# Patient Record
Sex: FEMALE | Race: WHITE | NOT HISPANIC OR LATINO | Employment: OTHER | URBAN - METROPOLITAN AREA
[De-identification: names, ages, dates, MRNs, and addresses within clinical notes are randomized per-mention and may not be internally consistent; named-entity substitution may affect disease eponyms.]

---

## 2022-03-28 ENCOUNTER — TELEPHONE (OUTPATIENT)
Dept: TRANSPLANT | Facility: CLINIC | Age: 57
End: 2022-03-28
Payer: COMMERCIAL

## 2022-03-28 NOTE — TELEPHONE ENCOUNTER
"----- Message from Kel Nayak sent at 3/28/2022 10:03 AM CDT -----    Have the patient's demographic sheet which includes: patient's name, mailing address, phone number and insurance information. We will check to see if patient medical records are in Care Everywhere: [facility]    Will fax "POST MD CARE AGREEMENT" letter to:    Local Hepatologist/Gastroenterologist: Sudarshan Mann MD   Phone: [phone]   Fax: [phone]        .    ----- Message -----  From: Kel Nayak  Sent: 3/28/2022  10:02 AM CDT  To: Kel Nayak      Returned call to pt and she told me that [facility] was told that Marjan in finance at [facility] faxed the pt recs to us for her to be seen in our clinic. Told her that we did not have any recs on her, but that we will be able to see her recs in Care Everywhere. She told me that she Is currently listed at [facility] with a MELD score of 19, but they will be moving to [place] and wanted to transfer he care here. Explained the referral process to her and her caregiver. Pt demos updated in Epic. Will send pt chart for review.    ==========================================================================    Speak with office  Received: Today  Vane Kimball sent to [facility] Pre-Liver Transplant Non-Clinical  Caller: Mickey Harris (Today,  8:51 AM)  Mickey Harris is calling to speak with Evelyn in regards to next step in transplant process. Records were faxed and pt would like confirmation.     [phone]        "

## 2022-04-04 ENCOUNTER — TELEPHONE (OUTPATIENT)
Dept: TRANSPLANT | Facility: CLINIC | Age: 57
End: 2022-04-04

## 2022-04-04 NOTE — TELEPHONE ENCOUNTER
Referral received from SELF    Patient with DORAN. MELD 19  ICD-10:  k74.60  Referred for liver transplant for  EVALUATION.    Referral completed and forwarded to [facility].    currently listed at [facility]    Insurance: [employer]  PRIMARY:   ID:  Contact #     SECONDARY:   ID:  Contact #

## 2022-04-07 ENCOUNTER — TELEPHONE (OUTPATIENT)
Dept: TRANSPLANT | Facility: CLINIC | Age: 57
End: 2022-04-07
Payer: COMMERCIAL

## 2022-04-07 NOTE — TELEPHONE ENCOUNTER
----- Message from Jethro Sterling sent at 4/7/2022  3:49 PM CDT -----  Regarding: miss call  Pt returning miss call    Call

## 2022-04-08 ENCOUNTER — TELEPHONE (OUTPATIENT)
Dept: TRANSPLANT | Facility: CLINIC | Age: 57
End: 2022-04-08
Payer: COMMERCIAL

## 2022-04-08 NOTE — TELEPHONE ENCOUNTER
----- Message from Kel Nayak sent at 4/8/2022 12:44 PM CDT -----  Regarding: FW: miss call    Returned call to pt to [facility] virtual appt w/Dr. Young, but the pt told me that they have left [place] and will be in [place] until SEPT. Virtual appt [facility]'ed with Dr. Regalado for 5/2  .  ----- Message -----  From: Jethro Sterling  Sent: 4/8/2022   9:23 AM CDT  To: [facility] Liver Referral Pool  Subject: miss call                                        Pt returning miss call    Call

## 2022-04-18 ENCOUNTER — TELEPHONE (OUTPATIENT)
Dept: TRANSPLANT | Facility: CLINIC | Age: 57
End: 2022-04-18

## 2022-04-18 NOTE — TELEPHONE ENCOUNTER
Spoke to pt. She is temporarily in [place] before the move to [place].  Pt is still listed in [place].

## 2022-04-29 RX ORDER — CHOLECALCIFEROL (VITAMIN D3) 125 MCG
125 CAPSULE ORAL DAILY
COMMUNITY
End: 2022-10-18

## 2022-04-29 RX ORDER — EVENING PRIMROSE OIL 500 MG
1 CAPSULE ORAL DAILY
Status: ON HOLD | COMMUNITY
End: 2023-01-31 | Stop reason: HOSPADM

## 2022-04-29 RX ORDER — ONDANSETRON 4 MG/1
4 TABLET, FILM COATED ORAL DAILY PRN
Status: ON HOLD | COMMUNITY
Start: 2021-09-30 | End: 2023-01-31 | Stop reason: HOSPADM

## 2022-04-29 RX ORDER — IBUPROFEN 100 MG/5ML
SUSPENSION, ORAL (FINAL DOSE FORM) ORAL
COMMUNITY
End: 2022-10-18

## 2022-04-29 RX ORDER — ALBUTEROL SULFATE 90 UG/1
AEROSOL, METERED RESPIRATORY (INHALATION)
Status: ON HOLD | COMMUNITY
Start: 2021-12-17 | End: 2023-01-31 | Stop reason: HOSPADM

## 2022-04-29 RX ORDER — FLUTICASONE PROPIONATE 220 UG/1
1 AEROSOL, METERED RESPIRATORY (INHALATION) DAILY PRN
Status: ON HOLD | COMMUNITY
Start: 2021-10-20 | End: 2023-01-31 | Stop reason: HOSPADM

## 2022-04-29 RX ORDER — LACTULOSE 10 G/15ML
SOLUTION ORAL; RECTAL
Status: ON HOLD | COMMUNITY
Start: 2022-04-26 | End: 2023-01-31 | Stop reason: HOSPADM

## 2022-04-29 RX ORDER — SPIRONOLACTONE 50 MG/1
100 TABLET, FILM COATED ORAL DAILY
Status: ON HOLD | COMMUNITY
Start: 2022-04-26 | End: 2023-01-31 | Stop reason: HOSPADM

## 2022-04-29 RX ORDER — BENZONATATE 100 MG/1
100 CAPSULE ORAL
COMMUNITY
Start: 2021-10-29 | End: 2022-06-30 | Stop reason: ALTCHOICE

## 2022-04-29 RX ORDER — AA/PROT/LYSINE/METHIO/VIT C/B6 50-12.5 MG
1 TABLET ORAL DAILY
Status: ON HOLD | COMMUNITY
Start: 2022-04-13 | End: 2023-01-31 | Stop reason: HOSPADM

## 2022-04-29 RX ORDER — FUROSEMIDE 20 MG/1
40 TABLET ORAL DAILY
Status: ON HOLD | COMMUNITY
Start: 2022-04-26 | End: 2023-02-01 | Stop reason: SDUPTHER

## 2022-04-29 RX ORDER — NICOTINE POLACRILEX 2 MG
1 GUM BUCCAL DAILY
Status: ON HOLD | COMMUNITY
End: 2023-01-31 | Stop reason: HOSPADM

## 2022-05-02 ENCOUNTER — OFFICE VISIT (OUTPATIENT)
Dept: TRANSPLANT | Facility: CLINIC | Age: 57
End: 2022-05-02
Payer: COMMERCIAL

## 2022-05-02 ENCOUNTER — PATIENT MESSAGE (OUTPATIENT)
Dept: TRANSPLANT | Facility: CLINIC | Age: 57
End: 2022-05-02
Payer: COMMERCIAL

## 2022-05-02 DIAGNOSIS — K76.82 HEPATIC ENCEPHALOPATHY: ICD-10-CM

## 2022-05-02 DIAGNOSIS — I85.10 SECONDARY ESOPHAGEAL VARICES WITHOUT BLEEDING: ICD-10-CM

## 2022-05-02 DIAGNOSIS — K75.81 LIVER CIRRHOSIS SECONDARY TO NASH: Primary | ICD-10-CM

## 2022-05-02 DIAGNOSIS — R18.8 OTHER ASCITES: ICD-10-CM

## 2022-05-02 DIAGNOSIS — K74.60 LIVER CIRRHOSIS SECONDARY TO NASH: Primary | ICD-10-CM

## 2022-05-02 DIAGNOSIS — R79.89 ELEVATED LFTS: ICD-10-CM

## 2022-05-02 PROCEDURE — 99204 OFFICE O/P NEW MOD 45 MIN: CPT | Mod: 95,TXP,, | Performed by: STUDENT IN AN ORGANIZED HEALTH CARE EDUCATION/TRAINING PROGRAM

## 2022-05-02 PROCEDURE — 99204 PR OFFICE/OUTPT VISIT, NEW, LEVL IV, 45-59 MIN: ICD-10-PCS | Mod: 95,TXP,, | Performed by: STUDENT IN AN ORGANIZED HEALTH CARE EDUCATION/TRAINING PROGRAM

## 2022-05-02 NOTE — PROGRESS NOTES
Transplant Hepatology   Transplant Evaluation Consult Note    The patient location is: home ([place])  The chief complaint leading to consultation is: DORAN cirrhosis    Visit type: audiovisual    Face to Face time with patient: 25  45 minutes of total time spent on the encounter, which includes face to face time and non-face to face time preparing to see the patient (eg, review of tests), Obtaining and/or reviewing separately obtained history, Documenting clinical information in the electronic or other health record, Independently interpreting results (not separately reported) and communicating results to the patient/family/caregiver, or Care coordination (not separately reported).     Each patient to whom he or she provides medical services by telemedicine is:  (1) informed of the relationship between the physician and patient and the respective role of any other health care provider with respect to management of the patient; and (2) notified that he or she may decline to receive medical services by telemedicine and may withdraw from such care at any time.      Referring provider: Aaareferral Self  PCP: No primary care provider on file.    Chief complaint:  DORAN cirrhosis, transplant evaluation    HPI: Mickey Harris is a 57 y.o. female who was referred to Transplant Hepatology Clinic for DORAN related cirrhosis. She is accompanied by her .    She reports being diagnosed with cirrhosis approximately 4 years ago.  She was found to have leukopenia and thrombocytopenia.  Ultrasound revealed hepatic steatosis and splenomegaly.  Subsequent MRI was suggestive of cirrhosis, and FibroScan showed F4 fibrosis, cirrhosis.  Serologic evaluation was negative, and her cirrhosis was attributed to DORAN.    She did well until approximately 1 year ago when she developed lower extremity edema and abdominal distension for which she was started on diuretics.  Subsequent imaging confirmed moderate volume ascites.  She has remained on  diuretics and has not required paracentesis.  Her cirrhosis has also been complicated by hepatic encephalopathy which is well controlled with lactulose.  She has had intermittent scleral icterus.  No recent GI bleeding.    She does not drink alcohol.  She was a previous social drinker but denies history of heavy alcohol use.  Previous workup was negative for chronic viral hepatitis.  She says that her mother  at age 24 from cirrhosis related to congenital portal vein issues.  Her son has elevated bilirubin.  She has no other known family history of liver disease.      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     Mild tricuspid regurgitation     Unspecified cirrhosis of liver        Past Surgical History:   Procedure Laterality Date    cholecystectomy      COLONOSCOPY      6 polyps removed    COLONOSCOPY  2021    ESOPHAGOGASTRODUODENOSCOPY      2 coulums of grade 1 varices    ESOPHAGOGASTRODUODENOSCOPY  2019    trace varices    ESOPHAGOGASTRODUODENOSCOPY  2021    gynecologic surgery       removal of scar tissues and adhesions    knee surgery      OOPHORECTOMY Right     OPEN hysterectomy      removed uterus and bilateral fallopian tubes and LEFT ovary stayed in place    ROTATOR CUFF REPAIR Right     TONSILLECTOMY  1972       Family History   Problem Relation Age of Onset    Hypertension Father     Hyperlipidemia Father     Heart disease Father     Depression Father     Arrhythmia Brother     Heart disease Brother        Social History     Tobacco Use    Smoking status: Former Smoker     Packs/day: 1.00     Types: Cigarettes     Quit date:      Years since quittin.3    Smokeless tobacco: Never Used   Substance Use Topics    Alcohol use: Not Currently     Comment:  2020 few sips of beer. before that last alcohol was 2019 no DUI hx       Current Outpatient Medications   Medication Sig Dispense Refill    albuterol (PROVENTIL/VENTOLIN HFA) 90 mcg/actuation inhaler SMARTSI Puff(s) Via Inhaler Every 4 Hours PRN      ascorbic acid, vitamin C, (VITAMIN C) 1000 MG tablet Take by mouth.      benzonatate (TESSALON) 100 MG capsule Take 100 mg by mouth.      biotin 1 mg Cap Take 1 mg by mouth.      cholecalciferol, vitamin D3, 125 mcg (5,000 unit) capsule Take 125 mcg by mouth.      cyanocobalamin, vitamin B-12, 50 mcg tablet Take 1 tablet by mouth once daily.      fluticasone propionate (FLOVENT HFA) 220 mcg/actuation inhaler Inhale 1 puff into the lungs.      furosemide (LASIX) 20 MG tablet Take 40 mg by mouth once daily.      lactulose (CHRONULAC) 10 gram/15 mL solution SMARTSIG:15 Milliliter(s) By Mouth 3 Times Daily      MG-PLUS-PROTEIN 133 mg tablet Take 1 tablet by mouth 3 (three) times daily with meals.      ondansetron (ZOFRAN) 4 MG tablet Take 4 mg by mouth.      spironolactone (ALDACTONE) 50 MG tablet Take 100 mg by mouth once daily.      vitamin E, dl,tocopheryl acet, (VITAMIN E, DL, ACETATE,) 45 mg (100 unit) Cap Take 1 tablet by mouth.       No current facility-administered medications for this visit.       Review of patient's allergies indicates:   Allergen Reactions    Levofloxacin Hives and Shortness Of Breath    Sulfa (sulfonamide antibiotics) Rash       Review of Systems   Constitutional: Negative for fever and weight loss.   Gastrointestinal: Negative for abdominal pain, blood in stool, constipation, diarrhea, heartburn, melena, nausea and vomiting.       There were no vitals filed for this visit.    Physical Exam  Constitutional:       General: She is not in acute distress.     Appearance: She is not ill-appearing.   HENT:      Head: Normocephalic and atraumatic.   Eyes:      Extraocular Movements: Extraocular movements intact.   Pulmonary:       Effort: No respiratory distress.   Neurological:      Mental Status: She is alert.         LABS: I personally reviewed pertinent laboratory findings.    No results found for: ALT, AST, GGT, ALKPHOS, BILITOT    No results found for: WBC, HGB, HCT, MCV, PLT    No results found for: NA, K, CL, CO2, BUN, CREATININE, CALCIUM, ANIONGAP, ESTGFRAFRICA, EGFRNONAA    No results found for: INR, PROTIME    Imaging:  I personally reviewed recent imaging studies available on the chart and from outside medical records.      Assessment:  57 y.o. female presenting with DORAN related cirrhosis. She is decompensated with ascites, HE, jaundice.    1. Liver cirrhosis secondary to DORAN    2. Secondary esophageal varices without bleeding    3. Other ascites    4. Hepatic encephalopathy    5. Elevated LFTs        Computed MELD-Na score unavailable. Necessary lab results were not found in the last year.  Computed MELD score unavailable. Necessary lab results were not found in the last year.      Transplant Candidacy: Patient is a 57 y.o. female with DORAN related cirrhosis with MELD-Na 17 on outside labs here for evaluation for possible OLT. Based on available information, she is a potential liver transplant candidate. She will undergo liver transplant evaluation after financial approval is obtained. She will be presented to Liver Transplant Selection Committee after all tests and evaluations are complete.    Recommendations:  1. Ascites/Edema: 2 gm Na diet. Continue Lasix 40 mg daily and Aldactone 100 mg daily    2. Encephalopathy: Continue lactulose. Titrate to maintain 3-4 bowel movements per day.    3. Variceal screening: EGD in 07/2021 showed trace varices. Repeat EGD in 07/2022.    4. HCC screening: US in 11/2021 without HCC. AFP 4. Repeat abdominal US and AFP every 6 months.    5. Immunizations: Recommend HAV and HBV vaccinations if not immune      UNOS Patient Status  Functional Status: 70% - Cares for self: unable to carry on normal  activity or active work  Physical Capacity: No Limitations    Diabetes: No  Any previous malignancy: No  Neoadjuvant Therapy: no  Has patient ever had a dx of HCC: no  Previous Abdominal Surgery: yes  Spontaneous Bacterial Peritonitis: no  History of Portal Vein Thrombosis: no  Transjugular Intrahepatic Portosystemic Shunt: no    Return to clinic within 3 months.    Kg Regalado MD  Staff Physician  Hepatology and Liver Transplant  [facility]  [facility]

## 2022-05-10 ENCOUNTER — TELEPHONE (OUTPATIENT)
Dept: TRANSPLANT | Facility: CLINIC | Age: 57
End: 2022-05-10
Payer: COMMERCIAL

## 2022-05-10 NOTE — TELEPHONE ENCOUNTER
"One Lorazepam prescription given to you today  Two Oxycodone prescriptions given to you today  STAY OFF OF Amlodipine  START Zetia  Get calcium in your diet rather than supplement  Enjoy [place]  See me in the spring/early summer for \"physical\"    " Called pt to discuss [facility] liver transplant evaluation.  Informed patient  that evaluation will take approx  2 to 3 days to complete, depending on rather additional consults.  Informed  her that all testing will be performed outpatient.  Patient notified that some testing may be completed outside.  Patient informed of which tests that can be scheduled locally.  Patient voiced understanding of the need to have her caregiver present for the . All questions/ concerns regarding liver transplant evaluation answered/ addressed.    Patient is currently listed for transplant at [facility] and hopes to get listed at [facility] too. Patient picked [facility] eval dates of June 30th and July 1st.     Not allergic to iodine. EGD and Colonoscopy done at [facility]. OBGYN and mammogram also at [facility].     [facility] pre-liver coordinator is Kay Nayak [phone].    Patient lives in [place] and in [place].

## 2022-05-19 ENCOUNTER — PATIENT MESSAGE (OUTPATIENT)
Dept: TRANSPLANT | Facility: CLINIC | Age: 57
End: 2022-05-19
Payer: COMMERCIAL

## 2022-05-19 ENCOUNTER — TELEPHONE (OUTPATIENT)
Dept: TRANSPLANT | Facility: CLINIC | Age: 57
End: 2022-05-19
Payer: COMMERCIAL

## 2022-05-19 NOTE — TELEPHONE ENCOUNTER
----- Message from Rodger Chen sent at 5/19/2022  2:35 PM CDT -----  Regarding: speak to nurse  Contact: Mickey Garduno is calling to speak to nurse.    Contact: [phone]

## 2022-05-19 NOTE — TELEPHONE ENCOUNTER
Returned call to pt. She stated that her [facility] MD wants to request the following two tests to be done during her liver transplant evaluation.    MRI abdomen w & w/o contrast  US abdomen    Explained that these are common tests ordered during our liver transplant evals, but that I would make sure that her coordinator, Santhosh, placed them in her eval order set. Understanding expressed. No other questions at this time.

## 2022-05-20 ENCOUNTER — TELEPHONE (OUTPATIENT)
Dept: TRANSPLANT | Facility: CLINIC | Age: 57
End: 2022-05-20
Payer: COMMERCIAL

## 2022-05-20 NOTE — TELEPHONE ENCOUNTER
Called patient to let her know that her evaluation will include an MRI w &wo contrast and a complete abd US with doppler per our transplant protocol for evaluation.   Patient sending message to [facility] to let them know the testing that will be ordered.

## 2022-06-21 ENCOUNTER — TELEPHONE (OUTPATIENT)
Dept: TRANSPLANT | Facility: CLINIC | Age: 57
End: 2022-06-21
Payer: COMMERCIAL

## 2022-06-21 ENCOUNTER — PATIENT MESSAGE (OUTPATIENT)
Dept: TRANSPLANT | Facility: CLINIC | Age: 57
End: 2022-06-21
Payer: COMMERCIAL

## 2022-06-21 DIAGNOSIS — Z76.82 ORGAN TRANSPLANT CANDIDATE: Primary | ICD-10-CM

## 2022-06-21 DIAGNOSIS — R05.9 COUGH: ICD-10-CM

## 2022-06-21 NOTE — TELEPHONE ENCOUNTER
Evaluation orders in Epic.  Evaluation worksheet given to .   Financially cleared for evaluation.   Will schedule updated Mammogram during eval.  Patient had hysterectomy and does not need pap smear.  Patient sent last pap smear from last July in [place].

## 2022-06-23 DIAGNOSIS — Z76.82 ORGAN TRANSPLANT CANDIDATE: Primary | ICD-10-CM

## 2022-06-29 ENCOUNTER — TELEPHONE (OUTPATIENT)
Dept: TRANSPLANT | Facility: CLINIC | Age: 57
End: 2022-06-29
Payer: COMMERCIAL

## 2022-06-29 NOTE — TELEPHONE ENCOUNTER
Patient called to confirm that they will be attending the scheduled appointments for Liver Transplant [facility] Evaluation scheduled to start 6/30  at 0730.  Patient confirms that caregivers will be present for the scheduled appointments.  Patient appointments reviewed along with location and special instructions.  Patient questions answered at this time and number provided to call the office if there is any problem.

## 2022-06-30 ENCOUNTER — HOSPITAL ENCOUNTER (OUTPATIENT)
Dept: RADIOLOGY | Facility: HOSPITAL | Age: 57
Discharge: HOME | End: 2022-06-30
Attending: STUDENT IN AN ORGANIZED HEALTH CARE EDUCATION/TRAINING PROGRAM
Payer: COMMERCIAL

## 2022-06-30 ENCOUNTER — EVALUATION (OUTPATIENT)
Dept: TRANSPLANT | Facility: CLINIC | Age: 57
End: 2022-06-30
Payer: COMMERCIAL

## 2022-06-30 ENCOUNTER — CLINICAL SUPPORT (OUTPATIENT)
Dept: TRANSPLANT | Facility: CLINIC | Age: 57
End: 2022-06-30
Payer: COMMERCIAL

## 2022-06-30 VITALS
OXYGEN SATURATION: 98 % | RESPIRATION RATE: 18 BRPM | WEIGHT: 188.25 LBS | DIASTOLIC BLOOD PRESSURE: 64 MMHG | TEMPERATURE: 97 F | HEIGHT: 64 IN | OXYGEN SATURATION: 98 % | RESPIRATION RATE: 18 BRPM | OXYGEN SATURATION: 98 % | SYSTOLIC BLOOD PRESSURE: 128 MMHG | HEIGHT: 64 IN | BODY MASS INDEX: 32.14 KG/M2 | SYSTOLIC BLOOD PRESSURE: 128 MMHG | HEART RATE: 83 BPM | BODY MASS INDEX: 32.14 KG/M2 | RESPIRATION RATE: 18 BRPM | DIASTOLIC BLOOD PRESSURE: 64 MMHG | WEIGHT: 188.25 LBS | BODY MASS INDEX: 32.14 KG/M2 | HEIGHT: 64 IN | SYSTOLIC BLOOD PRESSURE: 128 MMHG | HEART RATE: 83 BPM | TEMPERATURE: 97 F | DIASTOLIC BLOOD PRESSURE: 64 MMHG | HEART RATE: 83 BPM | WEIGHT: 188.25 LBS | TEMPERATURE: 97 F

## 2022-06-30 DIAGNOSIS — Z76.82 ORGAN TRANSPLANT CANDIDATE: ICD-10-CM

## 2022-06-30 DIAGNOSIS — Z12.31 BREAST CANCER SCREENING BY MAMMOGRAM: ICD-10-CM

## 2022-06-30 DIAGNOSIS — R05.9 COUGH: ICD-10-CM

## 2022-06-30 DIAGNOSIS — Z01.818 PRE-TRANSPLANT EVALUATION FOR LIVER TRANSPLANT: Primary | ICD-10-CM

## 2022-06-30 LAB
AMPHET+METHAMPHET UR QL: NEGATIVE
BACTERIA #/AREA URNS AUTO: NORMAL /HPF
BARBITURATES UR QL SCN>200 NG/ML: NEGATIVE
BENZODIAZ UR QL SCN>200 NG/ML: NEGATIVE
BILIRUB UR QL STRIP: NEGATIVE
BZE UR QL SCN: NEGATIVE
CANNABINOIDS UR QL SCN: NEGATIVE
CLARITY UR REFRACT.AUTO: ABNORMAL
COLOR UR AUTO: YELLOW
CREAT UR-MCNC: 141 MG/DL (ref 15–325)
ETHANOL UR-MCNC: <10 MG/DL
GLUCOSE UR QL STRIP: NEGATIVE
HGB UR QL STRIP: NEGATIVE
KETONES UR QL STRIP: NEGATIVE
LEUKOCYTE ESTERASE UR QL STRIP: NEGATIVE
METHADONE UR QL SCN>300 NG/ML: NEGATIVE
MICROSCOPIC COMMENT: NORMAL
NITRITE UR QL STRIP: NEGATIVE
OPIATES UR QL SCN: NEGATIVE
PCP UR QL SCN>25 NG/ML: NEGATIVE
PH UR STRIP: 6 [PH] (ref 5–8)
PROT UR QL STRIP: NEGATIVE
RBC #/AREA URNS AUTO: 1 /HPF (ref 0–4)
SP GR UR STRIP: 1.02 (ref 1–1.03)
SQUAMOUS #/AREA URNS AUTO: 1 /HPF
TOXICOLOGY INFORMATION: NORMAL
URN SPEC COLLECT METH UR: ABNORMAL
WBC #/AREA URNS AUTO: 2 /HPF (ref 0–5)

## 2022-06-30 PROCEDURE — 77063 MAMMO DIGITAL SCREENING BILAT WITH TOMO: ICD-10-PCS | Mod: 26,TXP,, | Performed by: RADIOLOGY

## 2022-06-30 PROCEDURE — 81001 URINALYSIS AUTO W/SCOPE: CPT | Mod: TXP | Performed by: STUDENT IN AN ORGANIZED HEALTH CARE EDUCATION/TRAINING PROGRAM

## 2022-06-30 PROCEDURE — 93975 VASCULAR STUDY: CPT | Mod: 26,GC,TXP, | Performed by: INTERNAL MEDICINE

## 2022-06-30 PROCEDURE — 77063 BREAST TOMOSYNTHESIS BI: CPT | Mod: TC,TXP

## 2022-06-30 PROCEDURE — 71250 CT CHEST WITHOUT CONTRAST: ICD-10-PCS | Mod: 26,TXP,, | Performed by: RADIOLOGY

## 2022-06-30 PROCEDURE — 71250 CT THORAX DX C-: CPT | Mod: 26,TXP,, | Performed by: RADIOLOGY

## 2022-06-30 PROCEDURE — 99999 PR PBB SHADOW E&M-EST. PATIENT-LVL III: ICD-10-PCS | Mod: PBBFAC,TXP,,

## 2022-06-30 PROCEDURE — 76700 US EXAM ABDOM COMPLETE: CPT | Mod: 26,59,GC,TXP | Performed by: INTERNAL MEDICINE

## 2022-06-30 PROCEDURE — 77063 BREAST TOMOSYNTHESIS BI: CPT | Mod: 26,TXP,, | Performed by: RADIOLOGY

## 2022-06-30 PROCEDURE — 93975 US ABDOMEN COMP WITH DOPPLER_PRE LIVER TRANSPLANT: ICD-10-PCS | Mod: 26,GC,TXP, | Performed by: INTERNAL MEDICINE

## 2022-06-30 PROCEDURE — 76700 US EXAM ABDOM COMPLETE: CPT | Mod: TC,TXP,59

## 2022-06-30 PROCEDURE — 77067 SCR MAMMO BI INCL CAD: CPT | Mod: 26,TXP,, | Performed by: RADIOLOGY

## 2022-06-30 PROCEDURE — 99215 OFFICE O/P EST HI 40 MIN: CPT | Mod: S$GLB,TXP,, | Performed by: TRANSPLANT SURGERY

## 2022-06-30 PROCEDURE — 97802 MEDICAL NUTRITION INDIV IN: CPT | Mod: S$GLB,TXP,, | Performed by: DIETITIAN, REGISTERED

## 2022-06-30 PROCEDURE — 80307 DRUG TEST PRSMV CHEM ANLYZR: CPT | Mod: TXP | Performed by: STUDENT IN AN ORGANIZED HEALTH CARE EDUCATION/TRAINING PROGRAM

## 2022-06-30 PROCEDURE — 99999 PR PBB SHADOW E&M-EST. PATIENT-LVL III: CPT | Mod: PBBFAC,TXP,,

## 2022-06-30 PROCEDURE — 99215 PR OFFICE/OUTPT VISIT, EST, LEVL V, 40-54 MIN: ICD-10-PCS | Mod: S$GLB,TXP,, | Performed by: TRANSPLANT SURGERY

## 2022-06-30 PROCEDURE — 76700 US ABDOMEN COMP WITH DOPPLER_PRE LIVER TRANSPLANT: ICD-10-PCS | Mod: 26,59,GC,TXP | Performed by: INTERNAL MEDICINE

## 2022-06-30 PROCEDURE — 97802 PR MED NUTR THER, 1ST, INDIV, EA 15 MIN: ICD-10-PCS | Mod: S$GLB,TXP,, | Performed by: DIETITIAN, REGISTERED

## 2022-06-30 PROCEDURE — 93975 VASCULAR STUDY: CPT | Mod: TC,TXP

## 2022-06-30 PROCEDURE — 77067 MAMMO DIGITAL SCREENING BILAT WITH TOMO: ICD-10-PCS | Mod: 26,TXP,, | Performed by: RADIOLOGY

## 2022-06-30 PROCEDURE — 71250 CT THORAX DX C-: CPT | Mod: TC,TXP

## 2022-06-30 NOTE — PROGRESS NOTES
Transplant Surgery Liver Transplant Recipient Evaluation    Referring Physician: Rosario Self  Corresponding Physician:     Subjective:     Reason for Visit: evaluate liver transplant candidacy    History of Present Illness: Mickey Harris is a 57 y.o. year old female who is being evaluated for liver transplantation.    Transplant History: N/A    Native Liver Disease (UNOS terminology): Cirrhosis: Fatty Liver (DORAN) (based on medical records from referral).    Manifestations of liver disease: ascites (diuretic-dependent), edema, encephalopathy, esophageal or gastric varices, fatigue, muscle wasting and thrombocytopenia  MELD-Na score: 15 at 6/30/2022  8:20 AM  MELD score: 15 at 6/30/2022  8:20 AM  Calculated from:  Serum Creatinine: 0.6 mg/dL (Using min of 1 mg/dL) at 6/30/2022  8:20 AM  Serum Sodium: 137 mmol/L at 6/30/2022  8:20 AM  Total Bilirubin: 3.5 mg/dL at 6/30/2022  8:20 AM  INR(ratio): 1.4 at 6/30/2022  8:20 AM  Age: 57 years    External provider notes reviewed: Yes    PMH: reviewed  PSH: reviewed    Review of Systems  Objective:     Physical Exam:  Constitutional:   Vitals reviewed: yes   Well-nourished and well-groomed: yes  Eyes:   Sclerae icteric: no   Extraocular movements intact: yes  GI:    Bowel sounds normal: yes   Tenderness: no    If yes, quadrant/location: not applicable   Palpable masses: no    If yes, quadrant/location: not applicable   Hepatosplenomegaly: no   Ascites: no   Hernia: no    If yes, type/location: not applicable   Surgical scars: yes    If yes, type/location: laparoscopic port sites  not applicable  Resp:   Effort normal: yes   Breath sounds normal: yes    CV:   Regular rate and rhythm: yes   Heart sounds normal: yes   Femoral pulses normal: yes   Extremities edematous: no  Skin:   Rashes or lesions present: no    If yes, describe:not applicable   Jaundice:: no    Musculoskeletal:   Gait normal: yes   Strength normal: yes  Psych:   Oriented to person, place, and time:  yes   Affect and mood normal: yes    Additional comments: obese abodmen    Diagnostics:  The following labs have been reviewed: CBC  CMP  The following radiology images have been independently reviewed and interpreted: scans pending         Transplant Surgery - Candidacy   Assessment/Plan:   I see no surgical contraindication to liver transplantation. Based on available information, Mickey Harris is a suitable liver transplant candidate. Final determination of transplant candidacy will be made once evaluation is complete and reviewed by the Liver Transplant Selection Committee.    Patient advised that it is recommended that all transplant candidates, and their close contacts and household members receive Covid vaccination.    Additional testing to be completed according to Liver : Written Order Guideline for Adult Liver Transplant Evaluation (LI-02)    Interpretation of tests and discussion of patient management involves all members of the multidisciplinary transplant team.    Tj Zazueta Jr, MD          Counseling: We provided Mickey Harris with a group education session today.  We discussed liver transplantation at length with her, including risks, potential complications, and alternatives in the management of her liver disease.  The discussion included complications related to anesthesia, bleeding, infection, primary nonfunction, and vascular thrombosis.  I discussed the typical postoperative course, length of hospitalization, the need for long-term immunosuppression, and the need for long-term routine follow-up.  I discussed living-donor and -donor transplantation and the relative advantages and disadvantages of each.  I also discussed average waiting times for both living donation and  donation.  I discussed national and center-specific survival rates.  I also mentioned the potential benefit of multicenter listing to candidates listed with centers within more than one organ procurement  organization.  All questions were answered.    Coronavirus disease (COVID-19) caused by severe acute respiratory virus coronavirus 2 (SARS-C0V 2) is associated with increased mortality in solid organ transplant recipients (SOT) compared to non-transplant patients. Vaccine responses to vaccination are depressed against SARS-CoV2 compared to normal individuals but improve with third vaccination doses. Vaccination prior to SOT provides both the best opportunity for transplant candidates to develop protective immunity and to reduce the risk of serious COVID19 infections post transplantation. Organ transplant candidates at [facility] will be required to receive SARS-CoV-2 vaccination prior to being listed with a an active status, whenever possible. Exceptions will be made for disability related reasons or for sincerely held Restorationism beliefs.     PHS: I discussed the use of organs from donors with PHS risk criteria, including the testing protocols utilized, as well as data from the literature regarding the likelihood of transmission of hepatitis or HIV.  The patient is willing to consider such grafts.  DCD: I discussed the use of organs recovered by donation after cardiac death (DCD), including slightly decreased graft survival and greater risk of arterial and biliary complications. The potential advantage to the recipient is possibly receiving a transplant sooner by accepting such an organ. The patient is willing to consider such grafts.  HBcAb: I discussed the use of organs from donors with HBcAb in conjunction with long term use of HBV antiviral drugs, such as lamivudine. The small but measurable risk of hepatitis B seroconversion was discussed as well as the potentially life long need to continue antiviral drugs. The patient is willing to consider such grafts.  HCV Non-viremic recipient: I discussed the use of HCV-positive organs in naive recipients, including the risk of viral  transmission to the patients or others, potential insurance barriers for antiviral medication coverage, risk for fibrosing cholestatic hepatitis, death or graft loss. The potential advantage to the recipient is the possibility of receiving a transplant sooner with decreased mortality risk by accepting such an organ. The patient is willing to consider such grafts.  LDLT: I discussed the nature of living donor liver transplant, including donor risks and more frequent recipient complications. The patient is willing to consider such grafts.

## 2022-06-30 NOTE — PROGRESS NOTES
"Transplant Recipient Adult Psychosocial Assessment    Mickey Harris  [address] [facility] [address]   Patient and spouse live between [facility] at this time. If listed, patient plans to permanently move out of [place] at the end of the summer and relocate to [place] prior to transplant.   Telephone Information:   Mobile [phone]   Home  [phone] (home)  Work  There is no work phone number on file.  E-mail  [email or web address]    Sex: female  YOB: 1965  Age: 57 y.o.    Encounter Date: 6/30/2022  [place] Citizen: yes  Primary Language: English   Needed: no    Emergency Contact:  Name: Kg Harris  Relationship: spouse  Address: same as patient  Phone Numbers:  [phone] (mobile)    Family/Social Support:   Number of dependents/: Patient has no dependents in need of . Patient has 3 pets that she is currently trying to secure pet care for.  Marital history:   Other family dynamics: Patient's biological mother passed away when patient was 5 years old. Patient's biological father and step-mother are a part of patient's life and live in [place]. Patient has been  to her spouse, Kg, for 39 years. Patient and spouse have 1 daughter, Simon Briones" (age 38), and 1 son, Jaison (age 37). Patient also has 2 brothers and in laws. Patient reports good relationship with family members.    Household Composition:  Name: Mickey Harris  Age: 57  Relationship: patient  Does person drive? sometimes     Name: Kg Harris  Age: 59  Relationship: spouse  Does person drive? yes     Pets: 3 dogs    *Patient and spouse are currently living on properties between [place], [place]. Patient also reports they have an RV they may utilize to stay closer to [facility] and will look at RV campgrounds. Patient plans to return to [place] after local transplant stay for a few more months if she may have to come to local appointments. Patient and spouse " plan to permanently move to their property in [place] in the future.     Do you and your caregivers have access to reliable transportation? yes  PRIMARY CAREGIVER: Kg Harris, spouse, will be primary caregiver, phone number [phone]. Patient's spouse was present during this assessment and reports commitment to serve as patient's primary caregiver. Patient's spouse is retired, drives and has transportation, and is in adequate health.       provided in-depth information to patient and caregiver regarding pre- and post-transplant caregiver role.   strongly encourages patient and caregiver to have concrete plan regarding post-transplant care giving, including back-up caregiver(s) to ensure care giving needs are met as needed.    Patient and Caregiver states understanding all aspects of caregiver role/commitment and is able/willing/committed to being caregiver to the fullest extent necessary.    Patient and Caregiver verbalizes understanding of the education provided today and caregiver responsibilities.         remains available. Patient and Caregiver agree to contact  in a timely manner if concerns arise.      Able to take time off work without financial concerns: yes.     Additional Significant Others who will Assist with Transplant:  Name: Kobe Harris (Confirmed via phone)  Age: 38  City: [place] State: [place]  Relationship: daughter  Does person drive? yes   Phone: [phone]    Name: Santhosh Gandara  Age: 75  City: [place] State: [place]  Relationship: step-mother  Does person drive? yes   Phone: [phone]     Living Will: Patient reports having personal living will  Healthcare Power of : Patient reports having personal healthcare power of . Patient reports her spouse, Kg Harris, is her POA.   Advance Directives on file: <<no information> per medical record.  Verbally reviewed LW/HCPA information. Patient reports she has personal living  will and Healthcare POA documents she will file with medical records  provided patient with copy of LW/HCPA documents and provided education on completion of forms.    Living Donors: Patient reports her daughter, Kobe, is interested in being a living donor if possible.  provided brief overview and advised patient to speak to coordinator regarding living donor process.    Highest Education Level: Post-College Graduate Degree  Reading Ability: college  Reports difficulty with: vision (wears prescription glasses). Patient also reports weekly memory issues due to ammonia levels. Patient reports good adherence with Lactulose as needed  Learns Best By: Combination      Status: no  VA Benefits: no     Working for Income: No  If no, reason not working: Demands of Treatment  Patient is currently unemployed. Patient was released from her company in 2022 and last worked in 2021 as an  .     Spouse/Significant Other Employment: Retired    Disabled: Patient is currently on long term disability from last employer. Patient has had assistance from [employer] and a  to apply for Social Security Disability and is awaiting decision.     Monthly Income: ~$10,000/month combined household  Able to afford all costs now and if transplanted, including medications: yes  Patient and Caregiver verbalizes understanding of personal responsibilities related to transplant costs and the importance of having a financial plan to ensure that patients transplant costs are fully covered.       provided fundraising information/education. Patient and Caregiververbalizes understanding.   remains available.    Insurance:   Payer/Plan Subscr  Sex Relation Sub. Ins. ID Effective Group Num   1. JAKI - AETNA* MARYAN HOOD 1965 Female Self [id number] 1/1/15 [id number]                                    [id number]   2. JAKI - [employer]* MARYAN HOOD  1965 Female Self [id number] 1/1/15                                    [address]     Primary Insurance (for UNOS reporting): Private Insurance  Secondary Insurance (for UNOS reporting): Private Insurance  Patient and Caregiver verbalizes clear understanding that patient may experience difficulty obtaining and/or be denied insurance coverage post-surgery. This includes and is not limited to disability insurance, life insurance, health insurance, burial insurance, long term care insurance, and other insurances.      Patient and Caregiver also reports understanding that future health concerns related to or unrelated to transplantation may not be covered by patient's insurance.  Resources and information provided and reviewed.     Patient and Caregiver provides verbal permission to release any necessary information to outside resources for patient care and discharge planning.  Resources and information provided are reviewed.      Dialysis Adherence: Patient denies any dialysis  Infusion Service: patient utilizing? no  Home Health: patient utilizing? no  DME: no  Pulmonary/Cardiac Rehab: no   ADLS:  Patient reports she is independent with daily activities and drives sometimes.     Adherence:  Patient reports high level of adherence with medical and medication regimens.  Adherence education and counseling provided.     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 before that last alcohol was 2019 no DUI hx     Social History     Substance and Sexual Activity   Drug Use Not on file     Social History     Substance and Sexual Activity   Sexual Activity Not on file       Per Today's Psychosocial:  Tobacco: none, patient denies any use.   Alcohol: none, patient denies any use. Patient's last alcohol use was over 2 years ago and only social drinking (1-2 drinks per month) prior to that time.   Illicit Drugs/Non-prescribed Medications: none, patient denies any use.    Patient and Caregiver states clear understanding of the potential impact of substance use as it relates to transplant candidacy and is aware of possible random substance screening. Substance abstinence/cessation counseling, education and resources provided and reviewed.     Arrests/DWI/Treatment/Rehab: patient denies    Psychiatric History:    Mental Health: Patient reports some recent anxiety related to current health concerns  Psychiatrist/Counselor: Patient has seen a psychologist in [place] and has the name of a counselor she plans to contact if needed.   Medications: Patient prefers not to take any medication for anxiety at this time   Suicide/Homicide Issues: Patient denies any SI or HI  Safety at home: Patient reports living in a safe environment at home and denies any concerns    Knowledge: Patient and Caregiver states having clear understanding and realistic expectations regarding the potential risks and potential benefits of organ transplantation and organ donation and agrees to discuss with health care team members and support system members, as well as to utilize available resources and express questions and/or concerns in order to further facilitate the pt informed decision-making.  Resources and information provided and reviewed.    Patient and Caregiver is aware of [facility]'s affiliation and/or partnership with agencies in home health care, LTAC, SNF, [facility], and other hospitals and  clinics.    Understanding: Patient and Caregiver reports having a clear understanding of the many lifetime commitments involved with being a transplant recipient, including costs, compliance, medications, lab work, procedures, appointments, concrete and financial planning, preparedness, timely and appropriate communication of concerns, abstinence (ETOH, tobacco, illicit non-prescribed drugs), adherence to all health care team recommendations, support system and caregiver involvement, appropriate and timely resource utilization and follow-through, mental health counseling as needed/recommended, and patient and caregiver responsibilities.  Social Service Handbook, resources and detailed educational information provided and reviewed.  Educational information provided.    Patient and Caregiver also reports current and expected compliance with health care regime and states having a clear understanding of the importance of compliance.      Patient and Caregiver reports a clear understanding that risks and benefits may be involved with organ transplantation and with organ donation.       Patient and Caregiver also reports clear understanding that psychosocial risk factors may affect patient, and include but are not limited to feelings of depression, generalized anxiety, anxiety regarding dependence on others, post traumatic stress disorder, feelings of guilt and other emotional and/or mental concerns, and/or exacerbation of existing mental health concerns.  Detailed resources provided and discussed.      Patient and Caregiver agrees to access appropriate resources in a timely manner as needed and/or as recommended, and to communicate concerns appropriately.  Patient and Caregiver also reports a clear understanding of treatment options available.     Patient and Caregiver received education in a group setting.   reviewed education, provided additional information, and answered questions.    Feelings or  Concerns: Patient denies any concerns regarding transplant.    Coping: Identify Patient & Caregiver Strategies to [place]:   1. Currently & Pre-transplant - Patient enjoys camping, cooking, boating, fishing and walking her dogs   2. At the time of surgery - Patient plans to have support from family   3. During post-Transplant & Recovery Period - Patient plans to read and have support from family    Goals: Patient has goals to enjoy her spouse's longterm with him in a home they are building in [place] and also visit their vacation home in [facility] .    Interview Behavior: Patient and Caregiver presents as alert and oriented x 4, pleasant, calm, communicative and asking and answering questions appropriately.          Transplant Social Work - Candidacy  Assessment/Plan:     Psychosocial Suitability: Patient presents as a low risk candidate for transplant at this time. Based on psychosocial risk factors, patient presents as low risk, due to patient having a strong support system and confirmed caregiver plan. Patient reports adequate finances and insurance. Patient denies any tobacco, alcohol or illicit substance abuse. Patient reports some anxiety related to current health issues and plans to see a counselor if needed.     Final determination of transplant candidacy will be made once evaluation is complete and reviewed by the Liver Transplant Selection Committee.    Recommendations/Additional Comments:   -Fundraising  -Local Lodging (LR vs RV/apartment)  -Ensure pet care  -File living will/healthcare POA with medical records  -Patient to call transplant SW with any concerns or obstacles  -Plan was created in collaboration with patient and patient/caregiver verbalize agreement with plan as discussed.      RAJENDRA YOO LCSW

## 2022-06-30 NOTE — PROGRESS NOTES
Mickey was seen  seen in clinic for Fast Pass evaluation.  Handbook on pre-liver transplant information (see outline below) was given to the patient.  Patient's , accompanied her to scheduled appointments.  Patient viewed pre-liver transplant education slides via desktop in transplant clinic.  Informed consent signed and written information given on selection criteria.    LIVER TRANSPLANT WORK-UP EDUCATION   I. UNDERSTANDING THE TRANSPLANT PROCESS     A. Transplant team      B. MELD score      C. Balancing urgency and outcome     D. Liver Transplant Options         1.  Donor         2. Living Donor--rationale, benefits     E. Transplant Work-up         1. Medical         2. Psychological and Social--lifetime commitment, life changes, personal plan/ goal         3. Financial--fundraising     F.  Completed work-up and Next Steps    G. Wait Time         1.  Can be listed at more than one center         2.  Can transfer wait time     H. The Call       I. Possible donor options         1. DCD         2. Hep B Core and Hep C Positive         3. Increased Risk     J.  Liver Transplant Surgery         1. Length         2. Transfusions, cell saver         3. Surgical risks         4. What to expect after sugery--Central lines, drains, Morrow catheter, incision, endotracheal              tube, NG tube, length of stay in ICU/ TSU  II.  HOW TO BEST CARE FOR YOURSELF (Take Five To Thrive)  III. UNDERSTANDING LIFE AFTER TRANSPLANT  A. Medicines after transplant      1. Immunosuppression--infection and rejection  B. Labs   IV. ADULT LIVER SURVIVAL RATES

## 2022-06-30 NOTE — PROGRESS NOTES
PHARM.D. PRE-TRANSPLANT NOTE:    This patient's medication therapy was evaluated as part of her pre-transplant evaluation.      The following general pharmacologic concerns were noted: Vitamin E may increase bleeding risk     The following concerns for post-operative pain management were noted: none     The following pharmacologic concerns related to HCV therapy were noted: none     This patient's medication profile was reviewed for considerations for DAA Hepatitis C therapy:    [X]  No current inducers of CYP 3A4 or PGP  [X]  No amiodarone on this patient's EMR profile in the last 24 months  [X]  No past or current atrial fibrillation on this patient's EMR profile       Current Outpatient Medications   Medication Sig Dispense Refill    albuterol (PROVENTIL/VENTOLIN HFA) 90 mcg/actuation inhaler SMARTSI Puff(s) Via Inhaler Every 4 Hours PRN      ascorbic acid, vitamin C, (VITAMIN C) 1000 MG tablet Take by mouth.      biotin 1 mg Cap Take 1 mg by mouth once daily.      cholecalciferol, vitamin D3, 125 mcg (5,000 unit) capsule Take 125 mcg by mouth once daily.      cyanocobalamin, vitamin B-12, 50 mcg tablet Take 1 tablet by mouth once daily.      fluticasone propionate (FLOVENT HFA) 220 mcg/actuation inhaler Inhale 1 puff into the lungs daily as needed.      furosemide (LASIX) 20 MG tablet Take 40 mg by mouth once daily.      lactulose (CHRONULAC) 10 gram/15 mL solution SMARTSIG:15 Milliliter(s) By Mouth 3 Times Daily      MG-PLUS-PROTEIN 133 mg tablet Take 1 tablet by mouth once daily.      ondansetron (ZOFRAN) 4 MG tablet Take 4 mg by mouth daily as needed.      spironolactone (ALDACTONE) 50 MG tablet Take 100 mg by mouth once daily.      vitamin E, dl,tocopheryl acet, (VITAMIN E, DL, ACETATE,) 45 mg (100 unit) Cap Take 1 tablet by mouth once daily.       No current facility-administered medications for this visit.           I am available for consultation and can be contacted, as needed by the other  members of the Transplant team.

## 2022-06-30 NOTE — PROGRESS NOTES
Subjective:      Patient ID: Mickey Harris is a 57 y.o. female.    Chief Complaint:Liver Transplant      History of Present Illness    Ms. Mickey Harris is a 57 year old woman with DORAN cirrhosis who is undergoing liver transplant evaluation.    She is from [place], home in [place], also home in [facility]     Patient denies recent fevers, chills, infective illnesses.   Denies hx of paracentesis or SBP    No history of diabetes.  No current or long term steroid use.   Denies splenectomy    Denies history of chronic, recurring infections of sinuses, throat, intestines, skin, reproductive organs, teeth or gums.   Reports history of chronic UTI - none in the last 6 months?  Previously 4-7 per year.      History of chickenpox as an adult.    No shingles.   Denies history of syphilis, gonorrhea, other STDs/viral hepatitis/ or other infective illnesses.     Denies known exposure to TB  Lived in [place]   No foreign travel -  [place] only.  Vacation, 3 weeks.  No associated illnesses  [place] - one week only  In 2014  No current plans for foreign travel    Animal exposures: 3 dogs fully vaccinated.    Possibly wants chickens  - counseled regarding this    Occupational:  Human Resources   Hobbies: camping, hiking Counseled re mosquito/tick precautions   Denies eating raw meat, fish, or shellfish  Denies IVDU or inhaled marijuana/cannabis     Immunization history:  Usual childhood vaccines  Flu - up to date  Tdap -5/6/2014  Hepatitis A - denies.  Positive Antibody from 2019, but current lab negative IgG  Hepatitis B - 3 doses in 2019. Surface antibody negative  Pneumonia - PPSV23 2018  Shingles - two doses 2019  COVID - 4th booster March 31, 2022       Immunization History   Administered Date(s) Administered    COVID-19, MRNA, LN-S, PF (MODERNA FULL 0.5 ML DOSE) 03/10/2021, 04/07/2021, 09/15/2021, 3/31/22    Hepatitis B, Adult 04/18/2019, 07/09/2019, 12/13/2019    Influenza 10/01/2019,  10/01/2020, 09/15/2021    Pneumococcal Polysaccharide - 23 Valent 09/19/2018    Tdap 05/06/2014    Zoster Recombinant 09/20/2019, 12/13/2019       Serologies     Latest Reference Range & Units 06/30/22 08:20   Hepatitis A Antibody IgG  Negative   Hep B Core Total Ab  Negative   Hep B S Ab  Negative   Hepatitis B Surface Ag Negative  Negative   Hepatitis C Ab Negative  Negative   HIV 1/2 Ag/Ab Negative  Negative   CMV IgG Interpretation Non-Reactive  Reactive !   Varicella IgG 0.00 - 0.90 ISR 3.20 (H)   Varicella Interpretation Negative  Positive !     Remaining serologies pending.  Data incomplete    Micro:   Note    Imaging:  CT chest - There are few small calcified micro granulomas.  Some of these are small noncalcified micro nodules.  These are all in the 2 mm range.        Review of Systems   Constitutional: Negative for chills, decreased appetite, fever, malaise/fatigue, night sweats, weight gain and weight loss.   HENT: Negative for congestion, ear pain, hearing loss, hoarse voice, sore throat and tinnitus.    Eyes: Negative for blurred vision, redness and visual disturbance.   Cardiovascular: Positive for leg swelling. Negative for chest pain and palpitations.   Respiratory: Negative for cough, hemoptysis, shortness of breath, sputum production and wheezing.    Endocrine: Negative for cold intolerance and heat intolerance.   Hematologic/Lymphatic: Negative for adenopathy. Does not bruise/bleed easily.   Skin: Negative for dry skin, itching, rash and suspicious lesions.   Musculoskeletal: Positive for joint pain. Negative for back pain, myalgias and neck pain.   Gastrointestinal: Negative for abdominal pain, constipation, diarrhea, heartburn, nausea and vomiting.   Genitourinary: Negative for dysuria, flank pain, frequency, hematuria, hesitancy and urgency.   Neurological: Positive for dizziness (almost daily lightheadedness ). Negative for numbness, paresthesias and weakness.   Psychiatric/Behavioral:  Negative for depression and memory loss. The patient does not have insomnia and is not nervous/anxious.    Allergic/Immunologic: Negative for environmental allergies, HIV exposure, hives and persistent infections.     Objective:   Physical Exam  Constitutional:       General: She is not in acute distress.     Appearance: Normal appearance. She is well-developed. She is not ill-appearing, toxic-appearing or diaphoretic.   HENT:      Head: Normocephalic and atraumatic.      Mouth/Throat:      Mouth: Mucous membranes are moist. No oral lesions.      Dentition: Normal dentition. No dental caries.      Pharynx: Uvula midline. No oropharyngeal exudate or posterior oropharyngeal erythema.   Eyes:      General: No scleral icterus.        Right eye: No discharge.         Left eye: No discharge.      Conjunctiva/sclera: Conjunctivae normal.   Cardiovascular:      Rate and Rhythm: Normal rate and regular rhythm.      Heart sounds: No murmur heard.    No friction rub. No gallop.   Pulmonary:      Effort: Pulmonary effort is normal. No respiratory distress.      Breath sounds: Normal breath sounds. No wheezing or rales.   Chest:   Breasts:      Right: No supraclavicular adenopathy.      Left: No supraclavicular adenopathy.       Abdominal:      General: Bowel sounds are normal. There is no distension.      Palpations: Abdomen is soft.      Tenderness: There is no abdominal tenderness. There is no guarding.   Musculoskeletal:      Cervical back: Normal range of motion and neck supple.      Right lower leg: Edema (mild ) present.      Left lower leg: Edema (mild) present.   Lymphadenopathy:      Head:      Right side of head: No submental, submandibular, tonsillar, preauricular, posterior auricular or occipital adenopathy.      Left side of head: No submental, submandibular, tonsillar, preauricular, posterior auricular or occipital adenopathy.      Cervical: No cervical adenopathy.      Upper Body:      Right upper body: No  supraclavicular or epitrochlear adenopathy.      Left upper body: No supraclavicular or epitrochlear adenopathy.      Lower Body: No right inguinal adenopathy. No left inguinal adenopathy.   Skin:     General: Skin is warm and dry.      Findings: No rash.   Neurological:      Mental Status: She is alert and oriented to person, place, and time.   Psychiatric:         Mood and Affect: Mood normal.         Behavior: Behavior normal.         Thought Content: Thought content normal.         Judgment: Judgment normal.       Assessment:       1. Pre-transplant evaluation for chronic liver disease    2. Organ transplant candidate    3. Need for hepatitis B vaccination    4. Need for pneumococcal vaccine    5. Need for hepatitis A immunization    6. History of recurrent UTI (urinary tract infection)          Plan:        1.  Risks of infection - No unusual risks of infection or significant barriers to transplantation were identified from the infectious disease standpoint given the available information at this time subject to the following:     -   Quantiferon Gold is pending.  If positive, please consult ID. If  Indeterminate, please draw T spot.  If T spot positive, please consult ID.     -   RPR, strongyloides  pending.  If positive, please consult ID   -   Pulmonary micronodules noted on CT - have ordered coccidioides IgG (also hx of residence in coccidioides endemic area), histoplasma antigen (serum and urine), blastomyces antigen urine, cryptococcal antigen.     -   Recommend Urology evaluation for history of recurrent UTI      2. Counseling: I discussed with the patient and her  the risk for increased susceptibility to infections following transplantation including increased risk for infection right after transplant and if rejection should occur.  The patient has been counseled on the importance of vaccinations including but not limited to a yearly flu vaccine.    3.  Specific guidance has been provided to the  patient regarding the patients occupation, hobbies and activities to avoid future infectious complications including but not limited to avoiding undercooked meats and seafood, proper hygiene, and contact with animals. Counseled regarding insect precautions with camping, fishing precautions.  Counseled regarding risks with birds/chickens.     4.  Recommended Immunizations      COVID booster - 5th dose.  4 months after 4th dose  Hep A (0,6 months)  Heplisav-B (0,1 month)   Prevnar 20      The patient was encouraged to contact us about any problems that may develop after immunization and possible side effects were reviewed.   Patient given my contact information.       Final determination of transplant candidacy will be made once evaluation is complete and reviewed by the Liver Transplant Selection Committee.        60 minutes of total time was spent on this encounter, which includes face to face time and non-face to face time preparing to see the patient (eg, review of tests), Obtaining and/or reviewing separately obtained history, documenting clinical information in the electronic or other health record, independently interpreting results (not separately reported) and communicating results to the patient/family/caregiver, or care coordination (not separately reported).

## 2022-06-30 NOTE — PROGRESS NOTES
Patient seen in clinic with caregiver.  Consents reviewed and signatures obtained.   Patient given supplies needed to obtain urine specimen.    Patient's name and date of birth verified.   Instructions reviewed with the patient on the way the specimen should be obtained.   Patient stated that  they understood the information provided for the collection and  placement of the specimen. Specimen collected in clinic. Patient given supplies and printed instructions for the collection of the 24 hour urine specimen.  Patient reports understanding the instructions provided to obtain the specimen and the storage of the specimen while at home.  Patient given instructed to bring the container to 2nd floor lab when the collection is completed.  Patient contact information verified.  Patient questions answered and concerns addressed.

## 2022-06-30 NOTE — PROGRESS NOTES
TRANSPLANT NUTRITIONAL ASSESSMENT    Referring Provider: Kg Regalado MD    Reason for Visit: Pre-liver transplant work-up    Age: 57 y.o.  Sex: female    There is no problem list on file for this patient.    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     Mild tricuspid regurgitation     Unspecified cirrhosis of liver      Lab Results   Component Value Date     (H) 2022    K 4.0 2022    ALBUMIN 3.3 (L) 2022    HGBA1C 4.5 2022    CALCIUM 8.7 2022     Other Pertinent Labs: none    Current Outpatient Medications   Medication Sig    albuterol (PROVENTIL/VENTOLIN HFA) 90 mcg/actuation inhaler SMARTSI Puff(s) Via Inhaler Every 4 Hours PRN    ascorbic acid, vitamin C, (VITAMIN C) 1000 MG tablet Take by mouth.    biotin 1 mg Cap Take 1 mg by mouth once daily.    cholecalciferol, vitamin D3, 125 mcg (5,000 unit) capsule Take 125 mcg by mouth once daily.    cyanocobalamin, vitamin B-12, 50 mcg tablet Take 1 tablet by mouth once daily.    fluticasone propionate (FLOVENT HFA) 220 mcg/actuation inhaler Inhale 1 puff into the lungs daily as needed.    furosemide (LASIX) 20 MG tablet Take 40 mg by mouth once daily.    lactulose (CHRONULAC) 10 gram/15 mL solution SMARTSIG:15 Milliliter(s) By Mouth 3 Times Daily    MG-PLUS-PROTEIN 133 mg tablet Take 1 tablet by mouth once daily.    ondansetron (ZOFRAN) 4 MG tablet Take 4 mg by mouth daily as needed.    spironolactone (ALDACTONE) 50 MG tablet Take 100 mg by mouth once daily.    vitamin E, dl,tocopheryl acet, (VITAMIN E, DL, ACETATE,) 45 mg (100 unit) Cap Take 1 tablet by mouth once daily.     No current facility-administered medications for this visit.     Allergies: Levofloxacin and Sulfa (sulfonamide antibiotics)    Ht Readings from Last 1  "Encounters:   06/30/22 5' 4.45" (1.637 m)     Wt Readings from Last 1 Encounters:   06/30/22 85.4 kg (188 lb 4.4 oz)      BMI: Body mass index is 31.87 kg/m².    Usual Weight: 175 lb  Weight Change/Time: lately eating out of the house more often, attributes weight gain to travel/inconsistent routine   Current Diet: regular  Appetite/Current Intake: fair   Exercise/Physical Activity: functional in ADL's when not too fatigued; can swim, use other exercise equipment or walk when able to  Frailty Score: 4.05     Nutritional/Herbal Supplements: Vit B, Vit E, Vit D  Potential Food/Medication Interactions: reviewed  Chewing/Swallowing Problems: none  Symptoms: nausea  Assessment of Lab Values: Glu 114, Alb 3.3  Support System: spouse present, voices support, pt enjoys cooking    Estimated Kcal Need: 6663-4814 kcal (20-25 kcal/kg)  Estimated Protein Need:  gm (1-1.5 gm/kg)    Nutritional History:   Pt has nausea about 1-2 hr after she takes her medication at night, occasional vomiting. She other davis has a good appetite. Pt is not using salt in cooking at home. She states she is trying to do more 'plant based' meals lately. She does not use boxed mixes or pre made ingredients. Pt reported the following diet recall:  B: 2 eggs, rarely de luna/sausage, fresh fruit, toast maybe, egg white frittata w/ vegetables, makes her own sauces  Snack: almonds/other nuts roasting in the oven with craisins, sandwich (freshly sliced low sodium turkey), low carb yogurt w/ frozen berries, maybe protein powder  D: lean beef (less often), chicken, baking shells for tacos herself, lean pork, grilled vegetables- usually raw vegetables  Snack: low sodium & cheese    Nutritional Diagnoses  Problem: food- and nutrition-related knowledge deficit  Etiology: r/t no recent edu on pre liver transplant nutrition recommendations  Symptoms: aeb diet     Educational Need? yes  Barriers: none identified  Discussed with: patient and spouse  Interventions: " Patient taught nutrition information regarding Pre-liver transplant work-up.    Pre liver transplant nutrition packet provided and discussed. Pt advised on the recommendations to follow a low sodium diet while taking in adequate protein.  This packet includes label low sodium reading tips, seasoning suggestions, protein intake goal amount (gm) for the individual patient, as well as oral supplement suggestions.   Exercise and physical activity are encouraged.    Goals/Recommendations: diet adherence, small frequent meals and snacks, choose healthy options when dining out and aerobic exercises as medically able  Actions Taken: instruct/provide written information  Strategies Used: problem solving, goal setting, motivational interviewing  Patient and/or family comprehend instructions: yes , adherence expected  Outcome: Verbalizes understanding  Monitoring: Contact information provided, will f/u in clinic and communicate with the care team as needed.     Counseling Time: 15 minutes

## 2022-07-01 ENCOUNTER — HOSPITAL ENCOUNTER (OUTPATIENT)
Dept: PULMONOLOGY | Facility: CLINIC | Age: 57
Discharge: HOME | End: 2022-07-01
Payer: COMMERCIAL

## 2022-07-01 ENCOUNTER — HOSPITAL ENCOUNTER (OUTPATIENT)
Dept: RADIOLOGY | Facility: HOSPITAL | Age: 57
Discharge: HOME | End: 2022-07-01
Attending: STUDENT IN AN ORGANIZED HEALTH CARE EDUCATION/TRAINING PROGRAM
Payer: COMMERCIAL

## 2022-07-01 ENCOUNTER — OFFICE VISIT (OUTPATIENT)
Dept: TRANSPLANT | Facility: CLINIC | Age: 57
End: 2022-07-01
Payer: COMMERCIAL

## 2022-07-01 ENCOUNTER — HOSPITAL ENCOUNTER (OUTPATIENT)
Dept: CARDIOLOGY | Facility: HOSPITAL | Age: 57
Discharge: HOME | End: 2022-07-01
Attending: STUDENT IN AN ORGANIZED HEALTH CARE EDUCATION/TRAINING PROGRAM
Payer: COMMERCIAL

## 2022-07-01 ENCOUNTER — OFFICE VISIT (OUTPATIENT)
Dept: INFECTIOUS DISEASES | Facility: CLINIC | Age: 57
End: 2022-07-01
Payer: COMMERCIAL

## 2022-07-01 ENCOUNTER — CLINICAL SUPPORT (OUTPATIENT)
Dept: INFECTIOUS DISEASES | Facility: CLINIC | Age: 57
End: 2022-07-01
Payer: COMMERCIAL

## 2022-07-01 VITALS
HEART RATE: 72 BPM | RESPIRATION RATE: 16 BRPM | HEIGHT: 64 IN | DIASTOLIC BLOOD PRESSURE: 57 MMHG | TEMPERATURE: 97 F | BODY MASS INDEX: 32.29 KG/M2 | SYSTOLIC BLOOD PRESSURE: 114 MMHG | WEIGHT: 189.13 LBS | OXYGEN SATURATION: 98 %

## 2022-07-01 VITALS
BODY MASS INDEX: 30.67 KG/M2 | HEART RATE: 65 BPM | SYSTOLIC BLOOD PRESSURE: 110 MMHG | TEMPERATURE: 98 F | DIASTOLIC BLOOD PRESSURE: 62 MMHG | WEIGHT: 190.06 LBS

## 2022-07-01 VITALS
WEIGHT: 189 LBS | SYSTOLIC BLOOD PRESSURE: 110 MMHG | HEART RATE: 65 BPM | BODY MASS INDEX: 30.37 KG/M2 | RESPIRATION RATE: 18 BRPM | HEIGHT: 66 IN | DIASTOLIC BLOOD PRESSURE: 62 MMHG

## 2022-07-01 DIAGNOSIS — Z76.82 ORGAN TRANSPLANT CANDIDATE: ICD-10-CM

## 2022-07-01 DIAGNOSIS — Z87.440 HISTORY OF RECURRENT UTI (URINARY TRACT INFECTION): ICD-10-CM

## 2022-07-01 DIAGNOSIS — R05.9 COUGH: ICD-10-CM

## 2022-07-01 DIAGNOSIS — R91.8 PULMONARY NODULES: ICD-10-CM

## 2022-07-01 DIAGNOSIS — Z23 NEED FOR HEPATITIS A IMMUNIZATION: ICD-10-CM

## 2022-07-01 DIAGNOSIS — K76.82 HEPATIC ENCEPHALOPATHY: ICD-10-CM

## 2022-07-01 DIAGNOSIS — Z23 NEED FOR HEPATITIS B VACCINATION: ICD-10-CM

## 2022-07-01 DIAGNOSIS — I85.10 SECONDARY ESOPHAGEAL VARICES WITHOUT BLEEDING: ICD-10-CM

## 2022-07-01 DIAGNOSIS — R18.8 OTHER ASCITES: ICD-10-CM

## 2022-07-01 DIAGNOSIS — K75.81 LIVER CIRRHOSIS SECONDARY TO NASH: Primary | ICD-10-CM

## 2022-07-01 DIAGNOSIS — Z01.818 PRE-TRANSPLANT EVALUATION FOR CHRONIC LIVER DISEASE: Primary | ICD-10-CM

## 2022-07-01 DIAGNOSIS — Z23 NEED FOR PNEUMOCOCCAL VACCINE: ICD-10-CM

## 2022-07-01 DIAGNOSIS — K74.60 LIVER CIRRHOSIS SECONDARY TO NASH: Primary | ICD-10-CM

## 2022-07-01 LAB
ALLENS TEST: ABNORMAL
ALLENS TEST: ABNORMAL
ASCENDING AORTA: 3.69 CM
AV INDEX (PROSTH): 0.85
AV MEAN GRADIENT: 4 MMHG
AV PEAK GRADIENT: 8 MMHG
AV VALVE AREA: 3.02 CM2
AV VELOCITY RATIO: 0.79
BSA FOR ECHO PROCEDURE: 2 M2
CV ECHO LV RWT: 0.35 CM
CV STRESS BASE HR: 69 BPM
DELSYS: ABNORMAL
DELSYS: ABNORMAL
DIASTOLIC BLOOD PRESSURE: 62 MMHG
DOP CALC AO PEAK VEL: 1.45 M/S
DOP CALC AO VTI: 29.47 CM
DOP CALC LVOT AREA: 3.6 CM2
DOP CALC LVOT DIAMETER: 2.13 CM
DOP CALC LVOT PEAK VEL: 1.14 M/S
DOP CALC LVOT STROKE VOLUME: 89.14 CM3
DOP CALCLVOT PEAK VEL VTI: 25.03 CM
E WAVE DECELERATION TIME: 235.25 MSEC
E/A RATIO: 1.18
E/E' RATIO: 9.74 M/S
ECHO LV POSTERIOR WALL: 0.83 CM (ref 0.6–1.1)
EJECTION FRACTION: 65 %
FRACTIONAL SHORTENING: 37 % (ref 28–44)
HCO3 UR-SCNC: 23.6 MMOL/L (ref 24–28)
HCO3 UR-SCNC: 24.2 MMOL/L (ref 24–28)
INTERVENTRICULAR SEPTUM: 0.81 CM (ref 0.6–1.1)
IVRT: 77.07 MSEC
LA MAJOR: 5.25 CM
LA MINOR: 4.89 CM
LA WIDTH: 3.92 CM
LEFT ATRIUM SIZE: 4.21 CM
LEFT ATRIUM VOLUME INDEX MOD: 28.6 ML/M2
LEFT ATRIUM VOLUME INDEX: 36.4 ML/M2
LEFT ATRIUM VOLUME MOD: 55.77 CM3
LEFT ATRIUM VOLUME: 71.03 CM3
LEFT INTERNAL DIMENSION IN SYSTOLE: 2.97 CM (ref 2.1–4)
LEFT VENTRICLE DIASTOLIC VOLUME INDEX: 53.14 ML/M2
LEFT VENTRICLE DIASTOLIC VOLUME: 103.62 ML
LEFT VENTRICLE MASS INDEX: 65 G/M2
LEFT VENTRICLE SYSTOLIC VOLUME INDEX: 17.5 ML/M2
LEFT VENTRICLE SYSTOLIC VOLUME: 34.15 ML
LEFT VENTRICULAR INTERNAL DIMENSION IN DIASTOLE: 4.72 CM (ref 3.5–6)
LEFT VENTRICULAR MASS: 127.15 G
LV LATERAL E/E' RATIO: 8.62 M/S
LV SEPTAL E/E' RATIO: 11.2 M/S
MV A" WAVE DURATION": 10.56 MSEC
MV PEAK A VEL: 0.95 M/S
MV PEAK E VEL: 1.12 M/S
MV STENOSIS PRESSURE HALF TIME: 68.22 MS
MV VALVE AREA P 1/2 METHOD: 3.22 CM2
OHS CV CPX 1 MINUTE RECOVERY HEART RATE: 142 BPM
OHS CV CPX 85 PERCENT MAX PREDICTED HEART RATE MALE: 132
OHS CV CPX MAX PREDICTED HEART RATE: 155
OHS CV CPX PATIENT IS FEMALE: 1
OHS CV CPX PATIENT IS MALE: 0
OHS CV CPX PEAK DIASTOLIC BLOOD PRESSURE: 54 MMHG
OHS CV CPX PEAK HEAR RATE: 142 BPM
OHS CV CPX PEAK RATE PRESSURE PRODUCT: NORMAL
OHS CV CPX PEAK SYSTOLIC BLOOD PRESSURE: 155 MMHG
OHS CV CPX PERCENT MAX PREDICTED HEART RATE ACHIEVED: 92
OHS CV CPX RATE PRESSURE PRODUCT PRESENTING: 7590
PCO2 BLDA: 36.2 MMHG (ref 35–45)
PCO2 BLDA: 38.4 MMHG (ref 35–45)
PH SMN: 7.4 [PH] (ref 7.35–7.45)
PH SMN: 7.43 [PH] (ref 7.35–7.45)
PISA MRMAX VEL: 0.05 M/S
PISA TR MAX VEL: 2.73 M/S
PO2 BLDA: 107 MMHG (ref 80–100)
PO2 BLDA: 305 MMHG (ref 80–100)
POC BE: -1 MMOL/L
POC BE: 0 MMOL/L
POC SATURATED O2: 100 % (ref 95–100)
POC SATURATED O2: 98 % (ref 95–100)
POC TCO2: 25 MMOL/L (ref 23–27)
POC TCO2: 25 MMOL/L (ref 23–27)
PULM VEIN S/D RATIO: 1.21
PV PEAK D VEL: 0.57 M/S
PV PEAK S VEL: 0.69 M/S
RA MAJOR: 5.88 CM
RA WIDTH: 3.47 CM
RIGHT VENTRICULAR END-DIASTOLIC DIMENSION: 4.14 CM
RV TISSUE DOPPLER FREE WALL SYSTOLIC VELOCITY 1 (APICAL 4 CHAMBER VIEW): 12.12 CM/S
SAMPLE: ABNORMAL
SAMPLE: ABNORMAL
SINUS: 3.15 CM
SITE: ABNORMAL
SITE: ABNORMAL
STJ: 2.82 CM
SYSTOLIC BLOOD PRESSURE: 110 MMHG
TDI LATERAL: 0.13 M/S
TDI SEPTAL: 0.1 M/S
TDI: 0.12 M/S
TR MAX PG: 30 MMHG
TRICUSPID ANNULAR PLANE SYSTOLIC EXCURSION: 2.69 CM

## 2022-07-01 PROCEDURE — 71046 XR CHEST PA AND LATERAL: ICD-10-PCS | Mod: 26,TXP,, | Performed by: RADIOLOGY

## 2022-07-01 PROCEDURE — 93351 STRESS ECHO (CUPID ONLY): ICD-10-PCS | Mod: 26,TXP,, | Performed by: INTERNAL MEDICINE

## 2022-07-01 PROCEDURE — A9585 GADOBUTROL INJECTION: HCPCS | Mod: TXP | Performed by: STUDENT IN AN ORGANIZED HEALTH CARE EDUCATION/TRAINING PROGRAM

## 2022-07-01 PROCEDURE — 94010 BREATHING CAPACITY TEST: ICD-10-PCS | Mod: S$GLB,TXP,, | Performed by: INTERNAL MEDICINE

## 2022-07-01 PROCEDURE — 90471 PR IMMUNIZ ADMIN,1 SINGLE/COMB VAC/TOXOID: ICD-10-PCS | Mod: S$GLB,TXP,, | Performed by: INTERNAL MEDICINE

## 2022-07-01 PROCEDURE — 94729 DIFFUSING CAPACITY: CPT | Mod: S$GLB,TXP,, | Performed by: INTERNAL MEDICINE

## 2022-07-01 PROCEDURE — 74183 MRI ABD W/O CNTR FLWD CNTR: CPT | Mod: 26,TXP,, | Performed by: RADIOLOGY

## 2022-07-01 PROCEDURE — 90677 PCV20 VACCINE IM: CPT | Mod: S$GLB,TXP,, | Performed by: INTERNAL MEDICINE

## 2022-07-01 PROCEDURE — 99999 PR PBB SHADOW E&M-EST. PATIENT-LVL IV: ICD-10-PCS | Mod: PBBFAC,TXP,, | Performed by: STUDENT IN AN ORGANIZED HEALTH CARE EDUCATION/TRAINING PROGRAM

## 2022-07-01 PROCEDURE — 90739 HEPATITIS B (RECOMBINANT) ADJUVANTED, 2 DOSE: ICD-10-PCS | Mod: S$GLB,TXP,, | Performed by: INTERNAL MEDICINE

## 2022-07-01 PROCEDURE — 36600 WITHDRAWAL OF ARTERIAL BLOOD: CPT | Mod: S$GLB,TXP,, | Performed by: INTERNAL MEDICINE

## 2022-07-01 PROCEDURE — 90739 HEPB VACC 2/4 DOSE ADULT IM: CPT | Mod: S$GLB,TXP,, | Performed by: INTERNAL MEDICINE

## 2022-07-01 PROCEDURE — 99999 PR PBB SHADOW E&M-EST. PATIENT-LVL IV: CPT | Mod: PBBFAC,TXP,, | Performed by: STUDENT IN AN ORGANIZED HEALTH CARE EDUCATION/TRAINING PROGRAM

## 2022-07-01 PROCEDURE — 99205 OFFICE O/P NEW HI 60 MIN: CPT | Mod: S$GLB,TXP,, | Performed by: NURSE PRACTITIONER

## 2022-07-01 PROCEDURE — 90472 PR IMMUNIZ,ADMIN,EACH ADDL: ICD-10-PCS | Mod: S$GLB,TXP,, | Performed by: INTERNAL MEDICINE

## 2022-07-01 PROCEDURE — 94010 BREATHING CAPACITY TEST: CPT | Mod: S$GLB,TXP,, | Performed by: INTERNAL MEDICINE

## 2022-07-01 PROCEDURE — 90471 IMMUNIZATION ADMIN: CPT | Mod: S$GLB,TXP,, | Performed by: INTERNAL MEDICINE

## 2022-07-01 PROCEDURE — 74183 MRI ABD W/O CNTR FLWD CNTR: CPT | Mod: TC,TXP

## 2022-07-01 PROCEDURE — 99999 PR PBB SHADOW E&M-EST. PATIENT-LVL V: ICD-10-PCS | Mod: PBBFAC,TXP,, | Performed by: NURSE PRACTITIONER

## 2022-07-01 PROCEDURE — 93325 DOPPLER ECHO COLOR FLOW MAPG: CPT | Mod: TXP

## 2022-07-01 PROCEDURE — 94681 PR EXHALED AIR ANALYSIS: O2, CO2: ICD-10-PCS | Mod: S$GLB,TXP,, | Performed by: INTERNAL MEDICINE

## 2022-07-01 PROCEDURE — 94681 O2 UPTK CO2 OUTP % O2 XTRC: CPT | Mod: S$GLB,TXP,, | Performed by: INTERNAL MEDICINE

## 2022-07-01 PROCEDURE — 99999 PR PBB SHADOW E&M-EST. PATIENT-LVL I: ICD-10-PCS | Mod: PBBFAC,TXP,,

## 2022-07-01 PROCEDURE — 63600150 PHARM REV CODE 636: Mod: TXP | Performed by: STUDENT IN AN ORGANIZED HEALTH CARE EDUCATION/TRAINING PROGRAM

## 2022-07-01 PROCEDURE — 99215 OFFICE O/P EST HI 40 MIN: CPT | Mod: S$GLB,TXP,, | Performed by: STUDENT IN AN ORGANIZED HEALTH CARE EDUCATION/TRAINING PROGRAM

## 2022-07-01 PROCEDURE — 90632 HEPATITIS A VACCINE ADULT IM: ICD-10-PCS | Mod: S$GLB,TXP,, | Performed by: INTERNAL MEDICINE

## 2022-07-01 PROCEDURE — 71046 X-RAY EXAM CHEST 2 VIEWS: CPT | Mod: 26,TXP,, | Performed by: RADIOLOGY

## 2022-07-01 PROCEDURE — 93325 DOPPLER ECHO COLOR FLOW MAPG: CPT | Mod: 26,TXP,, | Performed by: INTERNAL MEDICINE

## 2022-07-01 PROCEDURE — 93325 STRESS ECHO (CUPID ONLY): ICD-10-PCS | Mod: 26,TXP,, | Performed by: INTERNAL MEDICINE

## 2022-07-01 PROCEDURE — 36600 PR WITHDRAWAL OF ARTERIAL BLOOD: ICD-10-PCS | Mod: S$GLB,TXP,, | Performed by: INTERNAL MEDICINE

## 2022-07-01 PROCEDURE — 93351 STRESS TTE COMPLETE: CPT | Mod: 26,TXP,, | Performed by: INTERNAL MEDICINE

## 2022-07-01 PROCEDURE — 99215 PR OFFICE/OUTPT VISIT, EST, LEVL V, 40-54 MIN: ICD-10-PCS | Mod: S$GLB,TXP,, | Performed by: STUDENT IN AN ORGANIZED HEALTH CARE EDUCATION/TRAINING PROGRAM

## 2022-07-01 PROCEDURE — 99999 PR PBB SHADOW E&M-EST. PATIENT-LVL V: CPT | Mod: PBBFAC,TXP,, | Performed by: NURSE PRACTITIONER

## 2022-07-01 PROCEDURE — 93320 STRESS ECHO (CUPID ONLY): ICD-10-PCS | Mod: 26,TXP,, | Performed by: INTERNAL MEDICINE

## 2022-07-01 PROCEDURE — 93320 DOPPLER ECHO COMPLETE: CPT | Mod: 26,TXP,, | Performed by: INTERNAL MEDICINE

## 2022-07-01 PROCEDURE — 63600175 PHARM REV CODE 636 W HCPCS: Mod: TXP | Performed by: STUDENT IN AN ORGANIZED HEALTH CARE EDUCATION/TRAINING PROGRAM

## 2022-07-01 PROCEDURE — 99205 PR OFFICE/OUTPT VISIT, NEW, LEVL V, 60-74 MIN: ICD-10-PCS | Mod: S$GLB,TXP,, | Performed by: NURSE PRACTITIONER

## 2022-07-01 PROCEDURE — 94729 PR C02/MEMBANE DIFFUSE CAPACITY: ICD-10-PCS | Mod: S$GLB,TXP,, | Performed by: INTERNAL MEDICINE

## 2022-07-01 PROCEDURE — 82803 PR  BLOOD GASES: PH, PO2 & PCO2: ICD-10-PCS | Mod: S$GLB,TXP,, | Performed by: INTERNAL MEDICINE

## 2022-07-01 PROCEDURE — 94727 GAS DIL/WSHOT DETER LNG VOL: CPT | Mod: S$GLB,TXP,, | Performed by: INTERNAL MEDICINE

## 2022-07-01 PROCEDURE — 99999 PR PBB SHADOW E&M-EST. PATIENT-LVL I: CPT | Mod: PBBFAC,TXP,,

## 2022-07-01 PROCEDURE — 90632 HEPA VACCINE ADULT IM: CPT | Mod: S$GLB,TXP,, | Performed by: INTERNAL MEDICINE

## 2022-07-01 PROCEDURE — 90472 IMMUNIZATION ADMIN EACH ADD: CPT | Mod: S$GLB,TXP,, | Performed by: INTERNAL MEDICINE

## 2022-07-01 PROCEDURE — 25500020 PHARM REV CODE 255: Mod: TXP | Performed by: STUDENT IN AN ORGANIZED HEALTH CARE EDUCATION/TRAINING PROGRAM

## 2022-07-01 PROCEDURE — 74183 MRI ABDOMEN W WO CONTRAST: ICD-10-PCS | Mod: 26,TXP,, | Performed by: RADIOLOGY

## 2022-07-01 PROCEDURE — 94727 PR PULM FUNCTION TEST BY GAS: ICD-10-PCS | Mod: S$GLB,TXP,, | Performed by: INTERNAL MEDICINE

## 2022-07-01 PROCEDURE — 82803 BLOOD GASES ANY COMBINATION: CPT | Mod: S$GLB,TXP,, | Performed by: INTERNAL MEDICINE

## 2022-07-01 PROCEDURE — 90677 PNEUMOCOCCAL CONJUGATE VACCINE 20-VALENT: ICD-10-PCS | Mod: S$GLB,TXP,, | Performed by: INTERNAL MEDICINE

## 2022-07-01 PROCEDURE — 71046 X-RAY EXAM CHEST 2 VIEWS: CPT | Mod: TC,FY,TXP

## 2022-07-01 RX ORDER — ATROPINE SULFATE 0.1 MG/ML
0.75 INJECTION INTRAVENOUS
Status: COMPLETED | OUTPATIENT
Start: 2022-07-01 | End: 2022-07-01

## 2022-07-01 RX ORDER — GADOBUTROL 604.72 MG/ML
10 INJECTION INTRAVENOUS
Status: COMPLETED | OUTPATIENT
Start: 2022-07-01 | End: 2022-07-01

## 2022-07-01 RX ORDER — DOBUTAMINE HYDROCHLORIDE 100 MG/100ML
30 INJECTION INTRAVENOUS
Status: COMPLETED | OUTPATIENT
Start: 2022-07-01 | End: 2022-07-01

## 2022-07-01 RX ADMIN — ATROPINE SULFATE 0.75 MG: 0.1 INJECTION PARENTERAL at 11:07

## 2022-07-01 RX ADMIN — DOBUTAMINE IN DEXTROSE 30 MCG/KG/MIN: 100 INJECTION, SOLUTION INTRAVENOUS at 11:07

## 2022-07-01 RX ADMIN — GADOBUTROL 10 ML: 604.72 INJECTION INTRAVENOUS at 04:07

## 2022-07-01 NOTE — PROGRESS NOTES
Transplant Hepatology   Transplant Evaluation Follow Up Note    Referring provider: No ref. provider found  PCP: Primary Doctor No    Chief complaint:  DORAN cirrhosis, transplant evaluation    HPI: Mickey Harris is a 57 y.o. female who was referred to Transplant Hepatology Clinic for DORAN related cirrhosis. She is accompanied by her .    22:  She reports generalized fatigue but is otherwise without complaints today.  No recent hospitalizations or ED visits.  Compliant with diuretics without recent abdominal distention.  Also compliant with lactulose without recent encephalopathy.  Continued scleral icterus.  No recent GI bleeding.    22 Initial Visit: She reports being diagnosed with cirrhosis approximately 4 years ago.  She was found to have leukopenia and thrombocytopenia.  Ultrasound revealed hepatic steatosis and splenomegaly.  Subsequent MRI was suggestive of cirrhosis, and FibroScan showed F4 fibrosis, cirrhosis.  Serologic evaluation was negative, and her cirrhosis was attributed to DORAN.    She did well until approximately 1 year ago when she developed lower extremity edema and abdominal distension for which she was started on diuretics.  Subsequent imaging confirmed moderate volume ascites.  She has remained on diuretics and has not required paracentesis.  Her cirrhosis has also been complicated by hepatic encephalopathy which is well controlled with lactulose.  She has had intermittent scleral icterus.  No recent GI bleeding.    She does not drink alcohol.  She was a previous social drinker but denies history of heavy alcohol use.  Previous workup was negative for chronic viral hepatitis.  She says that her mother  at age 24 from cirrhosis related to congenital portal vein issues.  Her son has elevated bilirubin.  She has no other known family history of liver disease.      Past Medical History:   Diagnosis Date    Anxiety disorder, unspecified     Asthma     Chronic cough      Hepatic encephalopathy     Hyperlipidemia     Infarction of spleen 2021    Liver cirrhosis secondary to DORAN     Mild tricuspid regurgitation     Unspecified cirrhosis of liver        Past Surgical History:   Procedure Laterality Date    cholecystectomy  2003    COLONOSCOPY      6 polyps removed    COLONOSCOPY  2021    ESOPHAGOGASTRODUODENOSCOPY      2 coulums of grade 1 varices    ESOPHAGOGASTRODUODENOSCOPY  2019    trace varices    ESOPHAGOGASTRODUODENOSCOPY  2021    gynecologic surgery       removal of scar tissues and adhesions    HYSTERECTOMY  1989    knee surgery  1992    OOPHORECTOMY Right 1982    OPEN hysterectomy      removed uterus and bilateral fallopian tubes and LEFT ovary stayed in place    ROTATOR CUFF REPAIR Right     TONSILLECTOMY  1972       Family History   Problem Relation Age of Onset    Hypertension Father     Hyperlipidemia Father     Heart disease Father     Depression Father     Arrhythmia Brother     Heart disease Brother        Social History     Tobacco Use    Smoking status: Former Smoker     Packs/day: 1.00     Types: Cigarettes     Quit date:      Years since quittin.5    Smokeless tobacco: Never Used   Substance Use Topics    Alcohol use: Not Currently     Comment: 2020 few sips of beer. before that last alcohol was 2019 no DUI hx       Current Outpatient Medications   Medication Sig Dispense Refill    albuterol (PROVENTIL/VENTOLIN HFA) 90 mcg/actuation inhaler SMARTSI Puff(s) Via Inhaler Every 4 Hours PRN      ascorbic acid, vitamin C, (VITAMIN C) 1000 MG tablet Take by mouth.      biotin 1 mg Cap Take 1 mg by mouth once daily.      cholecalciferol, vitamin D3, 125 mcg (5,000 unit) capsule Take 125 mcg by mouth once daily.      cyanocobalamin, vitamin B-12, 50 mcg tablet Take 1 tablet by mouth once daily.      fluticasone propionate (FLOVENT HFA) 220 mcg/actuation inhaler Inhale 1 puff into the lungs  "daily as needed.      furosemide (LASIX) 20 MG tablet Take 40 mg by mouth once daily.      lactulose (CHRONULAC) 10 gram/15 mL solution SMARTSIG:15 Milliliter(s) By Mouth 3 Times Daily      MG-PLUS-PROTEIN 133 mg tablet Take 1 tablet by mouth once daily.      ondansetron (ZOFRAN) 4 MG tablet Take 4 mg by mouth daily as needed.      spironolactone (ALDACTONE) 50 MG tablet Take 100 mg by mouth once daily.      vitamin E, dl,tocopheryl acet, (VITAMIN E, DL, ACETATE,) 45 mg (100 unit) Cap Take 1 tablet by mouth once daily.       No current facility-administered medications for this visit.       Review of patient's allergies indicates:   Allergen Reactions    Levofloxacin Hives and Shortness Of Breath    Sulfa (sulfonamide antibiotics) Rash       Review of Systems   Constitutional: Positive for malaise/fatigue. Negative for fever and weight loss.   Gastrointestinal: Positive for abdominal pain. Negative for blood in stool, constipation, diarrhea, heartburn, melena, nausea and vomiting.       Vitals:    07/01/22 0810   BP: (!) 114/57   Pulse: 72   Resp: 16   Temp: 97.3 °F (36.3 °C)   TempSrc: Tympanic   SpO2: 98%   Weight: 85.8 kg (189 lb 2.5 oz)   Height: 5' 4" (1.626 m)       Physical Exam  Vitals reviewed.   Constitutional:       General: She is not in acute distress.  Eyes:      General: Scleral icterus present.   Cardiovascular:      Rate and Rhythm: Normal rate and regular rhythm.   Pulmonary:      Effort: Pulmonary effort is normal. No respiratory distress.   Abdominal:      General: Bowel sounds are normal. There is no distension.      Palpations: Abdomen is soft.      Tenderness: There is no abdominal tenderness. There is no guarding or rebound.   Musculoskeletal:      Right lower leg: No edema.      Left lower leg: No edema.   Skin:     Coloration: Skin is not jaundiced.         LABS: I personally reviewed pertinent laboratory findings.    Lab Results   Component Value Date    ALT 31 06/30/2022    AST 32 " 06/30/2022    GGT 38 06/30/2022    ALKPHOS 96 06/30/2022    BILITOT 3.5 (H) 06/30/2022       Lab Results   Component Value Date    WBC 2.33 (L) 06/30/2022    HGB 14.5 06/30/2022    HCT 40.8 06/30/2022    MCV 93 06/30/2022    PLT 47 (L) 06/30/2022       Lab Results   Component Value Date     06/30/2022    K 4.0 06/30/2022     06/30/2022    CO2 24 06/30/2022    BUN 14 06/30/2022    CREATININE 0.6 06/30/2022    CALCIUM 8.7 06/30/2022    ANIONGAP 6 (L) 06/30/2022    ESTGFRAFRICA >60.0 06/30/2022    EGFRNONAA >60.0 06/30/2022       Lab Results   Component Value Date    INR 1.4 (H) 06/30/2022       Imaging:  I personally reviewed recent imaging studies available on the chart and from outside medical records.      Assessment:  57 y.o. female with DORAN related cirrhosis. MELD 15. She is decompensated with ascites, HE, jaundice.    1. Liver cirrhosis secondary to DORAN    2. Secondary esophageal varices without bleeding    3. Other ascites    4. Hepatic encephalopathy        MELD-Na score: 15 at 6/30/2022  8:20 AM  MELD score: 15 at 6/30/2022  8:20 AM  Calculated from:  Serum Creatinine: 0.6 mg/dL (Using min of 1 mg/dL) at 6/30/2022  8:20 AM  Serum Sodium: 137 mmol/L at 6/30/2022  8:20 AM  Total Bilirubin: 3.5 mg/dL at 6/30/2022  8:20 AM  INR(ratio): 1.4 at 6/30/2022  8:20 AM  Age: 57 years      Transplant Candidacy:  She is undergoing liver transplant evaluation will be presented to Liver Transplant Selection Committee after all tests and evaluations are complete.    Recommendations:  1. Ascites/Edema: 2 gm Na diet. Continue Lasix 40 mg daily and Aldactone 100 mg daily    2. Encephalopathy: Continue lactulose. Titrate to maintain 3-4 bowel movements per day.    3. Variceal screening: EGD in 07/2021 showed trace varices. Repeat EGD in 07/2022.    4. HCC screening: US in 06/2022 without HCC. AFP 2.8. Repeat abdominal US and AFP every 6 months.    5. Immunizations: Recommend HAV and HBV vaccinations if not  immune    UNOS Patient Status  Functional Status: 70% - Cares for self: unable to carry on normal activity or active work  Physical Capacity: No Limitations    Diabetes: No  Any previous malignancy: No  Neoadjuvant Therapy: no  Has patient ever had a dx of HCC: no  Previous Abdominal Surgery: yes  Spontaneous Bacterial Peritonitis: no  History of Portal Vein Thrombosis: no  Transjugular Intrahepatic Portosystemic Shunt: no    MELD labs in 6 weeks. Return to clinic within 3 months.    I spent a total of 40 minutes on the day of the visit. This includes face to face time and non-face to face time preparing to see the patient (eg, review of tests), obtaining and/or reviewing separately obtained history, documenting clinical information in the electronic or other health record, independently interpreting results, and communicating results to the patient/family/caregiver, or care coordination.    Kg Regalado MD  Staff Physician  Hepatology and Liver Transplant  [facility]  [facility]

## 2022-07-01 NOTE — PROGRESS NOTES
Patient received Hep A #1 and Prevnar 20 vaccines in the left deltoid, and  Hep B #1 in the right deltoid. Pt tolerated well. Pt asked to wait in the clinic 15 minutes after injection in the event of an allergic reaction. Pt verbalized understanding. Pt left in NAD.

## 2022-07-01 NOTE — LETTER
July 5, 2022                     [facility]  [address]  [facility] [address]  Phone: [phone]   Patient: Mickey Harris   MR Number: [id number]   YOB: 1965   Date of Visit: 7/1/2022       Dear      Thank you for referring Mickey Harris to me for evaluation. Attached you will find relevant portions of my assessment and plan of care.    If you have questions, please do not hesitate to call me. I look forward to following Mickey Harris along with you.    Sincerely,    Kg Regalado MD    Enclosure    If you would like to receive this communication electronically, please contact [email or web address] or [phone] to request [employer] access.    [employer] is a tool which provides read-only access to select patient information with whom you have a relationship. Its easy to use and provides real time access to review your patients record including encounter summaries, notes, results, and demographic information.    If you feel you have received this communication in error or would no longer like to receive these types of communications, please e-mail [email or web address]

## 2022-07-01 NOTE — Clinical Note
Hi there!  Attached is ID pre-txplnt note.  Updated vaccines.  Also recommending consider Urology evaluation for frequent UTIs.  Added on some additional serologies.  Message me with any questions. Thanks.

## 2022-07-01 NOTE — NURSING NOTE
Patient verified by 2 identifiers and allergies reviewed.  22g IV placed to Lt FA.  DSE explained to patient, consent obtained & testing completed.  Pt tolerated testing well. IV left in place & flushed for MRI- Imaging Center notified.  Post study discharge instructions reviewed with patient, patient verbalized understanding.  Patient discharged to home in stable condition.

## 2022-07-01 NOTE — PATIENT INSTRUCTIONS
Vaccines recommended today:    Prevnar 20 (one dose good for life)  Heplisav-B (hepatitis B.  Two doses at least one month apart.    Hepatitis A (two doses 6 months apart).      Recommend COVID booster (5th COVID dose) - may take 4 months after your 4th dose ( after 7/31/22)   Ensure you get flu vaccine every year when available.     I am going to recommend consideration of Urology evaluation given history of recurrent UTI to your transplant team and they will determine if they want to refer you to someone here    Call me for any concerns

## 2022-07-01 NOTE — Clinical Note
Needs EGD in July for variceal surveillance either here or locally. MELD labs in 6 weeks. Return in 3 months with labs.

## 2022-07-05 ENCOUNTER — DOCUMENTATION ONLY (OUTPATIENT)
Dept: TRANSPLANT | Facility: CLINIC | Age: 57
End: 2022-07-05
Payer: COMMERCIAL

## 2022-07-06 ENCOUNTER — TELEPHONE (OUTPATIENT)
Dept: TRANSPLANT | Facility: CLINIC | Age: 57
End: 2022-07-06
Payer: COMMERCIAL

## 2022-07-06 ENCOUNTER — COMMITTEE REVIEW (OUTPATIENT)
Dept: TRANSPLANT | Facility: CLINIC | Age: 57
End: 2022-07-06
Payer: COMMERCIAL

## 2022-07-06 NOTE — TELEPHONE ENCOUNTER
Patient notified that she has been accepted into [facility] program pending financial approval from insurance company.   Patient notified that she needs an updated EGD.Patient states she will schedule.  Patient notified that she needs to discuss low vit D with PCP.   Patient's daughter will reach out to [facility] regarding LDLT program after patient is listed. Discussed small pulmonary nodule. Will need repeat CT chest in 1 year. Pt verbalized understanding.     Also Discussed patient's appt with ID. Per ID,  recommending consider Urology evaluation for frequent UTIs. Patient states she has not had a UTI in several months but if she starts having them again will make urology appt.

## 2022-07-06 NOTE — COMMITTEE REVIEW
Mickey Harris's case presented to selection committee.  Patient has been accepted for liver transplant due to Cirrhosis: Fatty Liver (DORAN) and complications of end stage liver disease including ascites, coagulopathy, encephalopathy, esophageal varices, hyperbilirubinemia, hypoalbuminemia, jaundice, portal hypertension, splenomegaly, thrombocytopenia and splenic collateral vessels, Leukopenia, leg edema, F4 fibrosis, fatigue, scleral icterus and prominent gastro esophageal varices  with a MELD score of 15.  Patient has no absolute contraindications for liver transplant.  Patient will be listed pending financial approval.    Patient will accept HBcAb positive livers.  Patient will accept HCVAB positive livers.  Patient will accept DCD livers.  Patient will accept HCV PALOMA positive livers  Patient will accept HBV PALOMA positive livers  I was present at the committee meeting and attest to the decision of the committee.    Raj Gauthier  07/06/2022

## 2022-07-07 ENCOUNTER — PATIENT MESSAGE (OUTPATIENT)
Dept: TRANSPLANT | Facility: CLINIC | Age: 57
End: 2022-07-07
Payer: COMMERCIAL

## 2022-07-07 ENCOUNTER — TELEPHONE (OUTPATIENT)
Dept: RADIOLOGY | Facility: HOSPITAL | Age: 57
End: 2022-07-07
Payer: COMMERCIAL

## 2022-07-07 NOTE — TELEPHONE ENCOUNTER
----- Message from Danni Garzon sent at 7/7/2022  3:50 PM CDT -----  Regarding: FW: MAMMO RESULTS  Contact: pt    ----- Message -----  From: Karlee Headley  Sent: 7/7/2022   3:44 PM CDT  To: , #  Subject: MAMMO RESULTS                                    Pt calling to ask staff to sign off on MAMMO report. Stats that she has been accepted for the transplant list but doctors are waiting to see the report..      Confirmed contact info below:  Contact Name: Mickey Harris  Phone Number: [phone]

## 2022-07-15 NOTE — PROGRESS NOTES
Pre-liver transplant education provided via slide show in exam room of transplant clinic.  E-consents obtained by Lizabeth Vera LPN and can be found under the Media tab.  FRANCESCO and HIV consent submitted to JENNYFER Bejarano MA for scanning.

## 2022-07-18 ENCOUNTER — PATIENT MESSAGE (OUTPATIENT)
Dept: TRANSPLANT | Facility: CLINIC | Age: 57
End: 2022-07-18
Payer: COMMERCIAL

## 2022-07-18 ENCOUNTER — TELEPHONE (OUTPATIENT)
Dept: TRANSPLANT | Facility: CLINIC | Age: 57
End: 2022-07-18
Payer: COMMERCIAL

## 2022-07-18 NOTE — TELEPHONE ENCOUNTER
Kg Regalado MD  [facility] Pre-Liver Transplant Clinical    Vitamin A and zinc low. Recommend she discuss supplement with her PCP.     Patient notified. She will discuss with PCP.    _________  Patient also notified that she is financially cleared for transplant however she needs updated MELD listing labs. Patient states she can go get labs this Wednesday.

## 2022-07-20 ENCOUNTER — TELEPHONE (OUTPATIENT)
Dept: TRANSPLANT | Facility: CLINIC | Age: 57
End: 2022-07-20
Payer: COMMERCIAL

## 2022-07-20 NOTE — TELEPHONE ENCOUNTER
Patient states that she went to get her MELD labs done at [facility] this morning. She will let me know when the lab results are in so that she can be listed in [facility].

## 2022-07-21 ENCOUNTER — TELEPHONE (OUTPATIENT)
Dept: TRANSPLANT | Facility: CLINIC | Age: 57
End: 2022-07-21
Payer: COMMERCIAL

## 2022-07-21 ENCOUNTER — DOCUMENTATION ONLY (OUTPATIENT)
Dept: TRANSPLANT | Facility: CLINIC | Age: 57
End: 2022-07-21
Payer: COMMERCIAL

## 2022-07-21 LAB
EXT ALBUMIN: 3.5
EXT ALKALINE PHOSPHATASE: 100
EXT ALT: 30
EXT AST: 31
EXT BILIRUBIN DIRECT: 0.7 MG/DL
EXT BILIRUBIN TOTAL: 3.2
EXT BUN: 11
EXT CALCIUM: 8.7
EXT CHLORIDE: 106
EXT CO2: 26
EXT CREATININE: 0.56 MG/DL
EXT GFR MDRD NON AF AMER: 106
EXT GLUCOSE: 108
EXT INR: 1.4
EXT POTASSIUM: 4.2
EXT PROTEIN TOTAL: 6
EXT PT: 14
EXT SODIUM: 138 MMOL/L

## 2022-07-21 NOTE — TELEPHONE ENCOUNTER
"  LIVER WAIT LISTING NOTE    **NOTE:   IF ANY EXTERNAL LABS ARE USED FOR LISTING THE VALUES AND DATES MUST BE ENTERED IN EPIC TO GENERATE THIS NOTE**    Date of Financial clearance to list: 22    N/[facility]:        Organ: Liver    Last Name: Kimberly  First Name: Mickey     : 1965      Gender:     female         MRN#: [id number]                                   State of Permanent Residence:  [address] [place] [id number]    Ethnicity: Not  or /a   Race:      White    CLINICAL INFORMATION       ABO  ABO Blood Group:   A NEG     ABO Confirmation: (THESE DATES MUST BE PRIOR TO THE LIST DATE AND SUPPORTED BY SEPARATE LAB REPORTS)    Internal Results    Lab Results   Component Value Date    GROUPTRH A NEG 2022    GROUPTRH A NEG 2022     No results found for: ABO    External Results    ABO Date 1:  ABO Result 1:  ABO Date 2:  ABO Result 2:     Are either of these ABO results based on External Labs? no  (If Yes, STOP and go to source document in Media Tab for verification).    VITALS  Height:    Ht Readings from Last 1 Encounters:   22 5' 6" (1.676 m)     Weight:    Wt Readings from Last 1 Encounters:   22 85.7 kg (189 lb)       LIVER ORGAN INFORMATION  Candidate Medical Urgency Status: ACTIVE    Number of Previous Transplants: 0    MELD/PELD Data Collection:  Had dialysis twice, or 24 hours of CVVHD, within 1 week prior to the serum creatinine test: N/A  Encephalopathy: 1 - 2 Date: 22  Ascites: slight Date: 22          MELD Score: 15          Lab Results   Component Value Date    EXTINR 1.4 (H) 2022    EXTCREATININ 0.56 2022    EXTSODIUM 138 2022    EXTBILITOTAL 3.2 (H) 2022    EXTALBUMIN 3.5 (L) 2022       Additional Organs: none  Kidney: No    If Kidney is "Yes" above, check Diagnosis and enter the medical eligibility below for a simultaneous liver/kidney:    Diagnosis: Chronic kidney disease (CKD) with " measured or calculated GFR less than or equal to 60 ml/min for greater than 90 consecutive days. At least one of the following must qualify for CKD:  Date Begun Dialysis CrCl (ml/min)  Must be < or = 30 eGFR (ml/min) Must be < or = 30    Yes/no:                    Diagnosis: Sustained acute kidney injury (must be confirmed at least once every 7 days). Please select at least one of the following criteria:  Date of test or treatment Dialysis received CrCl (ml/min) Must be < or = 30 eGFR (ml/min) Must be < or = 25 Number of days since previous test or treatment (must be less than or equal to 7 days)    Yes/No:                  Diagnosis: Metabolic disease, Check all diagnosis that apply:     Hyperoxaluria    Atypical hemolytic uremic syndrome (HUS) from mutations in factor H or factor I    Familial non-neuropathic systemic amyloidosis    Methylmalonic aciduria     Transplant nephrologist confirming candidate's most recent diagnosis for SLK: N/A               ## Please submit a separate Kidney Listing note for combined Liver/Kidney patients. ##    Will Recipient Accept?   Accept HBcAB Positive Organ:  yes  Accept HBV PALOMA Positive Organ:  yes  Accept HCV Antibody Positive Organ: yes   Accept HCV PALOMA Positive Organ:  yes                        Local: No                           Import: No  Accept DCD Organ:    yes  Minimum acceptable donor age:  5 years  Maximum acceptable donor age:  99 years  Minimum acceptable donor weight:  40 lbs    Maximum acceptable donor weight:  440 lb  Maximum miles the organ or  Recovery team will travel:   5000 miles    TCR Information    Citizenship: [place] Citizen   Country of permanent residence:   Year of entry to the [place]:   Highest education level: Post-College Graduate Degree    Patient on Life Support: No  Functional Status: 70% - cares for self: unable to carry on normal activity or active work  Working for income: No  If no, reason not working: Demands of Treatment  Previous Pancreas  Islet Infusion - No  Source of payment: Private Insurance  Diabetes: No  Any previous malignancy: No  Neoadjuvant Therapy: No  Has candidate ever had a diagnosis of HCC: No    Liver Medical Factors  Previous abdominal surgery: Yes  Spontaneous Bacterial Peritonitis: No  History of Portal Vein Thrombosis: No  Transjugular Intrahepatic Portosystemic Shunt: No    Blood type verified by Santhosh Rose RN and Jose G Canas RN  Patient is financially cleared to list

## 2022-07-22 NOTE — TELEPHONE ENCOUNTER
Patient has been listed in [facility] for liver transplant.  Called patient to let her know.  No answer. LVM  Will call back tomorrow.

## 2022-07-28 ENCOUNTER — EPISODE CHANGES (OUTPATIENT)
Dept: TRANSPLANT | Facility: CLINIC | Age: 57
End: 2022-07-28

## 2022-08-03 ENCOUNTER — CONFERENCE (OUTPATIENT)
Dept: TRANSPLANT | Facility: CLINIC | Age: 57
End: 2022-08-03
Payer: COMMERCIAL

## 2022-08-03 ENCOUNTER — PATIENT MESSAGE (OUTPATIENT)
Dept: TRANSPLANT | Facility: CLINIC | Age: 57
End: 2022-08-03
Payer: COMMERCIAL

## 2022-08-03 DIAGNOSIS — Z76.82 ORGAN TRANSPLANT CANDIDATE: Primary | ICD-10-CM

## 2022-08-04 ENCOUNTER — TELEPHONE (OUTPATIENT)
Dept: TRANSPLANT | Facility: CLINIC | Age: 57
End: 2022-08-04
Payer: COMMERCIAL

## 2022-08-04 ENCOUNTER — PATIENT MESSAGE (OUTPATIENT)
Dept: ENDOSCOPY | Facility: HOSPITAL | Age: 57
End: 2022-08-04
Payer: COMMERCIAL

## 2022-08-04 DIAGNOSIS — K74.60 CIRRHOSIS OF LIVER WITHOUT ASCITES, UNSPECIFIED HEPATIC CIRRHOSIS TYPE: Primary | ICD-10-CM

## 2022-08-04 NOTE — TELEPHONE ENCOUNTER
Patient's case discussed in the liver meeting.  Discussed recipients candidacy for LDLT.  Imaging reviewed.  Plan/ Recommendations:  No identified barriers to proceeding with live liver transplant    Informed patient of the above information.  Patient verbalized understanding.    Patient also wanted transplant team to know that she is scheduled EGD at [facility] soon.

## 2022-08-04 NOTE — TELEPHONE ENCOUNTER
Patient's case discussed in the liver meeting today.  Patients daughter would like to be a living liver donor.  Discussed recipients candidacy for LDLT.  Imaging reviewed.  Plan/ Recommendations:  No identified barriers to proceeding with live liver transplant.  Patient deemed suitable to proceed w/ evaluation with patients daughter as a potential live donor.

## 2022-08-05 NOTE — TELEPHONE ENCOUNTER
[facility] RISK CRITERIA BEHAVIOR DONOR ORGAN OFFER NOTE    Notified by Palmer Roberts, , of liver offer with donor information.  Donor and recipient information read back and verified.  Spoke with Mickey Harris and identified no acute medical issues during telephone assessment.  Patient stated her daughter will be evaluated for LDLT and she will wait

## 2022-08-17 ENCOUNTER — TELEPHONE (OUTPATIENT)
Dept: ENDOSCOPY | Facility: HOSPITAL | Age: 57
End: 2022-08-17
Payer: COMMERCIAL

## 2022-08-17 NOTE — TELEPHONE ENCOUNTER
----- Message from Beba Li MA sent at 8/17/2022 11:52 AM CDT -----  Contact: Mickey Garduno is currently scheduled to have a EGD done on 9/20. Patient is requesting that the procedure be moved to October. Please contact patient, to assist with rescheduling.    Confirmed Contact below:  Contact Name:Mickey Harris  Phone Number: [phone]

## 2022-08-18 ENCOUNTER — PATIENT MESSAGE (OUTPATIENT)
Dept: ENDOSCOPY | Facility: HOSPITAL | Age: 57
End: 2022-08-18
Payer: COMMERCIAL

## 2022-08-19 ENCOUNTER — PATIENT MESSAGE (OUTPATIENT)
Dept: ENDOSCOPY | Facility: HOSPITAL | Age: 57
End: 2022-08-19
Payer: COMMERCIAL

## 2022-08-19 ENCOUNTER — PATIENT MESSAGE (OUTPATIENT)
Dept: TRANSPLANT | Facility: CLINIC | Age: 57
End: 2022-08-19
Payer: COMMERCIAL

## 2022-08-19 ENCOUNTER — TELEPHONE (OUTPATIENT)
Dept: TRANSPLANT | Facility: CLINIC | Age: 57
End: 2022-08-19
Payer: COMMERCIAL

## 2022-08-19 NOTE — TELEPHONE ENCOUNTER
Hi Dr Regalado,  I discussed our conversation with my  and we both agree you had some very good points.  We just made arrangements to be in [place] on the 17th of September and going forward, instead of the end of Oct. In addition, I changed my EGD test back to September 20th versus October 19th.   I also spoke with my daughter and she wants to continue the testing to be a donor but does understand that I should respond to backups in case a good liver becomes available.  Therefore I will consider backup offers and will be available after September 17th for any and all calls.  Please let me know if I need to address anything else. Mickey Harris      Message forwarded to Dr. Regalado

## 2022-08-26 ENCOUNTER — PATIENT MESSAGE (OUTPATIENT)
Dept: INFECTIOUS DISEASES | Facility: CLINIC | Age: 57
End: 2022-08-26
Payer: COMMERCIAL

## 2022-08-26 DIAGNOSIS — Z76.82 ORGAN TRANSPLANT CANDIDATE: Primary | ICD-10-CM

## 2022-09-09 ENCOUNTER — TELEPHONE (OUTPATIENT)
Dept: TRANSPLANT | Facility: CLINIC | Age: 57
End: 2022-09-09
Payer: COMMERCIAL

## 2022-09-09 NOTE — TELEPHONE ENCOUNTER
BACK-UP ORGAN OFFER NOTE    Notified by Dave Horn, , of backup liver offer with donor information.  Donor and recipient information read back and verified.  Spoke with Mickey Harris in telephone assessment.  She reports that she remains in [place] at this time.  In the process of packing and plans to relocate to vacation home in [place] 9/17/2022.  She states that she will be available then for any offers.   notified of above.  On call comments updated w/ above info.

## 2022-09-20 ENCOUNTER — ANESTHESIA EVENT (OUTPATIENT)
Dept: ENDOSCOPY | Facility: HOSPITAL | Age: 57
End: 2022-09-20
Payer: COMMERCIAL

## 2022-09-20 ENCOUNTER — ANESTHESIA (OUTPATIENT)
Dept: ENDOSCOPY | Facility: HOSPITAL | Age: 57
End: 2022-09-20
Payer: COMMERCIAL

## 2022-09-20 ENCOUNTER — HOSPITAL ENCOUNTER (OUTPATIENT)
Facility: HOSPITAL | Age: 57
Discharge: HOME | End: 2022-09-20
Attending: STUDENT IN AN ORGANIZED HEALTH CARE EDUCATION/TRAINING PROGRAM | Admitting: STUDENT IN AN ORGANIZED HEALTH CARE EDUCATION/TRAINING PROGRAM
Payer: COMMERCIAL

## 2022-09-20 VITALS
OXYGEN SATURATION: 100 % | TEMPERATURE: 98 F | HEIGHT: 65 IN | HEART RATE: 71 BPM | DIASTOLIC BLOOD PRESSURE: 62 MMHG | BODY MASS INDEX: 31.65 KG/M2 | SYSTOLIC BLOOD PRESSURE: 106 MMHG | WEIGHT: 190 LBS | RESPIRATION RATE: 18 BRPM

## 2022-09-20 DIAGNOSIS — Z76.82 ORGAN TRANSPLANT CANDIDATE: Primary | ICD-10-CM

## 2022-09-20 PROCEDURE — E9220 PRA ENDO ANESTHESIA: HCPCS | Mod: TXP,,, | Performed by: NURSE ANESTHETIST, CERTIFIED REGISTERED

## 2022-09-20 PROCEDURE — 25000003 PHARM REV CODE 250: Mod: TXP | Performed by: STUDENT IN AN ORGANIZED HEALTH CARE EDUCATION/TRAINING PROGRAM

## 2022-09-20 PROCEDURE — 43235 PR EGD, FLEX, DIAGNOSTIC: ICD-10-PCS | Mod: TXP,,, | Performed by: STUDENT IN AN ORGANIZED HEALTH CARE EDUCATION/TRAINING PROGRAM

## 2022-09-20 PROCEDURE — 37000008 HC ANESTHESIA 1ST 15 MINUTES: Mod: TXP | Performed by: STUDENT IN AN ORGANIZED HEALTH CARE EDUCATION/TRAINING PROGRAM

## 2022-09-20 PROCEDURE — 63600175 PHARM REV CODE 636 W HCPCS: Mod: TXP | Performed by: NURSE ANESTHETIST, CERTIFIED REGISTERED

## 2022-09-20 PROCEDURE — 37000009 HC ANESTHESIA EA ADD 15 MINS: Mod: TXP | Performed by: STUDENT IN AN ORGANIZED HEALTH CARE EDUCATION/TRAINING PROGRAM

## 2022-09-20 PROCEDURE — 25000003 PHARM REV CODE 250: Mod: TXP | Performed by: NURSE ANESTHETIST, CERTIFIED REGISTERED

## 2022-09-20 PROCEDURE — E9220 PRA ENDO ANESTHESIA: ICD-10-PCS | Mod: TXP,,, | Performed by: NURSE ANESTHETIST, CERTIFIED REGISTERED

## 2022-09-20 PROCEDURE — 43235 EGD DIAGNOSTIC BRUSH WASH: CPT | Mod: TXP,,, | Performed by: STUDENT IN AN ORGANIZED HEALTH CARE EDUCATION/TRAINING PROGRAM

## 2022-09-20 PROCEDURE — 43235 EGD DIAGNOSTIC BRUSH WASH: CPT | Mod: TXP | Performed by: STUDENT IN AN ORGANIZED HEALTH CARE EDUCATION/TRAINING PROGRAM

## 2022-09-20 RX ORDER — LIDOCAINE HYDROCHLORIDE 20 MG/ML
INJECTION INTRAVENOUS
Status: DISCONTINUED | OUTPATIENT
Start: 2022-09-20 | End: 2022-09-20

## 2022-09-20 RX ORDER — PROPOFOL 10 MG/ML
VIAL (ML) INTRAVENOUS
Status: DISCONTINUED | OUTPATIENT
Start: 2022-09-20 | End: 2022-09-20

## 2022-09-20 RX ORDER — PROPOFOL 10 MG/ML
VIAL (ML) INTRAVENOUS CONTINUOUS PRN
Status: DISCONTINUED | OUTPATIENT
Start: 2022-09-20 | End: 2022-09-20

## 2022-09-20 RX ORDER — SODIUM CHLORIDE 9 MG/ML
INJECTION, SOLUTION INTRAVENOUS CONTINUOUS
Status: DISCONTINUED | OUTPATIENT
Start: 2022-09-20 | End: 2022-09-20 | Stop reason: HOSPADM

## 2022-09-20 RX ADMIN — Medication 150 MCG/KG/MIN: at 11:09

## 2022-09-20 RX ADMIN — LIDOCAINE HYDROCHLORIDE 20 MG: 20 INJECTION INTRAVENOUS at 11:09

## 2022-09-20 RX ADMIN — SODIUM CHLORIDE: 0.9 INJECTION, SOLUTION INTRAVENOUS at 10:09

## 2022-09-20 RX ADMIN — PROPOFOL 80 MG: 10 INJECTION, EMULSION INTRAVENOUS at 11:09

## 2022-09-20 RX ADMIN — SODIUM CHLORIDE: 0.9 INJECTION, SOLUTION INTRAVENOUS at 11:09

## 2022-09-20 NOTE — ANESTHESIA POSTPROCEDURE EVALUATION
Anesthesia Post Evaluation    Patient: Mickey Harris    Procedure(s) Performed: Procedure(s) (LRB):  EGD (ESOPHAGOGASTRODUODENOSCOPY) (N/A)    Final Anesthesia Type: general      Patient location during evaluation: GI PACU  Patient participation: Yes- Able to Participate  Level of consciousness: awake and alert  Post-procedure vital signs: reviewed and stable  Pain management: adequate  Airway patency: patent    PONV status at discharge: No PONV  Anesthetic complications: no      Cardiovascular status: blood pressure returned to baseline  Respiratory status: unassisted  Hydration status: euvolemic  Follow-up not needed.          Vitals Value Taken Time   /59 09/20/22 1147   Temp  09/20/22 1158   Pulse 69 09/20/22 1147   Resp 18 09/20/22 1147   SpO2 99 % 09/20/22 1147         No case tracking events are documented in the log.      Pain/Misael Score: Misael Score: 9 (9/20/2022 11:38 AM)

## 2022-09-20 NOTE — H&P
Short Stay Endoscopy History and Physical    PCP - Primary Doctor No  Referring Physician - Santhosh Rose RN  No address on file    Procedure - EGD  ASA - per anesthesia  Mallampati - per anesthesia  History of Anesthesia problems - no  Family history Anesthesia problems -  no   Plan of anesthesia - General    HPI  57 y.o. 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    Surgical History:  has a past surgical history that includes Esophagogastroduodenoscopy; Colonoscopy; Tonsillectomy (1972); cholecystectomy (2003); knee surgery (1992); Oophorectomy (Right, 1982); Esophagogastroduodenoscopy (11/01/2019); Esophagogastroduodenoscopy (07/13/2021); Colonoscopy (07/07/2021); Rotator cuff repair (Right, 2001); OPEN hysterectomy (1989); gynecologic surgery ; and Hysterectomy (1989).    Family History: family history includes Arrhythmia in her brother; Depression in her father; Heart disease in her brother and father; Hyperlipidemia in her father; Hypertension in her father..    Social History:  reports that she quit smoking about 34 years ago. Her smoking use included cigarettes. She smoked an average of 1 pack per day. She has never used smokeless tobacco. She reports that she does not currently use alcohol.    Review of patient's allergies indicates:   Allergen Reactions    Levofloxacin Hives and Shortness Of Breath    Sulfa (sulfonamide antibiotics) Rash       Medications:   Medications Prior to Admission   Medication Sig Dispense Refill Last Dose    albuterol (PROVENTIL/VENTOLIN HFA) 90  mcg/actuation inhaler SMARTSI Puff(s) Via Inhaler Every 4 Hours PRN       ascorbic acid, vitamin C, (VITAMIN C) 1000 MG tablet Take by mouth.       biotin 1 mg Cap Take 1 mg by mouth once daily.       cholecalciferol, vitamin D3, 125 mcg (5,000 unit) capsule Take 125 mcg by mouth once daily.       cyanocobalamin, vitamin B-12, 50 mcg tablet Take 1 tablet by mouth once daily.       fluticasone propionate (FLOVENT HFA) 220 mcg/actuation inhaler Inhale 1 puff into the lungs daily as needed.       furosemide (LASIX) 20 MG tablet Take 40 mg by mouth once daily.       lactulose (CHRONULAC) 10 gram/15 mL solution SMARTSIG:15 Milliliter(s) By Mouth 3 Times Daily       MG-PLUS-PROTEIN 133 mg tablet Take 1 tablet by mouth once daily.       ondansetron (ZOFRAN) 4 MG tablet Take 4 mg by mouth daily as needed.       spironolactone (ALDACTONE) 50 MG tablet Take 100 mg by mouth once daily.       vitamin E, dl,tocopheryl acet, (VITAMIN E, DL, ACETATE,) 45 mg (100 unit) Cap Take 1 tablet by mouth once daily.          Physical Exam:    Vital Signs:   Vitals:    22 1031   BP: 133/77   Pulse: 77   Resp: 16   Temp: 97.9 °F (36.6 °C)       General Appearance: Well appearing in no acute distress  Abdomen: Soft, non tender, non distended with normal bowel sounds, no masses      Labs:  Lab Results   Component Value Date    WBC 2.33 (L) 2022    HGB 14.5 2022    HCT 40.8 2022    PLT 47 (L) 2022    ALT 31 2022    AST 32 2022     2022    K 4.0 2022     2022    CREATININE 0.6 2022    BUN 14 2022    CO2 24 2022    TSH 1.363 2022    INR 1.4 (H) 2022    HGBA1C 4.5 2022       I have explained the risks and benefits of this endoscopic procedure to the patient including but not limited to bleeding, inflammation, infection, perforation, and death.      Bruce Dominguez MD

## 2022-09-20 NOTE — TRANSFER OF CARE
"Anesthesia Transfer of Care Note    Patient: Mickey Harris    Procedure(s) Performed: Procedure(s) (LRB):  EGD (ESOPHAGOGASTRODUODENOSCOPY) (N/A)    Patient location: PACU    Anesthesia Type: general    Transport from OR: Transported from OR on room air with adequate spontaneous ventilation    Post pain: adequate analgesia    Post assessment: no apparent anesthetic complications and tolerated procedure well    Post vital signs: stable    Level of consciousness: sedated and responds to stimulation    Nausea/Vomiting: no nausea/vomiting    Complications: none    Transfer of care protocol was followed      Last vitals:   Visit Vitals  BP (!) 121/59 (BP Location: Left arm, Patient Position: Lying)   Pulse 89   Temp 36.7 °C (98.1 °F) (Temporal)   Resp 16   Ht 5' 5" (1.651 m)   Wt 86.2 kg (190 lb)   SpO2 97%   Breastfeeding No   BMI 31.62 kg/m²     "

## 2022-09-20 NOTE — PROVATION PATIENT INSTRUCTIONS
Discharge Summary/Instructions after an Endoscopic Procedure  Patient Name: Mickey Harris  Patient MRN: [id number]  Patient YOB: 1965 Tuesday, September 20, 2022  Bruce Dominguez MD  Dear patient,  As a result of recent federal legislation (The Federal Cures Act), you may   receive lab or pathology results from your procedure in your [facility]   account before your physician is able to contact you. Your physician or   their representative will relay the results to you with their   recommendations at their soonest availability.  Thank you,  RESTRICTIONS:  During your procedure today, you received medications for sedation.  These   medications may affect your judgment, balance and coordination.  Therefore,   for 24 hours, you have the following restrictions:   - DO NOT drive a car, operate machinery, make legal/financial decisions,   sign important papers or drink alcohol.    ACTIVITY:  Today: no heavy lifting, straining or running due to procedural   sedation/anesthesia.  The following day: return to full activity including work.  DIET:  Eat and drink normally unless instructed otherwise.     TREATMENT FOR COMMON SIDE EFFECTS:  - Mild abdominal pain, nausea, belching, bloating or excessive gas:  rest,   eat lightly and use a heating pad.  - Sore Throat: treat with throat lozenges and/or gargle with warm salt   water.  - Because air was used during the procedure, expelling large amounts of air   from your rectum or belching is normal.  - If a bowel prep was taken, you may not have a bowel movement for 1-3 days.    This is normal.  SYMPTOMS TO WATCH FOR AND REPORT TO YOUR PHYSICIAN:  1. Abdominal pain or bloating, other than gas cramps.  2. Chest pain.  3. Back pain.  4. Signs of infection such as: chills or fever occurring within 24 hours   after the procedure.  5. Rectal bleeding, which would show as bright red, maroon, or black stools.   (A tablespoon of blood from the rectum is not serious,  especially if   hemorrhoids are present.)  6. Vomiting.  7. Weakness or dizziness.  GO DIRECTLY TO THE NEAREST EMERGENCY ROOM IF YOU HAVE ANY OF THE FOLLOWING:      Difficulty breathing              Chills and/or fever over 101 F   Persistent vomiting and/or vomiting blood   Severe abdominal pain   Severe chest pain   Black, tarry stools   Bleeding- more than one tablespoon   Any other symptom or condition that you feel may need urgent attention  Your doctor recommends these additional instructions:  If any biopsies were taken, your doctors clinic will contact you in 1 to 2   weeks with any results.  - Discharge patient to home.   - The findings and recommendations were discussed with the patient.   - Patient has a contact number available for emergencies.  The signs and   symptoms of potential delayed complications were discussed with the   patient.  Return to normal activities tomorrow.  Written discharge   instructions were provided to the patient.   - Return to liver clinic as previously scheduled.  For questions, problems or results please call your physician - Bruce Dominguez MD at Work:  [phone].  [facility], EMERGENCY ROOM PHONE NUMBER: [phone]  IF A COMPLICATION OR EMERGENCY SITUATION ARISES AND YOU ARE UNABLE TO REACH   YOUR PHYSICIAN - GO DIRECTLY TO THE EMERGENCY ROOM.  Bruce Dominguez MD  9/20/2022 11:34:56 AM  This report has been verified and signed electronically.  Dear patient,  As a result of recent federal legislation (The Federal Cures Act), you may   receive lab or pathology results from your procedure in your [facility]   account before your physician is able to contact you. Your physician or   their representative will relay the results to you with their   recommendations at their soonest availability.  Thank you,  PROVATION

## 2022-09-20 NOTE — PLAN OF CARE
Discharge instructions reviewed. Patient verbalized understanding and is without any questions or concerns at this time.

## 2022-09-20 NOTE — ANESTHESIA PREPROCEDURE EVALUATION
2022  Mickey Harris is a 57 y.o., female.  Pre-operative evaluation for Procedure(s) (LRB):  EGD (ESOPHAGOGASTRODUODENOSCOPY) (N/A)    Mickey Harris is a 57 y.o. female     Patient Active Problem List   Diagnosis    History of recurrent UTI (urinary tract infection)    Cough    Organ transplant candidate       Review of patient's allergies indicates:   Allergen Reactions    Levofloxacin Hives and Shortness Of Breath    Sulfa (sulfonamide antibiotics) Rash       No current facility-administered medications on file prior to encounter.     Current Outpatient Medications on File Prior to Encounter   Medication Sig Dispense Refill    albuterol (PROVENTIL/VENTOLIN HFA) 90 mcg/actuation inhaler SMARTSI Puff(s) Via Inhaler Every 4 Hours PRN      ascorbic acid, vitamin C, (VITAMIN C) 1000 MG tablet Take by mouth.      biotin 1 mg Cap Take 1 mg by mouth once daily.      cholecalciferol, vitamin D3, 125 mcg (5,000 unit) capsule Take 125 mcg by mouth once daily.      cyanocobalamin, vitamin B-12, 50 mcg tablet Take 1 tablet by mouth once daily.      fluticasone propionate (FLOVENT HFA) 220 mcg/actuation inhaler Inhale 1 puff into the lungs daily as needed.      furosemide (LASIX) 20 MG tablet Take 40 mg by mouth once daily.      lactulose (CHRONULAC) 10 gram/15 mL solution SMARTSIG:15 Milliliter(s) By Mouth 3 Times Daily      MG-PLUS-PROTEIN 133 mg tablet Take 1 tablet by mouth once daily.      ondansetron (ZOFRAN) 4 MG tablet Take 4 mg by mouth daily as needed.      spironolactone (ALDACTONE) 50 MG tablet Take 100 mg by mouth once daily.      vitamin E, dl,tocopheryl acet, (VITAMIN E, DL, ACETATE,) 45 mg (100 unit) Cap Take 1 tablet by mouth once daily.         Past Surgical History:   Procedure Laterality Date    cholecystectomy      COLONOSCOPY      6 polyps removed    COLONOSCOPY   2021    ESOPHAGOGASTRODUODENOSCOPY      2 coulums of grade 1 varices    ESOPHAGOGASTRODUODENOSCOPY  2019    trace varices    ESOPHAGOGASTRODUODENOSCOPY  2021    gynecologic surgery       removal of scar tissues and adhesions    HYSTERECTOMY  1989    knee surgery  1992    OOPHORECTOMY Right 1982    OPEN hysterectomy      removed uterus and bilateral fallopian tubes and LEFT ovary stayed in place    ROTATOR CUFF REPAIR Right     TONSILLECTOMY  1972       Social History     Socioeconomic History    Marital status:     Number of children: 2   Tobacco Use    Smoking status: Former     Packs/day: 1.00     Types: Cigarettes     Quit date:      Years since quittin.7    Smokeless tobacco: Never   Substance and Sexual Activity    Alcohol use: Not Currently     Comment: 2020 few sips of beer. before that last alcohol was 2019 no DUI hx         CBC:   Recent Labs     22  0956   WBC 2.47*   RBC 4.50   HGB 14.9   HCT 40.0   PLT 54*   MCV 89   MCH 33.1*   MCHC 37.3*       CMP: No results for input(s): NA, K, CL, CO2, BUN, CREATININE, GLU, MG, PHOS, CALCIUM, ALBUMIN, PROT, ALKPHOS, ALT, AST, BILITOT in the last 72 hours.    INR  Recent Labs     22  0956   INR 1.4*           Diagnostic Studies:      EKD Echo:  No results found for this or any previous visit.        Pre-op Assessment    I have reviewed the Patient Summary Reports.    I have reviewed the NPO Status.      Review of Systems  Anesthesia Hx:  No problems with previous Anesthesia  History of prior surgery of interest to airway management or planning: Previous anesthesia: General Denies Family Hx of Anesthesia complications.   Denies Personal Hx of Anesthesia complications.   Pulmonary:   Asthma    Hepatic/GI:   Liver Disease,        Physical Exam  General: Well nourished, Cooperative, Alert and Oriented    Airway:  Mallampati: III   Mouth Opening: Normal  TM Distance: Normal  Tongue:  Normal  Neck ROM: Normal ROM    Chest/Lungs:  Clear to auscultation, Normal Respiratory Rate    Heart:  Rate: Normal        Anesthesia Plan  Type of Anesthesia, risks & benefits discussed:    Anesthesia Type: Gen Natural Airway  Intra-op Monitoring Plan: Standard ASA Monitors  Post Op Pain Control Plan: multimodal analgesia  Induction:  IV  Informed Consent: Informed consent signed with the Patient and all parties understand the risks and agree with anesthesia plan.  All questions answered.   ASA Score: 3    Ready For Surgery From Anesthesia Perspective.     .

## 2022-09-20 NOTE — PLAN OF CARE
Plan of care reviewed with patient. Patient verbalized understanding. Questions and concerns addressed.

## 2022-09-22 ENCOUNTER — TELEPHONE (OUTPATIENT)
Dept: TRANSPLANT | Facility: CLINIC | Age: 57
End: 2022-09-22
Payer: COMMERCIAL

## 2022-09-25 ENCOUNTER — TELEPHONE (OUTPATIENT)
Dept: TRANSPLANT | Facility: CLINIC | Age: 57
End: 2022-09-25
Payer: COMMERCIAL

## 2022-09-25 NOTE — TELEPHONE ENCOUNTER
PATIENT NAME: Mickey Harris  [facility] #: [id number]    Lab Results   Component Value Date    CREATININE 0.7 09/20/2022     09/20/2022    BILITOT 3.6 (H) 09/20/2022    ALBUMIN 3.3 (L) 09/20/2022    INR 1.4 (H) 09/20/2022     MELD 16  Encephalopathy: 1 - 2  Ascites: slight  Dialysis: no     Recertification requestor: Santhosh Rose

## 2022-10-02 ENCOUNTER — TELEPHONE (OUTPATIENT)
Dept: TRANSPLANT | Facility: CLINIC | Age: 57
End: 2022-10-02
Payer: COMMERCIAL

## 2022-10-02 DIAGNOSIS — Z76.82 ORGAN TRANSPLANT CANDIDATE: Primary | ICD-10-CM

## 2022-10-03 ENCOUNTER — TELEPHONE (OUTPATIENT)
Dept: TRANSPLANT | Facility: CLINIC | Age: 57
End: 2022-10-03

## 2022-10-03 ENCOUNTER — LAB VISIT (OUTPATIENT)
Dept: TRANSPLANT | Facility: CLINIC | Age: 57
End: 2022-10-03
Payer: COMMERCIAL

## 2022-10-03 DIAGNOSIS — Z76.82 ORGAN TRANSPLANT CANDIDATE: ICD-10-CM

## 2022-10-03 LAB — SARS-COV-2 RDRP RESP QL NAA+PROBE: NEGATIVE

## 2022-10-03 PROCEDURE — U0002 COVID-19 LAB TEST NON-CDC: HCPCS | Mod: TXP | Performed by: TRANSPLANT SURGERY

## 2022-10-03 NOTE — PROGRESS NOTES
0852 Covid-19 test performed as ordered with specimen to lab department. Patient tolerated well.

## 2022-10-03 NOTE — TELEPHONE ENCOUNTER
"[facility] RISK CRITERIA BEHAVIOR DONOR ORGAN OFFER NOTE    Notified by Dave Horn, , of liver offer with donor information.  Donor and recipient information read back and verified.  Spoke with Mickey Harris and identified no acute medical issues during telephone assessment.  Patient instructed NPO @ 4:00 AM.    Patient verbalized understanding that he/she has been called as backup recipient.    The donor's reported social history includes PHS risk criteria    The risk of getting HIV or other hepatitis viruses from this donor is very small compared to the risk of dying while waiting for another liver. The risk of clinical illness from any transmitted infection is also small due to the availability of highly effective oral drugs for all three of these viruses. While the patient has the right to decline any organ for any reason, available scientific data do not support turning down an organ based on Hepatitis C or behavioral risk factors as the risks associated with waiting longer for the transplant are many times greater. Informed patient that Dr. Zazueta thinks that this is a good liver for the patient.     Patient was asked if they have had a positive COVID-19 test or if they have any signs or symptoms. Informed patient that they will be tested for COVID-19 upon arrival to the hospital, unless have a previous positive result. If tested and result is positive, the transplant will not be able to occur, they will be inactivated on the wait list for 28 days per protocol and required to quarantine.     Patient was also informed that if she turned down the offer, "A transplant doctor will call you after we hang up". Patient was also informed that if she turned down this donor, she would not be punished or removed from the transplant list, that she would remain on the list at her current status/MELD score. However, by waiting on the list longer rather than receiving this transplant, she will face a " greater risk of death than any risk from the slight possibility of transmitted infection.    Patient was also informed that she would have the opportunity to see and talk to the surgeon when she got to the hospital and before she went down to the operating room.    Patient understands risk and agrees to proceed with transplant.

## 2022-10-03 NOTE — TELEPHONE ENCOUNTER
ON CALL NOTE  @12noon  Received phone call from DIPTI Horn, , that the liver transplant case has been cancelled.  Patient notified.  Informed patient that she may resume her diet/ medication, and resume normal activities since he is no longer on stand by.  She verbalized understanding and agrees to do so.

## 2022-10-05 ENCOUNTER — IMMUNIZATION (OUTPATIENT)
Dept: INTERNAL MEDICINE | Facility: CLINIC | Age: 57
End: 2022-10-05
Payer: COMMERCIAL

## 2022-10-05 ENCOUNTER — IMMUNIZATION (OUTPATIENT)
Dept: PHARMACY | Facility: CLINIC | Age: 57
End: 2022-10-05
Payer: COMMERCIAL

## 2022-10-05 DIAGNOSIS — Z23 NEED FOR VACCINATION: Primary | ICD-10-CM

## 2022-10-05 PROCEDURE — 90686 FLU VACCINE (QUAD) GREATER THAN OR EQUAL TO 3YO PRESERVATIVE FREE IM: ICD-10-PCS | Mod: S$GLB,,, | Performed by: INTERNAL MEDICINE

## 2022-10-05 PROCEDURE — 90471 FLU VACCINE (QUAD) GREATER THAN OR EQUAL TO 3YO PRESERVATIVE FREE IM: ICD-10-PCS | Mod: S$GLB,,, | Performed by: INTERNAL MEDICINE

## 2022-10-05 PROCEDURE — 90471 IMMUNIZATION ADMIN: CPT | Mod: S$GLB,,, | Performed by: INTERNAL MEDICINE

## 2022-10-05 PROCEDURE — 90686 IIV4 VACC NO PRSV 0.5 ML IM: CPT | Mod: S$GLB,,, | Performed by: INTERNAL MEDICINE

## 2022-10-14 ENCOUNTER — TELEPHONE (OUTPATIENT)
Dept: TRANSPLANT | Facility: CLINIC | Age: 57
End: 2022-10-14
Payer: COMMERCIAL

## 2022-10-14 NOTE — TELEPHONE ENCOUNTER
Returned phone call to patient.  No answer.   Called both numbers on file. No answer.  [facility] with callback number.

## 2022-10-14 NOTE — TELEPHONE ENCOUNTER
Gilberto DAMICO [facility] Pre-Liver Transplant Clinical  Caller: Mickey (Today, 11:38 AM)  Pt is returning call back to her coordinator Santhosh.     [phone]  _________________    Called to inform me that her daughter is a match for LDLT.   Discussed her upcoming LDLT surgery. Patient states that she will send me additional questions that she may have through the patient portal.

## 2022-10-18 ENCOUNTER — OFFICE VISIT (OUTPATIENT)
Dept: TRANSPLANT | Facility: CLINIC | Age: 57
End: 2022-10-18
Payer: COMMERCIAL

## 2022-10-18 ENCOUNTER — CLINICAL SUPPORT (OUTPATIENT)
Dept: INFECTIOUS DISEASES | Facility: CLINIC | Age: 57
End: 2022-10-18
Payer: COMMERCIAL

## 2022-10-18 ENCOUNTER — LAB VISIT (OUTPATIENT)
Dept: LAB | Facility: HOSPITAL | Age: 57
End: 2022-10-18
Attending: STUDENT IN AN ORGANIZED HEALTH CARE EDUCATION/TRAINING PROGRAM
Payer: COMMERCIAL

## 2022-10-18 ENCOUNTER — CONFERENCE (OUTPATIENT)
Dept: TRANSPLANT | Facility: CLINIC | Age: 57
End: 2022-10-18
Payer: COMMERCIAL

## 2022-10-18 VITALS
DIASTOLIC BLOOD PRESSURE: 63 MMHG | SYSTOLIC BLOOD PRESSURE: 143 MMHG | RESPIRATION RATE: 16 BRPM | WEIGHT: 190.69 LBS | HEART RATE: 79 BPM | TEMPERATURE: 98 F | HEIGHT: 65 IN | BODY MASS INDEX: 31.77 KG/M2 | OXYGEN SATURATION: 99 %

## 2022-10-18 DIAGNOSIS — K76.82 HEPATIC ENCEPHALOPATHY: ICD-10-CM

## 2022-10-18 DIAGNOSIS — I85.10 SECONDARY ESOPHAGEAL VARICES WITHOUT BLEEDING: ICD-10-CM

## 2022-10-18 DIAGNOSIS — Z23 NEED FOR HEPATITIS B VACCINATION: ICD-10-CM

## 2022-10-18 DIAGNOSIS — Z76.82 ORGAN TRANSPLANT CANDIDATE: ICD-10-CM

## 2022-10-18 DIAGNOSIS — K74.60 LIVER CIRRHOSIS SECONDARY TO NASH: Primary | ICD-10-CM

## 2022-10-18 DIAGNOSIS — R18.8 OTHER ASCITES: ICD-10-CM

## 2022-10-18 DIAGNOSIS — K75.81 LIVER CIRRHOSIS SECONDARY TO NASH: Primary | ICD-10-CM

## 2022-10-18 LAB
ALBUMIN SERPL BCP-MCNC: 3.4 G/DL (ref 3.5–5.2)
ALP SERPL-CCNC: 82 U/L (ref 55–135)
ALT SERPL W/O P-5'-P-CCNC: 40 U/L (ref 10–44)
ANION GAP SERPL CALC-SCNC: 6 MMOL/L (ref 8–16)
AST SERPL-CCNC: 40 U/L (ref 10–40)
BASOPHILS # BLD AUTO: 0.01 K/UL (ref 0–0.2)
BASOPHILS NFR BLD: 0.4 % (ref 0–1.9)
BILIRUB SERPL-MCNC: 3.8 MG/DL (ref 0.1–1)
BUN SERPL-MCNC: 13 MG/DL (ref 6–20)
CALCIUM SERPL-MCNC: 8.7 MG/DL (ref 8.7–10.5)
CHLORIDE SERPL-SCNC: 107 MMOL/L (ref 95–110)
CO2 SERPL-SCNC: 23 MMOL/L (ref 23–29)
CREAT SERPL-MCNC: 0.7 MG/DL (ref 0.5–1.4)
DIFFERENTIAL METHOD: ABNORMAL
EOSINOPHIL # BLD AUTO: 0.1 K/UL (ref 0–0.5)
EOSINOPHIL NFR BLD: 2.2 % (ref 0–8)
ERYTHROCYTE [DISTWIDTH] IN BLOOD BY AUTOMATED COUNT: 13.9 % (ref 11.5–14.5)
EST. GFR  (NO RACE VARIABLE): >60 ML/MIN/1.73 M^2
GLUCOSE SERPL-MCNC: 103 MG/DL (ref 70–110)
HCT VFR BLD AUTO: 40.9 % (ref 37–48.5)
HGB BLD-MCNC: 15 G/DL (ref 12–16)
IMM GRANULOCYTES # BLD AUTO: 0 K/UL (ref 0–0.04)
IMM GRANULOCYTES NFR BLD AUTO: 0 % (ref 0–0.5)
INR PPP: 1.4 (ref 0.8–1.2)
LYMPHOCYTES # BLD AUTO: 0.6 K/UL (ref 1–4.8)
LYMPHOCYTES NFR BLD: 22.7 % (ref 18–48)
MCH RBC QN AUTO: 33.3 PG (ref 27–31)
MCHC RBC AUTO-ENTMCNC: 36.7 G/DL (ref 32–36)
MCV RBC AUTO: 91 FL (ref 82–98)
MONOCYTES # BLD AUTO: 0.3 K/UL (ref 0.3–1)
MONOCYTES NFR BLD: 9 % (ref 4–15)
NEUTROPHILS # BLD AUTO: 1.8 K/UL (ref 1.8–7.7)
NEUTROPHILS NFR BLD: 65.7 % (ref 38–73)
NRBC BLD-RTO: 0 /100 WBC
PLATELET # BLD AUTO: 47 K/UL (ref 150–450)
PMV BLD AUTO: 11.7 FL (ref 9.2–12.9)
POTASSIUM SERPL-SCNC: 4.4 MMOL/L (ref 3.5–5.1)
PROT SERPL-MCNC: 6.4 G/DL (ref 6–8.4)
PROTHROMBIN TIME: 14.7 SEC (ref 9–12.5)
RBC # BLD AUTO: 4.5 M/UL (ref 4–5.4)
SODIUM SERPL-SCNC: 136 MMOL/L (ref 136–145)
WBC # BLD AUTO: 2.77 K/UL (ref 3.9–12.7)

## 2022-10-18 PROCEDURE — 99215 OFFICE O/P EST HI 40 MIN: CPT | Mod: S$GLB,TXP,, | Performed by: STUDENT IN AN ORGANIZED HEALTH CARE EDUCATION/TRAINING PROGRAM

## 2022-10-18 PROCEDURE — 99999 PR PBB SHADOW E&M-EST. PATIENT-LVL IV: ICD-10-PCS | Mod: PBBFAC,TXP,, | Performed by: STUDENT IN AN ORGANIZED HEALTH CARE EDUCATION/TRAINING PROGRAM

## 2022-10-18 PROCEDURE — 36415 COLL VENOUS BLD VENIPUNCTURE: CPT | Mod: TXP | Performed by: STUDENT IN AN ORGANIZED HEALTH CARE EDUCATION/TRAINING PROGRAM

## 2022-10-18 PROCEDURE — 85610 PROTHROMBIN TIME: CPT | Mod: TXP | Performed by: STUDENT IN AN ORGANIZED HEALTH CARE EDUCATION/TRAINING PROGRAM

## 2022-10-18 PROCEDURE — 90739 HEPB VACC 2/4 DOSE ADULT IM: CPT | Mod: S$GLB,TXP,, | Performed by: INTERNAL MEDICINE

## 2022-10-18 PROCEDURE — 85025 COMPLETE CBC W/AUTO DIFF WBC: CPT | Mod: TXP | Performed by: STUDENT IN AN ORGANIZED HEALTH CARE EDUCATION/TRAINING PROGRAM

## 2022-10-18 PROCEDURE — 99999 PR PBB SHADOW E&M-EST. PATIENT-LVL I: ICD-10-PCS | Mod: PBBFAC,TXP,,

## 2022-10-18 PROCEDURE — 90739 HEPATITIS B (RECOMBINANT) ADJUVANTED, 2 DOSE: ICD-10-PCS | Mod: S$GLB,TXP,, | Performed by: INTERNAL MEDICINE

## 2022-10-18 PROCEDURE — 99999 PR PBB SHADOW E&M-EST. PATIENT-LVL IV: CPT | Mod: PBBFAC,TXP,, | Performed by: STUDENT IN AN ORGANIZED HEALTH CARE EDUCATION/TRAINING PROGRAM

## 2022-10-18 PROCEDURE — 99999 PR PBB SHADOW E&M-EST. PATIENT-LVL I: CPT | Mod: PBBFAC,TXP,,

## 2022-10-18 PROCEDURE — 90471 PR IMMUNIZ ADMIN,1 SINGLE/COMB VAC/TOXOID: ICD-10-PCS | Mod: S$GLB,TXP,, | Performed by: INTERNAL MEDICINE

## 2022-10-18 PROCEDURE — 99215 PR OFFICE/OUTPT VISIT, EST, LEVL V, 40-54 MIN: ICD-10-PCS | Mod: S$GLB,TXP,, | Performed by: STUDENT IN AN ORGANIZED HEALTH CARE EDUCATION/TRAINING PROGRAM

## 2022-10-18 PROCEDURE — 80053 COMPREHEN METABOLIC PANEL: CPT | Mod: TXP | Performed by: STUDENT IN AN ORGANIZED HEALTH CARE EDUCATION/TRAINING PROGRAM

## 2022-10-18 PROCEDURE — 80321 ALCOHOLS BIOMARKERS 1OR 2: CPT | Mod: TXP | Performed by: STUDENT IN AN ORGANIZED HEALTH CARE EDUCATION/TRAINING PROGRAM

## 2022-10-18 PROCEDURE — 90471 IMMUNIZATION ADMIN: CPT | Mod: S$GLB,TXP,, | Performed by: INTERNAL MEDICINE

## 2022-10-18 RX ORDER — ZINC SULFATE 50(220)MG
220 CAPSULE ORAL
COMMUNITY
Start: 2022-09-06 | End: 2022-10-18

## 2022-10-18 RX ORDER — ERGOCALCIFEROL 1.25 MG/1
50000 CAPSULE ORAL
Status: ON HOLD | COMMUNITY
Start: 2022-09-26 | End: 2023-01-31 | Stop reason: HOSPADM

## 2022-10-18 RX ORDER — HYDROXYZINE HYDROCHLORIDE 25 MG/1
25 TABLET, FILM COATED ORAL 3 TIMES DAILY
Status: ON HOLD | COMMUNITY
Start: 2022-09-06 | End: 2023-01-31 | Stop reason: HOSPADM

## 2022-10-18 NOTE — PROGRESS NOTES
Transplant Hepatology   Transplant Evaluation Follow Up Note    Referring provider: No ref. provider found  PCP: Primary Doctor No    Chief complaint:  DORAN cirrhosis, transplant evaluation    HPI: Mickey Harris is a 57 y.o. female who was referred to Transplant Hepatology Clinic for DORAN related cirrhosis. She is accompanied by her .    She reports continued generalized fatigue but is otherwise without complaints today.  No recent hospitalizations or ED visits.  Compliant with diuretics without recent abdominal distention.  Also compliant with lactulose without recent encephalopathy.  Continued scleral icterus.  No recent GI bleeding.    Background: She reports being diagnosed with cirrhosis approximately 4 years ago.  She was found to have leukopenia and thrombocytopenia.  Ultrasound revealed hepatic steatosis and splenomegaly.  Subsequent MRI was suggestive of cirrhosis, and FibroScan showed F4 fibrosis, cirrhosis.  Serologic evaluation was negative, and her cirrhosis was attributed to DORAN.    She did well until approximately 1 year ago when she developed lower extremity edema and abdominal distension for which she was started on diuretics.  Subsequent imaging confirmed moderate volume ascites.  She has remained on diuretics and has not required paracentesis.  Her cirrhosis has also been complicated by hepatic encephalopathy which is well controlled with lactulose.  She has had intermittent scleral icterus.  No recent GI bleeding.    She does not drink alcohol.  She was a previous social drinker but denies history of heavy alcohol use.  Previous workup was negative for chronic viral hepatitis.  She says that her mother  at age 24 from cirrhosis related to congenital portal vein issues.  Her son has elevated bilirubin.  She has no other known family history of liver disease.      Past Medical History:   Diagnosis Date    Anxiety disorder, unspecified     Asthma     Chronic cough     Hepatic  encephalopathy     Hyperlipidemia     Infarction of spleen 2021    Liver cirrhosis secondary to DORAN     Mild tricuspid regurgitation     Unspecified cirrhosis of liver        Past Surgical History:   Procedure Laterality Date    cholecystectomy      COLONOSCOPY      6 polyps removed    COLONOSCOPY  2021    ESOPHAGOGASTRODUODENOSCOPY      2 coulums of grade 1 varices    ESOPHAGOGASTRODUODENOSCOPY  2019    trace varices    ESOPHAGOGASTRODUODENOSCOPY  2021    ESOPHAGOGASTRODUODENOSCOPY N/A 2022    Procedure: EGD (ESOPHAGOGASTRODUODENOSCOPY);  Surgeon: Bruce Dominguez MD;  Location: [facility] ([address] [place] [address]);  Service: Endoscopy;  Laterality: N/A;  labs prior  liver transplant candidate  screen for varices   fully vaccinated; instructions to portal-LW   pt rescheduled; updated instructions to portal-st    gynecologic surgery       removal of scar tissues and adhesions    HYSTERECTOMY      knee surgery  1992    OOPHORECTOMY Right     OPEN hysterectomy      removed uterus and bilateral fallopian tubes and LEFT ovary stayed in place    ROTATOR CUFF REPAIR Right     TONSILLECTOMY  1972       Family History   Problem Relation Age of Onset    Hypertension Father     Hyperlipidemia Father     Heart disease Father     Depression Father     Arrhythmia Brother     Heart disease Brother        Social History     Tobacco Use    Smoking status: Former     Packs/day: 1.00     Types: Cigarettes     Quit date:      Years since quittin.8    Smokeless tobacco: Never   Substance Use Topics    Alcohol use: Not Currently    Drug use: Not Currently       Current Outpatient Medications   Medication Sig Dispense Refill    albuterol (PROVENTIL/VENTOLIN HFA) 90 mcg/actuation inhaler SMARTSI Puff(s) Via Inhaler Every 4 Hours PRN      ascorbic acid, vitamin C, (VITAMIN C) 1000 MG tablet Take by mouth.      biotin 1 mg Cap Take 1 mg by mouth once daily.      cholecalciferol,  "vitamin D3, 125 mcg (5,000 unit) capsule Take 125 mcg by mouth once daily.      cyanocobalamin, vitamin B-12, 50 mcg tablet Take 1 tablet by mouth once daily.      fluticasone propionate (FLOVENT HFA) 220 mcg/actuation inhaler Inhale 1 puff into the lungs daily as needed.      furosemide (LASIX) 20 MG tablet Take 40 mg by mouth once daily.      lactulose (CHRONULAC) 10 gram/15 mL solution SMARTSIG:15 Milliliter(s) By Mouth 3 Times Daily      MG-PLUS-PROTEIN 133 mg tablet Take 1 tablet by mouth once daily.      ondansetron (ZOFRAN) 4 MG tablet Take 4 mg by mouth daily as needed.      spironolactone (ALDACTONE) 50 MG tablet Take 100 mg by mouth once daily.      vitamin E, dl,tocopheryl acet, (VITAMIN E, DL, ACETATE,) 45 mg (100 unit) Cap Take 1 tablet by mouth once daily.       No current facility-administered medications for this visit.       Review of patient's allergies indicates:   Allergen Reactions    Levofloxacin Hives and Shortness Of Breath    Sulfa (sulfonamide antibiotics) Rash       Review of Systems   Constitutional:  Positive for malaise/fatigue. Negative for fever and weight loss.   Gastrointestinal:  Positive for abdominal pain. Negative for blood in stool, constipation, diarrhea, heartburn, melena, nausea and vomiting.     Vitals:    10/18/22 1046   BP: (!) 143/63   Pulse: 79   Resp: 16   Temp: 97.7 °F (36.5 °C)   TempSrc: Temporal   SpO2: 99%   Weight: 86.5 kg (190 lb 11.2 oz)   Height: 5' 4.57" (1.64 m)         Physical Exam  Vitals reviewed.   Constitutional:       General: She is not in acute distress.  Eyes:      General: Scleral icterus present.   Cardiovascular:      Rate and Rhythm: Normal rate and regular rhythm.   Pulmonary:      Effort: Pulmonary effort is normal. No respiratory distress.   Abdominal:      General: Bowel sounds are normal. There is no distension.      Palpations: Abdomen is soft.      Tenderness: There is no abdominal tenderness. There is no guarding or rebound. "   Musculoskeletal:      Right lower leg: No edema.      Left lower leg: No edema.   Skin:     Coloration: Skin is not jaundiced.       LABS: I personally reviewed pertinent laboratory findings.    Lab Results   Component Value Date    ALT 36 09/20/2022    AST 37 09/20/2022    GGT 38 06/30/2022    ALKPHOS 85 09/20/2022    BILITOT 3.6 (H) 09/20/2022       Lab Results   Component Value Date    WBC 2.77 (L) 10/18/2022    HGB 15.0 10/18/2022    HCT 40.9 10/18/2022    MCV 91 10/18/2022    PLT 47 (L) 10/18/2022       Lab Results   Component Value Date     10/18/2022    K 4.4 10/18/2022     10/18/2022    CO2 24 09/20/2022    BUN 12 09/20/2022    CREATININE 0.7 09/20/2022    CALCIUM 8.7 10/18/2022    ANIONGAP 4 (L) 09/20/2022    ESTGFRAFRICA >60.0 06/30/2022    EGFRNONAA >60.0 06/30/2022       Lab Results   Component Value Date    INR 1.4 (H) 10/18/2022    INR 1.4 (H) 09/20/2022    INR 1.4 (H) 06/30/2022       Imaging:  I personally reviewed recent imaging studies available on the chart and from outside medical records.      Assessment:  57 y.o. female with DORAN related cirrhosis. MELD 15. She is decompensated with ascites, HE, jaundice.    1. Liver cirrhosis secondary to DORAN    2. Secondary esophageal varices without bleeding    3. Other ascites    4. Hepatic encephalopathy    5. Organ transplant candidate        MELD-Na score: 16 at 9/20/2022  9:56 AM  MELD score: 15 at 9/20/2022  9:56 AM  Calculated from:  Serum Creatinine: 0.7 mg/dL (Using min of 1 mg/dL) at 9/20/2022  9:56 AM  Serum Sodium: 136 mmol/L at 9/20/2022  9:56 AM  Total Bilirubin: 3.6 mg/dL at 9/20/2022  9:56 AM  INR(ratio): 1.4 at 9/20/2022  9:56 AM  Age: 57 years      Transplant Candidacy:  She is listed for liver transplantation. Tentatively planning for liver donor transplant 12/2022.    Recommendations:  Ascites/Edema: 2 gm Na diet. Continue Lasix 40 mg daily and Aldactone 100 mg daily    Encephalopathy: Continue lactulose. Titrate to  maintain 3-4 bowel movements per day.    Variceal screening: EGD in 09/2022 showed small varices. Repeat EGD in 09/2023.    HCC screening: MRI in 07/2022 without HCC. AFP 2.8. Repeat abdominal US and AFP every 6 months.    Immunizations: Recommend HAV and HBV vaccinations if not immune    UNOS Patient Status  Functional Status: 60% - Requires occasional assistance but is able to care for needs  Physical Capacity: No Limitations    Diabetes: No  Any previous malignancy: No  Neoadjuvant Therapy: no  Has patient ever had a dx of HCC: no  Previous Abdominal Surgery: yes  Spontaneous Bacterial Peritonitis: no  History of Portal Vein Thrombosis: no  Transjugular Intrahepatic Portosystemic Shunt: no    MELD labs in 6 weeks. Return to clinic within 3 months.    I spent a total of 40 minutes on the day of the visit. This includes face to face time and non-face to face time preparing to see the patient (eg, review of tests), obtaining and/or reviewing separately obtained history, documenting clinical information in the electronic or other health record, independently interpreting results, and communicating results to the patient/family/caregiver, or care coordination.    Kg Regalado MD  Staff Physician  Hepatology and Liver Transplant  [facility]  [facility]

## 2022-10-18 NOTE — Clinical Note
October 23, 2022                     [facility]st [facility]  [address]  [facility] [address]  Phone: [phone]   Patient: Mickey Harris   MR Number: [id number]   YOB: 1965   Date of Visit: 10/18/2022       Dear      Thank you for referring Mickey Harris to me for evaluation. Attached you will find relevant portions of my assessment and plan of care.    If you have questions, please do not hesitate to call me. I look forward to following Mickey Harris along with you.    Sincerely,    Kg Regalado MD    Enclosure    If you would like to receive this communication electronically, please contact [email or web address] or [phone] to request [employer] access.    [employer] is a tool which provides read-only access to select patient information with whom you have a relationship. Its easy to use and provides real time access to review your patients record including encounter summaries, notes, results, and demographic information.    If you feel you have received this communication in error or would no longer like to receive these types of communications, please e-mail [email or web address]

## 2022-10-19 ENCOUNTER — PATIENT MESSAGE (OUTPATIENT)
Dept: TRANSPLANT | Facility: CLINIC | Age: 57
End: 2022-10-19
Payer: COMMERCIAL

## 2022-10-20 ENCOUNTER — TELEPHONE (OUTPATIENT)
Dept: TRANSPLANT | Facility: CLINIC | Age: 57
End: 2022-10-20
Payer: COMMERCIAL

## 2022-10-20 DIAGNOSIS — Z76.82 ORGAN TRANSPLANT CANDIDATE: ICD-10-CM

## 2022-10-20 DIAGNOSIS — K75.81 LIVER CIRRHOSIS SECONDARY TO NASH: Primary | ICD-10-CM

## 2022-10-20 DIAGNOSIS — K74.60 LIVER CIRRHOSIS SECONDARY TO NASH: Primary | ICD-10-CM

## 2022-10-20 NOTE — TELEPHONE ENCOUNTER
Returned phone call.  Patient wanted an update on what appointments she has for her pre-op LDLT.  Answered all questions.

## 2022-10-21 LAB
CLINICAL BIOCHEMIST REVIEW: NORMAL
PETH 16:0/18.1 (POPETH): <10 NG/ML
PETH 16:0/18.2 (PLPETH): <10 NG/ML

## 2022-10-26 ENCOUNTER — TELEPHONE (OUTPATIENT)
Dept: TRANSPLANT | Facility: CLINIC | Age: 57
End: 2022-10-26
Payer: COMMERCIAL

## 2022-10-26 ENCOUNTER — PATIENT MESSAGE (OUTPATIENT)
Dept: TRANSPLANT | Facility: CLINIC | Age: 57
End: 2022-10-26
Payer: COMMERCIAL

## 2022-10-26 NOTE — TELEPHONE ENCOUNTER
----- Message from Kel Nayak sent at 10/26/2022  2:19 PM CDT -----  Regarding: FW: appts    Returned call to pt and she [facility]. Told her that Sav is out of the office, so I am unsure is she sp wit Dr. Ghotra and Dr. Regalado about he she still needs to see one of them since she saw Dr. Regalado on 10/18. Told her that I will have another nurse sp with them and give her a call back.    .  ----- Message -----  From: Patience Fine RN  Sent: 10/26/2022   2:00 PM CDT  To: Kel Nayak  Subject: appts                                            She sent a message and would like you to call and go over her appts with her.

## 2022-11-16 ENCOUNTER — TELEPHONE (OUTPATIENT)
Dept: TRANSPLANT | Facility: CLINIC | Age: 57
End: 2022-11-16
Payer: COMMERCIAL

## 2022-11-16 NOTE — TELEPHONE ENCOUNTER
Per request of pt's donor, called pt to discuss possible OR date change to 12/8 & logistics surrounding the change of plans.    Donor and recipient OK to move date of surgery. Will make arrangements as needed.

## 2022-11-18 ENCOUNTER — PATIENT MESSAGE (OUTPATIENT)
Dept: TRANSPLANT | Facility: CLINIC | Age: 57
End: 2022-11-18
Payer: COMMERCIAL

## 2022-11-18 ENCOUNTER — TELEPHONE (OUTPATIENT)
Dept: TRANSPLANT | Facility: CLINIC | Age: 57
End: 2022-11-18
Payer: COMMERCIAL

## 2022-11-18 NOTE — TELEPHONE ENCOUNTER
----- Message from Rodger Chen sent at 11/18/2022  9:46 AM CST -----  Regarding: Speak to Nurse  Patient called in looking to speak with nurse Serrano to follow up from their previous conversation.          Contact: [phone]

## 2022-11-18 NOTE — TELEPHONE ENCOUNTER
Called Mickey to inform her that we will keep the pre-op dates for her and her daughter as scheduled. Understanding expressed. Mickey with inform her daughter via phone.

## 2022-11-30 ENCOUNTER — OFFICE VISIT (OUTPATIENT)
Dept: TRANSPLANT | Facility: CLINIC | Age: 57
End: 2022-11-30
Payer: COMMERCIAL

## 2022-11-30 ENCOUNTER — SOCIAL WORK (OUTPATIENT)
Dept: TRANSPLANT | Facility: CLINIC | Age: 57
End: 2022-11-30
Payer: COMMERCIAL

## 2022-11-30 ENCOUNTER — CLINICAL SUPPORT (OUTPATIENT)
Dept: TRANSPLANT | Facility: CLINIC | Age: 57
End: 2022-11-30
Payer: COMMERCIAL

## 2022-11-30 ENCOUNTER — HOSPITAL ENCOUNTER (OUTPATIENT)
Dept: RADIOLOGY | Facility: HOSPITAL | Age: 57
Discharge: HOME | End: 2022-11-30
Attending: STUDENT IN AN ORGANIZED HEALTH CARE EDUCATION/TRAINING PROGRAM
Payer: COMMERCIAL

## 2022-11-30 VITALS
HEIGHT: 65 IN | HEIGHT: 65 IN | RESPIRATION RATE: 18 BRPM | HEART RATE: 81 BPM | OXYGEN SATURATION: 98 % | SYSTOLIC BLOOD PRESSURE: 134 MMHG | DIASTOLIC BLOOD PRESSURE: 63 MMHG | OXYGEN SATURATION: 98 % | WEIGHT: 192.44 LBS | RESPIRATION RATE: 18 BRPM | TEMPERATURE: 97 F | HEART RATE: 81 BPM | WEIGHT: 192.44 LBS | DIASTOLIC BLOOD PRESSURE: 63 MMHG | HEART RATE: 81 BPM | SYSTOLIC BLOOD PRESSURE: 134 MMHG | RESPIRATION RATE: 18 BRPM | DIASTOLIC BLOOD PRESSURE: 63 MMHG | SYSTOLIC BLOOD PRESSURE: 134 MMHG | BODY MASS INDEX: 32.06 KG/M2 | TEMPERATURE: 97 F | BODY MASS INDEX: 32.06 KG/M2 | TEMPERATURE: 97 F | HEIGHT: 65 IN | OXYGEN SATURATION: 98 % | BODY MASS INDEX: 32.06 KG/M2 | WEIGHT: 192.44 LBS

## 2022-11-30 DIAGNOSIS — Z76.82 ORGAN TRANSPLANT CANDIDATE: ICD-10-CM

## 2022-11-30 DIAGNOSIS — K74.60 LIVER CIRRHOSIS SECONDARY TO NASH (NONALCOHOLIC STEATOHEPATITIS): ICD-10-CM

## 2022-11-30 DIAGNOSIS — Z76.82 ORGAN TRANSPLANT CANDIDATE: Primary | ICD-10-CM

## 2022-11-30 DIAGNOSIS — K75.81 LIVER CIRRHOSIS SECONDARY TO NASH (NONALCOHOLIC STEATOHEPATITIS): ICD-10-CM

## 2022-11-30 DIAGNOSIS — K74.60 HEPATIC CIRRHOSIS, UNSPECIFIED HEPATIC CIRRHOSIS TYPE, UNSPECIFIED WHETHER ASCITES PRESENT: Primary | ICD-10-CM

## 2022-11-30 DIAGNOSIS — Z94.4 LIVER REPLACED BY TRANSPLANT: ICD-10-CM

## 2022-11-30 PROCEDURE — 93975 VASCULAR STUDY: CPT | Mod: TC,TXP

## 2022-11-30 PROCEDURE — 76700 US EXAM ABDOM COMPLETE: CPT | Mod: 26,59,TXP, | Performed by: RADIOLOGY

## 2022-11-30 PROCEDURE — 99999 PR PBB SHADOW E&M-EST. PATIENT-LVL IV: ICD-10-PCS | Mod: PBBFAC,TXP,,

## 2022-11-30 PROCEDURE — 99215 OFFICE O/P EST HI 40 MIN: CPT | Mod: S$GLB,TXP,, | Performed by: INTERNAL MEDICINE

## 2022-11-30 PROCEDURE — 93975 VASCULAR STUDY: CPT | Mod: 26,TXP,, | Performed by: RADIOLOGY

## 2022-11-30 PROCEDURE — 99999 PR PBB SHADOW E&M-EST. PATIENT-LVL IV: ICD-10-PCS | Mod: PBBFAC,TXP,, | Performed by: INTERNAL MEDICINE

## 2022-11-30 PROCEDURE — 93975 US ABDOMEN COMP WITH DOPPLER (XPD): ICD-10-PCS | Mod: 26,TXP,, | Performed by: RADIOLOGY

## 2022-11-30 PROCEDURE — 99999 PR PBB SHADOW E&M-EST. PATIENT-LVL IV: CPT | Mod: PBBFAC,TXP,, | Performed by: INTERNAL MEDICINE

## 2022-11-30 PROCEDURE — 99499 UNLISTED E&M SERVICE: CPT | Mod: NTX,S$GLB,, | Performed by: SURGERY

## 2022-11-30 PROCEDURE — 76700 US ABDOMEN COMP WITH DOPPLER (XPD): ICD-10-PCS | Mod: 26,59,TXP, | Performed by: RADIOLOGY

## 2022-11-30 PROCEDURE — 99499 NO LOS: ICD-10-PCS | Mod: NTX,S$GLB,, | Performed by: SURGERY

## 2022-11-30 PROCEDURE — 99999 PR PBB SHADOW E&M-EST. PATIENT-LVL IV: CPT | Mod: PBBFAC,TXP,,

## 2022-11-30 PROCEDURE — 99999 PR PBB SHADOW E&M-EST. PATIENT-LVL I: ICD-10-PCS | Mod: PBBFAC,TXP,,

## 2022-11-30 PROCEDURE — 99215 PR OFFICE/OUTPT VISIT, EST, LEVL V, 40-54 MIN: ICD-10-PCS | Mod: S$GLB,TXP,, | Performed by: INTERNAL MEDICINE

## 2022-11-30 PROCEDURE — 99999 PR PBB SHADOW E&M-EST. PATIENT-LVL I: CPT | Mod: PBBFAC,TXP,,

## 2022-11-30 NOTE — PROGRESS NOTES
Subjective:       Patient ID: Mickey Harris is a 57 y.o. female.    Chief Complaint: Waitlist Maintenance    HPI  I saw this 57 y.o. lady in the liver transplant clinic where she came with her . She is currently listed on our waitlist with a MELD score of 16 and she is scheduled to get a living donor transplant next week.    DORAN related cirrhosis.   - generalized fatigue but is otherwise without complaints today.  No recent hospitalizations or ED visits.  Compliant with diuretics without recent abdominal distention.  Also compliant with lactulose without recent encephalopathy.      Main complaints are currently itching, fatigue and edema.       She does not drink alcohol.  She was a previous social drinker but denies history of heavy alcohol use.  Previous workup was negative for chronic viral hepatitis.  She says that her mother  at age 24 from cirrhosis related to congenital portal vein issues.  Her son has elevated bilirubin.  She has no other known family history of liver disease.     MELD-Na score: 16 at 2022  9:10 AM  MELD score: 15 at 2022  9:10 AM  Calculated from:  Serum Creatinine: 0.7 mg/dL (Using min of 1 mg/dL) at 2022  9:10 AM  Serum Sodium: 136 mmol/L at 2022  9:10 AM  Total Bilirubin: 4.0 mg/dL at 2022  9:10 AM  INR(ratio): 1.4 at 2022  9:10 AM  Age: 57 years     PMH:  Right rotator cuff surgery  Hysterectomy  Lap leticia  Asthma    Review of Systems   Constitutional:  Negative for activity change, appetite change, chills, fatigue, fever and unexpected weight change.   HENT:  Negative for ear pain, hearing loss, nosebleeds, sore throat and trouble swallowing.    Eyes:  Negative for redness and visual disturbance.   Respiratory:  Negative for cough, chest tightness, shortness of breath and wheezing.    Cardiovascular:  Negative for chest pain and palpitations.   Gastrointestinal:  Negative for abdominal distention, abdominal pain, blood in stool,  constipation, diarrhea, nausea and vomiting.   Genitourinary:  Negative for difficulty urinating, dysuria, frequency, hematuria and urgency.   Musculoskeletal:  Negative for arthralgias, back pain, gait problem, joint swelling and myalgias.   Skin:  Negative for rash.   Neurological:  Negative for tremors, seizures, speech difficulty, weakness and headaches.   Hematological:  Negative for adenopathy.   Psychiatric/Behavioral:  Negative for confusion, decreased concentration and sleep disturbance. The patient is not nervous/anxious.        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     Mild tricuspid regurgitation     Unspecified cirrhosis of liver      Past Surgical History:   Procedure Laterality Date    cholecystectomy  2003    COLONOSCOPY      6 polyps removed    COLONOSCOPY  07/07/2021    ESOPHAGOGASTRODUODENOSCOPY      2 coulums of grade 1 varices    ESOPHAGOGASTRODUODENOSCOPY  11/01/2019    trace varices    ESOPHAGOGASTRODUODENOSCOPY  07/13/2021    ESOPHAGOGASTRODUODENOSCOPY N/A 9/20/2022    Procedure: EGD (ESOPHAGOGASTRODUODENOSCOPY);  Surgeon: Bruce Dominguez MD;  Location: [facility] ([address] [place] [address]);  Service: Endoscopy;  Laterality: N/A;  labs prior  liver transplant candidate  screen for varices  8/18 fully vaccinated; instructions to portal-LW  8/19 pt rescheduled; updated instructions to portal-st    gynecologic surgery       removal of scar tissues and adhesions    HYSTERECTOMY  1989    knee surgery  1992    OOPHORECTOMY Right 1982    OPEN hysterectomy  1989    removed uterus and bilateral fallopian tubes and LEFT ovary stayed in place    ROTATOR CUFF REPAIR Right 2001     TONSILLECTOMY  1972     Current Outpatient Medications   Medication Sig    albuterol (PROVENTIL/VENTOLIN HFA) 90 mcg/actuation inhaler SMARTSI Puff(s) Via Inhaler Every 4 Hours PRN    biotin 1 mg Cap Take 1 mg by mouth once daily.    cyanocobalamin, vitamin B-12, 50 mcg tablet Take 1 tablet by mouth once daily.    fluticasone propionate (FLOVENT HFA) 220 mcg/actuation inhaler Inhale 1 puff into the lungs daily as needed.    furosemide (LASIX) 20 MG tablet Take 40 mg by mouth once daily.    hydrOXYzine HCL (ATARAX) 25 MG tablet Take 25 mg by mouth 3 (three) times daily.    lactulose (CHRONULAC) 10 gram/15 mL solution SMARTSIG:15 Milliliter(s) By Mouth 3 Times Daily    loratadine-pseudoephedrine  mg (CLARITIN-D 24-HOUR)  mg per 24 hr tablet Take 1 tablet by mouth once daily.    MG-PLUS-PROTEIN 133 mg tablet Take 1 tablet by mouth once daily.    ondansetron (ZOFRAN) 4 MG tablet Take 4 mg by mouth daily as needed.    spironolactone (ALDACTONE) 50 MG tablet Take 100 mg by mouth once daily.    vitamin E, dl,tocopheryl acet, (VITAMIN E, DL, ACETATE,) 45 mg (100 unit) Cap Take 1 tablet by mouth once daily.    ergocalciferol (ERGOCALCIFEROL) 50,000 unit Cap Take 50,000 Units by mouth every 7 days.     No current facility-administered medications for this visit.       Objective:      Physical Exam  Constitutional:       General: She is not in acute distress.  HENT:      Head: Normocephalic.   Eyes:      Pupils: Pupils are equal, round, and reactive to light.   Neck:      Thyroid: No thyromegaly.      Vascular: No JVD.      Trachea: No tracheal deviation.   Cardiovascular:      Rate and Rhythm: Normal rate and regular rhythm.      Heart sounds: Normal heart sounds. No murmur heard.  Pulmonary:      Effort: Pulmonary effort is normal.      Breath sounds: Normal breath sounds. No stridor.   Abdominal:      Palpations: Abdomen is soft.   Lymphadenopathy:      Head:      Right side of head: No submental,  submandibular, tonsillar, preauricular, posterior auricular or occipital adenopathy.      Left side of head: No submental, submandibular, tonsillar, preauricular, posterior auricular or occipital adenopathy.      Cervical: No cervical adenopathy.   Neurological:      Mental Status: She is alert. She is not disoriented.      Cranial Nerves: No cranial nerve deficit.      Sensory: No sensory deficit.         Assessment:       1. Liver cirrhosis secondary to DORAN (nonalcoholic steatohepatitis)        Plan:   Ascites/Edema: 2 gm Na diet. Continue Lasix 40 mg daily and Aldactone 100 mg daily     Encephalopathy: Continue lactulose. Titrate to maintain 3-4 bowel movements per day.     For liver US later today    UNOS Patient Status  Functional Status: 80% - Normal activity with effort: some symptoms of disease  Physical Capacity: No Limitations    Patient on life support: No  Diabetes: No  Any previous malignancy: No  Neoadjuvant Therapy: no  Has patient ever had a dx of HCC: no  Previous Abdominal Surgery: no  Spontaneous Bacterial Peritonitis: no  History of Portal Vein Thrombosis: no  Transjugular Intrahepatic Portosystemic Shunt: no

## 2022-11-30 NOTE — PROGRESS NOTES
RECIPIENT PRE-OP NOTE    Patient presented with her /primary caregiver, Kg Harris (ph: [phone])  for evaluation in clinic.    Potential Recipient Name: Mickey Harris, [id number]  Encounter Date: 11/30/2022    Sex: female  YOB: 1965  Age: 57 y.o.    Housing/Contact Info:  [address] [id number]  Telephone Information:   Mobile [phone]    Home: [phone] (home)  Work: There is no work phone number on file.  E-mail: [email or web address]    Patient reports her plan is to move to permanent residence in [place], [place] post-transplant and once medically cleared.     Potential Surgery Date: December 8, 2022  Potential Donor Name: Kobe Harris, Clinic Number: [id number]  Potential Donor Relationship: daughter    Patient presents as alert and oriented x 4, pleasant, good eye contact, calm, communicative, and asking and answering questions appropriately. Patient presents as a 57 y.o. year old female to recipient pre-op appointment for scheduled liver living donor surgery. Patient's  accompanies patient.  Patient states that she is independent with ADLs at this time.  Patient states motivation to pursue organ transplant at this time.    Does patient drive?  Patient is aware will not be able to drive until medically cleared by the transplant team. Patient verbalizes understanding that patient will need assistance for all transportation needs until medically cleared to drive.    Caregivers/Transportation:  Name: Kg Harris  Age: 59  Phone: [phone]  Relationship:   Does person drive? yes  Does person have own/reliable transportation? yes    Name: Santhosh Gandara  Age: 74  Phone: [phone]  Relationship:  step-mother  Does person drive? yes  Does person have own/reliable transportation? yes    Dependents/Others who rely on Patient/Caregiver for care: Patient has 3 dogs. Patient reports dogs are currently boarded.    Cognitive:  Education:   "Post-College Graduate Degree  Reading Level: college  Reports difficulty with: seeing (wears prescription glasses). Memory issues due to liver disease. Patient reports taking lactulose and notes noticing a difference if she forgets.    Infusion Service: patient utilizing? no  Home Health: patient utilizing? no  DME:  patient utilizing? no    Living Will: yes  Healthcare Power of : yes, pts , Kg Harris is POA.    Insurance: Payor: [employer] / Plan: [employer] CHOICE POS / Product Type: Commercial /   Possible concerns regarding insurance post-donation reviewed. Patient verbalizes clear understanding.    Financial:  Employment: Patient is currently disabled. Patient does not plan to return to work once medically cleared. Patient reports she is on "permanent" disability via PCP. Patient reports she expects SSD decision in January 2023.  Spouse/Significant Other Employment: occupation: retired.  Patient states does not expect to have any financial problems following transplant surgery.  Patient states has conducted fundraising to assist with post-transplant costs.    Tobacco/Alcohol/Illicit Drug Abuse: Patient reports does not use alcohol and illicit drugs and that patient does plan to remain abstinent. Patient denies any current alcohol use. During initial evaluation, patient reported she was a social drinker and last alcohol use over 2 years ago.     Psychiatric History: patient denies. Patient notes symptoms of anxiety. Patient reports coping well with support from family and taking walks. Patient reports she plans to engage mental health services once she is medically stable post-transplant.    Coping: Patient states that she is coping well with having liver living donor surgery. Patient states will call as needed and does understand how to access resources including the  as needed, both inpatient and outpatient.    Resources, information and support provided. Psychosocial aspects regarding " organ donation and transplantation were discussed. Patient reports having a clear understanding of resources, information, support and psychosocial aspects.    Discharge Plan: Patient to discharge to  local rental property for three months  under the care of patient's  post-organ transplant. Patient states that patient's  will be present as caregiver in the hospital. Patient's  will transport patient home. Patient states agreement with not driving and not returning to work until medically cleared to do so.    Patient states having clear understanding and realistic expectations regarding the potential risks and potential benefits of organ transplantation and organ donation. Patient agrees to further discuss with health care team members and support system members, as well as to utilize available resources and express questions and/or concerns. Resources and information provided and reviewed.    Patient reports motivation to proceed with living organ donor transplantation as scheduled.     Suitability for Transplant: Patient presents as  low risk  candidate for organ transplant at this time.  Patient states does have a caregiver plan, transportation plan, and lodging plan in place. Patient states that patient does have medical and prescription medicine insurance in place and does have a plan in place to afford post-transplant costs.    Patient provided verbal permission to release any necessary information to outside resources for patient care and discharge planning.  Resources and information provided and reviewed.  Patient is choosing has no specific agency preferences.      Patient states that she is aware of what patient's normal copays and deductibles are for prescription medicines.       provided psychosocial support, counseling, resources, education, assistance, and discharge planning.  remains available.    Recommendations/Additional Comments:   -Fundraising (pts  sister started go fund me for her)  - Local lodging (pt secured local rental property nearby [facility] for 3 months utilizing insurance lodging benefits)       Patient states is aware of [facility]'s affiliation and/or partnership with agencies in home health care, LTAC, SNF, DME, and other hospitals and clinics.

## 2022-11-30 NOTE — PROGRESS NOTES
Met with Mickey and her  for pre-op visit. Reviewed pre-op instructions & scheduled pre-op COVID test on 12/7 @ 1000. Allowed time for & answered all questions. Transplant surgery via living donor scheduled 12/8/22.

## 2022-11-30 NOTE — PROGRESS NOTES
Transplant Surgery  Liver Transplant Recipient Evaluation    Referring Provider: Aaareferral Self    Subjective:     Reason for Visit: evaluation for liver transplant    History of Present Illness: Mickey Harris is a 57 y.o. female who is being evaluated for liver transplant due to Cirrhosis: Fatty Liver (DORAN). Mickey reports edema, encephalopathy, fatigue, jaundice, portal hypertension, and thrombocytopenia.    External provider notes reviewed: Yes    Review of Systems   Constitutional: Negative.  Negative for activity change, appetite change, chills, diaphoresis, fatigue, fever and unexpected weight change.   Respiratory:  Negative for cough, choking, chest tightness and shortness of breath.    Cardiovascular:  Negative for chest pain.   Gastrointestinal:  Negative for abdominal distention, constipation, diarrhea, nausea and vomiting.   Musculoskeletal:  Negative for arthralgias.   Skin:  Negative for color change and rash.   Neurological:  Negative for dizziness and headaches.   Psychiatric/Behavioral:  Negative for confusion.    All other systems reviewed and are negative.  Objective:     Physical Exam  Vitals and nursing note reviewed.   Constitutional:       General: She is not in acute distress.     Appearance: She is well-developed. She is not diaphoretic.   HENT:      Mouth/Throat:      Pharynx: No oropharyngeal exudate.   Eyes:      General: No scleral icterus.     Conjunctiva/sclera: Conjunctivae normal.      Pupils: Pupils are equal, round, and reactive to light.   Neck:      Thyroid: No thyroid mass or thyromegaly.   Cardiovascular:      Rate and Rhythm: Normal rate.      Heart sounds: Normal heart sounds.   Pulmonary:      Effort: Pulmonary effort is normal. No respiratory distress.      Breath sounds: Normal breath sounds. No wheezing or rales.   Abdominal:      General: There is no distension.      Palpations: Abdomen is soft. There is no mass.      Tenderness: There is no abdominal tenderness.  There is no guarding or rebound.      Hernia: No hernia is present.          Comments: Lap leticia scars    Musculoskeletal:      Cervical back: Neck supple.   Lymphadenopathy:      Head:      Right side of head: No submandibular adenopathy.      Left side of head: No submandibular adenopathy.      Cervical: No cervical adenopathy.      Upper Body:      Right upper body: No supraclavicular adenopathy.      Left upper body: No supraclavicular adenopathy.   Skin:     General: Skin is dry.      Findings: No rash.   Neurological:      Mental Status: She is alert and oriented to person, place, and time.     MELD-Na score: 16 at 11/30/2022  9:10 AM  MELD score: 15 at 11/30/2022  9:10 AM  Calculated from:  Serum Creatinine: 0.7 mg/dL (Using min of 1 mg/dL) at 11/30/2022  9:10 AM  Serum Sodium: 136 mmol/L at 11/30/2022  9:10 AM  Total Bilirubin: 4.0 mg/dL at 11/30/2022  9:10 AM  INR(ratio): 1.4 at 11/30/2022  9:10 AM  Age: 57 years    Diagnostics:  The following labs have been reviewed: CBC  CMP  PT  INR  The following radiology images have been independently reviewed and interpreted: Liver US  CT Abd/Pelvis    Diagnoses:  1. Hepatic cirrhosis, unspecified hepatic cirrhosis type, unspecified whether ascites present        Transplant Surgery - Candidacy   Assessment/Plan:     Transplant Candidacy: Mickey Harris is a 57 y.o. 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  Based on available information, Mickey is a suitable liver transplant candidate.  She will be presented to selection committee after all tests and evaluations are complete.    Additional testing to be completed according to Liver : Written Order Guideline for Adult Liver Transplant Evaluation (LI-02)    Interpretation of tests and discussion of patient management involves all members of the multidisciplinary transplant team.    I had an extensive discussion with her and her  regarding the additional  risks of live donation including higher rates of biliary complications and portal perfusion issues at the time of surgery. We also discussed the potential for splenic artery ligation. All questions were answered.     Juan Pablo Kimbrough MD           [facility] Patient Status  Functional Status: 60% - Requires occasional assistance but is able to care for needs  Physical Capacity: No Limitations    Counseling: I discussed with Mickey the benefits of liver transplantation.  We discussed the evaluation and listing procedures.  We discussed the MELD system and the associated waiting times.  We discussed national and center specific survival results.  We discussed the option of being multiply listed in different OPOs.  We discussed the option of living donation versus  donor transplantation and the advantages and relative disadvantages of each.   We discussed the risks, benefits and potential complications related to surgery including the risks related to anesthesia, bleeding, infection, primary non-function of the allograft, the risk of reoperation as well as the risk of death.  We discussed the typical post-operative course, length of hospitalization, the long-term use of immunosuppressive therapy as well as the need for long-term routine follow-up.    Patient advised that it is recommended that all transplant candidates, and their close contacts and household members receive Covid vaccination.    Coronavirus disease (COVID-19) caused by severe acute respiratory virus coronavirus 2 (SARS-C0V 2) is associated with increased mortality in solid organ transplant recipients (SOT) compared to non-transplant patients. Vaccine responses to vaccination are depressed against SARS-CoV2 compared to normal individuals but improve with third vaccination doses. Vaccination prior to SOT provides both the best opportunity for transplant candidates to develop protective immunity and to reduce the risk of serious COVID19 infections post  transplantation. Organ transplant candidates at [facility] will be required to receive SARS-CoV-2 vaccination prior to being listed with a an active status, whenever possible. Exceptions will be made for disability related reasons or for sincerely held Yazidi beliefs.     LDLT: I discussed the nature of living donor liver transplant, including donor risks and more frequent recipient complications. The patient is willing to consider such grafts.

## 2022-11-30 NOTE — H&P
Transplant Surgery  Liver Transplant Recipient Evaluation    Referring Provider: Aaareferral Self    Subjective:     Reason for Visit: evaluation for liver transplant    History of Present Illness: Mickey Harris is a 57 y.o. female who is being evaluated for liver transplant due to Cirrhosis: Fatty Liver (DORAN). Mickey reports edema, encephalopathy, fatigue, jaundice, portal hypertension, and thrombocytopenia.    External provider notes reviewed: Yes    Review of Systems   Constitutional: Negative.  Negative for activity change, appetite change, chills, diaphoresis, fatigue, fever and unexpected weight change.   Respiratory:  Negative for cough, choking, chest tightness and shortness of breath.    Cardiovascular:  Negative for chest pain.   Gastrointestinal:  Negative for abdominal distention, constipation, diarrhea, nausea and vomiting.   Musculoskeletal:  Negative for arthralgias.   Skin:  Negative for color change and rash.   Neurological:  Negative for dizziness and headaches.   Psychiatric/Behavioral:  Negative for confusion.    All other systems reviewed and are negative.  Objective:     Physical Exam  Vitals and nursing note reviewed.   Constitutional:       General: She is not in acute distress.     Appearance: She is well-developed. She is not diaphoretic.   HENT:      Mouth/Throat:      Pharynx: No oropharyngeal exudate.   Eyes:      General: No scleral icterus.     Conjunctiva/sclera: Conjunctivae normal.      Pupils: Pupils are equal, round, and reactive to light.   Neck:      Thyroid: No thyroid mass or thyromegaly.   Cardiovascular:      Rate and Rhythm: Normal rate.      Heart sounds: Normal heart sounds.   Pulmonary:      Effort: Pulmonary effort is normal. No respiratory distress.      Breath sounds: Normal breath sounds. No wheezing or rales.   Abdominal:      General: There is no distension.      Palpations: Abdomen is soft. There is no mass.      Tenderness: There is no abdominal tenderness.  There is no guarding or rebound.      Hernia: No hernia is present.          Comments: Lap leticia scars    Musculoskeletal:      Cervical back: Neck supple.   Lymphadenopathy:      Head:      Right side of head: No submandibular adenopathy.      Left side of head: No submandibular adenopathy.      Cervical: No cervical adenopathy.      Upper Body:      Right upper body: No supraclavicular adenopathy.      Left upper body: No supraclavicular adenopathy.   Skin:     General: Skin is dry.      Findings: No rash.   Neurological:      Mental Status: She is alert and oriented to person, place, and time.     MELD-Na score: 16 at 11/30/2022  9:10 AM  MELD score: 15 at 11/30/2022  9:10 AM  Calculated from:  Serum Creatinine: 0.7 mg/dL (Using min of 1 mg/dL) at 11/30/2022  9:10 AM  Serum Sodium: 136 mmol/L at 11/30/2022  9:10 AM  Total Bilirubin: 4.0 mg/dL at 11/30/2022  9:10 AM  INR(ratio): 1.4 at 11/30/2022  9:10 AM  Age: 57 years    Diagnostics:  The following labs have been reviewed: CBC  CMP  PT  INR  The following radiology images have been independently reviewed and interpreted: Liver US  CT Abd/Pelvis    Diagnoses:  1. Hepatic cirrhosis, unspecified hepatic cirrhosis type, unspecified whether ascites present        Transplant Surgery - Candidacy   Assessment/Plan:     Transplant Candidacy: Mickey Harris is a 57 y.o. 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  Based on available information, Mickey is a suitable liver transplant candidate.  She will be presented to selection committee after all tests and evaluations are complete.    Additional testing to be completed according to Liver : Written Order Guideline for Adult Liver Transplant Evaluation (LI-02)    Interpretation of tests and discussion of patient management involves all members of the multidisciplinary transplant team.    I had an extensive discussion with her and her  regarding the additional  risks of live donation including higher rates of biliary complications and portal perfusion issues at the time of surgery. We also discussed the potential for splenic artery ligation. All questions were answered.     Juan Pablo Kimbrough MD           [facility] Patient Status  Functional Status: 60% - Requires occasional assistance but is able to care for needs  Physical Capacity: No Limitations    Counseling: I discussed with Mickey the benefits of liver transplantation.  We discussed the evaluation and listing procedures.  We discussed the MELD system and the associated waiting times.  We discussed national and center specific survival results.  We discussed the option of being multiply listed in different OPOs.  We discussed the option of living donation versus  donor transplantation and the advantages and relative disadvantages of each.   We discussed the risks, benefits and potential complications related to surgery including the risks related to anesthesia, bleeding, infection, primary non-function of the allograft, the risk of reoperation as well as the risk of death.  We discussed the typical post-operative course, length of hospitalization, the long-term use of immunosuppressive therapy as well as the need for long-term routine follow-up.    Patient advised that it is recommended that all transplant candidates, and their close contacts and household members receive Covid vaccination.    Coronavirus disease (COVID-19) caused by severe acute respiratory virus coronavirus 2 (SARS-C0V 2) is associated with increased mortality in solid organ transplant recipients (SOT) compared to non-transplant patients. Vaccine responses to vaccination are depressed against SARS-CoV2 compared to normal individuals but improve with third vaccination doses. Vaccination prior to SOT provides both the best opportunity for transplant candidates to develop protective immunity and to reduce the risk of serious COVID19 infections post  transplantation. Organ transplant candidates at [facility] will be required to receive SARS-CoV-2 vaccination prior to being listed with a an active status, whenever possible. Exceptions will be made for disability related reasons or for sincerely held [employer] beliefs.     LDLT: I discussed the nature of living donor liver transplant, including donor risks and more frequent recipient complications. The patient is willing to consider such grafts.

## 2022-11-30 NOTE — Clinical Note
November 30, 2022                     [facility]st [facility]  [address]  [facility] [address]  Phone: [phone]   Patient: Mickey Harris   MR Number: [id number]   YOB: 1965   Date of Visit: 11/30/2022       Dear      Thank you for referring Mickey Harris to me for evaluation. Attached you will find relevant portions of my assessment and plan of care.    If you have questions, please do not hesitate to call me. I look forward to following Mickey Harris along with you.    Sincerely,    Raj Gauthier MD    Enclosure    If you would like to receive this communication electronically, please contact [email or web address] or [phone] to request [employer] Link access.    [employer] Link is a tool which provides read-only access to select patient information with whom you have a relationship. Its easy to use and provides real time access to review your patients record including encounter summaries, notes, results, and demographic information.    If you feel you have received this communication in error or would no longer like to receive these types of communications, please e-mail [email or web address]

## 2022-12-03 ENCOUNTER — ANESTHESIA EVENT (OUTPATIENT)
Dept: SURGERY | Facility: HOSPITAL | Age: 57
DRG: 006 | End: 2022-12-03
Payer: COMMERCIAL

## 2022-12-07 ENCOUNTER — TELEPHONE (OUTPATIENT)
Dept: TRANSPLANT | Facility: CLINIC | Age: 57
End: 2022-12-07
Payer: COMMERCIAL

## 2022-12-07 ENCOUNTER — LAB VISIT (OUTPATIENT)
Dept: TRANSPLANT | Facility: CLINIC | Age: 57
End: 2022-12-07
Payer: COMMERCIAL

## 2022-12-07 ENCOUNTER — TELEPHONE (OUTPATIENT)
Dept: TRANSPLANT | Facility: CLINIC | Age: 57
End: 2022-12-07

## 2022-12-07 DIAGNOSIS — Z76.82 ORGAN TRANSPLANT CANDIDATE: Primary | ICD-10-CM

## 2022-12-07 LAB — SARS-COV-2 RDRP RESP QL NAA+PROBE: NEGATIVE

## 2022-12-07 PROCEDURE — U0002 COVID-19 LAB TEST NON-CDC: HCPCS | Mod: TXP | Performed by: TRANSPLANT SURGERY

## 2022-12-07 RX ORDER — MUPIROCIN 20 MG/G
OINTMENT TOPICAL
Status: CANCELLED | OUTPATIENT
Start: 2022-12-07

## 2022-12-07 RX ORDER — METHYLPREDNISOLONE SODIUM SUCCINATE 500 MG/8ML
500 INJECTION INTRAMUSCULAR; INTRAVENOUS
Status: CANCELLED | OUTPATIENT
Start: 2022-12-07

## 2022-12-07 NOTE — ANESTHESIA PREPROCEDURE EVALUATION
[facility]  Anesthesia Pre-Operative Evaluation        Patient Name: Mickey Harris  YOB: 1965  MRN: [id number]    SUBJECTIVE:     Pre-operative Evaluation for Procedure(s) (LRB):  TRANSPLANT, LIVER (N/A)     2023    Mickey Harris is a 57 y.o. female with a PMHx significant for HLD and mild asthma with a four-year history of progressive DORAN cirrhosis which has been complicated by ascites, hepatic encephalopathy and small esophageal varices. She was evaluated and listed for liver transplant, now with plans for a living donor transplant.     Current MELD Score:  MELD-Na score: 17 at 2023  5:29 AM  MELD score: 16 at 2023  5:29 AM  Calculated from:  Serum Creatinine: 0.6 mg/dL (Using min of 1 mg/dL) at 2023  5:29 AM  Serum Sodium: 136 mmol/L at 2023  5:29 AM  Total Bilirubin: 4.2 mg/dL at 2023  5:29 AM  INR(ratio): 1.4 at 2023  5:29 AM  Age: 57 years    She now presents for the above procedure(s) with Transplant Surgery - Dr. Kimbrough    Previous Airway: None documented.    Patient Active Problem List   Diagnosis    History of recurrent UTI (urinary tract infection)    Cough    Organ transplant candidate    Liver cirrhosis secondary to DORAN (nonalcoholic steatohepatitis)       Review of patient's allergies indicates:   Allergen Reactions    Levofloxacin Hives and Shortness Of Breath    Sulfa (sulfonamide antibiotics) Rash       Current Outpatient Medications   Medication Instructions    albuterol (PROVENTIL/VENTOLIN HFA) 90 mcg/actuation inhaler SMARTSI Puff(s) Via Inhaler Every 4 Hours PRN    biotin 1 mg, Oral, Daily    cyanocobalamin, vitamin B-12, 50 mcg tablet 1 tablet, Oral, Daily    ergocalciferol (ERGOCALCIFEROL) 50,000 Units, Oral, Every 7 days    fluticasone propionate (FLOVENT HFA) 220 mcg/actuation inhaler 1 puff, Inhalation, Daily PRN    furosemide (LASIX) 40 mg, Oral, Daily    hydrOXYzine HCL (ATARAX) 25 mg, Oral, 3 times  daily    lactulose (CHRONULAC) 10 gram/15 mL solution SMARTSIG:15 Milliliter(s) By Mouth 3 Times Daily    loratadine-pseudoephedrine  mg (CLARITIN-D 24-HOUR)  mg per 24 hr tablet 1 tablet, Oral, Daily    MG-PLUS-PROTEIN 133 mg tablet 1 tablet, Oral, Daily    ondansetron (ZOFRAN) 4 mg, Oral, Daily PRN    spironolactone (ALDACTONE) 100 mg, Oral, Daily    vitamin E, dl,tocopheryl acet, (VITAMIN E, DL, ACETATE,) 45 mg (100 unit) Cap 1 tablet, Oral, Daily       Past Surgical History:   Procedure Laterality Date    cholecystectomy  2003    COLONOSCOPY      6 polyps removed    COLONOSCOPY  07/07/2021    ESOPHAGOGASTRODUODENOSCOPY      2 coulums of grade 1 varices    ESOPHAGOGASTRODUODENOSCOPY  11/01/2019    trace varices    ESOPHAGOGASTRODUODENOSCOPY  07/13/2021    ESOPHAGOGASTRODUODENOSCOPY N/A 9/20/2022    Procedure: EGD (ESOPHAGOGASTRODUODENOSCOPY);  Surgeon: Bruce Dominguez MD;  Location: [address]);  Service: Endoscopy;  Laterality: N/A;  labs prior  liver transplant candidate  screen for varices  8/18 fully vaccinated; instructions to portal-LW  8/19 pt rescheduled; updated instructions to portal-st    gynecologic surgery       removal of scar tissues and adhesions    HYSTERECTOMY  1989    knee surgery  1992    OOPHORECTOMY Right 1982    OPEN hysterectomy  1989    removed uterus and bilateral fallopian tubes and LEFT ovary stayed in place    ROTATOR CUFF REPAIR Right 2001    TONSILLECTOMY  1972       Social History     Substance and Sexual Activity   Drug Use Not Currently     Alcohol Use: Not At Risk    Frequency of Alcohol Consumption: Never    Average Number of Drinks: Patient does not drink    Frequency of Binge Drinking: Never     Tobacco Use: Medium Risk    Smoking Tobacco Use: Former    Smokeless Tobacco Use: Never    Passive Exposure: Not on file       OBJECTIVE:     Vital Signs Range (Last 24H):  Temp:  [36.9 °C (98.4 °F)]   Pulse:  [76]   Resp:  [19]   BP:  (122)/(56)   SpO2:  [97 %]       Significant Labs    Heme Profile  Lab Results   Component Value Date    WBC 2.71 (L) 01/17/2023    HGB 14.1 01/17/2023    HCT 39.7 01/17/2023    PLT 53 (L) 01/17/2023       Coagulation Studies  Lab Results   Component Value Date    LABPROT 14.6 (H) 01/17/2023    INR 1.4 (H) 01/17/2023    APTT 31.0 01/17/2023       BMP  Lab Results   Component Value Date     01/17/2023    K 3.8 01/17/2023     01/17/2023    CO2 21 (L) 01/17/2023    BUN 10 01/17/2023    CREATININE 0.6 01/17/2023    MG 1.8 01/17/2023       Liver Function Tests  Lab Results   Component Value Date    AST 34 01/17/2023    ALT 34 01/17/2023    ALKPHOS 84 01/17/2023    BILITOT 4.2 (H) 01/17/2023    PROT 6.3 01/17/2023    ALBUMIN 3.4 (L) 01/17/2023       Lipid Profile  No results found for: CHOL, HDL, LDLDIRECT, TRIG    Endocrine Profile  Lab Results   Component Value Date    HGBA1C 4.5 01/17/2023    TSH 1.363 06/30/2022       Diagnostic Studies    US Abdomen Complete with Doppler (11/30/2022):  Impression  Liver findings together with stable splenomegaly, main portal vein dilatation, patent umbilical vein and trace ascites, consistent with portal hypertension    Stable common bile duct dilatation.    Hepatic steatosis.      MRI Abdomen with/without Contrast (07/01/2022):  Impression  1. Morphologic changes of cirrhosis.  No concerning parenchymal lesions.  2. Sequela of portal hypertension including splenomegaly, recanalized umbilical vein, prominent gastroesophageal varices splenic collateral vessels and a trace volume of abdominal ascites.  3. Subcentimeter T2 hyperintense pancreatic head lesion, possibly a small cyst, pseudocyst or cystic neoplasm.  4. Minimally complex left renal cyst.  5. Mild intra and extrahepatic bile duct dilatation.  No definite intraductal filling defects to suggest choledocholithiasis.      CT Chest without Contrast (06/21/2022):  Impression  There is splenomegaly. There are splenic  hilar collaterals, mesenteric collaterals, lower esophageal varices, and the possible left splenorenal shunt. There are collaterals along the lesser curvature of the stomach. This is consistent with portal hypertension and liver disease.    Tiny micro nodules most of which are calcified likely representing granulomatous disease.  Because some of these are noncalcified and there is a smoking history a follow-up low-dose screening CT may be warranted in 12 months.    Coronary artery calcifications.      Cardiac Studies    EKG:   No results found for this or any previous visit.    Stress Echo (07/01/2022):  Interpretation Summary  · The patient reached the end of the protocol.  · There were no arrhythmias during stress.  · The ECG portion of this study is negative for myocardial ischemia.  · The left ventricle is normal in size with normal systolic function.  · The estimated ejection fraction is 65%.  · Normal left ventricular diastolic function.  · Mild left atrial enlargement.  · Normal right ventricular size with normal right ventricular systolic function.  · Moderate right atrial enlargement.  · Moderate tricuspid regurgitation.  · The stress echo portion of this study is negative for myocardial ischemia.      ASSESSMENT/PLAN:     Mickey Harris is a 57 y.o. female with DORAN cirrhosis presenting for living donor liver transplant.      Pre-op Assessment    I have reviewed the Patient Summary Reports.     I have reviewed the Nursing Notes. I have reviewed the NPO Status.   I have reviewed the Medications.     Review of Systems  Anesthesia Hx:  No problems with previous Anesthesia  History of prior surgery of interest to airway management or planning: Denies Family Hx of Anesthesia complications.   Denies Personal Hx of Anesthesia complications.   Social:  Former Smoker  Denies Tobacco Use. Denies Alcohol Use.   Hematology/Oncology:         -- Denies Anemia: --  Thrombocytopenia: chronic and anticipate platelet  transfusion    Cardiovascular:  Functional Capacity good / => 4 METS  Denies Cardiovascular Symptoms.  Denies Congestive Heart Failure (CHF)   Denies Hypertension.    Pulmonary:  Asthma:   Inhaler use is rescue inhaler PRN. Current breathing status is optimal, free of wheezing.  Denies Obstructive Sleep Apnea (TRUONG)   Renal/:  Denies Kidney Function/Disease    Hepatic/GI:  Denies Bowel Conditions  Denies Biliary Disease  Liver Disease , Cirrhosis , MELD Score of 16 Ascites, Esophageal Varices, Thrombocytopenia, Awaiting Liver TransplantDenies Portal Hypertension, Denies Spontaneous Bacterial Peritonitis and Denies Hepatic Neoplasm DORAN cirrhosis   Musculoskeletal:  Denies Musculoskeletal General/Symptoms  Denies Bone Disorder    Neurological:  Denies Seizure Disorder  Denies CVA - Cerebrovasular Accident    Endocrine:  Denies Diabetes  Denies Thyroid Disease  Metabolic Disorders, Hyperlipoproteinemia         Anesthesia Plan  Type of Anesthesia, risks & benefits discussed:    Anesthesia Type: Gen ETT  Intra-op Monitoring Plan: Standard ASA Monitors, Art Line, Central Line, PA and CO  Post Op Pain Control Plan: multimodal analgesia and IV/PO Opioids PRN  Induction:  IV  Airway Plan: Video, Post-Induction  Informed Consent: Informed consent signed with the Patient and all parties understand the risks and agree with anesthesia plan.  All questions answered.   ASA Score: 4  Day of Surgery Review of History & Physical: H&P Update referred to the surgeon/provider.    Ready For Surgery From Anesthesia Perspective.     .

## 2022-12-07 NOTE — TELEPHONE ENCOUNTER
Called pt with update from call this morning with Cathy. Donor is, in fact, positive for COVID, therefore surgery will need to be postponed to a date in early January per the MDs. Will f/up- with pt & donor after committee today with a firmer date. Understanding expressed. No other questions at this time.

## 2022-12-07 NOTE — PROGRESS NOTES
Patient denies symptoms including cough, fevers, SOB, diarrhea, loss of taste or smell.   Informed patient of process for Covid test.  Patient denies prior nasal surgery. Pt verbalized permission to proceed with test.    Nasopharyngeal rapid specimen collected.  Patient tolerated procedure well.

## 2022-12-07 NOTE — TELEPHONE ENCOUNTER
Phone call returned.  Patient reports that she is scheduled for live donor liver transplant surgery on tomorrow.  Unfortunately,  tested positive.  At that time, patient and daughter (living donor), left the home in attempt to prevent marco virus.  They have tested daily.  Initially, daughter tested negative, but tested positive last night.  Only symptom is nasal congestion.  Patient continues to test negative.  Asking for guidance on how to proceed.  Informed patient that the live donor coordinator will be notified of above and she/ donor will be informed of recommendations/ plan.  She voiced understanding.    =============================================    ----- Message from Josef Koenig sent at 12/7/2022  8:20 AM CST -----  Regarding: speak to coordinator  Patient calling to speak to coordinator. Requesting a call back ASAP. No other details provided.    Call: [phone] ([place]

## 2022-12-08 ENCOUNTER — ANESTHESIA (OUTPATIENT)
Dept: SURGERY | Facility: HOSPITAL | Age: 57
DRG: 006 | End: 2022-12-08
Payer: COMMERCIAL

## 2022-12-19 ENCOUNTER — TELEPHONE (OUTPATIENT)
Dept: TRANSPLANT | Facility: CLINIC | Age: 57
End: 2022-12-19
Payer: COMMERCIAL

## 2022-12-19 DIAGNOSIS — K75.81 LIVER CIRRHOSIS SECONDARY TO NASH (NONALCOHOLIC STEATOHEPATITIS): Primary | ICD-10-CM

## 2022-12-19 DIAGNOSIS — K74.60 LIVER CIRRHOSIS SECONDARY TO NASH (NONALCOHOLIC STEATOHEPATITIS): Primary | ICD-10-CM

## 2022-12-19 DIAGNOSIS — Z76.82 ORGAN TRANSPLANT CANDIDATE: ICD-10-CM

## 2022-12-19 NOTE — TELEPHONE ENCOUNTER
----- Message from Kel Nayak sent at 12/19/2022  4:26 PM CST -----    Called and sp to pt; went over pre op appts tien'ed for 1/6 and 1/11.  .

## 2023-01-05 ENCOUNTER — TELEPHONE (OUTPATIENT)
Dept: TRANSPLANT | Facility: CLINIC | Age: 58
End: 2023-01-05
Payer: COMMERCIAL

## 2023-01-05 NOTE — TELEPHONE ENCOUNTER
Liver Transplant Committee Discussion     Patient Name: Mickey Harris   : 1965  MRN: [id number]    Requested by:  Surgeons: Jordon Lamb MD      Day to be discussed: Liver discussion days: Tuesday    Transplant Coordinator: Liver Coordinators: Zach Branch    Patient Status: outpatient    Transplant Status: transplant status: Waitlist    Reason for Discussion: Mrs. Arevalo was originally scheduled with her daughter for living liver donor transplant surgery in December, but the procedure was pushed to January due to her daughter (the donor) testing + for COVID. Mrs. Arevalo was in the same household as both\ her daughter and her  (both of whom had COVID), and the living donor team is concerned that since she was exposed to both of them, she could potential test + for COVID during her required pre-op COVID test prior to her  surgery date & therefore case another delay in surgery.    Plan: Pt's past COVID test discussed & chart updated with 22 + home swab COVID test that pt did not report at the time of testing. Pt is NOT to be tested for COVID prior to her surgery date (23) due to her having tested + for COVID at home w/in the last month. Otherwise OK to proceed with transplantation via living liver donation.     Route to:

## 2023-01-06 ENCOUNTER — LAB VISIT (OUTPATIENT)
Dept: LAB | Facility: HOSPITAL | Age: 58
End: 2023-01-06
Payer: COMMERCIAL

## 2023-01-06 ENCOUNTER — CLINICAL SUPPORT (OUTPATIENT)
Dept: TRANSPLANT | Facility: CLINIC | Age: 58
End: 2023-01-06
Payer: COMMERCIAL

## 2023-01-06 ENCOUNTER — PATIENT MESSAGE (OUTPATIENT)
Dept: TRANSPLANT | Facility: CLINIC | Age: 58
End: 2023-01-06

## 2023-01-06 ENCOUNTER — OFFICE VISIT (OUTPATIENT)
Dept: TRANSPLANT | Facility: CLINIC | Age: 58
End: 2023-01-06
Payer: COMMERCIAL

## 2023-01-06 VITALS
WEIGHT: 194.25 LBS | OXYGEN SATURATION: 98 % | HEIGHT: 65 IN | HEART RATE: 75 BPM | SYSTOLIC BLOOD PRESSURE: 148 MMHG | BODY MASS INDEX: 32.36 KG/M2 | SYSTOLIC BLOOD PRESSURE: 148 MMHG | OXYGEN SATURATION: 98 % | BODY MASS INDEX: 32.36 KG/M2 | HEART RATE: 75 BPM | TEMPERATURE: 97 F | WEIGHT: 194.25 LBS | TEMPERATURE: 96 F | DIASTOLIC BLOOD PRESSURE: 66 MMHG | RESPIRATION RATE: 16 BRPM | HEIGHT: 65 IN | RESPIRATION RATE: 16 BRPM | DIASTOLIC BLOOD PRESSURE: 66 MMHG

## 2023-01-06 DIAGNOSIS — R18.8 OTHER ASCITES: ICD-10-CM

## 2023-01-06 DIAGNOSIS — Z76.82 ORGAN TRANSPLANT CANDIDATE: ICD-10-CM

## 2023-01-06 DIAGNOSIS — I85.10 SECONDARY ESOPHAGEAL VARICES WITHOUT BLEEDING: ICD-10-CM

## 2023-01-06 DIAGNOSIS — K75.81 LIVER CIRRHOSIS SECONDARY TO NASH (NONALCOHOLIC STEATOHEPATITIS): Primary | ICD-10-CM

## 2023-01-06 DIAGNOSIS — K76.82 HEPATIC ENCEPHALOPATHY: ICD-10-CM

## 2023-01-06 DIAGNOSIS — K74.60 LIVER CIRRHOSIS SECONDARY TO NASH (NONALCOHOLIC STEATOHEPATITIS): Primary | ICD-10-CM

## 2023-01-06 DIAGNOSIS — K74.60 LIVER CIRRHOSIS SECONDARY TO NASH (NONALCOHOLIC STEATOHEPATITIS): ICD-10-CM

## 2023-01-06 DIAGNOSIS — K75.81 LIVER CIRRHOSIS SECONDARY TO NASH (NONALCOHOLIC STEATOHEPATITIS): ICD-10-CM

## 2023-01-06 LAB
ALBUMIN SERPL BCP-MCNC: 3.4 G/DL (ref 3.5–5.2)
ALP SERPL-CCNC: 86 U/L (ref 55–135)
ALT SERPL W/O P-5'-P-CCNC: 35 U/L (ref 10–44)
ANION GAP SERPL CALC-SCNC: 7 MMOL/L (ref 8–16)
AST SERPL-CCNC: 36 U/L (ref 10–40)
BASOPHILS # BLD AUTO: 0.03 K/UL (ref 0–0.2)
BASOPHILS NFR BLD: 1.1 % (ref 0–1.9)
BILIRUB SERPL-MCNC: 5 MG/DL (ref 0.1–1)
BUN SERPL-MCNC: 11 MG/DL (ref 6–20)
CALCIUM SERPL-MCNC: 9 MG/DL (ref 8.7–10.5)
CHLORIDE SERPL-SCNC: 107 MMOL/L (ref 95–110)
CO2 SERPL-SCNC: 22 MMOL/L (ref 23–29)
CREAT SERPL-MCNC: 0.7 MG/DL (ref 0.5–1.4)
DIFFERENTIAL METHOD: ABNORMAL
EOSINOPHIL # BLD AUTO: 0.1 K/UL (ref 0–0.5)
EOSINOPHIL NFR BLD: 3.3 % (ref 0–8)
ERYTHROCYTE [DISTWIDTH] IN BLOOD BY AUTOMATED COUNT: 13.7 % (ref 11.5–14.5)
EST. GFR  (NO RACE VARIABLE): >60 ML/MIN/1.73 M^2
GLUCOSE SERPL-MCNC: 105 MG/DL (ref 70–110)
HCT VFR BLD AUTO: 42.4 % (ref 37–48.5)
HGB BLD-MCNC: 14.9 G/DL (ref 12–16)
IMM GRANULOCYTES # BLD AUTO: 0.01 K/UL (ref 0–0.04)
IMM GRANULOCYTES NFR BLD AUTO: 0.4 % (ref 0–0.5)
INR PPP: 1.4 (ref 0.8–1.2)
LYMPHOCYTES # BLD AUTO: 0.8 K/UL (ref 1–4.8)
LYMPHOCYTES NFR BLD: 27.6 % (ref 18–48)
MCH RBC QN AUTO: 32.7 PG (ref 27–31)
MCHC RBC AUTO-ENTMCNC: 35.1 G/DL (ref 32–36)
MCV RBC AUTO: 93 FL (ref 82–98)
MONOCYTES # BLD AUTO: 0.3 K/UL (ref 0.3–1)
MONOCYTES NFR BLD: 9.5 % (ref 4–15)
NEUTROPHILS # BLD AUTO: 1.6 K/UL (ref 1.8–7.7)
NEUTROPHILS NFR BLD: 58.1 % (ref 38–73)
NRBC BLD-RTO: 0 /100 WBC
PLATELET # BLD AUTO: 54 K/UL (ref 150–450)
PMV BLD AUTO: 12.2 FL (ref 9.2–12.9)
POTASSIUM SERPL-SCNC: 4.3 MMOL/L (ref 3.5–5.1)
PROT SERPL-MCNC: 6.5 G/DL (ref 6–8.4)
PROTHROMBIN TIME: 14.5 SEC (ref 9–12.5)
RBC # BLD AUTO: 4.56 M/UL (ref 4–5.4)
SODIUM SERPL-SCNC: 136 MMOL/L (ref 136–145)
WBC # BLD AUTO: 2.75 K/UL (ref 3.9–12.7)

## 2023-01-06 PROCEDURE — 99215 OFFICE O/P EST HI 40 MIN: CPT | Mod: S$GLB,TXP,, | Performed by: STUDENT IN AN ORGANIZED HEALTH CARE EDUCATION/TRAINING PROGRAM

## 2023-01-06 PROCEDURE — 80053 COMPREHEN METABOLIC PANEL: CPT | Mod: TXP | Performed by: STUDENT IN AN ORGANIZED HEALTH CARE EDUCATION/TRAINING PROGRAM

## 2023-01-06 PROCEDURE — 99215 PR OFFICE/OUTPT VISIT, EST, LEVL V, 40-54 MIN: ICD-10-PCS | Mod: S$GLB,TXP,, | Performed by: STUDENT IN AN ORGANIZED HEALTH CARE EDUCATION/TRAINING PROGRAM

## 2023-01-06 PROCEDURE — 99999 PR PBB SHADOW E&M-EST. PATIENT-LVL III: ICD-10-PCS | Mod: PBBFAC,TXP,,

## 2023-01-06 PROCEDURE — 99999 PR PBB SHADOW E&M-EST. PATIENT-LVL III: CPT | Mod: PBBFAC,TXP,, | Performed by: STUDENT IN AN ORGANIZED HEALTH CARE EDUCATION/TRAINING PROGRAM

## 2023-01-06 PROCEDURE — 36415 COLL VENOUS BLD VENIPUNCTURE: CPT | Mod: TXP | Performed by: STUDENT IN AN ORGANIZED HEALTH CARE EDUCATION/TRAINING PROGRAM

## 2023-01-06 PROCEDURE — 85025 COMPLETE CBC W/AUTO DIFF WBC: CPT | Mod: TXP | Performed by: STUDENT IN AN ORGANIZED HEALTH CARE EDUCATION/TRAINING PROGRAM

## 2023-01-06 PROCEDURE — 99999 PR PBB SHADOW E&M-EST. PATIENT-LVL III: ICD-10-PCS | Mod: PBBFAC,TXP,, | Performed by: STUDENT IN AN ORGANIZED HEALTH CARE EDUCATION/TRAINING PROGRAM

## 2023-01-06 PROCEDURE — 85610 PROTHROMBIN TIME: CPT | Mod: TXP | Performed by: STUDENT IN AN ORGANIZED HEALTH CARE EDUCATION/TRAINING PROGRAM

## 2023-01-06 PROCEDURE — 99999 PR PBB SHADOW E&M-EST. PATIENT-LVL III: CPT | Mod: PBBFAC,TXP,,

## 2023-01-06 NOTE — PROGRESS NOTES
Pre-op appt to discuss pre-op instructions. Pt stated that she tested positive for COVID the Friday after her daughter tested positive.

## 2023-01-06 NOTE — Clinical Note
January 12, 2023                     [facility]  [address]  [facility] [address]  Phone: [phone]   Patient: Mickey Harris   MR Number: [id number]   YOB: 1965   Date of Visit: 1/6/2023       Dear      Thank you for referring Mickey Harris to me for evaluation. Attached you will find relevant portions of my assessment and plan of care.    If you have questions, please do not hesitate to call me. I look forward to following Mickey Harris along with you.    Sincerely,    Kg Regalado MD    Enclosure    If you would like to receive this communication electronically, please contact [email or web address] or [phone] to request [employer] access.    [employer] is a tool which provides read-only access to select patient information with whom you have a relationship. Its easy to use and provides real time access to review your patients record including encounter summaries, notes, results, and demographic information.    If you feel you have received this communication in error or would no longer like to receive these types of communications, please e-mail [email or web address]

## 2023-01-06 NOTE — PROGRESS NOTES
Transplant Hepatology   Transplant Evaluation Follow Up Note    Referring provider: No ref. provider found  PCP: Primary Doctor No    Chief complaint:  DORAN cirrhosis, transplant evaluation    HPI: Mickey Harris is a 57 y.o. female who was referred to Transplant Hepatology Clinic for DORAN related cirrhosis. She is accompanied by her .    Diagnosed with COVID last month shortly after her daughter was diagnosed. Currently with no respiratory symptoms.  No recent hospitalizations or ED visits.  Compliant with diuretics without recent abdominal distention.  Also compliant with lactulose without recent encephalopathy.  Continued scleral icterus.  No recent GI bleeding.    Background: She reports being diagnosed with cirrhosis approximately 4 years ago.  She was found to have leukopenia and thrombocytopenia.  Ultrasound revealed hepatic steatosis and splenomegaly.  Subsequent MRI was suggestive of cirrhosis, and FibroScan showed F4 fibrosis, cirrhosis.  Serologic evaluation was negative, and her cirrhosis was attributed to DORAN.    She did well until approximately 1 year ago when she developed lower extremity edema and abdominal distension for which she was started on diuretics.  Subsequent imaging confirmed moderate volume ascites.  She has remained on diuretics and has not required paracentesis.  Her cirrhosis has also been complicated by hepatic encephalopathy which is well controlled with lactulose.  She has had intermittent scleral icterus.  No recent GI bleeding.    She does not drink alcohol.  She was a previous social drinker but denies history of heavy alcohol use.  Previous workup was negative for chronic viral hepatitis.  She says that her mother  at age 24 from cirrhosis related to congenital portal vein issues.  Her son has elevated bilirubin.  She has no other known family history of liver disease.      Past Medical History:   Diagnosis Date    Anxiety disorder, unspecified     Asthma     Chronic  cough     Hepatic encephalopathy     Hyperlipidemia     Infarction of spleen 2021    Liver cirrhosis secondary to DORAN     Mild tricuspid regurgitation     Unspecified cirrhosis of liver        Past Surgical History:   Procedure Laterality Date    cholecystectomy      COLONOSCOPY      6 polyps removed    COLONOSCOPY  2021    ESOPHAGOGASTRODUODENOSCOPY      2 coulums of grade 1 varices    ESOPHAGOGASTRODUODENOSCOPY  2019    trace varices    ESOPHAGOGASTRODUODENOSCOPY  2021    ESOPHAGOGASTRODUODENOSCOPY N/A 2022    Procedure: EGD (ESOPHAGOGASTRODUODENOSCOPY);  Surgeon: Bruce Dominguez MD;  Location: [facility] ([address] [place] [address]);  Service: Endoscopy;  Laterality: N/A;  labs prior  liver transplant candidate  screen for varices   fully vaccinated; instructions to portal-LW   pt rescheduled; updated instructions to portal-st    gynecologic surgery       removal of scar tissues and adhesions    HYSTERECTOMY      knee surgery  1992    OOPHORECTOMY Right     OPEN hysterectomy      removed uterus and bilateral fallopian tubes and LEFT ovary stayed in place    ROTATOR CUFF REPAIR Right     TONSILLECTOMY  1972       Family History   Problem Relation Age of Onset    Hypertension Father     Hyperlipidemia Father     Heart disease Father     Depression Father     Arrhythmia Brother     Heart disease Brother        Social History     Tobacco Use    Smoking status: Former     Packs/day: 1.00     Types: Cigarettes     Quit date:      Years since quittin.0    Smokeless tobacco: Never   Substance Use Topics    Alcohol use: Not Currently    Drug use: Not Currently       Current Outpatient Medications   Medication Sig Dispense Refill    albuterol (PROVENTIL/VENTOLIN HFA) 90 mcg/actuation inhaler SMARTSI Puff(s) Via Inhaler Every 4 Hours PRN      biotin 1 mg Cap Take 1 mg by mouth once daily.      cyanocobalamin, vitamin B-12, 50 mcg tablet Take 1 tablet by mouth once daily.  "     ergocalciferol (ERGOCALCIFEROL) 50,000 unit Cap Take 50,000 Units by mouth every 7 days.      fluticasone propionate (FLOVENT HFA) 220 mcg/actuation inhaler Inhale 1 puff into the lungs daily as needed.      furosemide (LASIX) 20 MG tablet Take 40 mg by mouth once daily.      hydrOXYzine HCL (ATARAX) 25 MG tablet Take 25 mg by mouth 3 (three) times daily.      lactulose (CHRONULAC) 10 gram/15 mL solution SMARTSIG:15 Milliliter(s) By Mouth 3 Times Daily      loratadine-pseudoephedrine  mg (CLARITIN-D 24-HOUR)  mg per 24 hr tablet Take 1 tablet by mouth once daily.      MG-PLUS-PROTEIN 133 mg tablet Take 1 tablet by mouth once daily.      ondansetron (ZOFRAN) 4 MG tablet Take 4 mg by mouth daily as needed.      spironolactone (ALDACTONE) 50 MG tablet Take 100 mg by mouth once daily.      vitamin E, dl,tocopheryl acet, (VITAMIN E, DL, ACETATE,) 45 mg (100 unit) Cap Take 1 tablet by mouth once daily.       No current facility-administered medications for this visit.       Review of patient's allergies indicates:   Allergen Reactions    Levofloxacin Hives and Shortness Of Breath    Sulfa (sulfonamide antibiotics) Rash       Review of Systems   Constitutional:  Positive for malaise/fatigue. Negative for fever and weight loss.   Gastrointestinal:  Negative for abdominal pain, blood in stool, constipation, diarrhea, heartburn, melena, nausea and vomiting.     Vitals:    01/06/23 1541   BP: (!) 148/66   Pulse: 75   Resp: 16   Temp: 96 °F (35.6 °C)   TempSrc: Tympanic   SpO2: 98%   Weight: 88.1 kg (194 lb 3.6 oz)   Height: 5' 5" (1.651 m)         Physical Exam  Vitals reviewed.   Constitutional:       General: She is not in acute distress.  Eyes:      General: Scleral icterus present.   Cardiovascular:      Rate and Rhythm: Normal rate and regular rhythm.   Pulmonary:      Effort: Pulmonary effort is normal. No respiratory distress.   Abdominal:      General: Bowel sounds are normal. There is no distension. "      Palpations: Abdomen is soft.      Tenderness: There is no abdominal tenderness. There is no guarding or rebound.   Musculoskeletal:      Right lower leg: No edema.      Left lower leg: No edema.   Skin:     Coloration: Skin is jaundiced.       LABS: I personally reviewed pertinent laboratory findings.    Lab Results   Component Value Date    ALT 35 01/06/2023    AST 36 01/06/2023    GGT 38 06/30/2022    ALKPHOS 86 01/06/2023    BILITOT 5.0 (H) 01/06/2023       Lab Results   Component Value Date    WBC 2.75 (L) 01/06/2023    HGB 14.9 01/06/2023    HCT 42.4 01/06/2023    MCV 93 01/06/2023    PLT 54 (L) 01/06/2023       Lab Results   Component Value Date     01/06/2023    K 4.3 01/06/2023     01/06/2023    CO2 22 (L) 01/06/2023    BUN 11 01/06/2023    CREATININE 0.7 01/06/2023    CALCIUM 9.0 01/06/2023    ANIONGAP 7 (L) 01/06/2023    ESTGFRAFRICA >60.0 06/30/2022    EGFRNONAA >60.0 06/30/2022       Lab Results   Component Value Date    INR 1.4 (H) 01/06/2023    INR 1.4 (H) 11/30/2022    INR 1.4 (H) 10/18/2022       Imaging:  I personally reviewed recent imaging studies available on the chart and from outside medical records.      Assessment:  57 y.o. female with DORAN related cirrhosis. MELD 15. She is decompensated with ascites, HE, jaundice.    1. Liver cirrhosis secondary to DORAN (nonalcoholic steatohepatitis)    2. Secondary esophageal varices without bleeding    3. Other ascites    4. Hepatic encephalopathy    5. Organ transplant candidate          MELD-Na score: 17 at 1/6/2023  2:56 PM  MELD score: 16 at 1/6/2023  2:56 PM  Calculated from:  Serum Creatinine: 0.7 mg/dL (Using min of 1 mg/dL) at 1/6/2023  2:56 PM  Serum Sodium: 136 mmol/L at 1/6/2023  2:56 PM  Total Bilirubin: 5.0 mg/dL at 1/6/2023  2:56 PM  INR(ratio): 1.4 at 1/6/2023  2:56 PM  Age: 57 years      Transplant Candidacy:  She is listed for liver transplantation. Tentatively planning for liver donor transplant  01/2023.    Recommendations:  Ascites/Edema: 2 gm Na diet. Continue Lasix 40 mg daily and Aldactone 100 mg daily    Encephalopathy: Continue lactulose. Titrate to maintain 3-4 bowel movements per day.    Variceal screening: EGD in 09/2022 showed small varices. Repeat EGD in 09/2023.    HCC screening: MRI in 07/2022 without HCC. AFP 2.8. Repeat abdominal US and AFP every 6 months.    Immunizations: Recommend HAV and HBV vaccinations if not immune    UNOS Patient Status  Functional Status: 60% - Requires occasional assistance but is able to care for needs  Physical Capacity: No Limitations    Diabetes: No  Any previous malignancy: No  Neoadjuvant Therapy: no  Has patient ever had a dx of HCC: no  Previous Abdominal Surgery: yes  Spontaneous Bacterial Peritonitis: no  History of Portal Vein Thrombosis: no  Transjugular Intrahepatic Portosystemic Shunt: no    MELD labs in 6 weeks. Return to clinic within 3 months if not transplanted.    I spent a total of 40 minutes on the day of the visit. This includes face to face time and non-face to face time preparing to see the patient (eg, review of tests), obtaining and/or reviewing separately obtained history, documenting clinical information in the electronic or other health record, independently interpreting results, and communicating results to the patient/family/caregiver, or care coordination.    Kg Regalado MD  Staff Physician  Hepatology and Liver Transplant  [facility]  [facility]

## 2023-01-09 ENCOUNTER — TELEPHONE (OUTPATIENT)
Dept: TRANSPLANT | Facility: CLINIC | Age: 58
End: 2023-01-09
Payer: COMMERCIAL

## 2023-01-09 NOTE — TELEPHONE ENCOUNTER
PATIENT NAME: Mickey Harris  [facility] #: [id number]    Lab Results   Component Value Date    CREATININE 0.7 01/06/2023     01/06/2023    BILITOT 5.0 (H) 01/06/2023    ALBUMIN 3.4 (L) 01/06/2023    INR 1.4 (H) 01/06/2023     MELD 17  Encephalopathy: 1 - 2  Ascites: slight  Dialysis: no     Recertification requestor: Lizabeth Vera

## 2023-01-11 ENCOUNTER — OFFICE VISIT (OUTPATIENT)
Dept: TRANSPLANT | Facility: CLINIC | Age: 58
End: 2023-01-11
Payer: COMMERCIAL

## 2023-01-11 VITALS
RESPIRATION RATE: 16 BRPM | OXYGEN SATURATION: 97 % | HEART RATE: 77 BPM | SYSTOLIC BLOOD PRESSURE: 131 MMHG | BODY MASS INDEX: 32.91 KG/M2 | TEMPERATURE: 97 F | HEIGHT: 65 IN | WEIGHT: 197.56 LBS | DIASTOLIC BLOOD PRESSURE: 66 MMHG

## 2023-01-11 DIAGNOSIS — K74.60 LIVER CIRRHOSIS SECONDARY TO NASH (NONALCOHOLIC STEATOHEPATITIS): Primary | ICD-10-CM

## 2023-01-11 DIAGNOSIS — K75.81 LIVER CIRRHOSIS SECONDARY TO NASH (NONALCOHOLIC STEATOHEPATITIS): Primary | ICD-10-CM

## 2023-01-11 PROCEDURE — 99499 NO LOS: ICD-10-PCS | Mod: NTX,S$GLB,, | Performed by: SURGERY

## 2023-01-11 PROCEDURE — 99499 UNLISTED E&M SERVICE: CPT | Mod: NTX,S$GLB,, | Performed by: SURGERY

## 2023-01-11 PROCEDURE — 99999 PR PBB SHADOW E&M-EST. PATIENT-LVL IV: CPT | Mod: PBBFAC,TXP,, | Performed by: SURGERY

## 2023-01-11 PROCEDURE — 99999 PR PBB SHADOW E&M-EST. PATIENT-LVL IV: ICD-10-PCS | Mod: PBBFAC,TXP,, | Performed by: SURGERY

## 2023-01-11 NOTE — H&P (VIEW-ONLY)
Transplant Surgery  Liver Transplant Recipient Evaluation    Referring Provider: Aaareferral Self    Subjective:     Reason for Visit: evaluation for liver transplant    History of Present Illness: Mickey Harris is a 57 y.o. female who is being evaluated for liver transplant due to Cirrhosis: Fatty Liver (DORAN). Mickey reports ascites (diuretic-dependent), encephalopathy, and fatigue.    External provider notes reviewed: No    Review of Systems   Constitutional: Negative.  Negative for activity change, appetite change, chills, diaphoresis, fatigue, fever and unexpected weight change.   Respiratory:  Negative for cough, choking, chest tightness and shortness of breath.    Cardiovascular:  Negative for chest pain.   Gastrointestinal:  Negative for abdominal distention, constipation, diarrhea, nausea and vomiting.   Musculoskeletal:  Negative for arthralgias.   Skin:  Negative for color change and rash.   Neurological:  Negative for dizziness and headaches.   Psychiatric/Behavioral:  Negative for confusion.    All other systems reviewed and are negative.  Objective:     Physical Exam  Vitals and nursing note reviewed.   Constitutional:       General: She is not in acute distress.     Appearance: She is well-developed. She is not diaphoretic.   HENT:      Mouth/Throat:      Pharynx: No oropharyngeal exudate.   Eyes:      General: No scleral icterus.     Conjunctiva/sclera: Conjunctivae normal.      Pupils: Pupils are equal, round, and reactive to light.   Neck:      Thyroid: No thyroid mass or thyromegaly.   Cardiovascular:      Rate and Rhythm: Normal rate.      Heart sounds: Normal heart sounds.   Pulmonary:      Effort: Pulmonary effort is normal. No respiratory distress.      Breath sounds: Normal breath sounds. No wheezing or rales.   Abdominal:      General: There is no distension.      Palpations: Abdomen is soft. There is no mass.      Tenderness: There is no abdominal tenderness. There is no guarding or  rebound.      Hernia: No hernia is present.   Musculoskeletal:      Cervical back: Neck supple.   Lymphadenopathy:      Head:      Right side of head: No submandibular adenopathy.      Left side of head: No submandibular adenopathy.      Cervical: No cervical adenopathy.      Upper Body:      Right upper body: No supraclavicular adenopathy.      Left upper body: No supraclavicular adenopathy.   Skin:     General: Skin is dry.      Findings: No rash.   Neurological:      Mental Status: She is alert and oriented to person, place, and time.       MELD-Na score: 17 at 1/6/2023  2:56 PM  MELD score: 16 at 1/6/2023  2:56 PM  Calculated from:  Serum Creatinine: 0.7 mg/dL (Using min of 1 mg/dL) at 1/6/2023  2:56 PM  Serum Sodium: 136 mmol/L at 1/6/2023  2:56 PM  Total Bilirubin: 5.0 mg/dL at 1/6/2023  2:56 PM  INR(ratio): 1.4 at 1/6/2023  2:56 PM  Age: 57 years    Diagnostics:  The following labs have been reviewed: CBC  CMP  PT  INR  The following radiology images have been independently reviewed and interpreted: CT Abd/Pelvis    Diagnoses:  1. Liver cirrhosis secondary to DORAN (nonalcoholic steatohepatitis)        Transplant Surgery - Candidacy   Assessment/Plan:     Transplant Candidacy: Mickey Harris is a 57 y.o. 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  Based on available information, Mickey is a suitable liver transplant candidate.  She is scheduled for a living donor transplant next week         Juan Pablo Kimbrough MD  [facility] Patient Status  Functional Status: 60% - Requires occasional assistance but is able to care for needs  Physical Capacity: No Limitations    Counseling: I discussed with Mickey the benefits of liver transplantation.  We discussed the evaluation and listing procedures.  We discussed the MELD system and the associated waiting times.  We discussed national and center specific survival results.  We discussed the option of being multiply listed in  different OPOs.  We discussed the option of living donation versus  donor transplantation and the advantages and relative disadvantages of each.   We discussed the risks, benefits and potential complications related to surgery including the risks related to anesthesia, bleeding, infection, primary non-function of the allograft, the risk of reoperation as well as the risk of death.  We discussed the typical post-operative course, length of hospitalization, the long-term use of immunosuppressive therapy as well as the need for long-term routine follow-up. We discussed additional risks related to live donation including biliary strictures/ leaks and vascular issues.     Patient advised that it is recommended that all transplant candidates, and their close contacts and household members receive Covid vaccination.    Coronavirus disease (COVID-19) caused by severe acute respiratory virus coronavirus 2 (SARS-C0V 2) is associated with increased mortality in solid organ transplant recipients (SOT) compared to non-transplant patients. Vaccine responses to vaccination are depressed against SARS-CoV2 compared to normal individuals but improve with third vaccination doses. Vaccination prior to SOT provides both the best opportunity for transplant candidates to develop protective immunity and to reduce the risk of serious COVID19 infections post transplantation. Organ transplant candidates at [facility] will be required to receive SARS-CoV-2 vaccination prior to being listed with a an active status, whenever possible. Exceptions will be made for disability related reasons or for sincerely held Jain beliefs.     LDLT: I discussed the nature of living donor liver transplant, including donor risks and more frequent recipient complications. The patient is willing to consider such grafts.

## 2023-01-11 NOTE — H&P
Transplant Surgery  Liver Transplant Recipient Evaluation    Referring Provider: Aaareferral Self    Subjective:     Reason for Visit: evaluation for liver transplant    History of Present Illness: Mickey Harris is a 57 y.o. female who is being evaluated for liver transplant due to Cirrhosis: Fatty Liver (DORAN). Mickey reports ascites (diuretic-dependent), encephalopathy, and fatigue.    External provider notes reviewed: No    Review of Systems   Constitutional: Negative.  Negative for activity change, appetite change, chills, diaphoresis, fatigue, fever and unexpected weight change.   Respiratory:  Negative for cough, choking, chest tightness and shortness of breath.    Cardiovascular:  Negative for chest pain.   Gastrointestinal:  Negative for abdominal distention, constipation, diarrhea, nausea and vomiting.   Musculoskeletal:  Negative for arthralgias.   Skin:  Negative for color change and rash.   Neurological:  Negative for dizziness and headaches.   Psychiatric/Behavioral:  Negative for confusion.    All other systems reviewed and are negative.  Objective:     Physical Exam  Vitals and nursing note reviewed.   Constitutional:       General: She is not in acute distress.     Appearance: She is well-developed. She is not diaphoretic.   HENT:      Mouth/Throat:      Pharynx: No oropharyngeal exudate.   Eyes:      General: No scleral icterus.     Conjunctiva/sclera: Conjunctivae normal.      Pupils: Pupils are equal, round, and reactive to light.   Neck:      Thyroid: No thyroid mass or thyromegaly.   Cardiovascular:      Rate and Rhythm: Normal rate.      Heart sounds: Normal heart sounds.   Pulmonary:      Effort: Pulmonary effort is normal. No respiratory distress.      Breath sounds: Normal breath sounds. No wheezing or rales.   Abdominal:      General: There is no distension.      Palpations: Abdomen is soft. There is no mass.      Tenderness: There is no abdominal tenderness. There is no guarding or  rebound.      Hernia: No hernia is present.   Musculoskeletal:      Cervical back: Neck supple.   Lymphadenopathy:      Head:      Right side of head: No submandibular adenopathy.      Left side of head: No submandibular adenopathy.      Cervical: No cervical adenopathy.      Upper Body:      Right upper body: No supraclavicular adenopathy.      Left upper body: No supraclavicular adenopathy.   Skin:     General: Skin is dry.      Findings: No rash.   Neurological:      Mental Status: She is alert and oriented to person, place, and time.       MELD-Na score: 17 at 1/6/2023  2:56 PM  MELD score: 16 at 1/6/2023  2:56 PM  Calculated from:  Serum Creatinine: 0.7 mg/dL (Using min of 1 mg/dL) at 1/6/2023  2:56 PM  Serum Sodium: 136 mmol/L at 1/6/2023  2:56 PM  Total Bilirubin: 5.0 mg/dL at 1/6/2023  2:56 PM  INR(ratio): 1.4 at 1/6/2023  2:56 PM  Age: 57 years    Diagnostics:  The following labs have been reviewed: CBC  CMP  PT  INR  The following radiology images have been independently reviewed and interpreted: CT Abd/Pelvis    Diagnoses:  1. Liver cirrhosis secondary to DORAN (nonalcoholic steatohepatitis)        Transplant Surgery - Candidacy   Assessment/Plan:     Transplant Candidacy: Mickey Harris is a 57 y.o. 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  Based on available information, Mickey is a suitable liver transplant candidate.  She is scheduled for a living donor transplant next week         Juan Pablo Kimbrough MD  [facility] Patient Status  Functional Status: 60% - Requires occasional assistance but is able to care for needs  Physical Capacity: No Limitations    Counseling: I discussed with Mickey the benefits of liver transplantation.  We discussed the evaluation and listing procedures.  We discussed the MELD system and the associated waiting times.  We discussed national and center specific survival results.  We discussed the option of being multiply listed in  different OPOs.  We discussed the option of living donation versus  donor transplantation and the advantages and relative disadvantages of each.   We discussed the risks, benefits and potential complications related to surgery including the risks related to anesthesia, bleeding, infection, primary non-function of the allograft, the risk of reoperation as well as the risk of death.  We discussed the typical post-operative course, length of hospitalization, the long-term use of immunosuppressive therapy as well as the need for long-term routine follow-up. We discussed additional risks related to live donation including biliary strictures/ leaks and vascular issues.     Patient advised that it is recommended that all transplant candidates, and their close contacts and household members receive Covid vaccination.    Coronavirus disease (COVID-19) caused by severe acute respiratory virus coronavirus 2 (SARS-C0V 2) is associated with increased mortality in solid organ transplant recipients (SOT) compared to non-transplant patients. Vaccine responses to vaccination are depressed against SARS-CoV2 compared to normal individuals but improve with third vaccination doses. Vaccination prior to SOT provides both the best opportunity for transplant candidates to develop protective immunity and to reduce the risk of serious COVID19 infections post transplantation. Organ transplant candidates at [facility] will be required to receive SARS-CoV-2 vaccination prior to being listed with a an active status, whenever possible. Exceptions will be made for disability related reasons or for sincerely held Sikh beliefs.     LDLT: I discussed the nature of living donor liver transplant, including donor risks and more frequent recipient complications. The patient is willing to consider such grafts.

## 2023-01-11 NOTE — Clinical Note
January 12, 2023                     [facility]  [address]  [facility] [address]  Phone: [phone]   Patient: Mickey Harris   MR Number: [id number]   YOB: 1965   Date of Visit: 1/11/2023       Dear      Thank you for referring Mickey Harris to me for evaluation. Attached you will find relevant portions of my assessment and plan of care.    If you have questions, please do not hesitate to call me. I look forward to following Mickey Harris along with you.    Sincerely,    Juan Pablo Kimbrough MD    Enclosure    If you would like to receive this communication electronically, please contact [email or web address] or [phone] to request [employer] access.    [employer] is a tool which provides read-only access to select patient information with whom you have a relationship. Its easy to use and provides real time access to review your patients record including encounter summaries, notes, results, and demographic information.    If you feel you have received this communication in error or would no longer like to receive these types of communications, please e-mail [email or web address]

## 2023-01-11 NOTE — PROGRESS NOTES
Transplant Surgery  Liver Transplant Recipient Evaluation    Referring Provider: Aaareferral Self    Subjective:     Reason for Visit: evaluation for liver transplant    History of Present Illness: Mickey Harris is a 57 y.o. female who is being evaluated for liver transplant due to Cirrhosis: Fatty Liver (DORAN). Mickey reports ascites (diuretic-dependent), encephalopathy, and fatigue.    External provider notes reviewed: No    Review of Systems   Constitutional: Negative.  Negative for activity change, appetite change, chills, diaphoresis, fatigue, fever and unexpected weight change.   Respiratory:  Negative for cough, choking, chest tightness and shortness of breath.    Cardiovascular:  Negative for chest pain.   Gastrointestinal:  Negative for abdominal distention, constipation, diarrhea, nausea and vomiting.   Musculoskeletal:  Negative for arthralgias.   Skin:  Negative for color change and rash.   Neurological:  Negative for dizziness and headaches.   Psychiatric/Behavioral:  Negative for confusion.    All other systems reviewed and are negative.  Objective:     Physical Exam  Vitals and nursing note reviewed.   Constitutional:       General: She is not in acute distress.     Appearance: She is well-developed. She is not diaphoretic.   HENT:      Mouth/Throat:      Pharynx: No oropharyngeal exudate.   Eyes:      General: No scleral icterus.     Conjunctiva/sclera: Conjunctivae normal.      Pupils: Pupils are equal, round, and reactive to light.   Neck:      Thyroid: No thyroid mass or thyromegaly.   Cardiovascular:      Rate and Rhythm: Normal rate.      Heart sounds: Normal heart sounds.   Pulmonary:      Effort: Pulmonary effort is normal. No respiratory distress.      Breath sounds: Normal breath sounds. No wheezing or rales.   Abdominal:      General: There is no distension.      Palpations: Abdomen is soft. There is no mass.      Tenderness: There is no abdominal tenderness. There is no guarding or  rebound.      Hernia: No hernia is present.   Musculoskeletal:      Cervical back: Neck supple.   Lymphadenopathy:      Head:      Right side of head: No submandibular adenopathy.      Left side of head: No submandibular adenopathy.      Cervical: No cervical adenopathy.      Upper Body:      Right upper body: No supraclavicular adenopathy.      Left upper body: No supraclavicular adenopathy.   Skin:     General: Skin is dry.      Findings: No rash.   Neurological:      Mental Status: She is alert and oriented to person, place, and time.       MELD-Na score: 17 at 1/6/2023  2:56 PM  MELD score: 16 at 1/6/2023  2:56 PM  Calculated from:  Serum Creatinine: 0.7 mg/dL (Using min of 1 mg/dL) at 1/6/2023  2:56 PM  Serum Sodium: 136 mmol/L at 1/6/2023  2:56 PM  Total Bilirubin: 5.0 mg/dL at 1/6/2023  2:56 PM  INR(ratio): 1.4 at 1/6/2023  2:56 PM  Age: 57 years    Diagnostics:  The following labs have been reviewed: CBC  CMP  PT  INR  The following radiology images have been independently reviewed and interpreted: CT Abd/Pelvis    Diagnoses:  1. Liver cirrhosis secondary to DORAN (nonalcoholic steatohepatitis)        Transplant Surgery - Candidacy   Assessment/Plan:     Transplant Candidacy: Mickey Harris is a 57 y.o. 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  Based on available information, Mickey is a suitable liver transplant candidate.  She is scheduled for a living donor transplant next week         Juan Pablo Kimbrough MD  [facility] Patient Status  Functional Status: 60% - Requires occasional assistance but is able to care for needs  Physical Capacity: No Limitations    Counseling: I discussed with Mickey the benefits of liver transplantation.  We discussed the evaluation and listing procedures.  We discussed the MELD system and the associated waiting times.  We discussed national and center specific survival results.  We discussed the option of being multiply listed in  different OPOs.  We discussed the option of living donation versus  donor transplantation and the advantages and relative disadvantages of each.   We discussed the risks, benefits and potential complications related to surgery including the risks related to anesthesia, bleeding, infection, primary non-function of the allograft, the risk of reoperation as well as the risk of death.  We discussed the typical post-operative course, length of hospitalization, the long-term use of immunosuppressive therapy as well as the need for long-term routine follow-up. We discussed additional risks related to live donation including biliary strictures/ leaks and vascular issues.     Patient advised that it is recommended that all transplant candidates, and their close contacts and household members receive Covid vaccination.    Coronavirus disease (COVID-19) caused by severe acute respiratory virus coronavirus 2 (SARS-C0V 2) is associated with increased mortality in solid organ transplant recipients (SOT) compared to non-transplant patients. Vaccine responses to vaccination are depressed against SARS-CoV2 compared to normal individuals but improve with third vaccination doses. Vaccination prior to SOT provides both the best opportunity for transplant candidates to develop protective immunity and to reduce the risk of serious COVID19 infections post transplantation. Organ transplant candidates at [facility] will be required to receive SARS-CoV-2 vaccination prior to being listed with a an active status, whenever possible. Exceptions will be made for disability related reasons or for sincerely held [employer] beliefs.     LDLT: I discussed the nature of living donor liver transplant, including donor risks and more frequent recipient complications. The patient is willing to consider such grafts.

## 2023-01-13 ENCOUNTER — TELEPHONE (OUTPATIENT)
Dept: TRANSPLANT | Facility: CLINIC | Age: 58
End: 2023-01-13
Payer: COMMERCIAL

## 2023-01-13 NOTE — TELEPHONE ENCOUNTER
Recipient Information  Name: Mickey Harris  MRN: [id number]  Age: 57 y.o. BMI: 33.11 kg/m2      Primary Surgeon:Luis E   Secondary Surgeon:Antony    Re-Transplant: No  Currently on Blood thinners? No Reason:     Induction:  Solumedrol    Other Considerations:    Donor Information  Name: Simon Harris  MRN: [id number]  Age: 38  BMI: 31.99 km/m2    Surgeon: Jamie and Seal  UNOS ID: [id number]  Lobe to be donated: right          Comments: N/A

## 2023-01-13 NOTE — TELEPHONE ENCOUNTER
Recipient Information  Name: Mickey Harris  MRN: [id number]  Age: 57 y.o. BMI: 33.11 kg/m2      Primary Surgeon:Luis E   Secondary Surgeon:Antony    Re-Transplant: No  Currently on Blood thinners? No Reason:     Induction: Solumedrol    Other Considerations:    Donor Information  Name: Simon Harris  MRN: [id number]  Age: 38  BMI: 31.99 km/m2    Surgeon: Jamie and Seal  UNOS ID: [id number]  Lobe to be donated: right          Comments: N/A

## 2023-01-14 NOTE — TELEPHONE ENCOUNTER
Recipient Information  Name: Mickey Harris  MRN: [id number]  Age: 57 y.o. BMI: 33.11 kg/m2      Primary Surgeon:Luis E   Secondary Surgeon:Antony    Re-Transplant: No  Currently on Blood thinners? No Reason:     Induction: IV steroids pulse    Other Considerations:    Donor Information  Name: Simon Harris  MRN: [id number]  Age: 38  BMI: 31.99 km/m2    Surgeon: Jamie and Seal  UNOS ID: [id number]  Lobe to be donated: right          Comments: N/A    Donor/Recipient Compatibility    Test/Item Donor Recipient Acceptable/Not Acceptable   ABO A NEG A NEG Acceptable   HBsAb (Hepatitis B Surface Antibody) <3.00, Non-reactive No results found for: HEPBSURFABQU Acceptable   HBsAg (Hepatitis B Surface Antigen) Non-reactive Hepatitis B Surface Ag (no units)   Date Value   06/30/2022 Negative    Acceptable   HBcAb (Hepatitis Core Antibody) Non-reactive Hep B Core Total Ab (no units)   Date Value   06/30/2022 Negative    Acceptable   HCVab (Hepatitis C antibody) Non-reactive Hepatitis C Ab (no units)   Date Value   06/30/2022 Negative    Acceptable   HIV  Non-reactive HIV 1/2 Ag/Ab (no units)   Date Value   06/30/2022 Negative    Acceptable   CMV Reactive CMV IgG Interpretation (no units)   Date Value   06/30/2022 Reactive (A)     If no results found, check Results Review for Sendouts Acceptable   RPR Non-reactive RPR (no units)   Date Value   06/30/2022 Non-reactive    Acceptable   PALOMA Testing Look in the Labs tab of Chart Review to find the results or Results Review activity. Look in the Labs tab of Chart Review to find the results or Results Review activity. Reviewed       Donor Only    Imaging imaging reviewed and suitable for donation     FLR>30%  GRWR>1.1  No steatosis on imaging     Jordon Lamb MD

## 2023-01-17 ENCOUNTER — HOSPITAL ENCOUNTER (INPATIENT)
Facility: HOSPITAL | Age: 58
LOS: 15 days | Discharge: HOME | DRG: 006 | End: 2023-02-01
Attending: SURGERY | Admitting: SURGERY
Payer: COMMERCIAL

## 2023-01-17 DIAGNOSIS — E87.70 HYPERVOLEMIA: ICD-10-CM

## 2023-01-17 DIAGNOSIS — K74.60 HEPATIC CIRRHOSIS, UNSPECIFIED HEPATIC CIRRHOSIS TYPE, UNSPECIFIED WHETHER ASCITES PRESENT: ICD-10-CM

## 2023-01-17 DIAGNOSIS — R53.81 DEBILITY: ICD-10-CM

## 2023-01-17 DIAGNOSIS — E87.70 HYPERVOLEMIA, UNSPECIFIED HYPERVOLEMIA TYPE: ICD-10-CM

## 2023-01-17 DIAGNOSIS — Z29.89 PROPHYLACTIC IMMUNOTHERAPY: ICD-10-CM

## 2023-01-17 DIAGNOSIS — R73.9 HYPERGLYCEMIA: ICD-10-CM

## 2023-01-17 DIAGNOSIS — I48.91 ATRIAL FIBRILLATION: ICD-10-CM

## 2023-01-17 DIAGNOSIS — K72.10 END STAGE LIVER DISEASE: ICD-10-CM

## 2023-01-17 DIAGNOSIS — K75.81 LIVER CIRRHOSIS SECONDARY TO NASH (NONALCOHOLIC STEATOHEPATITIS): ICD-10-CM

## 2023-01-17 DIAGNOSIS — Z79.60 LONG-TERM USE OF IMMUNOSUPPRESSANT MEDICATION: ICD-10-CM

## 2023-01-17 DIAGNOSIS — I47.9 PAROXYSMAL TACHYCARDIA: ICD-10-CM

## 2023-01-17 DIAGNOSIS — I49.9 CARDIAC RHYTHM DISORDER OR DISTURBANCE OR CHANGE: ICD-10-CM

## 2023-01-17 DIAGNOSIS — D69.6 THROMBOCYTOPENIA, UNSPECIFIED: ICD-10-CM

## 2023-01-17 DIAGNOSIS — K74.60 LIVER CIRRHOSIS SECONDARY TO NASH (NONALCOHOLIC STEATOHEPATITIS): ICD-10-CM

## 2023-01-17 DIAGNOSIS — Z94.4 S/P LIVER TRANSPLANT: Primary | ICD-10-CM

## 2023-01-17 DIAGNOSIS — T86.49 COMMON BILE DUCT LEAK OF TRANSPLANTED LIVER: ICD-10-CM

## 2023-01-17 DIAGNOSIS — I48.91 AF (ATRIAL FIBRILLATION): ICD-10-CM

## 2023-01-17 DIAGNOSIS — E87.79 OTHER HYPERVOLEMIA: ICD-10-CM

## 2023-01-17 DIAGNOSIS — I48.91 A-FIB: ICD-10-CM

## 2023-01-17 DIAGNOSIS — I48.0 PAROXYSMAL A-FIB: ICD-10-CM

## 2023-01-17 DIAGNOSIS — Z91.89 AT RISK FOR OPPORTUNISTIC INFECTIONS: ICD-10-CM

## 2023-01-17 DIAGNOSIS — T38.0X5A ADRENAL CORTICOSTEROID CAUSING ADVERSE EFFECT IN THERAPEUTIC USE: ICD-10-CM

## 2023-01-17 DIAGNOSIS — R00.0 TACHYCARDIA: ICD-10-CM

## 2023-01-17 DIAGNOSIS — K83.8 COMMON BILE DUCT LEAK OF TRANSPLANTED LIVER: ICD-10-CM

## 2023-01-17 DIAGNOSIS — D62 ACUTE BLOOD LOSS ANEMIA: ICD-10-CM

## 2023-01-17 LAB
ABO + RH BLD: NORMAL
ALBUMIN SERPL BCP-MCNC: 3 G/DL (ref 3.5–5.2)
ALBUMIN SERPL BCP-MCNC: 3.4 G/DL (ref 3.5–5.2)
ALLENS TEST: ABNORMAL
ALP SERPL-CCNC: 50 U/L (ref 55–135)
ALP SERPL-CCNC: 84 U/L (ref 55–135)
ALT SERPL W/O P-5'-P-CCNC: 1145 U/L (ref 10–44)
ALT SERPL W/O P-5'-P-CCNC: 34 U/L (ref 10–44)
ALT SERPL W/O P-5'-P-CCNC: 834 U/L (ref 10–44)
AMYLASE SERPL-CCNC: 35 U/L (ref 20–110)
ANION GAP SERPL CALC-SCNC: 18 MMOL/L (ref 8–16)
ANION GAP SERPL CALC-SCNC: 8 MMOL/L (ref 8–16)
APTT BLDCRRT: 116.8 SEC (ref 21–32)
APTT BLDCRRT: 30.3 SEC (ref 21–32)
APTT BLDCRRT: 31 SEC (ref 21–32)
APTT BLDCRRT: 32.3 SEC (ref 21–32)
APTT BLDCRRT: 35.1 SEC (ref 21–32)
AST SERPL-CCNC: 1030 U/L (ref 10–40)
AST SERPL-CCNC: 34 U/L (ref 10–40)
AST SERPL-CCNC: 948 U/L (ref 10–40)
BASOPHILS # BLD AUTO: 0.01 K/UL (ref 0–0.2)
BASOPHILS # BLD AUTO: 0.02 K/UL (ref 0–0.2)
BASOPHILS NFR BLD: 0.1 % (ref 0–1.9)
BASOPHILS NFR BLD: 0.7 % (ref 0–1.9)
BILIRUB DIRECT SERPL-MCNC: 0.9 MG/DL (ref 0.1–0.3)
BILIRUB SERPL-MCNC: 4.2 MG/DL (ref 0.1–1)
BILIRUB SERPL-MCNC: 7.8 MG/DL (ref 0.1–1)
BLD GP AB SCN CELLS X3 SERPL QL: NORMAL
BLD PROD TYP BPU: NORMAL
BLOOD UNIT EXPIRATION DATE: NORMAL
BLOOD UNIT TYPE CODE: 5100
BLOOD UNIT TYPE CODE: 600
BLOOD UNIT TYPE CODE: 6200
BLOOD UNIT TYPE CODE: NORMAL
BLOOD UNIT TYPE: NORMAL
BUN SERPL-MCNC: 10 MG/DL (ref 6–20)
BUN SERPL-MCNC: 16 MG/DL (ref 6–20)
CA-I BLDV-SCNC: 1.02 MMOL/L (ref 1.06–1.42)
CA-I BLDV-SCNC: 1.13 MMOL/L (ref 1.06–1.42)
CA-I BLDV-SCNC: 1.15 MMOL/L (ref 1.06–1.42)
CALCIUM SERPL-MCNC: 8.4 MG/DL (ref 8.7–10.5)
CALCIUM SERPL-MCNC: 8.9 MG/DL (ref 8.7–10.5)
CHLORIDE SERPL-SCNC: 106 MMOL/L (ref 95–110)
CHLORIDE SERPL-SCNC: 107 MMOL/L (ref 95–110)
CO2 SERPL-SCNC: 18 MMOL/L (ref 23–29)
CO2 SERPL-SCNC: 21 MMOL/L (ref 23–29)
CODING SYSTEM: NORMAL
CREAT SERPL-MCNC: 0.6 MG/DL (ref 0.5–1.4)
CREAT SERPL-MCNC: 1 MG/DL (ref 0.5–1.4)
DELSYS: ABNORMAL
DIFFERENTIAL METHOD: ABNORMAL
DIFFERENTIAL METHOD: ABNORMAL
DISPENSE STATUS: NORMAL
EOSINOPHIL # BLD AUTO: 0 K/UL (ref 0–0.5)
EOSINOPHIL # BLD AUTO: 0.1 K/UL (ref 0–0.5)
EOSINOPHIL NFR BLD: 0 % (ref 0–8)
EOSINOPHIL NFR BLD: 3 % (ref 0–8)
ERYTHROCYTE [DISTWIDTH] IN BLOOD BY AUTOMATED COUNT: 13.3 % (ref 11.5–14.5)
ERYTHROCYTE [DISTWIDTH] IN BLOOD BY AUTOMATED COUNT: 13.9 % (ref 11.5–14.5)
ERYTHROCYTE [SEDIMENTATION RATE] IN BLOOD BY WESTERGREN METHOD: 20 MM/H
EST. GFR  (NO RACE VARIABLE): >60 ML/MIN/1.73 M^2
EST. GFR  (NO RACE VARIABLE): >60 ML/MIN/1.73 M^2
ESTIMATED AVG GLUCOSE: 82 MG/DL (ref 68–131)
FIBRINOGEN PPP-MCNC: 111 MG/DL (ref 182–400)
FIBRINOGEN PPP-MCNC: 182 MG/DL (ref 182–400)
FIBRINOGEN PPP-MCNC: 197 MG/DL (ref 182–400)
FIBRINOGEN PPP-MCNC: 214 MG/DL (ref 182–400)
FIO2: 100
FIO2: 100
FIO2: 30
GLUCOSE SERPL-MCNC: 108 MG/DL (ref 70–110)
GLUCOSE SERPL-MCNC: 113 MG/DL (ref 70–110)
GLUCOSE SERPL-MCNC: 120 MG/DL (ref 70–110)
GLUCOSE SERPL-MCNC: 165 MG/DL (ref 70–110)
GLUCOSE SERPL-MCNC: 168 MG/DL (ref 70–110)
GLUCOSE SERPL-MCNC: 172 MG/DL (ref 70–110)
GLUCOSE SERPL-MCNC: 186 MG/DL (ref 70–110)
GLUCOSE SERPL-MCNC: 194 MG/DL (ref 70–110)
GLUCOSE SERPL-MCNC: 199 MG/DL (ref 70–110)
GLUCOSE SERPL-MCNC: 200 MG/DL (ref 70–110)
GLUCOSE SERPL-MCNC: 247 MG/DL (ref 70–110)
GLUCOSE SERPL-MCNC: 250 MG/DL (ref 70–110)
GLUCOSE SERPL-MCNC: 292 MG/DL (ref 70–110)
GLUCOSE SERPL-MCNC: 296 MG/DL (ref 70–110)
HBA1C MFR BLD: 4.5 % (ref 4–5.6)
HBV CORE AB SERPL QL IA: NORMAL
HBV SURFACE AB SER-ACNC: >1000 MIU/ML
HBV SURFACE AB SER-ACNC: REACTIVE M[IU]/ML
HBV SURFACE AG SERPL QL IA: NORMAL
HCO3 UR-SCNC: 18.7 MMOL/L (ref 24–28)
HCO3 UR-SCNC: 18.8 MMOL/L (ref 24–28)
HCO3 UR-SCNC: 19.8 MMOL/L (ref 24–28)
HCO3 UR-SCNC: 21.1 MMOL/L (ref 24–28)
HCO3 UR-SCNC: 21.4 MMOL/L (ref 24–28)
HCO3 UR-SCNC: 21.5 MMOL/L (ref 24–28)
HCO3 UR-SCNC: 22.5 MMOL/L (ref 24–28)
HCO3 UR-SCNC: 23.2 MMOL/L (ref 24–28)
HCO3 UR-SCNC: 24.1 MMOL/L (ref 24–28)
HCO3 UR-SCNC: 24.4 MMOL/L (ref 24–28)
HCO3 UR-SCNC: 25 MMOL/L (ref 24–28)
HCT VFR BLD AUTO: 35.7 % (ref 37–48.5)
HCT VFR BLD AUTO: 36.4 % (ref 37–48.5)
HCT VFR BLD AUTO: 37.3 % (ref 37–48.5)
HCT VFR BLD AUTO: 37.7 % (ref 37–48.5)
HCT VFR BLD AUTO: 39.7 % (ref 37–48.5)
HCT VFR BLD CALC: 34 %PCV (ref 36–54)
HCT VFR BLD CALC: 35 %PCV (ref 36–54)
HCT VFR BLD CALC: 36 %PCV (ref 36–54)
HCT VFR BLD CALC: 37 %PCV (ref 36–54)
HCT VFR BLD CALC: 38 %PCV (ref 36–54)
HCT VFR BLD CALC: 40 %PCV (ref 36–54)
HCT VFR BLD CALC: 41 %PCV (ref 36–54)
HCT VFR BLD CALC: 41 %PCV (ref 36–54)
HCV AB SERPL QL IA: NORMAL
HCV RNA SERPL QL NAA+PROBE: NOT DETECTED
HCV RNA SPEC NAA+PROBE-ACNC: <12 IU/ML
HGB BLD-MCNC: 12.1 G/DL (ref 12–16)
HGB BLD-MCNC: 13.3 G/DL (ref 12–16)
HGB BLD-MCNC: 13.5 G/DL (ref 12–16)
HGB BLD-MCNC: 13.8 G/DL (ref 12–16)
HGB BLD-MCNC: 14.1 G/DL (ref 12–16)
HIV 1+2 AB+HIV1 P24 AG SERPL QL IA: NORMAL
IMM GRANULOCYTES # BLD AUTO: 0.01 K/UL (ref 0–0.04)
IMM GRANULOCYTES # BLD AUTO: 0.05 K/UL (ref 0–0.04)
IMM GRANULOCYTES NFR BLD AUTO: 0.4 % (ref 0–0.5)
IMM GRANULOCYTES NFR BLD AUTO: 0.6 % (ref 0–0.5)
INR PPP: 1.4 (ref 0.8–1.2)
INR PPP: 1.4 (ref 0.8–1.2)
INR PPP: 1.5 (ref 0.8–1.2)
INR PPP: 1.5 (ref 0.8–1.2)
INR PPP: 1.9 (ref 0.8–1.2)
LDH SERPL L TO P-CCNC: 1136 U/L (ref 110–260)
LDH SERPL L TO P-CCNC: 8.59 MMOL/L (ref 0.36–1.25)
LYMPHOCYTES # BLD AUTO: 0.2 K/UL (ref 1–4.8)
LYMPHOCYTES # BLD AUTO: 0.6 K/UL (ref 1–4.8)
LYMPHOCYTES NFR BLD: 2.7 % (ref 18–48)
LYMPHOCYTES NFR BLD: 23.2 % (ref 18–48)
MAGNESIUM SERPL-MCNC: 1.8 MG/DL (ref 1.6–2.6)
MAGNESIUM SERPL-MCNC: 1.9 MG/DL (ref 1.6–2.6)
MAGNESIUM SERPL-MCNC: 1.9 MG/DL (ref 1.6–2.6)
MCH RBC QN AUTO: 32.3 PG (ref 27–31)
MCH RBC QN AUTO: 32.4 PG (ref 27–31)
MCHC RBC AUTO-ENTMCNC: 33.9 G/DL (ref 32–36)
MCHC RBC AUTO-ENTMCNC: 35.5 G/DL (ref 32–36)
MCV RBC AUTO: 91 FL (ref 82–98)
MCV RBC AUTO: 95 FL (ref 82–98)
MODE: ABNORMAL
MONOCYTES # BLD AUTO: 0.3 K/UL (ref 0.3–1)
MONOCYTES # BLD AUTO: 0.7 K/UL (ref 0.3–1)
MONOCYTES NFR BLD: 7.8 % (ref 4–15)
MONOCYTES NFR BLD: 9.6 % (ref 4–15)
NEUTROPHILS # BLD AUTO: 1.7 K/UL (ref 1.8–7.7)
NEUTROPHILS # BLD AUTO: 7.5 K/UL (ref 1.8–7.7)
NEUTROPHILS NFR BLD: 63.1 % (ref 38–73)
NEUTROPHILS NFR BLD: 88.8 % (ref 38–73)
NRBC BLD-RTO: 0 /100 WBC
NRBC BLD-RTO: 0 /100 WBC
PCO2 BLDA: 33.5 MMHG (ref 35–45)
PCO2 BLDA: 34.7 MMHG (ref 35–45)
PCO2 BLDA: 34.8 MMHG (ref 35–45)
PCO2 BLDA: 35.8 MMHG (ref 35–45)
PCO2 BLDA: 38.6 MMHG (ref 35–45)
PCO2 BLDA: 40.9 MMHG (ref 35–45)
PCO2 BLDA: 42 MMHG (ref 35–45)
PCO2 BLDA: 43.9 MMHG (ref 35–45)
PCO2 BLDA: 44.9 MMHG (ref 35–45)
PCO2 BLDA: 45.8 MMHG (ref 35–45)
PCO2 BLDA: 54.8 MMHG (ref 35–45)
PEEP: 5
PH SMN: 7.25 [PH] (ref 7.35–7.45)
PH SMN: 7.27 [PH] (ref 7.35–7.45)
PH SMN: 7.3 [PH] (ref 7.35–7.45)
PH SMN: 7.3 [PH] (ref 7.35–7.45)
PH SMN: 7.34 [PH] (ref 7.35–7.45)
PH SMN: 7.35 [PH] (ref 7.35–7.45)
PH SMN: 7.36 [PH] (ref 7.35–7.45)
PH SMN: 7.37 [PH] (ref 7.35–7.45)
PH SMN: 7.38 [PH] (ref 7.35–7.45)
PH SMN: 7.39 [PH] (ref 7.35–7.45)
PH SMN: 7.42 [PH] (ref 7.35–7.45)
PHOSPHATE SERPL-MCNC: 2.5 MG/DL (ref 2.7–4.5)
PLATELET # BLD AUTO: 42 K/UL (ref 150–450)
PLATELET # BLD AUTO: 53 K/UL (ref 150–450)
PLATELET # BLD AUTO: 65 K/UL (ref 150–450)
PLATELET # BLD AUTO: 70 K/UL (ref 150–450)
PLATELET # BLD AUTO: 83 K/UL (ref 150–450)
PLATELET BLD QL SMEAR: ABNORMAL
PMV BLD AUTO: 11.6 FL (ref 9.2–12.9)
PMV BLD AUTO: 12.2 FL (ref 9.2–12.9)
PMV BLD AUTO: 12.3 FL (ref 9.2–12.9)
PMV BLD AUTO: 12.9 FL (ref 9.2–12.9)
PMV BLD AUTO: 13.1 FL (ref 9.2–12.9)
PO2 BLDA: 128 MMHG (ref 80–100)
PO2 BLDA: 135 MMHG (ref 80–100)
PO2 BLDA: 216 MMHG (ref 80–100)
PO2 BLDA: 236 MMHG (ref 80–100)
PO2 BLDA: 240 MMHG (ref 80–100)
PO2 BLDA: 288 MMHG (ref 80–100)
PO2 BLDA: 362 MMHG (ref 80–100)
PO2 BLDA: 501 MMHG (ref 80–100)
PO2 BLDA: 563 MMHG (ref 80–100)
PO2 BLDA: 567 MMHG (ref 80–100)
PO2 BLDA: 64 MMHG (ref 40–60)
POC BE: -1 MMOL/L
POC BE: -2 MMOL/L
POC BE: -3 MMOL/L
POC BE: -3 MMOL/L
POC BE: -4 MMOL/L
POC BE: -6 MMOL/L
POC BE: -8 MMOL/L
POC BE: -8 MMOL/L
POC BE: 0 MMOL/L
POC IONIZED CALCIUM: 0.96 MMOL/L (ref 1.06–1.42)
POC IONIZED CALCIUM: 1.02 MMOL/L (ref 1.06–1.42)
POC IONIZED CALCIUM: 1.02 MMOL/L (ref 1.06–1.42)
POC IONIZED CALCIUM: 1.05 MMOL/L (ref 1.06–1.42)
POC IONIZED CALCIUM: 1.07 MMOL/L (ref 1.06–1.42)
POC IONIZED CALCIUM: 1.13 MMOL/L (ref 1.06–1.42)
POC IONIZED CALCIUM: 1.18 MMOL/L (ref 1.06–1.42)
POC IONIZED CALCIUM: 1.26 MMOL/L (ref 1.06–1.42)
POC SATURATED O2: 100 % (ref 95–100)
POC SATURATED O2: 89 % (ref 95–100)
POC SATURATED O2: 99 % (ref 95–100)
POC SATURATED O2: 99 % (ref 95–100)
POC TCO2: 20 MMOL/L (ref 23–27)
POC TCO2: 20 MMOL/L (ref 23–27)
POC TCO2: 21 MMOL/L (ref 23–27)
POC TCO2: 22 MMOL/L (ref 23–27)
POC TCO2: 24 MMOL/L (ref 24–29)
POC TCO2: 25 MMOL/L (ref 23–27)
POC TCO2: 26 MMOL/L (ref 23–27)
POCT GLUCOSE: 291 MG/DL (ref 70–110)
POOLED CRYOPPT GVN BPU: NORMAL
POOLED CRYOPPT GVN BPU: NORMAL
POTASSIUM BLD-SCNC: 3.7 MMOL/L (ref 3.5–5.1)
POTASSIUM BLD-SCNC: 3.8 MMOL/L (ref 3.5–5.1)
POTASSIUM BLD-SCNC: 3.9 MMOL/L (ref 3.5–5.1)
POTASSIUM BLD-SCNC: 4 MMOL/L (ref 3.5–5.1)
POTASSIUM BLD-SCNC: 4 MMOL/L (ref 3.5–5.1)
POTASSIUM BLD-SCNC: 4.1 MMOL/L (ref 3.5–5.1)
POTASSIUM BLD-SCNC: 4.2 MMOL/L (ref 3.5–5.1)
POTASSIUM BLD-SCNC: 4.8 MMOL/L (ref 3.5–5.1)
POTASSIUM SERPL-SCNC: 3.8 MMOL/L (ref 3.5–5.1)
POTASSIUM SERPL-SCNC: 3.9 MMOL/L (ref 3.5–5.1)
POTASSIUM SERPL-SCNC: 4 MMOL/L (ref 3.5–5.1)
POTASSIUM SERPL-SCNC: 4 MMOL/L (ref 3.5–5.1)
POTASSIUM SERPL-SCNC: 4.1 MMOL/L (ref 3.5–5.1)
PROT SERPL-MCNC: 5.3 G/DL (ref 6–8.4)
PROT SERPL-MCNC: 6.3 G/DL (ref 6–8.4)
PROTHROMBIN TIME: 14.2 SEC (ref 9–12.5)
PROTHROMBIN TIME: 14.6 SEC (ref 9–12.5)
PROTHROMBIN TIME: 15.1 SEC (ref 9–12.5)
PROTHROMBIN TIME: 15.7 SEC (ref 9–12.5)
PROTHROMBIN TIME: 19.3 SEC (ref 9–12.5)
RBC # BLD AUTO: 3.75 M/UL (ref 4–5.4)
RBC # BLD AUTO: 4.35 M/UL (ref 4–5.4)
SAMPLE: ABNORMAL
SITE: ABNORMAL
SODIUM BLD-SCNC: 134 MMOL/L (ref 136–145)
SODIUM BLD-SCNC: 137 MMOL/L (ref 136–145)
SODIUM BLD-SCNC: 138 MMOL/L (ref 136–145)
SODIUM BLD-SCNC: 139 MMOL/L (ref 136–145)
SODIUM BLD-SCNC: 139 MMOL/L (ref 136–145)
SODIUM BLD-SCNC: 141 MMOL/L (ref 136–145)
SODIUM SERPL-SCNC: 136 MMOL/L (ref 136–145)
SODIUM SERPL-SCNC: 136 MMOL/L (ref 136–145)
SODIUM SERPL-SCNC: 139 MMOL/L (ref 136–145)
SODIUM SERPL-SCNC: 140 MMOL/L (ref 136–145)
SODIUM SERPL-SCNC: 142 MMOL/L (ref 136–145)
UNIT NUMBER: NORMAL
UNIT NUMBER: NORMAL
VT: 375
WBC # BLD AUTO: 2.71 K/UL (ref 3.9–12.7)
WBC # BLD AUTO: 8.43 K/UL (ref 3.9–12.7)

## 2023-01-17 PROCEDURE — 82150 ASSAY OF AMYLASE: CPT | Performed by: STUDENT IN AN ORGANIZED HEALTH CARE EDUCATION/TRAINING PROGRAM

## 2023-01-17 PROCEDURE — 86965 POOLING BLOOD PLATELETS: CPT | Performed by: ANESTHESIOLOGY

## 2023-01-17 PROCEDURE — 94002 VENT MGMT INPAT INIT DAY: CPT

## 2023-01-17 PROCEDURE — 63600175 PHARM REV CODE 636 W HCPCS: Mod: NTX | Performed by: SURGERY

## 2023-01-17 PROCEDURE — 47135 TRANSPLANTATION OF LIVER: CPT | Mod: ,,, | Performed by: SURGERY

## 2023-01-17 PROCEDURE — 25000003 PHARM REV CODE 250: Performed by: SURGERY

## 2023-01-17 PROCEDURE — 25000003 PHARM REV CODE 250: Mod: NTX | Performed by: SURGERY

## 2023-01-17 PROCEDURE — 63600175 PHARM REV CODE 636 W HCPCS: Performed by: NURSE ANESTHETIST, CERTIFIED REGISTERED

## 2023-01-17 PROCEDURE — 27201423 OPTIME MED/SURG SUP & DEVICES STERILE SUPPLY: Performed by: SURGERY

## 2023-01-17 PROCEDURE — 36620 PR INSERT CATH,ART,PERCUT,SHORTTERM: ICD-10-PCS | Mod: 59,,, | Performed by: ANESTHESIOLOGY

## 2023-01-17 PROCEDURE — 81100001 HC LIVER ACQUISITION-LIVE DONOR

## 2023-01-17 PROCEDURE — 88309 TISSUE EXAM BY PATHOLOGIST: CPT | Performed by: PATHOLOGY

## 2023-01-17 PROCEDURE — 82947 ASSAY GLUCOSE BLOOD QUANT: CPT | Mod: 91 | Performed by: SURGERY

## 2023-01-17 PROCEDURE — 27000191 HC C-V MONITORING

## 2023-01-17 PROCEDURE — 84460 ALANINE AMINO (ALT) (SGPT): CPT | Performed by: SURGERY

## 2023-01-17 PROCEDURE — 47135 PR TRANSPLANT LIVER,ALLOTRANSPLANT: ICD-10-PCS | Mod: ,,, | Performed by: SURGERY

## 2023-01-17 PROCEDURE — 47135 PR TRANSPLANT LIVER,ALLOTRANSPLANT: ICD-10-PCS | Mod: 82,,, | Performed by: TRANSPLANT SURGERY

## 2023-01-17 PROCEDURE — C1729 CATH, DRAINAGE: HCPCS | Performed by: SURGERY

## 2023-01-17 PROCEDURE — 88309 TISSUE EXAM BY PATHOLOGIST: CPT | Mod: 26,,, | Performed by: PATHOLOGY

## 2023-01-17 PROCEDURE — 85730 THROMBOPLASTIN TIME PARTIAL: CPT | Mod: 91 | Performed by: STUDENT IN AN ORGANIZED HEALTH CARE EDUCATION/TRAINING PROGRAM

## 2023-01-17 PROCEDURE — 84132 ASSAY OF SERUM POTASSIUM: CPT | Mod: 91 | Performed by: SURGERY

## 2023-01-17 PROCEDURE — 86704 HEP B CORE ANTIBODY TOTAL: CPT | Mod: NTX | Performed by: SURGERY

## 2023-01-17 PROCEDURE — P9035 PLATELET PHERES LEUKOREDUCED: HCPCS | Performed by: ANESTHESIOLOGY

## 2023-01-17 PROCEDURE — 36000930 HC OR TIME LEV VII 1ST 15 MIN: Performed by: SURGERY

## 2023-01-17 PROCEDURE — P9017 PLASMA 1 DONOR FRZ W/IN 8 HR: HCPCS | Performed by: SURGERY

## 2023-01-17 PROCEDURE — 36620 INSERTION CATHETER ARTERY: CPT | Mod: 59,,, | Performed by: ANESTHESIOLOGY

## 2023-01-17 PROCEDURE — 85049 AUTOMATED PLATELET COUNT: CPT | Mod: 91 | Performed by: SURGERY

## 2023-01-17 PROCEDURE — 85018 HEMOGLOBIN: CPT | Mod: 91 | Performed by: SURGERY

## 2023-01-17 PROCEDURE — 36415 COLL VENOUS BLD VENIPUNCTURE: CPT | Performed by: SURGERY

## 2023-01-17 PROCEDURE — 87389 HIV-1 AG W/HIV-1&-2 AB AG IA: CPT | Mod: NTX | Performed by: SURGERY

## 2023-01-17 PROCEDURE — P9012 CRYOPRECIPITATE EACH UNIT: HCPCS | Performed by: ANESTHESIOLOGY

## 2023-01-17 PROCEDURE — 36000931 HC OR TIME LEV VII EA ADD 15 MIN: Performed by: SURGERY

## 2023-01-17 PROCEDURE — 85520 HEPARIN ASSAY: CPT

## 2023-01-17 PROCEDURE — 93503 INSERT/PLACE HEART CATHETER: CPT | Mod: 59,,, | Performed by: ANESTHESIOLOGY

## 2023-01-17 PROCEDURE — 27000221 HC OXYGEN, UP TO 24 HOURS

## 2023-01-17 PROCEDURE — 83735 ASSAY OF MAGNESIUM: CPT | Mod: 91 | Performed by: STUDENT IN AN ORGANIZED HEALTH CARE EDUCATION/TRAINING PROGRAM

## 2023-01-17 PROCEDURE — 83735 ASSAY OF MAGNESIUM: CPT | Mod: 91 | Performed by: SURGERY

## 2023-01-17 PROCEDURE — 80053 COMPREHEN METABOLIC PANEL: CPT | Mod: 91 | Performed by: STUDENT IN AN ORGANIZED HEALTH CARE EDUCATION/TRAINING PROGRAM

## 2023-01-17 PROCEDURE — 84100 ASSAY OF PHOSPHORUS: CPT | Performed by: STUDENT IN AN ORGANIZED HEALTH CARE EDUCATION/TRAINING PROGRAM

## 2023-01-17 PROCEDURE — 80053 COMPREHEN METABOLIC PANEL: CPT | Mod: NTX | Performed by: SURGERY

## 2023-01-17 PROCEDURE — D9220A PRA ANESTHESIA: Mod: ANES,,, | Performed by: ANESTHESIOLOGY

## 2023-01-17 PROCEDURE — 88309 PR  SURG PATH,LEVEL VI: ICD-10-PCS | Mod: 26,,, | Performed by: PATHOLOGY

## 2023-01-17 PROCEDURE — 27100025 HC TUBING, SET FLUID WARMER: Mod: NTX | Performed by: ANESTHESIOLOGY

## 2023-01-17 PROCEDURE — 86706 HEP B SURFACE ANTIBODY: CPT | Mod: NTX | Performed by: SURGERY

## 2023-01-17 PROCEDURE — 27100088 HC CELL SAVER

## 2023-01-17 PROCEDURE — 85610 PROTHROMBIN TIME: CPT | Mod: 91 | Performed by: STUDENT IN AN ORGANIZED HEALTH CARE EDUCATION/TRAINING PROGRAM

## 2023-01-17 PROCEDURE — 85384 FIBRINOGEN ACTIVITY: CPT | Mod: 91 | Performed by: SURGERY

## 2023-01-17 PROCEDURE — 63600175 PHARM REV CODE 636 W HCPCS: Performed by: STUDENT IN AN ORGANIZED HEALTH CARE EDUCATION/TRAINING PROGRAM

## 2023-01-17 PROCEDURE — 85014 HEMATOCRIT: CPT | Mod: 91 | Performed by: SURGERY

## 2023-01-17 PROCEDURE — 87340 HEPATITIS B SURFACE AG IA: CPT | Mod: NTX | Performed by: SURGERY

## 2023-01-17 PROCEDURE — 25000003 PHARM REV CODE 250: Performed by: NURSE ANESTHETIST, CERTIFIED REGISTERED

## 2023-01-17 PROCEDURE — 27201041 HC RESERVOIR, CARDIOTOMY

## 2023-01-17 PROCEDURE — 25000003 PHARM REV CODE 250: Performed by: STUDENT IN AN ORGANIZED HEALTH CARE EDUCATION/TRAINING PROGRAM

## 2023-01-17 PROCEDURE — 85025 COMPLETE CBC W/AUTO DIFF WBC: CPT | Mod: NTX | Performed by: SURGERY

## 2023-01-17 PROCEDURE — 86803 HEPATITIS C AB TEST: CPT | Mod: NTX | Performed by: SURGERY

## 2023-01-17 PROCEDURE — D9220A PRA ANESTHESIA: Mod: CRNA,,, | Performed by: NURSE ANESTHETIST, CERTIFIED REGISTERED

## 2023-01-17 PROCEDURE — 82040 ASSAY OF SERUM ALBUMIN: CPT | Performed by: SURGERY

## 2023-01-17 PROCEDURE — C1751 CATH, INF, PER/CENT/MIDLINE: HCPCS | Mod: NTX | Performed by: ANESTHESIOLOGY

## 2023-01-17 PROCEDURE — 86900 BLOOD TYPING SEROLOGIC ABO: CPT | Mod: NTX | Performed by: SURGERY

## 2023-01-17 PROCEDURE — 85025 COMPLETE CBC W/AUTO DIFF WBC: CPT | Mod: 91 | Performed by: STUDENT IN AN ORGANIZED HEALTH CARE EDUCATION/TRAINING PROGRAM

## 2023-01-17 PROCEDURE — 47135 TRANSPLANTATION OF LIVER: CPT | Mod: 82,,, | Performed by: TRANSPLANT SURGERY

## 2023-01-17 PROCEDURE — 93503 PR INSERT/PLACE FLOW DIRECT CATH: ICD-10-PCS | Mod: 59,,, | Performed by: ANESTHESIOLOGY

## 2023-01-17 PROCEDURE — 20000000 HC ICU ROOM

## 2023-01-17 PROCEDURE — 83036 HEMOGLOBIN GLYCOSYLATED A1C: CPT | Mod: NTX | Performed by: SURGERY

## 2023-01-17 PROCEDURE — 94761 N-INVAS EAR/PLS OXIMETRY MLT: CPT

## 2023-01-17 PROCEDURE — 37000008 HC ANESTHESIA 1ST 15 MINUTES: Performed by: SURGERY

## 2023-01-17 PROCEDURE — 37000009 HC ANESTHESIA EA ADD 15 MINS: Performed by: SURGERY

## 2023-01-17 PROCEDURE — 99900035 HC TECH TIME PER 15 MIN (STAT)

## 2023-01-17 PROCEDURE — 82330 ASSAY OF CALCIUM: CPT | Mod: 91 | Performed by: SURGERY

## 2023-01-17 PROCEDURE — 85730 THROMBOPLASTIN TIME PARTIAL: CPT | Mod: 91 | Performed by: SURGERY

## 2023-01-17 PROCEDURE — 87522 HEPATITIS C REVRS TRNSCRPJ: CPT | Mod: NTX | Performed by: SURGERY

## 2023-01-17 PROCEDURE — D9220A PRA ANESTHESIA: ICD-10-PCS | Mod: CRNA,,, | Performed by: NURSE ANESTHETIST, CERTIFIED REGISTERED

## 2023-01-17 PROCEDURE — 82248 BILIRUBIN DIRECT: CPT | Mod: NTX | Performed by: SURGERY

## 2023-01-17 PROCEDURE — 27800506 HC CATH, RAPID INFUSION (7.5&8.5): Mod: NTX | Performed by: ANESTHESIOLOGY

## 2023-01-17 PROCEDURE — 85610 PROTHROMBIN TIME: CPT | Mod: 91 | Performed by: SURGERY

## 2023-01-17 PROCEDURE — 84450 TRANSFERASE (AST) (SGOT): CPT | Performed by: SURGERY

## 2023-01-17 PROCEDURE — 27200675 HC TRANSDUCER MONITOR KIT 4 LINES: Mod: NTX | Performed by: ANESTHESIOLOGY

## 2023-01-17 PROCEDURE — 63600175 PHARM REV CODE 636 W HCPCS: Performed by: SURGERY

## 2023-01-17 PROCEDURE — D9220A PRA ANESTHESIA: ICD-10-PCS | Mod: ANES,,, | Performed by: ANESTHESIOLOGY

## 2023-01-17 PROCEDURE — 83615 LACTATE (LD) (LDH) ENZYME: CPT | Performed by: STUDENT IN AN ORGANIZED HEALTH CARE EDUCATION/TRAINING PROGRAM

## 2023-01-17 PROCEDURE — 85002 BLEEDING TIME TEST: CPT

## 2023-01-17 PROCEDURE — 47143 PR TRANSPLANT,PREP DONOR LIVER, WHOLE: ICD-10-PCS | Mod: 82,,, | Performed by: SURGERY

## 2023-01-17 PROCEDURE — 86920 COMPATIBILITY TEST SPIN: CPT | Mod: NTX | Performed by: SURGERY

## 2023-01-17 PROCEDURE — 47143 PR TRANSPLANT,PREP DONOR LIVER, WHOLE: ICD-10-PCS | Mod: 51,,, | Performed by: TRANSPLANT SURGERY

## 2023-01-17 PROCEDURE — 84295 ASSAY OF SERUM SODIUM: CPT | Performed by: SURGERY

## 2023-01-17 RX ORDER — ALBUMIN HUMAN 50 G/1000ML
SOLUTION INTRAVENOUS CONTINUOUS PRN
Status: DISCONTINUED | OUTPATIENT
Start: 2023-01-17 | End: 2023-01-17

## 2023-01-17 RX ORDER — CALCIUM GLUCONATE 20 MG/ML
3 INJECTION, SOLUTION INTRAVENOUS
Status: DISCONTINUED | OUTPATIENT
Start: 2023-01-18 | End: 2023-01-20

## 2023-01-17 RX ORDER — MORPHINE SULFATE 2 MG/ML
1 INJECTION, SOLUTION INTRAMUSCULAR; INTRAVENOUS
Status: DISCONTINUED | OUTPATIENT
Start: 2023-01-17 | End: 2023-01-18

## 2023-01-17 RX ORDER — PROPOFOL 10 MG/ML
VIAL (ML) INTRAVENOUS
Status: DISCONTINUED | OUTPATIENT
Start: 2023-01-17 | End: 2023-01-17

## 2023-01-17 RX ORDER — CALCIUM GLUCONATE 20 MG/ML
2 INJECTION, SOLUTION INTRAVENOUS
Status: DISCONTINUED | OUTPATIENT
Start: 2023-01-18 | End: 2023-01-20

## 2023-01-17 RX ORDER — SODIUM CHLORIDE 0.9 % (FLUSH) 0.9 %
10 SYRINGE (ML) INJECTION
Status: DISCONTINUED | OUTPATIENT
Start: 2023-01-17 | End: 2023-02-01 | Stop reason: HOSPADM

## 2023-01-17 RX ORDER — POTASSIUM CHLORIDE 14.9 MG/ML
60 INJECTION INTRAVENOUS
Status: DISCONTINUED | OUTPATIENT
Start: 2023-01-18 | End: 2023-01-20

## 2023-01-17 RX ORDER — POTASSIUM CHLORIDE 29.8 MG/ML
80 INJECTION INTRAVENOUS
Status: DISCONTINUED | OUTPATIENT
Start: 2023-01-18 | End: 2023-01-20

## 2023-01-17 RX ORDER — FUROSEMIDE 10 MG/ML
INJECTION INTRAMUSCULAR; INTRAVENOUS
Status: DISCONTINUED | OUTPATIENT
Start: 2023-01-17 | End: 2023-01-17

## 2023-01-17 RX ORDER — MIDAZOLAM HYDROCHLORIDE 1 MG/ML
INJECTION INTRAMUSCULAR; INTRAVENOUS
Status: DISCONTINUED | OUTPATIENT
Start: 2023-01-17 | End: 2023-01-17

## 2023-01-17 RX ORDER — PREDNISONE 20 MG/1
20 TABLET ORAL DAILY
Status: DISCONTINUED | OUTPATIENT
Start: 2023-01-23 | End: 2023-01-27

## 2023-01-17 RX ORDER — NYSTATIN 100000 [USP'U]/ML
500000 SUSPENSION ORAL
Status: DISCONTINUED | OUTPATIENT
Start: 2023-01-18 | End: 2023-01-23

## 2023-01-17 RX ORDER — LIDOCAINE HYDROCHLORIDE 10 MG/ML
INJECTION, SOLUTION EPIDURAL; INFILTRATION; INTRACAUDAL; PERINEURAL
Status: DISPENSED
Start: 2023-01-17 | End: 2023-01-17

## 2023-01-17 RX ORDER — SODIUM BICARBONATE 1 MEQ/ML
SYRINGE (ML) INTRAVENOUS
Status: DISCONTINUED | OUTPATIENT
Start: 2023-01-17 | End: 2023-01-17

## 2023-01-17 RX ORDER — SODIUM CHLORIDE 9 MG/ML
INJECTION, SOLUTION INTRAVENOUS CONTINUOUS
Status: DISCONTINUED | OUTPATIENT
Start: 2023-01-17 | End: 2023-02-01 | Stop reason: HOSPADM

## 2023-01-17 RX ORDER — MAGNESIUM SULFATE HEPTAHYDRATE 40 MG/ML
4 INJECTION, SOLUTION INTRAVENOUS
Status: DISCONTINUED | OUTPATIENT
Start: 2023-01-18 | End: 2023-01-20

## 2023-01-17 RX ORDER — METHYLPREDNISOLONE SOD SUCC 125 MG
120 VIAL (EA) INJECTION DAILY
Status: COMPLETED | OUTPATIENT
Start: 2023-01-20 | End: 2023-01-20

## 2023-01-17 RX ORDER — PHENYLEPHRINE HYDROCHLORIDE 10 MG/ML
INJECTION INTRAVENOUS
Status: DISCONTINUED | OUTPATIENT
Start: 2023-01-17 | End: 2023-01-17

## 2023-01-17 RX ORDER — HEPARIN SODIUM 1000 [USP'U]/ML
INJECTION, SOLUTION INTRAVENOUS; SUBCUTANEOUS
Status: DISCONTINUED | OUTPATIENT
Start: 2023-01-17 | End: 2023-01-17 | Stop reason: HOSPADM

## 2023-01-17 RX ORDER — MYCOPHENOLATE MOFETIL 200 MG/ML
1000 POWDER, FOR SUSPENSION ORAL 2 TIMES DAILY
Status: DISCONTINUED | OUTPATIENT
Start: 2023-01-17 | End: 2023-01-19

## 2023-01-17 RX ORDER — PROTAMINE SULFATE 10 MG/ML
INJECTION, SOLUTION INTRAVENOUS
Status: DISCONTINUED | OUTPATIENT
Start: 2023-01-17 | End: 2023-01-17

## 2023-01-17 RX ORDER — METHYLPREDNISOLONE SODIUM SUCCINATE 1 G/16ML
INJECTION INTRAMUSCULAR; INTRAVENOUS
Status: DISCONTINUED | OUTPATIENT
Start: 2023-01-17 | End: 2023-01-17

## 2023-01-17 RX ORDER — FAMOTIDINE 10 MG/ML
20 INJECTION INTRAVENOUS EVERY 12 HOURS
Status: DISCONTINUED | OUTPATIENT
Start: 2023-01-17 | End: 2023-01-19

## 2023-01-17 RX ORDER — CALCIUM GLUCONATE 20 MG/ML
1 INJECTION, SOLUTION INTRAVENOUS
Status: DISCONTINUED | OUTPATIENT
Start: 2023-01-18 | End: 2023-01-20

## 2023-01-17 RX ORDER — MUPIROCIN 20 MG/G
OINTMENT TOPICAL
Status: DISCONTINUED | OUTPATIENT
Start: 2023-01-17 | End: 2023-01-17 | Stop reason: HOSPADM

## 2023-01-17 RX ORDER — METHYLPREDNISOLONE SOD SUCC 125 MG
160 VIAL (EA) INJECTION DAILY
Status: COMPLETED | OUTPATIENT
Start: 2023-01-19 | End: 2023-01-19

## 2023-01-17 RX ORDER — ONDANSETRON 2 MG/ML
INJECTION INTRAMUSCULAR; INTRAVENOUS
Status: DISCONTINUED | OUTPATIENT
Start: 2023-01-17 | End: 2023-01-17

## 2023-01-17 RX ORDER — HEPARIN SODIUM 5000 [USP'U]/ML
5000 INJECTION, SOLUTION INTRAVENOUS; SUBCUTANEOUS EVERY 8 HOURS
Status: DISCONTINUED | OUTPATIENT
Start: 2023-01-17 | End: 2023-02-01 | Stop reason: HOSPADM

## 2023-01-17 RX ORDER — MANNITOL 250 MG/ML
INJECTION, SOLUTION INTRAVENOUS
Status: DISCONTINUED | OUTPATIENT
Start: 2023-01-17 | End: 2023-01-17

## 2023-01-17 RX ORDER — ROCURONIUM BROMIDE 10 MG/ML
INJECTION, SOLUTION INTRAVENOUS
Status: DISCONTINUED | OUTPATIENT
Start: 2023-01-17 | End: 2023-01-17

## 2023-01-17 RX ORDER — MUPIROCIN 20 MG/G
1 OINTMENT TOPICAL 2 TIMES DAILY
Status: COMPLETED | OUTPATIENT
Start: 2023-01-17 | End: 2023-01-22

## 2023-01-17 RX ORDER — PROPOFOL 10 MG/ML
VIAL (ML) INTRAVENOUS CONTINUOUS PRN
Status: DISCONTINUED | OUTPATIENT
Start: 2023-01-17 | End: 2023-01-17

## 2023-01-17 RX ORDER — FENTANYL CITRATE 50 UG/ML
INJECTION, SOLUTION INTRAMUSCULAR; INTRAVENOUS
Status: DISCONTINUED | OUTPATIENT
Start: 2023-01-17 | End: 2023-01-17

## 2023-01-17 RX ORDER — LIDOCAINE HCL/PF 100 MG/5ML
SYRINGE (ML) INTRAVENOUS
Status: DISCONTINUED | OUTPATIENT
Start: 2023-01-17 | End: 2023-01-17

## 2023-01-17 RX ORDER — DEXTROSE 50 % IN WATER (D50W) INTRAVENOUS SYRINGE
CONTINUOUS PRN
Status: DISCONTINUED | OUTPATIENT
Start: 2023-01-17 | End: 2023-01-17

## 2023-01-17 RX ORDER — POTASSIUM CHLORIDE 29.8 MG/ML
40 INJECTION INTRAVENOUS
Status: DISCONTINUED | OUTPATIENT
Start: 2023-01-18 | End: 2023-01-20

## 2023-01-17 RX ORDER — DEXTROSE MONOHYDRATE AND SODIUM CHLORIDE 5; .9 G/100ML; G/100ML
INJECTION, SOLUTION INTRAVENOUS CONTINUOUS
Status: DISCONTINUED | OUTPATIENT
Start: 2023-01-17 | End: 2023-01-19

## 2023-01-17 RX ORDER — MAGNESIUM SULFATE HEPTAHYDRATE 40 MG/ML
2 INJECTION, SOLUTION INTRAVENOUS
Status: DISCONTINUED | OUTPATIENT
Start: 2023-01-18 | End: 2023-01-20

## 2023-01-17 RX ORDER — SULFAMETHOXAZOLE AND TRIMETHOPRIM 400; 80 MG/1; MG/1
1 TABLET ORAL EVERY MORNING
Status: DISCONTINUED | OUTPATIENT
Start: 2023-01-24 | End: 2023-01-17

## 2023-01-17 RX ORDER — METHYLPREDNISOLONE SOD SUCC 125 MG
200 VIAL (EA) INJECTION DAILY
Status: COMPLETED | OUTPATIENT
Start: 2023-01-18 | End: 2023-01-18

## 2023-01-17 RX ORDER — PROPOFOL 10 MG/ML
0-50 INJECTION, EMULSION INTRAVENOUS CONTINUOUS
Status: DISCONTINUED | OUTPATIENT
Start: 2023-01-17 | End: 2023-01-18

## 2023-01-17 RX ORDER — VALGANCICLOVIR 450 MG/1
450 TABLET, FILM COATED ORAL DAILY
Status: DISCONTINUED | OUTPATIENT
Start: 2023-01-27 | End: 2023-02-01 | Stop reason: HOSPADM

## 2023-01-17 RX ADMIN — MYCOPHENOLATE MOFETIL 1000 MG: 200 POWDER, FOR SUSPENSION ORAL at 11:01

## 2023-01-17 RX ADMIN — TACROLIMUS 1 MG: 1 CAPSULE ORAL at 11:01

## 2023-01-17 RX ADMIN — HEPARIN SODIUM 5000 UNITS: 5000 INJECTION INTRAVENOUS; SUBCUTANEOUS at 11:01

## 2023-01-17 RX ADMIN — MANNITOL 40 ML: 12.5 INJECTION, SOLUTION INTRAVENOUS at 05:01

## 2023-01-17 RX ADMIN — AMPICILLIN SODIUM AND SULBACTAM SODIUM 3 G: 2; 1 INJECTION, POWDER, FOR SOLUTION INTRAMUSCULAR; INTRAVENOUS at 12:01

## 2023-01-17 RX ADMIN — FENTANYL CITRATE 50 MCG: 50 INJECTION, SOLUTION INTRAMUSCULAR; INTRAVENOUS at 01:01

## 2023-01-17 RX ADMIN — MUPIROCIN 1 G: 20 OINTMENT TOPICAL at 11:01

## 2023-01-17 RX ADMIN — FUROSEMIDE 90 MG: 10 INJECTION, SOLUTION INTRAMUSCULAR; INTRAVENOUS at 12:01

## 2023-01-17 RX ADMIN — CALCIUM CHLORIDE 500 MG: 100 INJECTION, SOLUTION INTRAVENOUS at 05:01

## 2023-01-17 RX ADMIN — SODIUM BICARBONATE 25 MEQ: 84 INJECTION, SOLUTION INTRAVENOUS at 05:01

## 2023-01-17 RX ADMIN — HEPARIN SODIUM 2000 UNITS: 1000 INJECTION, SOLUTION INTRAVENOUS; SUBCUTANEOUS at 05:01

## 2023-01-17 RX ADMIN — ALBUMIN HUMAN: 50 SOLUTION INTRAVENOUS at 05:01

## 2023-01-17 RX ADMIN — NOREPINEPHRINE BITARTRATE 0.04 MCG/KG/MIN: 1 INJECTION, SOLUTION, CONCENTRATE INTRAVENOUS at 01:01

## 2023-01-17 RX ADMIN — FAMOTIDINE 20 MG: 10 INJECTION INTRAVENOUS at 11:01

## 2023-01-17 RX ADMIN — FUROSEMIDE 40 MG: 10 INJECTION, SOLUTION INTRAMUSCULAR; INTRAVENOUS at 05:01

## 2023-01-17 RX ADMIN — ROCURONIUM BROMIDE 30 MG: 10 INJECTION INTRAVENOUS at 08:01

## 2023-01-17 RX ADMIN — ROCURONIUM BROMIDE 50 MG: 10 INJECTION INTRAVENOUS at 04:01

## 2023-01-17 RX ADMIN — METHYLPREDNISOLONE SODIUM SUCCINATE 500 MG: 1 INJECTION, POWDER, LYOPHILIZED, FOR SOLUTION INTRAMUSCULAR; INTRAVENOUS at 12:01

## 2023-01-17 RX ADMIN — MIDAZOLAM HYDROCHLORIDE 2 MG: 1 INJECTION, SOLUTION INTRAMUSCULAR; INTRAVENOUS at 11:01

## 2023-01-17 RX ADMIN — AMPICILLIN SODIUM AND SULBACTAM SODIUM 3 G: 2; 1 INJECTION, POWDER, FOR SOLUTION INTRAMUSCULAR; INTRAVENOUS at 02:01

## 2023-01-17 RX ADMIN — ROCURONIUM BROMIDE 50 MG: 10 INJECTION INTRAVENOUS at 01:01

## 2023-01-17 RX ADMIN — FENTANYL CITRATE 50 MCG: 50 INJECTION, SOLUTION INTRAMUSCULAR; INTRAVENOUS at 11:01

## 2023-01-17 RX ADMIN — SODIUM CHLORIDE, SODIUM GLUCONATE, SODIUM ACETATE, POTASSIUM CHLORIDE, MAGNESIUM CHLORIDE, SODIUM PHOSPHATE, DIBASIC, AND POTASSIUM PHOSPHATE: .53; .5; .37; .037; .03; .012; .00082 INJECTION, SOLUTION INTRAVENOUS at 11:01

## 2023-01-17 RX ADMIN — CALCIUM CHLORIDE 500 MG: 100 INJECTION, SOLUTION INTRAVENOUS at 04:01

## 2023-01-17 RX ADMIN — LIDOCAINE HYDROCHLORIDE 100 MG: 20 INJECTION, SOLUTION INTRAVENOUS at 11:01

## 2023-01-17 RX ADMIN — PROTAMINE SULFATE 15 MG: 10 INJECTION, SOLUTION INTRAVENOUS at 07:01

## 2023-01-17 RX ADMIN — LIDOCAINE HYDROCHLORIDE 50 MG: 20 INJECTION, SOLUTION INTRAVENOUS at 11:01

## 2023-01-17 RX ADMIN — PHENYLEPHRINE HYDROCHLORIDE 200 MCG: 10 INJECTION INTRAVENOUS at 01:01

## 2023-01-17 RX ADMIN — ROCURONIUM BROMIDE 50 MG: 10 INJECTION INTRAVENOUS at 11:01

## 2023-01-17 RX ADMIN — SODIUM CHLORIDE: 9 INJECTION, SOLUTION INTRAVENOUS at 10:01

## 2023-01-17 RX ADMIN — INSULIN HUMAN 10 UNITS: 100 INJECTION, SOLUTION PARENTERAL at 05:01

## 2023-01-17 RX ADMIN — DEXTROSE MONOHYDRATE: 25 INJECTION, SOLUTION INTRAVENOUS at 05:01

## 2023-01-17 RX ADMIN — Medication 50 MCG/KG/MIN: at 09:01

## 2023-01-17 RX ADMIN — AMPICILLIN SODIUM AND SULBACTAM SODIUM 3 G: 2; 1 INJECTION, POWDER, FOR SOLUTION INTRAMUSCULAR; INTRAVENOUS at 04:01

## 2023-01-17 RX ADMIN — SODIUM CHLORIDE, SODIUM GLUCONATE, SODIUM ACETATE, POTASSIUM CHLORIDE, MAGNESIUM CHLORIDE, SODIUM PHOSPHATE, DIBASIC, AND POTASSIUM PHOSPHATE: .53; .5; .37; .037; .03; .012; .00082 INJECTION, SOLUTION INTRAVENOUS at 05:01

## 2023-01-17 RX ADMIN — PROPOFOL 150 MG: 10 INJECTION, EMULSION INTRAVENOUS at 11:01

## 2023-01-17 RX ADMIN — DEXTROSE AND SODIUM CHLORIDE: 5; 900 INJECTION, SOLUTION INTRAVENOUS at 10:01

## 2023-01-17 RX ADMIN — MANNITOL 90 ML: 12.5 INJECTION, SOLUTION INTRAVENOUS at 12:01

## 2023-01-17 RX ADMIN — VASOPRESSIN 0.04 UNITS/MIN: 20 INJECTION INTRAVENOUS at 01:01

## 2023-01-17 RX ADMIN — MUPIROCIN: 20 OINTMENT TOPICAL at 06:01

## 2023-01-17 RX ADMIN — PROPOFOL 50 MG: 10 INJECTION, EMULSION INTRAVENOUS at 05:01

## 2023-01-17 RX ADMIN — SODIUM BICARBONATE 50 MEQ: 84 INJECTION, SOLUTION INTRAVENOUS at 06:01

## 2023-01-17 RX ADMIN — SODIUM CHLORIDE 2 UNITS/HR: 9 INJECTION, SOLUTION INTRAVENOUS at 06:01

## 2023-01-17 RX ADMIN — MAGNESIUM SULFATE 2 G: 2 INJECTION INTRAVENOUS at 11:01

## 2023-01-17 RX ADMIN — AMPICILLIN SODIUM AND SULBACTAM SODIUM 3 G: 2; 1 INJECTION, POWDER, FOR SOLUTION INTRAMUSCULAR; INTRAVENOUS at 06:01

## 2023-01-17 RX ADMIN — PROTAMINE SULFATE 5 MG: 10 INJECTION, SOLUTION INTRAVENOUS at 06:01

## 2023-01-17 RX ADMIN — ALBUMIN HUMAN: 50 SOLUTION INTRAVENOUS at 02:01

## 2023-01-17 RX ADMIN — ROCURONIUM BROMIDE 50 MG: 10 INJECTION INTRAVENOUS at 12:01

## 2023-01-17 NOTE — ANESTHESIA PROCEDURE NOTES
Central Line    Diagnosis: ESLD  Patient location during procedure: done in OR    Staffing  Authorizing Provider: See Huff MD  Performing Provider: Jonel Davila MD    Staffing  Performed: resident/CRNA   Anesthesiologist was present at the time of the procedure.  Preanesthetic Checklist  Completed: patient identified, IV checked, site marked, risks and benefits discussed, surgical consent, monitors and equipment checked, pre-op evaluation, timeout performed and anesthesia consent given  Indication   Indication: hemodynamic monitoring, vascular access, med administration     Anesthesia   general anesthesia    Central Line   Skin Prep: skin prepped with ChloraPrep, skin prep agent completely dried prior to procedure  Sterile Barriers Followed: Yes    All five maximal barriers used- gloves, gown, cap, mask, and large sterile sheet    hand hygiene performed prior to central venous catheter insertion  Location: right internal jugular.   Catheter type: triple lumen  Catheter Size: 12 Fr  Ultrasound: vascular probe with ultrasound   Vessel Caliber: medium, patent, compressibility normal  Needle advanced into vessel with real time Ultrasound guidance.  Guidewire confirmed in vessel.  sterile gel and probe cover used in ultrasound-guided central venous catheter insertion  Manometry: none  Insertion Attempts: 1   Securement:line sutured, chlorhexidine patch, sterile dressing applied and blood return through all ports    Post-Procedure    Adverse Events:none      Guidewire Guidewire removed intact.

## 2023-01-17 NOTE — ANESTHESIA PROCEDURE NOTES
[place]    Diagnosis: ESLD  Patient location during procedure: done in OR    Staffing  Authorizing Provider: See Huff MD  Performing Provider: Jonel Davila MD    Anesthesiologist was present at the time of the procedure.  Preanesthetic Checklist  Completed: patient identified, IV checked, site marked, risks and benefits discussed, surgical consent, monitors and equipment checked, pre-op evaluation, timeout performed and anesthesia consent given  [place]  Local Infiltration: none  Location: right,  internal jugular vein  Vessel Caliber: medium, patent, compressibility normal  Introducer: 14 Fr double lumen, manometry not used.  Device: CCO/Oximetric Catheter   Catheter Size: 8.5 Fr  Catheter placement by yes. Heme positive aspiration all ports. PAC floated with balloon up until wedgedSterile sheath used  Locked at: 48 cm.Insertion Attempts: 1  Indication: hemodynamic monitoring, intravenous therapy  Ultrasound Guidance  Needle advanced into vessel with real time Ultrasound guidance.  Guidewire confirmed in vessel.  Sterile sheath used.  Assessment  Central Line Bundle Protocol followed. Hand hygiene before procedure, surgical cap worn, surgical mask worn, sterile surgical gloves worn, large sterile drape used.  Verification: ultrasound and blood return  Dressing: sutured in place and taped and tegaderm  Patient: Tolerated Well

## 2023-01-17 NOTE — HPI
Mickey Harris is a 57 y.o. female who presented on 1/17/22 for living related donor liver transplant in the setting of DORAN cirrhosis. She was diagnosed approximately 4 years ago and did well up until one year ago when she began to develop lower extremity edema and ascites managed with diuretics. She has never required paracentesis. She has also developed hepatic encephalopathy managed with lactulose. There is no history of GI bleeding.    Ms. Harris is now s/p living donor liver transplantation on 1/17/23. She received a right liver lobe which was quite large (~1000g). Biliary reconstruction was Angela-en-y reconstruction to the donor right hepatic duct. The procedure was performed without apparent complication and she was transferred to the SICU for postoperative care and management. EBL was 1.5 L. Intraoperatively she received 3.5 L of crystalloid, 1L of albumin, no pRBCs, 430 of cell saver, 2u FFP, 2u Plts, and 2u Cryo. The reported urine output during the case was 3 L. She arrives to the SICU intubated, sedated, and hemodynamically stable without vasopressor/inotrope support. Initial ABG demonstrated a mild mixed acidosis with a pH of 7.34, adequate ventilation, and excellent oxygenation. Her ventilator settings were able to be weaned accordingly. Initial iSTAT lactate was 8.6.    Ischemic Times:  Time of portal reperfusion: 1/17/2023  5:43 PM  Time of arterial reperfusion: 1/17/2023  6:04 PM  Anastomosis (warm ischemia) time: 28 minutes  Cold ischemia time: 109 minutes     Surgical Data:  Graft type (whole vs. partial): partial  IVC reconstruction: hepatic vein confluence piggyback  Portal vein status: patent  Portal graft performed? no  Donor arterial anatomy: right Espitia  Donor arterial inflow: other  Recipient arterial anatomy: standard  Recipient arterial inflow: right hepatic artery  Arterial graft performed? no  Biliary reconstruction: angela-en-y (donor right hepatic duct to recipient enteric-jejunum  )  Biliary stent: none  Disposition of Vessels from donor of transplanted organ:   Damage during procurement: no  Procurement damage comments:

## 2023-01-17 NOTE — ASSESSMENT & PLAN NOTE
Mickey Harris is a 57 y.o. female with DORAN cirrhosis complicated by hepatic encephalopathy on home lactulose who is now status post living related donor liver transplant on 1/17/22.      Neuro/Psych:   -- Sedation: Propofol. Wean for neuro exam. Plan to remain sedated overnight  -- Pain: Multimodal with breakthrough narcotics             Cards:   -- HDS, off pressors  -- Holding home crestor  -- STRICT CVP GOAL <9  -- SBP >100      Pulm:   -- Goal O2 sat > 90%  -- Plan to remain extubated overnight. Wean vent settings as tolerated       Renal:  -- Keep davis for strict I/O  -- Trend BUN/Cr  -- BMP Q12H for 24 hours then daily      FEN / GI:   -- Replace lytes as needed  -- Nutrition: NPO, will re-evaluate once extubated  -- Anti-rejection methylpred, mycophenolate, tacro; daily tacro levels  -- AST Q4H for 24 hr  -- Liver US in AM      ID:   -- Tm: afebrile  -- Abx Amp, Bactrim, Valganciclovir, Nystatin suspension      Heme/Onc:   -- Post op H/H pending  -- Q4 Hr CBC and PT for 1 day, then daily  -- Vitamin K Q8H for 3 doses      Endo:   -- Gluc goal 140-180  -- Insulin gtt as needed; Endo consulted appreciate recommendations      PPx:   Feeding: NPO  Analgesia/Sedation: PRNs / Prop and Precedex  Thromboembolic prevention: SQH  HOB >30: Ordered  Stress Ulcer ppx: Famotidine Q12H  Glucose control: Critical care goal 140-180 g/dl, insulin gtt    Lines/Drains/Airway: RIJ CVC x2, A line x2 / RYLEY x2 / ET      Dispo/Code Status/Palliative:   -- SICU / Full Code

## 2023-01-17 NOTE — ANESTHESIA PROCEDURE NOTES
Intubation    Date/Time: 1/17/2023 11:31 AM  Performed by: Mesfin Shelton CRNA  Authorized by: See Huff MD     Intubation:     Induction:  Intravenous    Intubated:  Postinduction    Mask Ventilation:  Easy mask    Attempts:  1    Attempted By:  CRNA    Method of Intubation:  Video laryngoscopy    Blade:  Horne 3    Laryngeal View Grade: Grade I - full view of cords      Difficult Airway Encountered?: No      Complications:  None    Airway Device:  Oral endotracheal tube    Airway Device Size:  7.5    Style/Cuff Inflation:  Cuffed (inflated to minimal occlusive pressure)    Inflation Amount (mL):  7    Tube secured:  22    Secured at:  The lips    Placement Verified By:  Capnometry    Complicating Factors:  None    Findings Post-Intubation:  BS equal bilateral and atraumatic/condition of teeth unchanged

## 2023-01-17 NOTE — ANESTHESIA PROCEDURE NOTES
R Femoral Arterial Line    Diagnosis: ESLD    Patient location during procedure: done in OR    Staffing  Authorizing Provider: See Huff MD  Performing Provider: Jonel Davila MD    Anesthesiologist was present at the time of the procedure.    Preanesthetic Checklist  Completed: patient identified, IV checked, site marked, risks and benefits discussed, surgical consent, monitors and equipment checked, pre-op evaluation, timeout performed and anesthesia consent givenR Femoral Arterial Line  Local Infiltration: none  Orientation: right  Location: femoral    Catheter Size: 4 Fr Cook  Catheter placement by Ultrasound guidance. Heme positive aspiration all ports.   Vessel Caliber: medium, patent, compressibility normal  Needle advanced into vessel with real time Ultrasound guidance.  Guidewire confirmed in vessel.Insertion Attempts: 1  Assessment  Dressing: sutured in place and taped and tegaderm  Patient: Tolerated well

## 2023-01-17 NOTE — ANESTHESIA PROCEDURE NOTES
Arterial    Diagnosis: Liver cirrhosis secondary to DORAN    Patient location during procedure: done in OR  Procedure start time: 1/17/2023 11:31 AM  Timeout: 1/17/2023 11:30 AM  Procedure end time: 1/17/2023 11:32 AM    Staffing  Authorizing Provider: See Huff MD  Performing Provider: Mesfin Shelton CRNA    Anesthesiologist was present at the time of the procedure.    Preanesthetic Checklist  Completed: patient identified, IV checked, site marked, risks and benefits discussed, surgical consent, monitors and equipment checked, pre-op evaluation, timeout performed and anesthesia consent givenArterial  Skin Prep: chlorhexidine gluconate  Local Infiltration: none  Location: radial    Catheter Size: 20 G  Catheter placement by Ultrasound guidance. Heme positive aspiration all ports.   Vessel Caliber: small, patent, compressibility normal  Needle advanced into vessel with real time Ultrasound guidance.  Guidewire confirmed in vessel.Insertion Attempts: 1  Assessment  Dressing: secured with tape and tegaderm  Patient: Tolerated well

## 2023-01-18 PROBLEM — R73.9 HYPERGLYCEMIA: Status: ACTIVE | Noted: 2023-01-18

## 2023-01-18 PROBLEM — T38.0X5A ADRENAL CORTICOSTEROID CAUSING ADVERSE EFFECT IN THERAPEUTIC USE: Status: ACTIVE | Noted: 2023-01-18

## 2023-01-18 PROBLEM — Z94.4 S/P LIVER TRANSPLANT: Status: ACTIVE | Noted: 2023-01-18

## 2023-01-18 LAB
ALBUMIN SERPL BCP-MCNC: 2.9 G/DL (ref 3.5–5.2)
ALBUMIN SERPL BCP-MCNC: 3.2 G/DL (ref 3.5–5.2)
ALLENS TEST: ABNORMAL
ALLENS TEST: NORMAL
ALLENS TEST: NORMAL
ALP SERPL-CCNC: 39 U/L (ref 55–135)
ALP SERPL-CCNC: 46 U/L (ref 55–135)
ALT SERPL W/O P-5'-P-CCNC: 596 U/L (ref 10–44)
ALT SERPL W/O P-5'-P-CCNC: 779 U/L (ref 10–44)
ANION GAP SERPL CALC-SCNC: 14 MMOL/L (ref 8–16)
ANION GAP SERPL CALC-SCNC: 5 MMOL/L (ref 8–16)
ANION GAP SERPL CALC-SCNC: 6 MMOL/L (ref 8–16)
ANION GAP SERPL CALC-SCNC: 8 MMOL/L (ref 8–16)
ANISOCYTOSIS BLD QL SMEAR: SLIGHT
APTT BLDCRRT: 29.9 SEC (ref 21–32)
AST SERPL-CCNC: 290 U/L (ref 10–40)
AST SERPL-CCNC: 328 U/L (ref 10–40)
AST SERPL-CCNC: 361 U/L (ref 10–40)
AST SERPL-CCNC: 426 U/L (ref 10–40)
AST SERPL-CCNC: 518 U/L (ref 10–40)
AST SERPL-CCNC: 635 U/L (ref 10–40)
AST SERPL-CCNC: 653 U/L (ref 10–40)
AST SERPL-CCNC: 862 U/L (ref 10–40)
BASOPHILS # BLD AUTO: 0 K/UL (ref 0–0.2)
BASOPHILS # BLD AUTO: 0.01 K/UL (ref 0–0.2)
BASOPHILS # BLD AUTO: 0.02 K/UL (ref 0–0.2)
BASOPHILS NFR BLD: 0 % (ref 0–1.9)
BASOPHILS NFR BLD: 0.1 % (ref 0–1.9)
BASOPHILS NFR BLD: 0.2 % (ref 0–1.9)
BILIRUB DIRECT SERPL-MCNC: 4.8 MG/DL (ref 0.1–0.3)
BILIRUB SERPL-MCNC: 9.4 MG/DL (ref 0.1–1)
BILIRUB SERPL-MCNC: 9.4 MG/DL (ref 0.1–1)
BLD PROD TYP BPU: NORMAL
BLD PROD TYP BPU: NORMAL
BLOOD UNIT EXPIRATION DATE: NORMAL
BLOOD UNIT EXPIRATION DATE: NORMAL
BLOOD UNIT TYPE CODE: 5100
BLOOD UNIT TYPE CODE: 6200
BLOOD UNIT TYPE: NORMAL
BLOOD UNIT TYPE: NORMAL
BUN SERPL-MCNC: 19 MG/DL (ref 6–20)
BUN SERPL-MCNC: 23 MG/DL (ref 6–20)
BUN SERPL-MCNC: 26 MG/DL (ref 6–20)
BUN SERPL-MCNC: 29 MG/DL (ref 6–20)
CALCIUM SERPL-MCNC: 7.6 MG/DL (ref 8.7–10.5)
CALCIUM SERPL-MCNC: 7.8 MG/DL (ref 8.7–10.5)
CALCIUM SERPL-MCNC: 8 MG/DL (ref 8.7–10.5)
CALCIUM SERPL-MCNC: 8.1 MG/DL (ref 8.7–10.5)
CHLORIDE SERPL-SCNC: 108 MMOL/L (ref 95–110)
CHLORIDE SERPL-SCNC: 112 MMOL/L (ref 95–110)
CHLORIDE SERPL-SCNC: 112 MMOL/L (ref 95–110)
CHLORIDE SERPL-SCNC: 113 MMOL/L (ref 95–110)
CO2 SERPL-SCNC: 20 MMOL/L (ref 23–29)
CO2 SERPL-SCNC: 21 MMOL/L (ref 23–29)
CO2 SERPL-SCNC: 22 MMOL/L (ref 23–29)
CO2 SERPL-SCNC: 24 MMOL/L (ref 23–29)
CODING SYSTEM: NORMAL
CODING SYSTEM: NORMAL
CREAT SERPL-MCNC: 0.8 MG/DL (ref 0.5–1.4)
CREAT SERPL-MCNC: 0.8 MG/DL (ref 0.5–1.4)
CREAT SERPL-MCNC: 1 MG/DL (ref 0.5–1.4)
CREAT SERPL-MCNC: 1 MG/DL (ref 0.5–1.4)
DELSYS: ABNORMAL
DELSYS: NORMAL
DELSYS: NORMAL
DIFFERENTIAL METHOD: ABNORMAL
DISPENSE STATUS: NORMAL
DISPENSE STATUS: NORMAL
DOHLE BOD BLD QL SMEAR: PRESENT
EOSINOPHIL # BLD AUTO: 0 K/UL (ref 0–0.5)
EOSINOPHIL NFR BLD: 0 % (ref 0–8)
ERYTHROCYTE [DISTWIDTH] IN BLOOD BY AUTOMATED COUNT: 13.9 % (ref 11.5–14.5)
ERYTHROCYTE [DISTWIDTH] IN BLOOD BY AUTOMATED COUNT: 13.9 % (ref 11.5–14.5)
ERYTHROCYTE [DISTWIDTH] IN BLOOD BY AUTOMATED COUNT: 14 % (ref 11.5–14.5)
ERYTHROCYTE [DISTWIDTH] IN BLOOD BY AUTOMATED COUNT: 14.1 % (ref 11.5–14.5)
ERYTHROCYTE [DISTWIDTH] IN BLOOD BY AUTOMATED COUNT: 14.3 % (ref 11.5–14.5)
ERYTHROCYTE [SEDIMENTATION RATE] IN BLOOD BY WESTERGREN METHOD: 20 MM/H
EST. GFR  (NO RACE VARIABLE): >60 ML/MIN/1.73 M^2
FIO2: 30
GLUCOSE SERPL-MCNC: 121 MG/DL (ref 70–110)
GLUCOSE SERPL-MCNC: 136 MG/DL (ref 70–110)
GLUCOSE SERPL-MCNC: 177 MG/DL (ref 70–110)
GLUCOSE SERPL-MCNC: 208 MG/DL (ref 70–110)
GLUCOSE SERPL-MCNC: 211 MG/DL (ref 70–110)
GLUCOSE SERPL-MCNC: 287 MG/DL (ref 70–110)
HCO3 UR-SCNC: 13.4 MMOL/L (ref 24–28)
HCO3 UR-SCNC: 23.9 MMOL/L (ref 24–28)
HCO3 UR-SCNC: 24.1 MMOL/L (ref 24–28)
HCO3 UR-SCNC: 25.3 MMOL/L (ref 24–28)
HCO3 UR-SCNC: 25.6 MMOL/L (ref 24–28)
HCT VFR BLD AUTO: 24.5 % (ref 37–48.5)
HCT VFR BLD AUTO: 28.5 % (ref 37–48.5)
HCT VFR BLD AUTO: 29.1 % (ref 37–48.5)
HCT VFR BLD AUTO: 31.7 % (ref 37–48.5)
HCT VFR BLD AUTO: 34.9 % (ref 37–48.5)
HCT VFR BLD AUTO: 35.9 % (ref 37–48.5)
HCT VFR BLD AUTO: 36 % (ref 37–48.5)
HCT VFR BLD CALC: 23 %PCV (ref 36–54)
HCT VFR BLD CALC: 42 %PCV (ref 36–54)
HGB BLD-MCNC: 10 G/DL (ref 12–16)
HGB BLD-MCNC: 10.3 G/DL (ref 12–16)
HGB BLD-MCNC: 11.2 G/DL (ref 12–16)
HGB BLD-MCNC: 12.1 G/DL (ref 12–16)
HGB BLD-MCNC: 12.3 G/DL (ref 12–16)
HGB BLD-MCNC: 12.6 G/DL (ref 12–16)
HGB BLD-MCNC: 8.4 G/DL (ref 12–16)
IMM GRANULOCYTES # BLD AUTO: 0.03 K/UL (ref 0–0.04)
IMM GRANULOCYTES # BLD AUTO: 0.03 K/UL (ref 0–0.04)
IMM GRANULOCYTES # BLD AUTO: 0.05 K/UL (ref 0–0.04)
IMM GRANULOCYTES # BLD AUTO: 0.07 K/UL (ref 0–0.04)
IMM GRANULOCYTES NFR BLD AUTO: 0.4 % (ref 0–0.5)
IMM GRANULOCYTES NFR BLD AUTO: 0.4 % (ref 0–0.5)
IMM GRANULOCYTES NFR BLD AUTO: 0.5 % (ref 0–0.5)
IMM GRANULOCYTES NFR BLD AUTO: 0.5 % (ref 0–0.5)
IMM GRANULOCYTES NFR BLD AUTO: 0.6 % (ref 0–0.5)
INR PPP: 1.4 (ref 0.8–1.2)
INR PPP: 1.5 (ref 0.8–1.2)
INR PPP: 1.5 (ref 0.8–1.2)
INR PPP: 1.9 (ref 0.8–1.2)
LACTATE SERPL-SCNC: 2.3 MMOL/L (ref 0.5–2.2)
LACTATE SERPL-SCNC: 3.9 MMOL/L (ref 0.5–2.2)
LACTATE SERPL-SCNC: 7.6 MMOL/L (ref 0.5–2.2)
LYMPHOCYTES # BLD AUTO: 0.2 K/UL (ref 1–4.8)
LYMPHOCYTES # BLD AUTO: 0.3 K/UL (ref 1–4.8)
LYMPHOCYTES NFR BLD: 2.1 % (ref 18–48)
LYMPHOCYTES NFR BLD: 2.1 % (ref 18–48)
LYMPHOCYTES NFR BLD: 2.2 % (ref 18–48)
LYMPHOCYTES NFR BLD: 2.9 % (ref 18–48)
LYMPHOCYTES NFR BLD: 3.1 % (ref 18–48)
LYMPHOCYTES NFR BLD: 3.5 % (ref 18–48)
LYMPHOCYTES NFR BLD: 3.9 % (ref 18–48)
MAGNESIUM SERPL-MCNC: 2.5 MG/DL (ref 1.6–2.6)
MAGNESIUM SERPL-MCNC: 2.6 MG/DL (ref 1.6–2.6)
MCH RBC QN AUTO: 31.9 PG (ref 27–31)
MCH RBC QN AUTO: 32.4 PG (ref 27–31)
MCH RBC QN AUTO: 32.4 PG (ref 27–31)
MCH RBC QN AUTO: 32.6 PG (ref 27–31)
MCH RBC QN AUTO: 32.6 PG (ref 27–31)
MCH RBC QN AUTO: 32.8 PG (ref 27–31)
MCH RBC QN AUTO: 32.9 PG (ref 27–31)
MCHC RBC AUTO-ENTMCNC: 34.2 G/DL (ref 32–36)
MCHC RBC AUTO-ENTMCNC: 34.3 G/DL (ref 32–36)
MCHC RBC AUTO-ENTMCNC: 34.7 G/DL (ref 32–36)
MCHC RBC AUTO-ENTMCNC: 35.1 G/DL (ref 32–36)
MCHC RBC AUTO-ENTMCNC: 35.1 G/DL (ref 32–36)
MCHC RBC AUTO-ENTMCNC: 35.3 G/DL (ref 32–36)
MCHC RBC AUTO-ENTMCNC: 35.4 G/DL (ref 32–36)
MCV RBC AUTO: 92 FL (ref 82–98)
MCV RBC AUTO: 93 FL (ref 82–98)
MCV RBC AUTO: 94 FL (ref 82–98)
MCV RBC AUTO: 95 FL (ref 82–98)
MODE: ABNORMAL
MODE: NORMAL
MODE: NORMAL
MONOCYTES # BLD AUTO: 0.3 K/UL (ref 0.3–1)
MONOCYTES # BLD AUTO: 0.4 K/UL (ref 0.3–1)
MONOCYTES # BLD AUTO: 0.5 K/UL (ref 0.3–1)
MONOCYTES # BLD AUTO: 0.7 K/UL (ref 0.3–1)
MONOCYTES NFR BLD: 3.6 % (ref 4–15)
MONOCYTES NFR BLD: 3.9 % (ref 4–15)
MONOCYTES NFR BLD: 3.9 % (ref 4–15)
MONOCYTES NFR BLD: 4.6 % (ref 4–15)
MONOCYTES NFR BLD: 4.7 % (ref 4–15)
MONOCYTES NFR BLD: 5.1 % (ref 4–15)
MONOCYTES NFR BLD: 6.3 % (ref 4–15)
NEUTROPHILS # BLD AUTO: 10.2 K/UL (ref 1.8–7.7)
NEUTROPHILS # BLD AUTO: 6.3 K/UL (ref 1.8–7.7)
NEUTROPHILS # BLD AUTO: 7.1 K/UL (ref 1.8–7.7)
NEUTROPHILS # BLD AUTO: 7.4 K/UL (ref 1.8–7.7)
NEUTROPHILS # BLD AUTO: 7.5 K/UL (ref 1.8–7.7)
NEUTROPHILS # BLD AUTO: 8.4 K/UL (ref 1.8–7.7)
NEUTROPHILS # BLD AUTO: 9.6 K/UL (ref 1.8–7.7)
NEUTROPHILS NFR BLD: 90.9 % (ref 38–73)
NEUTROPHILS NFR BLD: 91 % (ref 38–73)
NEUTROPHILS NFR BLD: 91.3 % (ref 38–73)
NEUTROPHILS NFR BLD: 92.1 % (ref 38–73)
NEUTROPHILS NFR BLD: 92.4 % (ref 38–73)
NEUTROPHILS NFR BLD: 92.5 % (ref 38–73)
NEUTROPHILS NFR BLD: 93.6 % (ref 38–73)
NRBC BLD-RTO: 0 /100 WBC
OVALOCYTES BLD QL SMEAR: ABNORMAL
PCO2 BLDA: 30.4 MMHG (ref 35–45)
PCO2 BLDA: 38.5 MMHG (ref 35–45)
PCO2 BLDA: 41.5 MMHG (ref 35–45)
PCO2 BLDA: 41.7 MMHG (ref 35–45)
PCO2 BLDA: 42.1 MMHG (ref 35–45)
PEEP: 5
PH SMN: 7.25 [PH] (ref 7.35–7.45)
PH SMN: 7.37 [PH] (ref 7.35–7.45)
PH SMN: 7.39 [PH] (ref 7.35–7.45)
PH SMN: 7.39 [PH] (ref 7.35–7.45)
PH SMN: 7.41 [PH] (ref 7.35–7.45)
PHOSPHATE SERPL-MCNC: 1.4 MG/DL (ref 2.7–4.5)
PHOSPHATE SERPL-MCNC: 3.2 MG/DL (ref 2.7–4.5)
PLATELET # BLD AUTO: 28 K/UL (ref 150–450)
PLATELET # BLD AUTO: 32 K/UL (ref 150–450)
PLATELET # BLD AUTO: 34 K/UL (ref 150–450)
PLATELET # BLD AUTO: 42 K/UL (ref 150–450)
PLATELET # BLD AUTO: 50 K/UL (ref 150–450)
PLATELET # BLD AUTO: 51 K/UL (ref 150–450)
PLATELET # BLD AUTO: 59 K/UL (ref 150–450)
PLATELET BLD QL SMEAR: ABNORMAL
PMV BLD AUTO: 12 FL (ref 9.2–12.9)
PMV BLD AUTO: 12.2 FL (ref 9.2–12.9)
PMV BLD AUTO: 12.3 FL (ref 9.2–12.9)
PMV BLD AUTO: 12.7 FL (ref 9.2–12.9)
PMV BLD AUTO: 12.8 FL (ref 9.2–12.9)
PMV BLD AUTO: 12.8 FL (ref 9.2–12.9)
PMV BLD AUTO: 13.2 FL (ref 9.2–12.9)
PO2 BLDA: 111 MMHG (ref 80–100)
PO2 BLDA: 232 MMHG (ref 80–100)
PO2 BLDA: 436 MMHG (ref 80–100)
PO2 BLDA: 80 MMHG (ref 80–100)
PO2 BLDA: 90 MMHG (ref 80–100)
POC BE: -1 MMOL/L
POC BE: -1 MMOL/L
POC BE: -14 MMOL/L
POC BE: 0 MMOL/L
POC BE: 1 MMOL/L
POC IONIZED CALCIUM: 0.89 MMOL/L (ref 1.06–1.42)
POC IONIZED CALCIUM: 1.02 MMOL/L (ref 1.06–1.42)
POC SATURATED O2: 100 % (ref 95–100)
POC SATURATED O2: 100 % (ref 95–100)
POC SATURATED O2: 96 % (ref 95–100)
POC SATURATED O2: 97 % (ref 95–100)
POC SATURATED O2: 98 % (ref 95–100)
POC TCO2: 14 MMOL/L (ref 23–27)
POC TCO2: 25 MMOL/L (ref 23–27)
POC TCO2: 25 MMOL/L (ref 23–27)
POC TCO2: 27 MMOL/L (ref 23–27)
POC TCO2: 27 MMOL/L (ref 23–27)
POCT GLUCOSE: 137 MG/DL (ref 70–110)
POCT GLUCOSE: 179 MG/DL (ref 70–110)
POCT GLUCOSE: 191 MG/DL (ref 70–110)
POCT GLUCOSE: 195 MG/DL (ref 70–110)
POCT GLUCOSE: 202 MG/DL (ref 70–110)
POCT GLUCOSE: 233 MG/DL (ref 70–110)
POCT GLUCOSE: 248 MG/DL (ref 70–110)
POCT GLUCOSE: 271 MG/DL (ref 70–110)
POCT GLUCOSE: 297 MG/DL (ref 70–110)
POCT GLUCOSE: 320 MG/DL (ref 70–110)
POCT GLUCOSE: 332 MG/DL (ref 70–110)
POCT GLUCOSE: 344 MG/DL (ref 70–110)
POCT GLUCOSE: 348 MG/DL (ref 70–110)
POIKILOCYTOSIS BLD QL SMEAR: ABNORMAL
POTASSIUM BLD-SCNC: 2.5 MMOL/L (ref 3.5–5.1)
POTASSIUM BLD-SCNC: 5 MMOL/L (ref 3.5–5.1)
POTASSIUM SERPL-SCNC: 3.3 MMOL/L (ref 3.5–5.1)
POTASSIUM SERPL-SCNC: 3.9 MMOL/L (ref 3.5–5.1)
POTASSIUM SERPL-SCNC: 4.3 MMOL/L (ref 3.5–5.1)
POTASSIUM SERPL-SCNC: 4.5 MMOL/L (ref 3.5–5.1)
PROT SERPL-MCNC: 5 G/DL (ref 6–8.4)
PROT SERPL-MCNC: 5.1 G/DL (ref 6–8.4)
PROTHROMBIN TIME: 14.2 SEC (ref 9–12.5)
PROTHROMBIN TIME: 14.3 SEC (ref 9–12.5)
PROTHROMBIN TIME: 14.5 SEC (ref 9–12.5)
PROTHROMBIN TIME: 14.8 SEC (ref 9–12.5)
PROTHROMBIN TIME: 15 SEC (ref 9–12.5)
PROTHROMBIN TIME: 15.7 SEC (ref 9–12.5)
PROTHROMBIN TIME: 19.2 SEC (ref 9–12.5)
RBC # BLD AUTO: 2.59 M/UL (ref 4–5.4)
RBC # BLD AUTO: 3.05 M/UL (ref 4–5.4)
RBC # BLD AUTO: 3.13 M/UL (ref 4–5.4)
RBC # BLD AUTO: 3.44 M/UL (ref 4–5.4)
RBC # BLD AUTO: 3.74 M/UL (ref 4–5.4)
RBC # BLD AUTO: 3.85 M/UL (ref 4–5.4)
RBC # BLD AUTO: 3.87 M/UL (ref 4–5.4)
SAMPLE: ABNORMAL
SAMPLE: NORMAL
SAMPLE: NORMAL
SCHISTOCYTES BLD QL SMEAR: ABNORMAL
SCHISTOCYTES BLD QL SMEAR: ABNORMAL
SITE: ABNORMAL
SITE: NORMAL
SITE: NORMAL
SODIUM BLD-SCNC: 134 MMOL/L (ref 136–145)
SODIUM BLD-SCNC: 145 MMOL/L (ref 136–145)
SODIUM SERPL-SCNC: 140 MMOL/L (ref 136–145)
SODIUM SERPL-SCNC: 141 MMOL/L (ref 136–145)
SODIUM SERPL-SCNC: 142 MMOL/L (ref 136–145)
SODIUM SERPL-SCNC: 142 MMOL/L (ref 136–145)
TACROLIMUS BLD-MCNC: 8.4 NG/ML (ref 5–15)
UNIT NUMBER: NORMAL
UNIT NUMBER: NORMAL
VT: 375
WBC # BLD AUTO: 10.49 K/UL (ref 3.9–12.7)
WBC # BLD AUTO: 11.19 K/UL (ref 3.9–12.7)
WBC # BLD AUTO: 6.97 K/UL (ref 3.9–12.7)
WBC # BLD AUTO: 7.67 K/UL (ref 3.9–12.7)
WBC # BLD AUTO: 7.97 K/UL (ref 3.9–12.7)
WBC # BLD AUTO: 7.98 K/UL (ref 3.9–12.7)
WBC # BLD AUTO: 9.13 K/UL (ref 3.9–12.7)
WBC TOXIC VACUOLES BLD QL SMEAR: PRESENT

## 2023-01-18 PROCEDURE — 85610 PROTHROMBIN TIME: CPT | Mod: 91 | Performed by: SURGERY

## 2023-01-18 PROCEDURE — 99900017 HC EXTUBATION W/PARAMETERS (STAT)

## 2023-01-18 PROCEDURE — 85610 PROTHROMBIN TIME: CPT | Mod: 91 | Performed by: STUDENT IN AN ORGANIZED HEALTH CARE EDUCATION/TRAINING PROGRAM

## 2023-01-18 PROCEDURE — 83605 ASSAY OF LACTIC ACID: CPT | Performed by: STUDENT IN AN ORGANIZED HEALTH CARE EDUCATION/TRAINING PROGRAM

## 2023-01-18 PROCEDURE — 25000003 PHARM REV CODE 250

## 2023-01-18 PROCEDURE — 99900035 HC TECH TIME PER 15 MIN (STAT)

## 2023-01-18 PROCEDURE — 80197 ASSAY OF TACROLIMUS: CPT | Performed by: STUDENT IN AN ORGANIZED HEALTH CARE EDUCATION/TRAINING PROGRAM

## 2023-01-18 PROCEDURE — P9045 ALBUMIN (HUMAN), 5%, 250 ML: HCPCS | Mod: JG

## 2023-01-18 PROCEDURE — 63600175 PHARM REV CODE 636 W HCPCS: Performed by: STUDENT IN AN ORGANIZED HEALTH CARE EDUCATION/TRAINING PROGRAM

## 2023-01-18 PROCEDURE — 63600175 PHARM REV CODE 636 W HCPCS: Mod: JG

## 2023-01-18 PROCEDURE — 25000003 PHARM REV CODE 250: Performed by: SURGERY

## 2023-01-18 PROCEDURE — P9035 PLATELET PHERES LEUKOREDUCED: HCPCS | Performed by: STUDENT IN AN ORGANIZED HEALTH CARE EDUCATION/TRAINING PROGRAM

## 2023-01-18 PROCEDURE — 37799 UNLISTED PX VASCULAR SURGERY: CPT

## 2023-01-18 PROCEDURE — 85730 THROMBOPLASTIN TIME PARTIAL: CPT | Performed by: STUDENT IN AN ORGANIZED HEALTH CARE EDUCATION/TRAINING PROGRAM

## 2023-01-18 PROCEDURE — 80048 BASIC METABOLIC PNL TOTAL CA: CPT | Performed by: STUDENT IN AN ORGANIZED HEALTH CARE EDUCATION/TRAINING PROGRAM

## 2023-01-18 PROCEDURE — 82248 BILIRUBIN DIRECT: CPT | Performed by: STUDENT IN AN ORGANIZED HEALTH CARE EDUCATION/TRAINING PROGRAM

## 2023-01-18 PROCEDURE — 83605 ASSAY OF LACTIC ACID: CPT | Mod: 91 | Performed by: STUDENT IN AN ORGANIZED HEALTH CARE EDUCATION/TRAINING PROGRAM

## 2023-01-18 PROCEDURE — 85025 COMPLETE CBC W/AUTO DIFF WBC: CPT | Mod: 91 | Performed by: STUDENT IN AN ORGANIZED HEALTH CARE EDUCATION/TRAINING PROGRAM

## 2023-01-18 PROCEDURE — 99223 1ST HOSP IP/OBS HIGH 75: CPT | Mod: ,,, | Performed by: NURSE PRACTITIONER

## 2023-01-18 PROCEDURE — 85025 COMPLETE CBC W/AUTO DIFF WBC: CPT | Mod: 91 | Performed by: SURGERY

## 2023-01-18 PROCEDURE — P9035 PLATELET PHERES LEUKOREDUCED: HCPCS

## 2023-01-18 PROCEDURE — 94150 VITAL CAPACITY TEST: CPT

## 2023-01-18 PROCEDURE — 82803 BLOOD GASES ANY COMBINATION: CPT

## 2023-01-18 PROCEDURE — 25000003 PHARM REV CODE 250: Performed by: STUDENT IN AN ORGANIZED HEALTH CARE EDUCATION/TRAINING PROGRAM

## 2023-01-18 PROCEDURE — 84100 ASSAY OF PHOSPHORUS: CPT | Mod: 91 | Performed by: STUDENT IN AN ORGANIZED HEALTH CARE EDUCATION/TRAINING PROGRAM

## 2023-01-18 PROCEDURE — 94003 VENT MGMT INPAT SUBQ DAY: CPT

## 2023-01-18 PROCEDURE — 85610 PROTHROMBIN TIME: CPT | Performed by: STUDENT IN AN ORGANIZED HEALTH CARE EDUCATION/TRAINING PROGRAM

## 2023-01-18 PROCEDURE — 83605 ASSAY OF LACTIC ACID: CPT | Mod: 91

## 2023-01-18 PROCEDURE — 80053 COMPREHEN METABOLIC PANEL: CPT | Mod: 91 | Performed by: STUDENT IN AN ORGANIZED HEALTH CARE EDUCATION/TRAINING PROGRAM

## 2023-01-18 PROCEDURE — 84100 ASSAY OF PHOSPHORUS: CPT | Performed by: SURGERY

## 2023-01-18 PROCEDURE — 94761 N-INVAS EAR/PLS OXIMETRY MLT: CPT

## 2023-01-18 PROCEDURE — 83735 ASSAY OF MAGNESIUM: CPT | Mod: 91 | Performed by: STUDENT IN AN ORGANIZED HEALTH CARE EDUCATION/TRAINING PROGRAM

## 2023-01-18 PROCEDURE — 83735 ASSAY OF MAGNESIUM: CPT | Performed by: SURGERY

## 2023-01-18 PROCEDURE — 80053 COMPREHEN METABOLIC PANEL: CPT | Performed by: STUDENT IN AN ORGANIZED HEALTH CARE EDUCATION/TRAINING PROGRAM

## 2023-01-18 PROCEDURE — 99223 PR INITIAL HOSPITAL CARE,LEVL III: ICD-10-PCS | Mod: ,,, | Performed by: NURSE PRACTITIONER

## 2023-01-18 PROCEDURE — 27000221 HC OXYGEN, UP TO 24 HOURS

## 2023-01-18 PROCEDURE — 20000000 HC ICU ROOM

## 2023-01-18 PROCEDURE — 63600175 PHARM REV CODE 636 W HCPCS

## 2023-01-18 PROCEDURE — 84450 TRANSFERASE (AST) (SGOT): CPT | Mod: 91 | Performed by: STUDENT IN AN ORGANIZED HEALTH CARE EDUCATION/TRAINING PROGRAM

## 2023-01-18 PROCEDURE — 99900026 HC AIRWAY MAINTENANCE (STAT)

## 2023-01-18 PROCEDURE — 84450 TRANSFERASE (AST) (SGOT): CPT | Mod: 91 | Performed by: SURGERY

## 2023-01-18 RX ORDER — ALBUMIN HUMAN 50 G/1000ML
12.5 SOLUTION INTRAVENOUS ONCE
Status: COMPLETED | OUTPATIENT
Start: 2023-01-18 | End: 2023-01-18

## 2023-01-18 RX ORDER — ALBUMIN HUMAN 50 G/1000ML
SOLUTION INTRAVENOUS
Status: COMPLETED
Start: 2023-01-18 | End: 2023-01-18

## 2023-01-18 RX ORDER — TACROLIMUS 1 MG/1
6 CAPSULE ORAL EVERY 12 HOURS
Qty: 360 CAPSULE | Refills: 11 | Status: SHIPPED | OUTPATIENT
Start: 2023-01-18 | End: 2023-01-31 | Stop reason: SDUPTHER

## 2023-01-18 RX ORDER — HYDROCODONE BITARTRATE AND ACETAMINOPHEN 500; 5 MG/1; MG/1
TABLET ORAL
Status: DISCONTINUED | OUTPATIENT
Start: 2023-01-18 | End: 2023-01-19

## 2023-01-18 RX ORDER — VALGANCICLOVIR 450 MG/1
450 TABLET, FILM COATED ORAL DAILY
Qty: 30 TABLET | Refills: 2 | Status: SHIPPED | OUTPATIENT
Start: 2023-01-18 | End: 2023-02-14 | Stop reason: SDUPTHER

## 2023-01-18 RX ORDER — HYDROMORPHONE HYDROCHLORIDE 1 MG/ML
1 INJECTION, SOLUTION INTRAMUSCULAR; INTRAVENOUS; SUBCUTANEOUS EVERY 6 HOURS PRN
Status: DISCONTINUED | OUTPATIENT
Start: 2023-01-18 | End: 2023-01-18

## 2023-01-18 RX ORDER — CALCIUM CARBONATE 200(500)MG
500 TABLET,CHEWABLE ORAL ONCE
Status: COMPLETED | OUTPATIENT
Start: 2023-01-19 | End: 2023-01-18

## 2023-01-18 RX ORDER — OXYCODONE HYDROCHLORIDE 5 MG/1
5 TABLET ORAL EVERY 4 HOURS PRN
Status: DISCONTINUED | OUTPATIENT
Start: 2023-01-18 | End: 2023-02-01 | Stop reason: HOSPADM

## 2023-01-18 RX ORDER — NYSTATIN 100000 [USP'U]/ML
5 SUSPENSION ORAL
Qty: 210 ML | Refills: 0 | Status: SHIPPED | OUTPATIENT
Start: 2023-01-18 | End: 2023-01-31 | Stop reason: HOSPADM

## 2023-01-18 RX ORDER — OXYCODONE HYDROCHLORIDE 5 MG/1
5 TABLET ORAL EVERY 4 HOURS PRN
Status: DISCONTINUED | OUTPATIENT
Start: 2023-01-18 | End: 2023-01-18

## 2023-01-18 RX ORDER — ALBUMIN HUMAN 50 G/1000ML
25 SOLUTION INTRAVENOUS ONCE
Status: COMPLETED | OUTPATIENT
Start: 2023-01-18 | End: 2023-01-18

## 2023-01-18 RX ORDER — HYDROMORPHONE HYDROCHLORIDE 1 MG/ML
0.5 INJECTION, SOLUTION INTRAMUSCULAR; INTRAVENOUS; SUBCUTANEOUS EVERY 6 HOURS PRN
Status: DISCONTINUED | OUTPATIENT
Start: 2023-01-18 | End: 2023-01-26

## 2023-01-18 RX ORDER — PREDNISONE 5 MG/1
TABLET ORAL
Qty: 70 TABLET | Refills: 0 | Status: SHIPPED | OUTPATIENT
Start: 2023-01-18 | End: 2023-02-23

## 2023-01-18 RX ORDER — HYDROCODONE BITARTRATE AND ACETAMINOPHEN 500; 5 MG/1; MG/1
TABLET ORAL
Status: DISCONTINUED | OUTPATIENT
Start: 2023-01-18 | End: 2023-01-18

## 2023-01-18 RX ORDER — ACETAMINOPHEN 500 MG
1 TABLET ORAL DAILY
Qty: 30 TABLET | Refills: 5 | Status: SHIPPED | OUTPATIENT
Start: 2023-01-18 | End: 2023-02-14 | Stop reason: SDUPTHER

## 2023-01-18 RX ORDER — OXYCODONE HYDROCHLORIDE 10 MG/1
10 TABLET ORAL EVERY 6 HOURS PRN
Status: DISCONTINUED | OUTPATIENT
Start: 2023-01-18 | End: 2023-01-25

## 2023-01-18 RX ORDER — LIDOCAINE 50 MG/G
1 PATCH TOPICAL
Status: DISCONTINUED | OUTPATIENT
Start: 2023-01-18 | End: 2023-01-28

## 2023-01-18 RX ORDER — ALBUTEROL SULFATE 2.5 MG/.5ML
2.5 SOLUTION RESPIRATORY (INHALATION) EVERY 4 HOURS PRN
Status: DISCONTINUED | OUTPATIENT
Start: 2023-01-18 | End: 2023-01-19

## 2023-01-18 RX ORDER — MYCOPHENOLATE MOFETIL 250 MG/1
1000 CAPSULE ORAL 2 TIMES DAILY
Qty: 240 CAPSULE | Refills: 2 | Status: SHIPPED | OUTPATIENT
Start: 2023-01-18 | End: 2023-02-14 | Stop reason: SDUPTHER

## 2023-01-18 RX ADMIN — INSULIN HUMAN 14.6 UNITS/HR: 1 INJECTION, SOLUTION INTRAVENOUS at 06:01

## 2023-01-18 RX ADMIN — OXYCODONE HYDROCHLORIDE 10 MG: 10 TABLET ORAL at 06:01

## 2023-01-18 RX ADMIN — HEPARIN SODIUM 5000 UNITS: 5000 INJECTION INTRAVENOUS; SUBCUTANEOUS at 10:01

## 2023-01-18 RX ADMIN — AMPICILLIN SODIUM AND SULBACTAM SODIUM 3 G: 2; 1 INJECTION, POWDER, FOR SOLUTION INTRAMUSCULAR; INTRAVENOUS at 02:01

## 2023-01-18 RX ADMIN — ALBUMIN HUMAN 12.5 G: 50 SOLUTION INTRAVENOUS at 06:01

## 2023-01-18 RX ADMIN — HYDROMORPHONE HYDROCHLORIDE 0.5 MG: 1 INJECTION, SOLUTION INTRAMUSCULAR; INTRAVENOUS; SUBCUTANEOUS at 09:01

## 2023-01-18 RX ADMIN — MUPIROCIN 1 G: 20 OINTMENT TOPICAL at 09:01

## 2023-01-18 RX ADMIN — HYDROMORPHONE HYDROCHLORIDE 1 MG: 1 INJECTION, SOLUTION INTRAMUSCULAR; INTRAVENOUS; SUBCUTANEOUS at 11:01

## 2023-01-18 RX ADMIN — PHYTONADIONE 10 MG: 10 INJECTION, EMULSION INTRAMUSCULAR; INTRAVENOUS; SUBCUTANEOUS at 01:01

## 2023-01-18 RX ADMIN — TACROLIMUS 1 MG: 1 CAPSULE ORAL at 08:01

## 2023-01-18 RX ADMIN — SODIUM PHOSPHATE, MONOBASIC, MONOHYDRATE AND SODIUM PHOSPHATE, DIBASIC, ANHYDROUS 30 MMOL: 142; 276 INJECTION, SOLUTION INTRAVENOUS at 06:01

## 2023-01-18 RX ADMIN — AMPICILLIN SODIUM AND SULBACTAM SODIUM 3 G: 2; 1 INJECTION, POWDER, FOR SOLUTION INTRAMUSCULAR; INTRAVENOUS at 07:01

## 2023-01-18 RX ADMIN — ALBUMIN HUMAN 12.5 G: 50 SOLUTION INTRAVENOUS at 08:01

## 2023-01-18 RX ADMIN — FAMOTIDINE 20 MG: 10 INJECTION INTRAVENOUS at 09:01

## 2023-01-18 RX ADMIN — TACROLIMUS 1 MG: 1 CAPSULE ORAL at 06:01

## 2023-01-18 RX ADMIN — HYDROMORPHONE HYDROCHLORIDE 0.5 MG: 1 INJECTION, SOLUTION INTRAMUSCULAR; INTRAVENOUS; SUBCUTANEOUS at 03:01

## 2023-01-18 RX ADMIN — ALBUMIN HUMAN 25 G: 50 SOLUTION INTRAVENOUS at 01:01

## 2023-01-18 RX ADMIN — OXYCODONE HYDROCHLORIDE 5 MG: 5 TABLET ORAL at 01:01

## 2023-01-18 RX ADMIN — AMPICILLIN SODIUM AND SULBACTAM SODIUM 3 G: 2; 1 INJECTION, POWDER, FOR SOLUTION INTRAMUSCULAR; INTRAVENOUS at 01:01

## 2023-01-18 RX ADMIN — POTASSIUM CHLORIDE 40 MEQ: 29.8 INJECTION, SOLUTION INTRAVENOUS at 10:01

## 2023-01-18 RX ADMIN — POTASSIUM CHLORIDE 40 MEQ: 29.8 INJECTION, SOLUTION INTRAVENOUS at 04:01

## 2023-01-18 RX ADMIN — OXYCODONE 5 MG: 5 TABLET ORAL at 09:01

## 2023-01-18 RX ADMIN — FAMOTIDINE 20 MG: 10 INJECTION INTRAVENOUS at 08:01

## 2023-01-18 RX ADMIN — NYSTATIN 500000 UNITS: 500000 SUSPENSION ORAL at 06:01

## 2023-01-18 RX ADMIN — MORPHINE SULFATE 1 MG: 2 INJECTION, SOLUTION INTRAMUSCULAR; INTRAVENOUS at 03:01

## 2023-01-18 RX ADMIN — PHYTONADIONE 10 MG: 10 INJECTION, EMULSION INTRAMUSCULAR; INTRAVENOUS; SUBCUTANEOUS at 06:01

## 2023-01-18 RX ADMIN — PHYTONADIONE 10 MG: 10 INJECTION, EMULSION INTRAMUSCULAR; INTRAVENOUS; SUBCUTANEOUS at 02:01

## 2023-01-18 RX ADMIN — MUPIROCIN 1 G: 20 OINTMENT TOPICAL at 08:01

## 2023-01-18 RX ADMIN — NYSTATIN 500000 UNITS: 500000 SUSPENSION ORAL at 01:01

## 2023-01-18 RX ADMIN — PROPOFOL 50 MCG/KG/MIN: 10 INJECTION, EMULSION INTRAVENOUS at 04:01

## 2023-01-18 RX ADMIN — PROPOFOL 50 MCG/KG/MIN: 10 INJECTION, EMULSION INTRAVENOUS at 12:01

## 2023-01-18 RX ADMIN — ALBUMIN (HUMAN) 12.5 G: 12.5 SOLUTION INTRAVENOUS at 06:01

## 2023-01-18 RX ADMIN — SODIUM CHLORIDE: 9 INJECTION, SOLUTION INTRAVENOUS at 02:01

## 2023-01-18 RX ADMIN — LIDOCAINE 1 PATCH: 50 PATCH CUTANEOUS at 06:01

## 2023-01-18 RX ADMIN — INSULIN HUMAN 3 UNITS/HR: 1 INJECTION, SOLUTION INTRAVENOUS at 04:01

## 2023-01-18 RX ADMIN — CALCIUM CARBONATE (ANTACID) CHEW TAB 500 MG 500 MG: 500 CHEW TAB at 11:01

## 2023-01-18 RX ADMIN — MYCOPHENOLATE MOFETIL 1000 MG: 200 POWDER, FOR SUSPENSION ORAL at 09:01

## 2023-01-18 RX ADMIN — HEPARIN SODIUM 5000 UNITS: 5000 INJECTION INTRAVENOUS; SUBCUTANEOUS at 05:01

## 2023-01-18 RX ADMIN — METHYLPREDNISOLONE SODIUM SUCCINATE 200 MG: 125 INJECTION, POWDER, FOR SOLUTION INTRAMUSCULAR; INTRAVENOUS at 08:01

## 2023-01-18 RX ADMIN — HEPARIN SODIUM 5000 UNITS: 5000 INJECTION INTRAVENOUS; SUBCUTANEOUS at 01:01

## 2023-01-18 RX ADMIN — INSULIN HUMAN 29.2 UNITS/HR: 1 INJECTION, SOLUTION INTRAVENOUS at 02:01

## 2023-01-18 NOTE — OP NOTE
Operative Report    Date of Procedure: 1/17/2023  Date of Transplant (UNOS): 1/17/23    Surgeons:  Surgeon(s) and Role:     * Juan Pablo Kimbrough MD - Primary     * See Arce MD - Fellow     * Bg Hardy MD    First Assistant Attestation:  The presence of an additional attending surgeon functioning as first assistant was required due to the complexity of the procedure relative to any available residents. I certify that no resident was available who was qualified to serve as first assistant. Duties performed by the assistant included assisting the primary surgeon.    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 year-old female with Cirrhosis: Fatty Liver (DORAN),  (UNOS terminology) who has been evaluated for a liver transplant.  The procedure was thoroughly discussed with the patient, including potential risks, complications, and alternatives. Specific complications mentioned included death, graft non-function, bleeding, infection, rejection, renal failure, respiratory failure, and neurologic deficits, as well as the possibility of other complications not specifically mentioned.    Donor Risk Factors:  Prior to the operation, the patient was advised of any donor-specific risk factors requiring specific disclosure. Factors in this case included nothing that required specific disclosure.      Specific [facility] Donor Risk criteria for the organ donor include:  None    All questions were  answered, the patient voiced appropriate understanding, and she agreed to proceed with the planned procedure.    ABO Confirmation: Immediately following arrival of the donor organ and prior to implantation, a formal ABO confirmation was done according to hospital and UNOS policies.  I confirmed the UNOS ID number of the donor organ ([id number]) and the donor and recipient ABO types, directly verifying these data by comparison with the UNOS Match Run report (.  This confirmation was personally done by an attending surgeon and circulating nurse, and is officially documented elsewhere.    Time-Out: A complete time out was carried out prior to incision, with confirmation of patient identity, correct procedure, correct operative site, appropriate antibiotic prophylaxis, review of any known allergies, and presence of all needed equipment.    Procedure in Detail  Following the induction of general endotracheal anesthesia, appropriate arterial and venous lines were placed by the anesthesia team.  Care was taken to pad all pressure points and avoid any potential traction injuries from positioning. The urinary bladder was catheterized.  Sequential compression boots and Jarrod Huggers were used. The chest, abdomen, and upper thighs were sterilely prepped and draped.     The abdomen was entered via a wide bilateral subcostal incision. 0 mL of ascites was removed. The round ligament was ligated and divided. The falciform, left triangular, and gastrohepatic ligaments were divided using the electrocautery, with 2-0 silk ties as needed. The Espitia self-retaining retractor was placed to provide exposure. Adhesions between the abdominal viscera and the abdominal wall and the undersurface of the liver were divided as needed using cautery dissection and silk ties or suture ligatures. Hilar dissection was then carried out, with ligation of the right and left hepatic arteries as well as the cystic duct and the common hepatic duct. The  recipient arterial anatomy was found to be: standard. The portal vein was exposed circumferentially from its bifurcation down to the superior border of the pancreas. The portal vein was found to be patent.  Attention was next directed to the right side of the liver where the right triangular ligament was taken down, exposing the bare area of the liver, and the IVC. The infrahepatic IVC was isolated, followed by mobilization of the retrohepatic cava, division of the right adrenal vein, and isolation of the suprahepatic IVC. The liver was dissected off the cava.  The patient was given 2000 units of heparin prior to caval cross-clamping. . After assuring adequate stability after cross-clamping, the native liver was excised and the allograft liver was brought to the operative field.   The upper cava reconstructed outflow tract was anastomosed to the right and middle hepatic veins using 4-0 prolene. .  The donor portal vein was then anastomosed to the recipient portal inflow site (end portal vein) with 5-0 polypropylene. Once this was completed, anesthesia was notified and the liver was re-perfused. Copious irrigation with warm saline and temporary finger occlusion of portal inflow were used as needed to avoid any problems with hypothermia. Temporary packing, electrocautery, and polypropylene suture ligatures were used as appropriate to provide hemostasis. Once this was satisfactory, attention was directed to the arterial reconstruction.  Arterial inflow was provided to the graft by anastomosing the recipient right hepatic artery to the donor  other using 7-0 polypropylene. The liver was assessed for adequacy of perfusion, which was satisfactory. Ultrasound was done and was good Flow in all the vessels. Portal pressure was measured and was 13-14 mmhg. a temporary packing period was carried out to help assure hemostatis.  Portal pressure was rechecked and was 14-15 mmhg.  Attention was next directed toward the bile duct.  The donor bile duct was prepared and assessed for adequate vascular supply. Biliary reconstruction was then performed by anastomosing the recipient enteric-jejunum  to the donor duct using 6-0 PDS sutures.  The biliary stent used was: none.  A final check for hemostasis was made. 2 19f David drains were placed through separate incisions below the transverse abdominal incision and placed in the usual positions. The cavity was irrigated with saline. The abdomen was closed in layers using running #1 PDS suture. The incision was irrigated and the skin was closed with staples. At the end of the case all instrument, needle, and sponge counts were correct. The patient was taken to the ICU in good condition.     Fluids Administered:   Crystalloid (mL) 3,500   Colloid (mL) 1,000   FFP (Units) 2   Cryoprecipitate (Units)  2   Platelets (Units) 2   Cell Saver (CCs) 430   Cell Saver (mL) 430      Specimens: Explant liver  Drains: 19f David drains x 2    Additional Findings:    Estimated Blood Loss: 1,500 mL  Ascites Evacuated: 0 mL    Ischemic Times:  Time of portal reperfusion: 1/17/2023  5:43 PM  Time of arterial reperfusion: 1/17/2023  6:04 PM  Anastomosis (warm ischemia) time: 28 minutes  Cold ischemia time: 109 minutes    Surgical Data:  Graft type (whole vs. partial): partial  IVC reconstruction: hepatic vein confluence piggyback  Portal vein status: patent  Portal graft performed? no  Donor arterial anatomy: right Espitia  Donor arterial inflow: other  Recipient arterial anatomy: standard  Recipient arterial inflow: right hepatic artery  Arterial graft performed? no  Biliary reconstruction: angela-en-y (donor right hepatic duct to recipient enteric-jejunum )  Biliary stent: none  Disposition of Vessels from donor of transplanted organ:   Damage during procurement: no  Procurement damage comments:     Donor Factors:  UNOS ID: )[id number]  UNOS Match Run:   Donor type: living  Donor with reported PHS risk criteria:   Donor CMV  serologic status:   Donor with known cancer:   Donor HCV serologic status:   Donor HBcAb:   Donor HBV PALOMA:   Donor HCV PALOMA:   Liver weight: 1030 grams

## 2023-01-18 NOTE — NURSING
Patient admitted to SICU from OR s/p living donor liver transplant. Transported by OR team. Connected to bedside monitor and ventilator. Dr. Kimbrough, Dr. Arce and Dr. Mora at bedside for admission, assessing patient and placing orders. ABG and labs collected. Patient currently off of pressor gtts with stable BP. Insulin and propofol gtts infusing. Maintenance fluids initiated.

## 2023-01-18 NOTE — ASSESSMENT & PLAN NOTE
Mickey Harris is a 57 y.o. female with DORAN cirrhosis complicated by hepatic encephalopathy on home lactulose who is now status post living related donor liver transplant on 1/17/22.      Neuro/Psych:   -- Sedation: Propofol. Wean for anticipated extubation this morning.  -- Pain: Multimodal with breakthrough narcotics; will transition to PO once extubated             Cards:   -- HDS, off pressors  -- SBP >100      Pulm:   -- Goal O2 sat > 90%  -- On minimal vent settings this morning, will work towards SBT and extubation.      Renal:  -- Keep davis for strict I/O  -- Trend BUN/Cr  -- BMP Q12H today      FEN / GI:   -- Replace lytes as needed  -- Nutrition: NPO, will advance to clears this afternoon pending bedside swallow and extubation.   -- Anti-rejection methylpred, mycophenolate, tacro per transplant pharmacy; daily tacro levels  -- AST Q4H for 24 hr  -- Liver US today      ID:   -- Tm: afebrile  -- Abx Deidre-op Unasyn to complete today  -- Antimicrobial PPX: Valganciclovir, Nystatin suspension      Heme/Onc:   -- Hb stable  -- Q4 Hr CBC and PT for 1 day, then daily  -- Vitamin K Q8H for 3 doses, to complete this evening      Endo:   -- Gluc goal 140-180  -- Insulin gtt as needed; Endo consulted appreciate recommendations      PPx:   Feeding: NPO  Analgesia/Sedation: PRNs / Prop and Precedex  Thromboembolic prevention: SQH  HOB >30: Ordered  Stress Ulcer ppx: Famotidine Q12H  Glucose control: Critical care goal 140-180 g/dl, insulin gtt    Lines/Drains/Airway: RIJ CVC x2, A line x2 / RYLEY x2 / ET; Will remove fem a line and one CVC today.      Dispo/Code Status/Palliative:   -- SICU / Full Code

## 2023-01-18 NOTE — CONSULTS
[facility] - Surgical Intensive Care  Adult Nutrition  Consult Note    SUMMARY     Recommendations    1.) Recommend advancing diet when medically feasible to clear liquid diet.   2.) Recommend continuing advancing diet as tolerated/per MD.   3.) Recommend providing Boost Breeze, BID if PO intake is poor.   4.) RD to monitor.    Goals: To meet % or EEN/EPN by next RD f/u.  Nutrition Goal Status: new  Communication of RD Recs:  (POC)    Assessment and Plan    Nutrition Problem  Inadequate energy intake    Related to (etiology):   S/p liver transplant with sedation/ventilation    Signs and Symptoms (as evidenced by):   NPO status    Interventions/Recommendations (treatment strategy):  RD to collaborate with medical providers and continue to provide food safety education as needed. RD's office number on the education materials.     Nutrition Diagnosis Status:   New     Reason for Assessment    Reason For Assessment: consult  Diagnosis:  (cirrhosis (DORAN); s/p liver translplant)  Relevant Medical History: HLD  Interdisciplinary Rounds: did not attend  General Information Comments: Pt seen by RD today. Pt was sedated and on vent, resting comfortably. NFPE not appropriate at this time. Pt s/p liver transplant on 1/17. RD spoke with pt's daughter and  about Low Na diet and Food Safety Guidlines following transplant. RD provided family with the Low Na education handout and Food Safety Guidelines handout. Family stated to RD that they had no other questions at this time. RD left office phone number to reach RD if there are any concerns or questions.  Nutrition Discharge Planning: Post transplant nutrition education provided. Food safety/drug interactions emphasized. General healthy/low salt diet recommended. Education material left.  No other needs identified. Caregiver present.    Nutrition Risk Screen    Nutrition Risk Screen: no indicators present    Nutrition/Diet History    Food Preferences: No  "seafood  Spiritual, Cultural Beliefs, Caodaism Practices, Values that Affect Care: no  Food Allergies: NKFA    Anthropometrics    Temp: 98.1 °F (36.7 °C)  Height Method: Stated  Height: 5' 5" (165.1 cm)  Height (inches): 65 in  Weight Method: Bed Scale  Weight: 90.9 kg (200 lb 6.4 oz)  Weight (lb): 200.4 lb  Ideal Body Weight (IBW), Female: 125 lb  % Ideal Body Weight, Female (lb): 156.8 %  BMI (Calculated): 33.3  BMI Grade: 30 - 34.9- obesity - grade I     Lab/Procedures/Meds    Pertinent Labs Reviewed: reviewed  Pertinent Labs Comments: K:3.3; CO2: 20; gluc: 287; Ca: 8.1; phos: 1.4; alb: 2.9; bili: 9.4; AST: 653; ALT: 779  Pertinent Medications Reviewed: reviewed  Pertinent Medications Comments: heparin, VitK, insulin, propofol     Estimated/Assessed Needs    Weight Used For Calorie Calculations: 90.9 kg (200 lb 6.4 oz)  Energy Calorie Requirements (kcal): 1689 kcal ([facility])  Energy Need Method: [facility]  Protein Requirements: 86- 97g (1.5-1.7g/kg)  Weight Used For Protein Calculations: 57 kg (125 lb 10.6 oz) (using IBW)  Fluid Requirements (mL): 1ml/1kcal or per MD  Estimated Fluid Requirement Method: RDA Method  RDA Method (mL): 1689     Nutrition Prescription Ordered    Current Diet Order: NPO    Evaluation of Received Nutrient/Fluid Intake    Other Calories (kcal): 274 (Kcal from propofol)  I/O: +1.9L since admission  Comments: LBM: 1/16  % Intake of Estimated Energy Needs: 0 - 25 %  % Meal Intake: NPO    Nutrition Risk    Level of Risk/Frequency of Follow-up:  (RD f/u x1/week)     Monitor and Evaluation    Food and Nutrient Intake: energy intake, food and beverage intake  Food and Nutrient Adminstration: diet order  Knowledge/Beliefs/Attitudes: food and nutrition knowledge/skill, beliefs and attitudes  Physical Activity and Function: factors affecting access to physical activity  Anthropometric Measurements: height/length, weight, weight change, body mass index  Biochemical Data, Medical Tests and " Procedures: electrolyte and renal panel, gastrointestinal profile, glucose/endocrine profile, inflammatory profile, lipid profile  Nutrition-Focused Physical Findings: overall appearance, extremities, muscles and bones, skin     Nutrition Follow-Up    RD Follow-up?: Yes    Ranjana Lei MS RDN LDN

## 2023-01-18 NOTE — ANESTHESIA POSTPROCEDURE EVALUATION
Anesthesia Post Evaluation    Patient: Mickey Harris    Procedure(s) Performed: Procedure(s) (LRB):  TRANSPLANT, LIVER (N/A)  CREATION, ANASTOMOSIS, HOANG-EN-Y  ULTRASOUND, DIAGNOSTIC (N/A)    Final Anesthesia Type: general      Patient location during evaluation: ICU  Patient participation: No - Unable to Participate, Intubation  Level of consciousness: sedated  Post-procedure vital signs: reviewed and stable  Pain management: adequate  Airway patency: patent    PONV status at discharge: No PONV  Anesthetic complications: no      Cardiovascular status: stable  Respiratory status: ETT and ventilator  Hydration status: euvolemic  Follow-up not needed.  Comments: Patient sedated and intubated, chart review and interview with ICU nurse          Vitals Value Taken Time   /65 01/18/23 0801   Temp 36.7 °C (98.1 °F) 01/18/23 0730   Pulse 99 01/18/23 0849   Resp 24 01/18/23 0849   SpO2 97 % 01/18/23 0849   Vitals shown include unvalidated device data.      No case tracking events are documented in the log.      Pain/Misael Score: Pain Rating Prior to Med Admin: 0 (1/18/2023  3:25 AM)

## 2023-01-18 NOTE — HPI
Reason for Consult: Management of Hyperglycemia     Surgical Procedure and Date: Liver Transplant 1/17/2023    Patient is not diabetic and does not currently take any oral/injectable antidiabetic/hypoglycemic medications.     Lab Results   Component Value Date    HGBA1C 4.5 01/17/2023           HPI: Mickey Harris is a 57 y.o. female who presented on 1/17/22 for living related donor liver transplant in the setting of DORAN cirrhosis. She was diagnosed approximately 4 years ago and did well up until one year ago when she began to develop lower extremity edema and ascites managed with diuretics. She has never required paracentesis. She has also developed hepatic encephalopathy managed with lactulose. There is no history of GI bleeding. Ms. Harris is now s/p living donor liver transplantation on 1/17/23. She received a right liver lobe which was quite large (~1000g). Biliary reconstruction was Becky-en-y reconstruction to the donor right hepatic duct. The procedure was performed without apparent complication and she was transferred to the SICU for postoperative care and management. Endocrine consulted to manage hyperglycemia in the pressence of high-dose steroids. Of note, patient on 12-24 units/hr of insulin while on IIP.

## 2023-01-18 NOTE — PROGRESS NOTES
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  : [employer] Model: CATSmart or CATSplus : [place]   Model: [id number]     Serial number: [id number]   Serial number:   Disposable lot #: [id number]   Disposable lot #:      Were extra cardiotomies used for cell saver?  yes   if yes, #:  1    Solutions:  Anticoagulant: ACD-A   Expiration date:02/24 Volume used: 2000ml   Wash solution: 0.9% NaCl   Expiration date: 07/25 Volume used: 1972ml     Cell saver checklist  Time completed:           []   Circuit assembled correctly     []   Cell saver powered and operational     []   Vacuum connected, functional, adjust to max -150mmHg     []   Anticoagulant drip rate adjusted     []   Transfer bag properly labeled with patient name, expiration time, volume,       anticoagulant, OR number, and initials     []   Cell saver disinfected after use (completed at end of case)       Cell Saver volumes:    Total volume processed:     5960 mL     Total volume pRBCs recovered     427 mL     Volume pRBCs infused     427 mL         RIS checklist   Time completed:  []   RIS circuit assembled correctly     []   RIS power and operational     []   RIS disinfected after use (completed at end of case)       RIS volumes:    Total volume infused:    (see anesthesia record for blood        product information)    mL       Additional comments:

## 2023-01-18 NOTE — CARE UPDATE
"      SICU PLAN OF CARE NOTE    Dx: Liver cirrhosis secondary to DORAN (nonalcoholic steatohepatitis)    Shift Events: Patient extubated and SpO2 > 90% on RA. 2U Plt given. Albumin 5% 750 mL given for low CVP and UOP. Lines removed per orders.    Dr. Charles updated on gtt, outputs, and VS throughout the shift.    PRN pain medications given as ordered and requested per patient.     Plan of care reviewed with the patient and her family and all questions answered at this time.    Goals of Care: -160, CVP < 8, Pain control, promote independence and mobility    Neuro: AAO x4, Follows Commands, and Moves All Extremities    Vital Signs: BP (!) 147/71 (BP Location: Right arm, Patient Position: Lying)   Pulse 105   Temp 98.3 °F (36.8 °C) (Oral)   Resp (!) 25   Ht 5' 5" (1.651 m)   Wt 90.9 kg (200 lb 6.4 oz)   SpO2 (!) 93%   Breastfeeding No   BMI 33.35 kg/m²     Respiratory: Room Air    Diet: Clear Liquid (No sugar) with 1.5L FR    Gtts: Insulin, MIVF, and Abx    Urine Output: Urinary Catheter 390 cc/shift    Drains:   #1 RYLEY Drain, total output 240 cc / shift  #2 RYLEY Drain, total output 0 cc / shift     Labs/Accuchecks: Q4H CBC, PT-INR, AST; Daily Labs / Accuchecks Q1H    Skin: No signs of skin breakdown or redness to bony prominences. Sacral dressing in place. Patient repositioned Q2H. Waffle mattress in place and inflated. [place] bed plugged in and working.      "

## 2023-01-18 NOTE — H&P
[facility]  Critical Care - Surgery  History & Physical    Patient Name: Mickey Harris  MRN: [id number]  Admission Date: 1/17/2023  Code Status: Prior  Attending Physician: Juan Pablo Kimbrough MD   Primary Care Provider: Primary Doctor No   Principal Problem: Liver cirrhosis secondary to DORAN (nonalcoholic steatohepatitis)    Subjective:     HPI:  Mickey Harris is a 57 y.o. female who presented on 1/17/22 for living related donor liver transplant in the setting of DORAN cirrhosis. She was diagnosed approximately 4 years ago and did well up until one year ago when she began to develop lower extremity edema and ascites managed with diuretics. She has never required paracentesis. She has also developed hepatic encephalopathy managed with lactulose. There is no history of GI bleeding.    Ms. Harris is now s/p living donor liver transplantation on 1/17/23. She received a right liver lobe which was quite large (~1000g). Biliary reconstruction was Becky-en-y reconstruction to the donor right hepatic duct. The procedure was performed without apparent complication and she was transferred to the SICU for postoperative care and management. EBL was 1.5 L. Intraoperatively she received 3.5 L of crystalloid, 1L of albumin, no pRBCs, 430 of cell saver, 2u FFP, 2u Plts, and 2u Cryo. The reported urine output during the case was 3 L. She arrives to the SICU intubated, sedated, and hemodynamically stable without vasopressor/inotrope support. Initial ABG demonstrated a mild mixed acidosis with a pH of 7.34, adequate ventilation, and excellent oxygenation. Her ventilator settings were able to be weaned accordingly. Initial iSTAT lactate was 8.6.    Ischemic Times:  Time of portal reperfusion: 1/17/2023  5:43 PM  Time of arterial reperfusion: 1/17/2023  6:04 PM  Anastomosis (warm ischemia) time: 28 minutes  Cold ischemia time: 109 minutes     Surgical Data:  Graft type (whole vs. partial): partial  IVC reconstruction:  hepatic vein confluence piggyback  Portal vein status: patent  Portal graft performed? no  Donor arterial anatomy: right Espitia  Donor arterial inflow: other  Recipient arterial anatomy: standard  Recipient arterial inflow: right hepatic artery  Arterial graft performed? no  Biliary reconstruction: angela-en-y (donor right hepatic duct to recipient enteric-jejunum )  Biliary stent: none  Disposition of Vessels from donor of transplanted organ:   Damage during procurement: no  Procurement damage comments:       Hospital/ICU Course:  No notes on file    Follow-up For: Procedure(s) (LRB):  TRANSPLANT, LIVER (N/A)  CREATION, ANASTOMOSIS, ANGELA-EN-Y  ULTRASOUND, DIAGNOSTIC (N/A)    Post-Operative Day: Day of Surgery     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    Liver cirrhosis secondary to DORAN     Mild tricuspid regurgitation     Unspecified cirrhosis of liver        Past Surgical History:   Procedure Laterality Date    cholecystectomy  2003    COLONOSCOPY      6 polyps removed    COLONOSCOPY  07/07/2021    ESOPHAGOGASTRODUODENOSCOPY      2 coulums of grade 1 varices    ESOPHAGOGASTRODUODENOSCOPY  11/01/2019    trace varices    ESOPHAGOGASTRODUODENOSCOPY  07/13/2021    ESOPHAGOGASTRODUODENOSCOPY N/A 9/20/2022    Procedure: EGD (ESOPHAGOGASTRODUODENOSCOPY);  Surgeon: Bruce Dominguez MD;  Location: [facility] ([address] [place] [address]);  Service: Endoscopy;  Laterality: N/A;  labs prior  liver transplant candidate  screen for varices  8/18 fully vaccinated; instructions to portal-LW  8/19 pt rescheduled; updated instructions to portal-st    gynecologic surgery       removal of scar tissues and adhesions    HYSTERECTOMY  1989    knee surgery  1992    OOPHORECTOMY Right 1982    OPEN hysterectomy  1989    removed uterus and bilateral fallopian tubes and LEFT ovary stayed in place     ROTATOR CUFF REPAIR Right 2001    TONSILLECTOMY  1972       Review of patient's allergies indicates:   Allergen Reactions    Levofloxacin Hives and Shortness Of Breath    Sulfa (sulfonamide antibiotics) Rash       Family History       Problem Relation (Age of Onset)    Arrhythmia Brother    Depression Father    Heart disease Father, Brother    Hyperlipidemia Father    Hypertension Father          Tobacco Use    Smoking status: Former     Packs/day: 1.00     Types: Cigarettes     Quit date:      Years since quittin.0    Smokeless tobacco: Never   Substance and Sexual Activity    Alcohol use: Not Currently    Drug use: Not Currently    Sexual activity: Yes     Partners: Male      Review of Systems   Unable to perform ROS: Intubated   Objective:     Vital Signs (Most Recent):  Temp: 98.4 °F (36.9 °C) (23)  Pulse: 76 (23)  Resp: 19 (23)  BP: (!) 122/56 (23)  SpO2: 97 % (23)   Vital Signs (24h Range):  Temp:  [98.4 °F (36.9 °C)] 98.4 °F (36.9 °C)  Pulse:  [76] 76  Resp:  [19] 19  SpO2:  [97 %] 97 %  BP: (122)/(56) 122/56     Weight: 88.9 kg (196 lb)  Body mass index is 32.62 kg/m².      Intake/Output Summary (Last 24 hours) at 20230  Last data filed at 2023 2116  Gross per 24 hour   Intake 6120 ml   Output 4500 ml   Net 1620 ml       Physical Exam  Constitutional:       Comments: sedated   HENT:      Head: Normocephalic and atraumatic.   Neck:      Comments: R IJ CVC and PA catheter  Cardiovascular:      Rate and Rhythm: Normal rate and regular rhythm.      Pulses: Normal pulses.      Comments: L radial art line  R fem art line  Pulmonary:      Breath sounds: Normal breath sounds.      Comments: intubated  Abdominal:      Comments: Soft, nondistended. Chevron incision c/d/I.  2 RYLEY drains with dark, old blood   Genitourinary:     Comments: Morrow in place with clear urine  Musculoskeletal:      Right lower leg: No edema.      Left lower  leg: No edema.   Skin:     General: Skin is warm.      Capillary Refill: Capillary refill takes less than 2 seconds.       Vents:       Lines/Drains/Airways       Central Venous Catheter Line  Duration             Percutaneous Central Line Insertion/Assessment - Triple Lumen  01/17/23 1238 right internal jugular <1 day    Pulmonary Artery Catheter Assessment  01/17/23 1241 <1 day              Drain  Duration                  Closed/Suction Drain 01/17/23 2011 Right Abdomen Bulb 19 Fr. <1 day         Closed/Suction Drain 01/17/23 2012 Right Abdomen Bulb 19 Fr. <1 day         Urethral Catheter 01/17/23 1159 Non-latex;Straight-tip 16 Fr. <1 day              Airway  Duration                  Airway - Non-Surgical 01/17/23 1131 <1 day              Arterial Line  Duration             Arterial Line 01/17/23 1131 Radial <1 day    Arterial Line 01/17/23 1243 Right Femoral <1 day              Peripheral Intravenous Line  Duration                  Peripheral IV - Single Lumen 01/17/23 0627 18 G Posterior;Right Forearm <1 day         LUDIVINA 01/17/23 1222 Right Antecubital <1 day                    Significant Labs:    CBC/Anemia Profile:  Recent Labs   Lab 01/17/23  0529 01/17/23  1140 01/17/23 1816 01/17/23  1932   WBC 2.71*  --   --   --    HGB 14.1 13.8 13.5 13.3   HCT 39.7 37.7 37.3 36.4*   PLT 53* 42* 70* 83*   MCV 91  --   --   --    RDW 13.3  --   --   --         Chemistries:  Recent Labs   Lab 01/17/23  0529 01/17/23  1140 01/17/23 1816 01/17/23  1932    136 139 140   K 3.8 3.9 4.0 4.1     --   --   --    CO2 21*  --   --   --    BUN 10  --   --   --    CREATININE 0.6  --   --   --    CALCIUM 8.9  --   --   --    ALBUMIN 3.4* 3.0* 3.0* 3.0*   PROT 6.3  --   --   --    BILITOT 4.2*  --   --   --    ALKPHOS 84  --   --   --    ALT 34  --  1,145*  --    AST 34  --  1,030*  --    MG 1.8 1.8 1.9 1.9       All pertinent labs within the past 24 hours have been reviewed.    Significant Imaging: I have reviewed all  pertinent imaging results/findings within the past 24 hours.    Assessment/Plan:     * Liver cirrhosis secondary to DORAN (nonalcoholic steatohepatitis)  Mickey Harris is a 57 y.o. female with DORAN cirrhosis complicated by hepatic encephalopathy on home lactulose who is now status post living related donor liver transplant on 1/17/22.      Neuro/Psych:   -- Sedation: Propofol. Wean for neuro exam. Plan to remain sedated overnight  -- Pain: Multimodal with breakthrough narcotics             Cards:   -- HDS, off pressors  -- Holding home crestor  -- STRICT CVP GOAL <9  -- SBP >100      Pulm:   -- Goal O2 sat > 90%  -- Plan to remain extubated overnight. Wean vent settings as tolerated       Renal:  -- Keep davis for strict I/O  -- Trend BUN/Cr  -- BMP Q12H for 24 hours then daily      FEN / GI:   -- Replace lytes as needed  -- Nutrition: NPO, will re-evaluate once extubated  -- Anti-rejection methylpred, mycophenolate, tacro; daily tacro levels  -- AST Q4H for 24 hr  -- Liver US in AM      ID:   -- Tm: afebrile  -- Abx Amp, Bactrim, Valganciclovir, Nystatin suspension      Heme/Onc:   -- Post op H/H pending  -- Q4 Hr CBC and PT for 1 day, then daily  -- Vitamin K Q8H for 3 doses      Endo:   -- Gluc goal 140-180  -- Insulin gtt as needed; Endo consulted appreciate recommendations      PPx:   Feeding: NPO  Analgesia/Sedation: PRNs / Prop and Precedex  Thromboembolic prevention: SQH  HOB >30: Ordered  Stress Ulcer ppx: Famotidine Q12H  Glucose control: Critical care goal 140-180 g/dl, insulin gtt    Lines/Drains/Airway: RIJ CVC x2, A line x2 / RYLEY x2 / ET      Dispo/Code Status/Palliative:   -- SICU / Full Code         Critical care was time spent personally by me on the following activities: development of treatment plan with patient or surrogate and bedside caregivers, discussions with consultants, evaluation of patient's response to treatment, examination of patient, ordering and performing treatments and  interventions, ordering and review of laboratory studies, ordering and review of radiographic studies, pulse oximetry, re-evaluation of patient's condition.  This critical care time did not overlap with that of any other provider or involve time for any procedures.     Claude Mora MD  Critical Care - Surgery  [facility]

## 2023-01-18 NOTE — PLAN OF CARE
Recommendations     1.) Recommend advancing diet when medically feasible to clear liquid diet.   2.) Recommend continuing advancing diet as tolerated/per MD.   3.) Recommend providing Boost Breeze, BID if PO intake is poor.   4.) RD to monitor.     Goals: To meet % or EEN/EPN by next RD f/u.  Nutrition Goal Status: new  Communication of RD Recs:  (POC)        Ranjana Lei MS RDN LDN

## 2023-01-18 NOTE — PROGRESS NOTES
TRANSPLANT NOTE:    Admit Date: 1/17/2023    ORGAN:   RIGHT LIVER LOBE (SEGS 5,6,7,8) WITHOUT MIDDLE HEPATIC VEIN  Disease Etiology: Cirrhosis: Fatty Liver (DORAN)  Donor CMV Status: positive  Donor HCV Status: negative  Donor HBcAb: neg  Donor HBV PALOMA:   Donor HCV PALOMA:   Whole or Partial: Split Liver  Biliary Anastomosis: Becky-en-Y  Arterial Anatomy:     Mickey Harris is a 57 y.o. female s/p    Living liver transplant on 1/17/2023 (Liver) for Cirrhosis: Fatty Liver (DORAN).  This patient will follow the Steroid Induction protocol.  This patients immunosuppression will include a steroid taper over 5 weeks, Cellcept for 3 months and Prograf maintenance.  Opportunistic infection prophylaxis will include Valcyte for 3 months months (CMV D+ R+), nystatin, PJP ppx TBD (sulfa allergy, G6PD ordered).  Osteoporosis risk assessment not complete; DEXA not ordered, vitamin D was repleted this year.  I have reviewed the pre-op medications and those have been restarted those, as appropriate.

## 2023-01-18 NOTE — PLAN OF CARE
Progress Note  Transplant Surgery    Admit Date: 1/17/2023  Post-operative Day: 1  Hospital Day: 2    ORGAN:   RIGHT LIVER LOBE (SEGS 5,6,7,8) WITHOUT MIDDLE HEPATIC VEIN  Disease Etiology: Cirrhosis: Fatty Liver (DORAN)  Donor Type:   Living  CDC High Risk:     Donor CMV Status:   Donor CMV Status:   Donor HBcAB:     Donor HBV PALOMA:   Donor HCV PALOMA:   Donor HCV Status:     Whole or Partial: Split Liver  Biliary Anastomosis: Becky-en-Y  Arterial Anatomy:          Follow-up For: Procedure(s) (LRB):  TRANSPLANT, LIVER (N/A)  CREATION, ANASTOMOSIS, BECKY-EN-Y  ULTRASOUND, DIAGNOSTIC (N/A)      ASSESSMENT/PLAN:     I conducted multidisciplinary rounds in conjunction with the ICU/Critical Care attending staff, fellows, and residents, with involvement of ancillary services as appropriate. The patient's general condition was reviewed, specifically including allograft function, immunosuppressive management, dietary and nutritional status, pharmacy concerns, and status of expected transition to transplant stepdown care.    For details see the critical care note.

## 2023-01-18 NOTE — NURSING
Nurses Note -- 4 Eyes      1/17/2023   10:53 PM      Skin assessed during: Admit to SICU from OR      [x] No Pressure Injuries Present    [x]Prevention Measures Documented      [] Yes- Altered Skin Integrity Present or Discovered   [] LDA Added if Not in Epic (Describe Wound)   [] New Altered Skin Integrity was Present on Admit and Documented in LDA   [] Wound Image Taken    Wound Care Consulted? No    Attending Nurse:  Shanna Escoto RN     Second RN/Staff Member:  Jyothi Rose RN

## 2023-01-18 NOTE — OP NOTE
Certification of Assistant at Surgery       Surgery Date: 1/17/2023     Participating Surgeons:  Surgeon(s) and Role:     * Juan Pablo Kimbrough MD - Primary     * See Arce MD - Fellow     * Bg Hardy MD    Procedures:  Procedure(s) (LRB):  TRANSPLANT, LIVER (N/A)  CREATION, ANASTOMOSIS, HOANG-EN-Y  ULTRASOUND, DIAGNOSTIC (N/A)    Assistant Surgeon's Certification of Necessity:  I understand that section 1842 (b) (6) (d) of the Social Security Act generally prohibits Medicare Part B reasonable charge payment for the services of assistants at surgery in teaching hospitals when qualified residents are available to furnish such services. I certify that the services for which payment is claimed were medically necessary, and that no qualified resident was available to perform the services. I further understand that these services are subject to post-payment review by the Medicare carrier.      Bg Hardy MD    01/17/2023  9:01 PM

## 2023-01-18 NOTE — CONSULTS
[facility]  Endocrinology  Diabetes Consult Note    Consult Requested by: Juan Pablo Kimbrough MD   Reason for admit: Liver cirrhosis secondary to DORAN (nonalcoholic steatohepatitis)    HISTORY OF PRESENT ILLNESS:  Reason for Consult: Management of Hyperglycemia     Surgical Procedure and Date: Liver Transplant 1/17/2023    Patient is not diabetic and does not currently take any oral/injectable antidiabetic/hypoglycemic medications.     Lab Results   Component Value Date    HGBA1C 4.5 01/17/2023           HPI: Mickey Harris is a 57 y.o. female who presented on 1/17/22 for living related donor liver transplant in the setting of DORAN cirrhosis. She was diagnosed approximately 4 years ago and did well up until one year ago when she began to develop lower extremity edema and ascites managed with diuretics. She has never required paracentesis. She has also developed hepatic encephalopathy managed with lactulose. There is no history of GI bleeding. Ms. Harris is now s/p living donor liver transplantation on 1/17/23. She received a right liver lobe which was quite large (~1000g). Biliary reconstruction was Becky-en-y reconstruction to the donor right hepatic duct. The procedure was performed without apparent complication and she was transferred to the SICU for postoperative care and management. Endocrine consulted to manage hyperglycemia in the pressence of high-dose steroids. Of note, patient on 12-24 units/hr of insulin while on IIP.               Interval HPI:   Overnight events: Patient in room 12250 CVICU/70421 CVICU A. Blood glucose stable. BG at and above goal on current insulin regimen (IIP). Steroid use- Methylprednisolone  500 mg perioperatively and 200 mg POD 1. 1 Day Post-Op  Renal function- Normal   Vasopressors-  None       Endocrine will continue to follow and manage insulin orders inpatient.         Diet NPO     Eating:   NPO  Nausea: No  Hypoglycemia and intervention: No  Fever: No  TPN  and/or TF: No      PMH, PSH, FH, SH updated and reviewed     ROS:  Review of Systems  Constitutional: Negative for weight changes.  Eyes: Negative for visual disturbance.  Respiratory: Negative for cough.   Cardiovascular: Negative for chest pain.  Gastrointestinal: Negative for nausea.  Endocrine: Negative for polyuria, polydipsia.  Musculoskeletal: Negative for back pain.  Skin: Negative for rash.  Neurological: Negative for syncope.  Psychiatric/Behavioral: Negative for depression.    Current Medications and/or Treatments Impacting Glycemic Control  Immunotherapy:    Immunosuppressants           Stop Route Frequency     tacrolimus (PROGRAF) 1 mg/mL oral syringe         -- Oral 2 times daily     mycophenolate mofetil 200 mg/mL suspension 1,000 mg         -- Oral 2 times daily          Steroids:   Hormones (From admission, onward)      Start     Stop Route Frequency Ordered    01/23/23 0900  predniSONE tablet 20 mg  (methylprednisolone taper panel)        See Hyperspace for full Linked Orders Report.    -- Oral Daily 01/17/23 2148    01/22/23 0900  methylPREDNISolone sodium succinate injection 40 mg  (methylprednisolone taper panel)        See Hyperspace for full Linked Orders Report.    01/23 0859 IV Daily 01/17/23 2148    01/21/23 0900  methylPREDNISolone sodium succinate injection 80 mg  (methylprednisolone taper panel)        See Hyperspace for full Linked Orders Report.    01/22 0859 IV Daily 01/17/23 2148    01/20/23 0900  methylPREDNISolone sodium succinate injection 120 mg  (methylprednisolone taper panel)        See Hyperspace for full Linked Orders Report.    01/21 0859 IV Daily 01/17/23 2148    01/19/23 0900  methylPREDNISolone sodium succinate injection 160 mg  (methylprednisolone taper panel)        See Hyperspace for full Linked Orders Report.    01/20 0859 IV Daily 01/17/23 2148          Pressors:    Autonomic Drugs (From admission, onward)      None          Hyperglycemia/Diabetes Medications:    Antihyperglycemics (From admission, onward)      Start     Stop Route Frequency Ordered    01/17/23 2300  insulin regular in 0.9 % NaCl 100 unit/100 mL (1 unit/mL) infusion        Question Answer Comment   Insulin Rate Adjustment (DO NOT MODIFY ANSWER) [email or web address]    Enter initial dose from Infusion Protocol Chart (Units/hr): 5.6        -- IV Continuous 01/17/23 2148             PHYSICAL EXAMINATION:  Vitals:    01/18/23 1052   BP:    Pulse: (!) 116   Resp: (!) 21   Temp:      Body mass index is 33.35 kg/m².    Physical Exam  Constitutional: Well developed, well nourished, NAD.  ENT: External ears no masses with nose patent; normal hearing.  Neck: Supple; trachea midline.  Cardiovascular: Normal heart sounds, no LE edema. DP +2 bilaterally.  Lungs: Normal effort; lungs anterior bilaterally clear to auscultation.  Abdomen: Soft, no masses, no hernias.  MS: No clubbing or cyanosis of nails noted; unable to assess gait.  Skin: No rashes, lesions, or ulcers; no nodules.   Psychiatric: Good judgement and insight; normal mood and affect.  Neurological: Cranial nerves are grossly intact.   Foot: Nails in good condition, no amputations noted.        Labs Reviewed and Include   Recent Labs   Lab 01/18/23  0404 01/18/23  0802   * 211*   CALCIUM 8.1* 8.0*   ALBUMIN 2.9*  --    PROT 5.1*  --     142   K 3.3* 3.9   CO2 20* 22*    112*   BUN 19 23*   CREATININE 1.0 1.0   ALKPHOS 46*  --    *  --    *  635* 518*   BILITOT 9.4*  --      Lab Results   Component Value Date    WBC 10.49 01/18/2023    HGB 12.1 01/18/2023    HCT 34.9 (L) 01/18/2023    MCV 93 01/18/2023    PLT 50 (L) 01/18/2023     No results for input(s): TSH, FREET4 in the last 168 hours.  Lab Results   Component Value Date    HGBA1C 4.5 01/17/2023       Nutritional status:   Body mass index is 33.35 kg/m².  Lab Results   Component Value Date    ALBUMIN 2.9 (L)  01/18/2023    ALBUMIN 3.0 (L) 01/17/2023    ALBUMIN 3.0 (L) 01/17/2023     No results found for: PREALBUMIN    Estimated Creatinine Clearance: 68.3 mL/min (based on SCr of 1 mg/dL).    Accu-Checks  Recent Labs     01/18/23  0206 01/18/23  0305 01/18/23  0408 01/18/23  0509 01/18/23  0550 01/18/23  0709 01/18/23  0802 01/18/23  0912 01/18/23  0957 01/18/23  1103   POCTGLUCOSE 332* 297* 271* 248* 233* 195* 202* 191* 179* 137*        ASSESSMENT and PLAN    * Liver cirrhosis secondary to DORAN (nonalcoholic steatohepatitis)  Managed per primary team  Avoid hypoglycemia        Hyperglycemia  Endocrinology consulted for BG management.   BG goal 140-180      - IIP  - Requires intensive BG monitoring.   - BG checks q1hr  - Hypoglycemia protocol in place    - Notify endocrine if patient becomes hypokalemic (K < 3.3) and/or is not responding to replacement.   - Notify endocrine if patient requiring > 20 units/hr.      ** Please notify Endocrine for any change and/or advance in diet**  ** Please call Endocrine for any BG related issues **    Discharge Planning:   TBD. Please notify endocrinology prior to discharge.        S/P liver transplant  Optimize BG control to improve wound healing        Adrenal corticosteroid causing adverse effect in therapeutic use  Glucocorticoids markedly increase glucose levels. Expect the steroid taper will help glucose control.             Plan discussed with patient, family, and RN at bedside.        Jaison Laboy, DNP, FNP  Endocrinology  [facility] - Surgical Intensive Care

## 2023-01-18 NOTE — SUBJECTIVE & OBJECTIVE
Follow-up For: Procedure(s) (LRB):  TRANSPLANT, LIVER (N/A)  CREATION, ANASTOMOSIS, HOANG-EN-Y  ULTRASOUND, DIAGNOSTIC (N/A)    Post-Operative Day: Day of Surgery     Past Medical History:   Diagnosis Date    Anxiety disorder, unspecified     Asthma     Chronic cough     Hepatic encephalopathy     Hyperlipidemia     Infarction of spleen 2021    Liver cirrhosis secondary to DORAN     Mild tricuspid regurgitation     Unspecified cirrhosis of liver        Past Surgical History:   Procedure Laterality Date    cholecystectomy      COLONOSCOPY      6 polyps removed    COLONOSCOPY  2021    ESOPHAGOGASTRODUODENOSCOPY      2 coulums of grade 1 varices    ESOPHAGOGASTRODUODENOSCOPY  2019    trace varices    ESOPHAGOGASTRODUODENOSCOPY  2021    ESOPHAGOGASTRODUODENOSCOPY N/A 2022    Procedure: EGD (ESOPHAGOGASTRODUODENOSCOPY);  Surgeon: Bruce Dominguez MD;  Location: [facility] ([address] [place] [address]);  Service: Endoscopy;  Laterality: N/A;  labs prior  liver transplant candidate  screen for varices   fully vaccinated; instructions to portal-LW   pt rescheduled; updated instructions to portal-st    gynecologic surgery       removal of scar tissues and adhesions    HYSTERECTOMY      knee surgery  1992    OOPHORECTOMY Right     OPEN hysterectomy      removed uterus and bilateral fallopian tubes and LEFT ovary stayed in place    ROTATOR CUFF REPAIR Right     TONSILLECTOMY  1972       Review of patient's allergies indicates:   Allergen Reactions    Levofloxacin Hives and Shortness Of Breath    Sulfa (sulfonamide antibiotics) Rash       Family History       Problem Relation (Age of Onset)    Arrhythmia Brother    Depression Father    Heart disease Father, Brother    Hyperlipidemia Father    Hypertension Father          Tobacco Use    Smoking status: Former     Packs/day: 1.00     Types: Cigarettes     Quit date:      Years since quittin.0    Smokeless tobacco: Never   Substance  and Sexual Activity    Alcohol use: Not Currently    Drug use: Not Currently    Sexual activity: Yes     Partners: Male      Review of Systems   Unable to perform ROS: Intubated   Objective:     Vital Signs (Most Recent):  Temp: 98.4 °F (36.9 °C) (01/17/23 0607)  Pulse: 76 (01/17/23 0607)  Resp: 19 (01/17/23 0607)  BP: (!) 122/56 (01/17/23 0607)  SpO2: 97 % (01/17/23 0607)   Vital Signs (24h Range):  Temp:  [98.4 °F (36.9 °C)] 98.4 °F (36.9 °C)  Pulse:  [76] 76  Resp:  [19] 19  SpO2:  [97 %] 97 %  BP: (122)/(56) 122/56     Weight: 88.9 kg (196 lb)  Body mass index is 32.62 kg/m².      Intake/Output Summary (Last 24 hours) at 1/17/2023 2220  Last data filed at 1/17/2023 2116  Gross per 24 hour   Intake 6120 ml   Output 4500 ml   Net 1620 ml       Physical Exam  Constitutional:       Comments: sedated   HENT:      Head: Normocephalic and atraumatic.   Neck:      Comments: R IJ CVC and PA catheter  Cardiovascular:      Rate and Rhythm: Normal rate and regular rhythm.      Pulses: Normal pulses.      Comments: L radial art line  R fem art line  Pulmonary:      Breath sounds: Normal breath sounds.      Comments: intubated  Abdominal:      Comments: Soft, nondistended. Chevron incision c/d/I.  2 RYLEY drains with dark, old blood   Genitourinary:     Comments: Morrow in place with clear urine  Musculoskeletal:      Right lower leg: No edema.      Left lower leg: No edema.   Skin:     General: Skin is warm.      Capillary Refill: Capillary refill takes less than 2 seconds.       Vents:       Lines/Drains/Airways       Central Venous Catheter Line  Duration             Percutaneous Central Line Insertion/Assessment - Triple Lumen  01/17/23 1238 right internal jugular <1 day    Pulmonary Artery Catheter Assessment  01/17/23 1241 <1 day              Drain  Duration                  Closed/Suction Drain 01/17/23 2011 Right Abdomen Bulb 19 Fr. <1 day         Closed/Suction Drain 01/17/23 2012 Right Abdomen Bulb 19 Fr. <1 day          Urethral Catheter 01/17/23 1159 Non-latex;Straight-tip 16 Fr. <1 day              Airway  Duration                  Airway - Non-Surgical 01/17/23 1131 <1 day              Arterial Line  Duration             Arterial Line 01/17/23 1131 Radial <1 day    Arterial Line 01/17/23 1243 Right Femoral <1 day              Peripheral Intravenous Line  Duration                  Peripheral IV - Single Lumen 01/17/23 0627 18 G Posterior;Right Forearm <1 day         LUDIVINA 01/17/23 1222 Right Antecubital <1 day                    Significant Labs:    CBC/Anemia Profile:  Recent Labs   Lab 01/17/23 0529 01/17/23  1140 01/17/23 1816 01/17/23 1932   WBC 2.71*  --   --   --    HGB 14.1 13.8 13.5 13.3   HCT 39.7 37.7 37.3 36.4*   PLT 53* 42* 70* 83*   MCV 91  --   --   --    RDW 13.3  --   --   --         Chemistries:  Recent Labs   Lab 01/17/23 0529 01/17/23 1140 01/17/23 1816 01/17/23 1932    136 139 140   K 3.8 3.9 4.0 4.1     --   --   --    CO2 21*  --   --   --    BUN 10  --   --   --    CREATININE 0.6  --   --   --    CALCIUM 8.9  --   --   --    ALBUMIN 3.4* 3.0* 3.0* 3.0*   PROT 6.3  --   --   --    BILITOT 4.2*  --   --   --    ALKPHOS 84  --   --   --    ALT 34  --  1,145*  --    AST 34  --  1,030*  --    MG 1.8 1.8 1.9 1.9       All pertinent labs within the past 24 hours have been reviewed.    Significant Imaging: I have reviewed all pertinent imaging results/findings within the past 24 hours.

## 2023-01-18 NOTE — OP NOTE
Operative Report    Date of Procedure: 1/17/2023    Surgeon: Bg Hardy MD  First Assistant: Juan Castillo MD    Pre-operative Diagnosis: Allograft liver for transplantation  Post-operative Diagnosis: Same    Procedure(s) Performed:   Back Table Preparation of Right Lobe liver graft with hepatic venous reconstruction    Anesthesia: Not applicable  Estimated Blood Loss: Not applicable  Fluids Administered: Not applicable    Findings: Estimated steatosis 0-10%, normal vascular anatomy.  Drains: Not applicable  Specimens: Core biopsy of allograft liver      Preamble  Indications: This report describes only the backbench preparation of the liver prior to transplantation.  The transplant operation itself is described in a separate report.    ABO Confirmation: Immediately following arrival of the donor organ and prior to implantation, a formal ABO confirmation was done according to hospital and [facility]OS policies.  I confirmed the UNOS ID number of the donor organ and the donor and recipient ABO types, directly verifying these data by comparison with the [facility] report.  This confirmation was personally done by an attending surgeon and circulating nurse, and is officially documented elsewhere.    Time-Out: A complete time out was carried out prior to the procedure, with confirmation of patient identity, correct procedure, correct operative site, appropriate antibiotic prophylaxis, review of any known allergies, and presence of all needed equipment.    Procedure in Detail  The right lobe liver graft was received from the donor operative field and flushed with HTK solution via the portal vein, with additional flush via the hepatic artery and retrograde via the right hepatic vein.  The segment 5 and 8 veins were both large enough to warrant reconstruction. This was carried out using a segment of donor external iliac vein. Both segmental veins were implanted end-to-side into the vein graft with 6-0 prolene. The distal  end of the vein was ligated. The proximal end was sutured to the right hepatic vein over about 25% of the circumference resulting in a double-barreled lumen, which was extended slightly with a 1 cm long ring of donor IVC to create a pair-of-pants configuration.  The liver was then transported at ice temperature to the recipient operating room.

## 2023-01-18 NOTE — ASSESSMENT & PLAN NOTE
Glucocorticoids markedly increase glucose levels. Expect the steroid taper will help glucose control.

## 2023-01-18 NOTE — TRANSFER OF CARE
"Anesthesia Transfer of Care Note    Patient: Mickey Harris    Procedure(s) Performed: Procedure(s) (LRB):  TRANSPLANT, LIVER (N/A)  CREATION, ANASTOMOSIS, HOANG-EN-Y  ULTRASOUND, DIAGNOSTIC (N/A)    Patient location: ICU    Anesthesia Type: general    Transport from OR: Transported from OR intubated on 100% O2  with adequate ventilation controlled by transport ventilator. Continuous ECG monitoring in transport. Continuous SpO2 monitoring in transport. Continuos invasive BP monitoring in transport. Upon arrival to PACU/ICU, patient attached to ventilator and auscultated to confirm bilateral breath sounds and adequate TV    Post pain: adequate analgesia    Post assessment: no apparent anesthetic complications and tolerated procedure well    Post vital signs: stable    Level of consciousness: sedated    Nausea/Vomiting: no nausea/vomiting    Complications: none    Transfer of care protocol was followedComments: Report given to the ICU team. Patient hooked up to ICU monitors and ventilator. Vitals stable.       Last vitals:   Visit Vitals  BP (!) 122/56 (BP Location: Left arm, Patient Position: Lying)   Pulse 76   Temp 36.9 °C (98.4 °F) (Oral)   Resp 19   Ht 5' 5" (1.651 m)   Wt 88.9 kg (196 lb)   SpO2 97%   Breastfeeding No   BMI 32.62 kg/m²     "

## 2023-01-18 NOTE — PROGRESS NOTES
Admit / Transplant Note     SW met with patient and  to assess patients needs due to patient being sedated.  Patient is a 57 y.o.  female, admitted for liver transplant.     Patient admitted from home on 1/17/2023 .  At this time, patient presents as  sleepy .  At this time, patients caregiver presents as alert and oriented x 4 and asking and answering questions appropriately. Patient received a liver transplant on 1/17/23.      Household/Family Systems (as reported by patients caregiver)     Patient resides with patient's , at [address] [id number].  Support system includes , step mother and adult son.  Patient does not have dependents that are need of being cared for.     Patients primary caregiver is Kg Kimberly, patients , phone number [phone].  Confirmed patients contact information is [phone] (home);   Telephone Information:   Mobile [phone]   .    During admission, patient's caregiver plans to stay at the house the family has rented close to the hospital.  Confirmed patient and patients caregivers do have access to reliable transportation.    Cognitive Status/Learning     Patients caregiver reports patients reading ability as college and states patient does have difficulty with seeing.  Patients caregiver reports patient learns best by hands on learning.   Needed: No.   Highest education level: Post-College Graduate Degree    Vocation/Disability (as reported by patients caregiver)    Working for Income: No  If no, reason not working: Patient Choice - Retired  Patient is retired from working as a .    Adherence     Patients caregiver reports patient has a high level of adherence to patients health care regimen.  Adherence counseling and education provided.  Patient's caregiver verbalizes understanding.    Substance Use    Patients caregiver reports patients substance usage as the following:     Tobacco: none, patient denies any use.  Alcohol: none, patient denies any use.  Illicit Drugs/Non-prescribed Medications: none, patient denies any use.  Patients caregiver states clear understanding of the potential impact of substance use.  Substance abstinence/cessation counseling, education and resources provided and reviewed.     Services Utilizing/ADLS (as reported by patients caregiver)    Infusion Service: Prior to admission, patient utilizing? no  Home Health: Prior to admission, patient utilizing? no  DME: Prior to admission, no  Pulmonary/Cardiac Rehab: Prior to admission, no  Dialysis:  Prior to admission, no  Transplant Specialty Pharmacy:  Prior to admission, no.    Prior to admission, patients caregiver reports patient was independent with ADLS and was not driving.  Patients caregiver reports patient is not able to care for self at this time due to compromised medical condition (as documented in medical record) and physical weakness..  Patients caregiver reports patient indicates a willingness to care for self once medically cleared to do so.    Insurance/Medications    Insured by   Payer/Plan Subscr  Sex Relation Sub. Ins. ID Effective Group Num   1. AETNA - AETNA* MARYAN HOOD 1965 Female Self [id number] 1/1/15 [id number]                                   [address]   2. AETNA - AETNA* MARYAN HOOD 1965 Female Self [id number] 1/1/15 [id number]                                   [address]      Primary Insurance (for UNOS reporting): Private Insurance, Aetna.  Secondary Insurance (for UNOS reporting): None    Patients caregiver reports patient is able to obtain and afford medications at this time and at time of discharge.    Living Will/Healthcare Power of     Patients caregiver reports patient does not have a LW and/or HCPA.   provided education regarding LW and HCPA and the completion of forms.    Coping/Mental Health (as reported by  patients caregiver)    Patient is coping adequately with the aid of  family members.  Patients caregiver is coping adequately with the aid of  family members.      Discharge Planning (as reported by patients caregiver)    At time of discharge, patient plans to   stay at a house the family has rented close to the hospital.  The [place] is, [place] [address]  under the care of Kg Harris.  Patients  will transport patient.  Per rounds today, expected discharge date has not been medically determined at this time. Patients caretaker verbalizes understanding and is involved in treatment planning and discharge process.    Additional Concerns    Patient's caretaker denies additional needs and/or concerns at this time.  providing ongoing psychosocial support, education, resources and d/c planning as needed.  SW remains available.  remains available.

## 2023-01-18 NOTE — PROGRESS NOTES
[facility] - Surgical Intensive Care  Critical Care - Surgery  Progress Note    Patient Name: Mickey Harris  MRN: [id number]  Admission Date: 1/17/2023  Hospital Length of Stay: 1 days  Code Status: Full Code  Attending Provider: Juan Pablo Kimbrough MD  Primary Care Provider: Primary Doctor No   Principal Problem: Liver cirrhosis secondary to DORAN (nonalcoholic steatohepatitis)    Subjective:     Hospital/ICU Course:  No notes on file    Interval History/Significant Events: NAEON. VSS. UOP tapering off this morning and CVP trended down to 3-4 range. Given 500 cc albumin. Remains intubated and sedated.     Follow-up For: Procedure(s) (LRB):  TRANSPLANT, LIVER (N/A)  CREATION, ANASTOMOSIS, HOANG-EN-Y  ULTRASOUND, DIAGNOSTIC (N/A)    Post-Operative Day: 1 Day Post-Op    Objective:     Vital Signs (Most Recent):  Temp: 98.1 °F (36.7 °C) (01/18/23 0730)  Pulse: 99 (01/18/23 0915)  Resp: (!) 24 (01/18/23 0940)  BP: 130/70 (01/18/23 0900)  SpO2: 98 % (01/18/23 0915)   Vital Signs (24h Range):  Temp:  [98.1 °F (36.7 °C)-99 °F (37.2 °C)] 98.1 °F (36.7 °C)  Pulse:  [] 99  Resp:  [21-29] 24  SpO2:  [97 %-100 %] 98 %  BP: (104-130)/(60-70) 130/70  Arterial Line BP: (109-145)/(49-70) 131/56     Weight: 90.9 kg (200 lb 6.4 oz)  Body mass index is 33.35 kg/m².      Intake/Output Summary (Last 24 hours) at 1/18/2023 1009  Last data filed at 1/18/2023 0900  Gross per 24 hour   Intake 9420.98 ml   Output 5400 ml   Net 4020.98 ml       Physical Exam  Vitals and nursing note reviewed.   Constitutional:       Comments: Sedated, moves all extremities and follows commands during sedation pause.    HENT:      Head: Normocephalic and atraumatic.   Neck:      Comments: R IJ CVC and PA catheter  Cardiovascular:      Rate and Rhythm: Normal rate and regular rhythm.      Pulses: Normal pulses.      Comments: L radial art line  R fem art line  Pulmonary:      Breath sounds: Normal breath sounds.      Comments: intubated  Abdominal:      Comments:  Soft, nondistended. Chevron incision c/d/I.  2 RYLEY drains with serous appearing drainage with slight bilious tinge.    Genitourinary:     Comments: Morrow in place with clear urine  Musculoskeletal:      Right lower leg: No edema.      Left lower leg: No edema.   Skin:     General: Skin is warm.      Capillary Refill: Capillary refill takes less than 2 seconds.       Vents:  Vent Mode: A/C (01/18/23 0759)  Set Rate: 20 BPM (01/18/23 0759)  Vt Set: 375 mL (01/18/23 0759)  PEEP/CPAP: 5 cmH20 (01/18/23 0759)  Oxygen Concentration (%): 30 (01/18/23 0915)  Peak Airway Pressure: 9.2 cmH20 (01/18/23 0759)  Plateau Pressure: 0 cmH20 (01/18/23 0759)  Total Ve: 10.8 L/m (01/18/23 0759)  Negative Inspiratory Force (cm H2O): 0 (01/18/23 0759)  F/VT Ratio<105 (RSBI): (!) 73.57 (01/18/23 0759)    Lines/Drains/Airways       Central Venous Catheter Line  Duration              Introducer with Double Lumen 01/17/23 1241 right internal jugular <1 day    Pulmonary Artery Catheter Assessment  01/17/23 1241 <1 day    Trialysis (Dialysis) Catheter 01/17/23 1238 right internal jugular <1 day              Drain  Duration                  Closed/Suction Drain 01/17/23 2011 Right Abdomen Bulb 19 Fr. <1 day         Closed/Suction Drain 01/17/23 2012 Right Abdomen Bulb 19 Fr. <1 day         NG/OG Tube 01/17/23 2100 Center mouth <1 day         Urethral Catheter 01/17/23 1159 Non-latex;Straight-tip 16 Fr. <1 day              Airway  Duration                  Airway - Non-Surgical 01/17/23 1131 <1 day              Arterial Line  Duration             Arterial Line 01/17/23 1131 Left Radial <1 day    Arterial Line 01/17/23 1243 Right Femoral <1 day              Peripheral Intravenous Line  Duration                  Peripheral IV - Single Lumen 01/17/23 0627 18 G Posterior;Right Forearm 1 day         LUDIVINA 01/17/23 1222 Right Antecubital <1 day                    Significant Labs:    CBC/Anemia Profile:  Recent Labs   Lab 01/18/23  0001 01/18/23  0404  01/18/23  0802   WBC 7.67 6.97 10.49   HGB 12.3 12.6 12.1   HCT 36.0* 35.9* 34.9*   PLT 59* 51* 50*   MCV 94 93 93   RDW 14.1 14.0 14.0        Chemistries:  Recent Labs   Lab 01/17/23  0529 01/17/23  1140 01/17/23  1816 01/17/23  1932 01/17/23  2207 01/18/23  0001 01/18/23  0404 01/18/23  0802      < > 139 140 142  --  142 142   K 3.8   < > 4.0 4.1 4.0  --  3.3* 3.9     --   --   --  106  --  108 112*   CO2 21*  --   --   --  18*  --  20* 22*   BUN 10  --   --   --  16  --  19 23*   CREATININE 0.6  --   --   --  1.0  --  1.0 1.0   CALCIUM 8.9  --   --   --  8.4*  --  8.1* 8.0*   ALBUMIN 3.4*   < > 3.0* 3.0* 3.0*  --  2.9*  --    PROT 6.3  --   --   --  5.3*  --  5.1*  --    BILITOT 4.2*  --   --   --  7.8*  --  9.4*  --    ALKPHOS 84  --   --   --  50*  --  46*  --    ALT 34  --  1,145*  --  834*  --  779*  --    AST 34  --  1,030*  --  948* 862* 653*  635* 518*   MG 1.8   < > 1.9 1.9 1.8  --  2.6  --    PHOS  --   --   --   --  2.5*  --  1.4*  --     < > = values in this interval not displayed.       All pertinent labs within the past 24 hours have been reviewed.    Significant Imaging:  I have reviewed all pertinent imaging results/findings within the past 24 hours.    Assessment/Plan:     * Liver cirrhosis secondary to DORAN (nonalcoholic steatohepatitis)  Mickey Harris is a 57 y.o. female with DORAN cirrhosis complicated by hepatic encephalopathy on home lactulose who is now status post living related donor liver transplant on 1/17/22.      Neuro/Psych:   -- Sedation: Propofol. Wean for anticipated extubation this morning.  -- Pain: Multimodal with breakthrough narcotics; will transition to PO once extubated             Cards:   -- HDS, off pressors  -- SBP >100      Pulm:   -- Goal O2 sat > 90%  -- On minimal vent settings this morning, will work towards SBT and extubation.      Renal:  -- Keep davis for strict I/O  -- Trend BUN/Cr  -- BMP Q12H today      FEN / GI:   -- Replace lytes as needed  --  Nutrition: NPO, will advance to clears this afternoon pending bedside swallow and extubation.   -- Anti-rejection methylpred, mycophenolate, tacro per transplant pharmacy; daily tacro levels  -- AST Q4H for 24 hr  -- Liver US today      ID:   -- Tm: afebrile  -- Abx Deidre-op Unasyn to complete today  -- Antimicrobial PPX: Valganciclovir, Nystatin suspension      Heme/Onc:   -- Hb stable; Plt 50 this AM, will transfuse one unit  -- Q4 Hr CBC and PT for 1 day, then daily  -- Vitamin K Q8H for 3 doses, to complete this evening      Endo:   -- Gluc goal 140-180  -- Insulin gtt as needed; Endo consulted appreciate recommendations      PPx:   Feeding: NPO  Analgesia/Sedation: PRNs / Prop and Precedex  Thromboembolic prevention: SQH  HOB >30: Ordered  Stress Ulcer ppx: Famotidine Q12H  Glucose control: Critical care goal 140-180 g/dl, insulin gtt    Lines/Drains/Airway: RIJ CVC x2, A line x2 / RYLEY x2 / ET; Will remove fem a line and one CVC today.      Dispo/Code Status/Palliative:   -- SICU / Full Code           Jaison Charles MD  Critical Care - Surgery  [facility] - [facility]

## 2023-01-18 NOTE — ASSESSMENT & PLAN NOTE
Endocrinology consulted for BG management.   BG goal 140-180      - IIP  - Requires intensive BG monitoring.   - BG checks q1hr  - Hypoglycemia protocol in place    - Notify endocrine if patient becomes hypokalemic (K < 3.3) and/or is not responding to replacement.   - Notify endocrine if patient requiring > 20 units/hr.      ** Please notify Endocrine for any change and/or advance in diet**  ** Please call Endocrine for any BG related issues **    Discharge Planning:   TBD. Please notify endocrinology prior to discharge.

## 2023-01-18 NOTE — PT/OT/SLP PROGRESS
Physical Therapy      Patient Name:  Mickey Harris   MRN:  [id number]    Patient not seen today secondary to  (AM- pt not seen due to being intubated, PM - pt not seen due to RN putting pressure on recently pulled Femoral line.). Will follow-up at a later date.    1/18/2023  .

## 2023-01-18 NOTE — SUBJECTIVE & OBJECTIVE
Interval HPI:   Overnight events: Patient in room 64699 CVICU/57490 CVICU A. Blood glucose stable. BG at and above goal on current insulin regimen (IIP). Steroid use- Methylprednisolone  500 mg perioperatively and 200 mg POD 1. 1 Day Post-Op  Renal function- Normal   Vasopressors-  None       Endocrine will continue to follow and manage insulin orders inpatient.         Diet NPO     Eating:   NPO  Nausea: No  Hypoglycemia and intervention: No  Fever: No  TPN and/or TF: No      PMH, PSH, FH, SH updated and reviewed     ROS:  Review of Systems  Constitutional: Negative for weight changes.  Eyes: Negative for visual disturbance.  Respiratory: Negative for cough.   Cardiovascular: Negative for chest pain.  Gastrointestinal: Negative for nausea.  Endocrine: Negative for polyuria, polydipsia.  Musculoskeletal: Negative for back pain.  Skin: Negative for rash.  Neurological: Negative for syncope.  Psychiatric/Behavioral: Negative for depression.    Current Medications and/or Treatments Impacting Glycemic Control  Immunotherapy:    Immunosuppressants           Stop Route Frequency     tacrolimus (PROGRAF) 1 mg/mL oral syringe         -- Oral 2 times daily     mycophenolate mofetil 200 mg/mL suspension 1,000 mg         -- Oral 2 times daily          Steroids:   Hormones (From admission, onward)      Start     Stop Route Frequency Ordered    01/23/23 0900  predniSONE tablet 20 mg  (methylprednisolone taper panel)        See Hyperspace for full Linked Orders Report.    -- Oral Daily 01/17/23 2148    01/22/23 0900  methylPREDNISolone sodium succinate injection 40 mg  (methylprednisolone taper panel)        See Hyperspace for full Linked Orders Report.    01/23 0859 IV Daily 01/17/23 2148    01/21/23 0900  methylPREDNISolone sodium succinate injection 80 mg  (methylprednisolone taper panel)        See Hyperspace for full Linked Orders Report.    01/22 0859 IV Daily 01/17/23 2148    01/20/23 0900  methylPREDNISolone sodium  succinate injection 120 mg  (methylprednisolone taper panel)        See Hyperspace for full Linked Orders Report.    01/21 0859 IV Daily 01/17/23 2148 01/19/23 0900  methylPREDNISolone sodium succinate injection 160 mg  (methylprednisolone taper panel)        See Hyperspace for full Linked Orders Report.    01/20 0859 IV Daily 01/17/23 2148          Pressors:    Autonomic Drugs (From admission, onward)      None          Hyperglycemia/Diabetes Medications:   Antihyperglycemics (From admission, onward)      Start     Stop Route Frequency Ordered    01/17/23 2300  insulin regular in 0.9 % NaCl 100 unit/100 mL (1 unit/mL) infusion        Question Answer Comment   Insulin Rate Adjustment (DO NOT MODIFY ANSWER) [email or web address]    Enter initial dose from Infusion Protocol Chart (Units/hr): 5.6        -- IV Continuous 01/17/23 2148             PHYSICAL EXAMINATION:  Vitals:    01/18/23 1052   BP:    Pulse: (!) 116   Resp: (!) 21   Temp:      Body mass index is 33.35 kg/m².    Physical Exam  Constitutional: Well developed, well nourished, NAD.  ENT: External ears no masses with nose patent; normal hearing.  Neck: Supple; trachea midline.  Cardiovascular: Normal heart sounds, no LE edema. DP +2 bilaterally.  Lungs: Normal effort; lungs anterior bilaterally clear to auscultation.  Abdomen: Soft, no masses, no hernias.  MS: No clubbing or cyanosis of nails noted; unable to assess gait.  Skin: No rashes, lesions, or ulcers; no nodules.   Psychiatric: Good judgement and insight; normal mood and affect.  Neurological: Cranial nerves are grossly intact.   Foot: Nails in good condition, no amputations noted.

## 2023-01-18 NOTE — SUBJECTIVE & OBJECTIVE
Interval History/Significant Events: NAEON. VSS. UOP tapering off this morning and CVP trended down to 3-4 range. Given 500 cc albumin. Remains intubated and sedated.     Follow-up For: Procedure(s) (LRB):  TRANSPLANT, LIVER (N/A)  CREATION, ANASTOMOSIS, HOANG-EN-Y  ULTRASOUND, DIAGNOSTIC (N/A)    Post-Operative Day: 1 Day Post-Op    Objective:     Vital Signs (Most Recent):  Temp: 98.1 °F (36.7 °C) (01/18/23 0730)  Pulse: 99 (01/18/23 0915)  Resp: (!) 24 (01/18/23 0940)  BP: 130/70 (01/18/23 0900)  SpO2: 98 % (01/18/23 0915)   Vital Signs (24h Range):  Temp:  [98.1 °F (36.7 °C)-99 °F (37.2 °C)] 98.1 °F (36.7 °C)  Pulse:  [] 99  Resp:  [21-29] 24  SpO2:  [97 %-100 %] 98 %  BP: (104-130)/(60-70) 130/70  Arterial Line BP: (109-145)/(49-70) 131/56     Weight: 90.9 kg (200 lb 6.4 oz)  Body mass index is 33.35 kg/m².      Intake/Output Summary (Last 24 hours) at 1/18/2023 1009  Last data filed at 1/18/2023 0900  Gross per 24 hour   Intake 9420.98 ml   Output 5400 ml   Net 4020.98 ml       Physical Exam  Vitals and nursing note reviewed.   Constitutional:       Comments: Sedated, moves all extremities and follows commands during sedation pause.    HENT:      Head: Normocephalic and atraumatic.   Neck:      Comments: R IJ CVC and PA catheter  Cardiovascular:      Rate and Rhythm: Normal rate and regular rhythm.      Pulses: Normal pulses.      Comments: L radial art line  R fem art line  Pulmonary:      Breath sounds: Normal breath sounds.      Comments: intubated  Abdominal:      Comments: Soft, nondistended. Chevron incision c/d/I.  2 RYLEY drains with serous appearing drainage with slight bilious tinge.    Genitourinary:     Comments: Morrow in place with clear urine  Musculoskeletal:      Right lower leg: No edema.      Left lower leg: No edema.   Skin:     General: Skin is warm.      Capillary Refill: Capillary refill takes less than 2 seconds.       Vents:  Vent Mode: A/C (01/18/23 0759)  Set Rate: 20 BPM (01/18/23  0759)  Vt Set: 375 mL (01/18/23 0759)  PEEP/CPAP: 5 cmH20 (01/18/23 0759)  Oxygen Concentration (%): 30 (01/18/23 0915)  Peak Airway Pressure: 9.2 cmH20 (01/18/23 0759)  Plateau Pressure: 0 cmH20 (01/18/23 0759)  Total Ve: 10.8 L/m (01/18/23 0759)  Negative Inspiratory Force (cm H2O): 0 (01/18/23 0759)  F/VT Ratio<105 (RSBI): (!) 73.57 (01/18/23 0759)    Lines/Drains/Airways       Central Venous Catheter Line  Duration              Introducer with Double Lumen 01/17/23 1241 right internal jugular <1 day    Pulmonary Artery Catheter Assessment  01/17/23 1241 <1 day    Trialysis (Dialysis) Catheter 01/17/23 1238 right internal jugular <1 day              Drain  Duration                  Closed/Suction Drain 01/17/23 2011 Right Abdomen Bulb 19 Fr. <1 day         Closed/Suction Drain 01/17/23 2012 Right Abdomen Bulb 19 Fr. <1 day         NG/OG Tube 01/17/23 2100 Center mouth <1 day         Urethral Catheter 01/17/23 1159 Non-latex;Straight-tip 16 Fr. <1 day              Airway  Duration                  Airway - Non-Surgical 01/17/23 1131 <1 day              Arterial Line  Duration             Arterial Line 01/17/23 1131 Left Radial <1 day    Arterial Line 01/17/23 1243 Right Femoral <1 day              Peripheral Intravenous Line  Duration                  Peripheral IV - Single Lumen 01/17/23 0627 18 G Posterior;Right Forearm 1 day         LUDIVINA 01/17/23 1222 Right Antecubital <1 day                    Significant Labs:    CBC/Anemia Profile:  Recent Labs   Lab 01/18/23  0001 01/18/23  0404 01/18/23  0802   WBC 7.67 6.97 10.49   HGB 12.3 12.6 12.1   HCT 36.0* 35.9* 34.9*   PLT 59* 51* 50*   MCV 94 93 93   RDW 14.1 14.0 14.0        Chemistries:  Recent Labs   Lab 01/17/23  0529 01/17/23  1140 01/17/23  1816 01/17/23  1932 01/17/23  2207 01/18/23  0001 01/18/23  0404 01/18/23  0802      < > 139 140 142  --  142 142   K 3.8   < > 4.0 4.1 4.0  --  3.3* 3.9     --   --   --  106  --  108 112*   CO2 21*  --   --    --  18*  --  20* 22*   BUN 10  --   --   --  16  --  19 23*   CREATININE 0.6  --   --   --  1.0  --  1.0 1.0   CALCIUM 8.9  --   --   --  8.4*  --  8.1* 8.0*   ALBUMIN 3.4*   < > 3.0* 3.0* 3.0*  --  2.9*  --    PROT 6.3  --   --   --  5.3*  --  5.1*  --    BILITOT 4.2*  --   --   --  7.8*  --  9.4*  --    ALKPHOS 84  --   --   --  50*  --  46*  --    ALT 34  --  1,145*  --  834*  --  779*  --    AST 34  --  1,030*  --  948* 862* 653*  635* 518*   MG 1.8   < > 1.9 1.9 1.8  --  2.6  --    PHOS  --   --   --   --  2.5*  --  1.4*  --     < > = values in this interval not displayed.       All pertinent labs within the past 24 hours have been reviewed.    Significant Imaging:  I have reviewed all pertinent imaging results/findings within the past 24 hours.

## 2023-01-18 NOTE — PLAN OF CARE
No acute events since admit to SICU. Updated Dr. oMra on patient's status. CVP goal < 8 maintained. UOP  mL/hr. Maintenance fluids infusing. Dark red RYLEY output from RYLEY #1, minimal output from RYLEY #2. Arouses to voice and follows commands with sedation paused. SBP maintained > 100, no pressors required. MD aware of increased insulin requirements, BG starting to trend downward with hourly checks. Bed in low position and brake set. See flowsheets for detailed assessment.

## 2023-01-19 LAB
ALBUMIN SERPL BCP-MCNC: 3.1 G/DL (ref 3.5–5.2)
ALBUMIN SERPL BCP-MCNC: 3.2 G/DL (ref 3.5–5.2)
ALP SERPL-CCNC: 41 U/L (ref 55–135)
ALP SERPL-CCNC: 42 U/L (ref 55–135)
ALT SERPL W/O P-5'-P-CCNC: 448 U/L (ref 10–44)
ALT SERPL W/O P-5'-P-CCNC: 455 U/L (ref 10–44)
ANION GAP SERPL CALC-SCNC: 7 MMOL/L (ref 8–16)
ANION GAP SERPL CALC-SCNC: 9 MMOL/L (ref 8–16)
ANISOCYTOSIS BLD QL SMEAR: SLIGHT
ANISOCYTOSIS BLD QL SMEAR: SLIGHT
APTT BLDCRRT: 33.1 SEC (ref 21–32)
AST SERPL-CCNC: 274 U/L (ref 10–40)
AST SERPL-CCNC: 319 U/L (ref 10–40)
AST SERPL-CCNC: 323 U/L (ref 10–40)
BASO STIPL BLD QL SMEAR: ABNORMAL
BASOPHILS # BLD AUTO: 0 K/UL (ref 0–0.2)
BASOPHILS # BLD AUTO: 0 K/UL (ref 0–0.2)
BASOPHILS # BLD AUTO: 0.01 K/UL (ref 0–0.2)
BASOPHILS NFR BLD: 0 % (ref 0–1.9)
BASOPHILS NFR BLD: 0 % (ref 0–1.9)
BASOPHILS NFR BLD: 0.1 % (ref 0–1.9)
BILIRUB DIRECT SERPL-MCNC: 4.4 MG/DL (ref 0.1–0.3)
BILIRUB SERPL-MCNC: 8.8 MG/DL (ref 0.1–1)
BILIRUB SERPL-MCNC: 9.8 MG/DL (ref 0.1–1)
BUN SERPL-MCNC: 31 MG/DL (ref 6–20)
BUN SERPL-MCNC: 41 MG/DL (ref 6–20)
BURR CELLS BLD QL SMEAR: ABNORMAL
CA-I BLDV-SCNC: 0.97 MMOL/L (ref 1.06–1.42)
CALCIUM SERPL-MCNC: 7.6 MG/DL (ref 8.7–10.5)
CALCIUM SERPL-MCNC: 8.4 MG/DL (ref 8.7–10.5)
CHLORIDE SERPL-SCNC: 104 MMOL/L (ref 95–110)
CHLORIDE SERPL-SCNC: 111 MMOL/L (ref 95–110)
CO2 SERPL-SCNC: 21 MMOL/L (ref 23–29)
CO2 SERPL-SCNC: 23 MMOL/L (ref 23–29)
CREAT SERPL-MCNC: 0.7 MG/DL (ref 0.5–1.4)
CREAT SERPL-MCNC: 1.1 MG/DL (ref 0.5–1.4)
DIFFERENTIAL METHOD: ABNORMAL
EOSINOPHIL # BLD AUTO: 0 K/UL (ref 0–0.5)
EOSINOPHIL NFR BLD: 0 % (ref 0–8)
ERYTHROCYTE [DISTWIDTH] IN BLOOD BY AUTOMATED COUNT: 14.1 % (ref 11.5–14.5)
ERYTHROCYTE [DISTWIDTH] IN BLOOD BY AUTOMATED COUNT: 14.2 % (ref 11.5–14.5)
ERYTHROCYTE [DISTWIDTH] IN BLOOD BY AUTOMATED COUNT: 14.2 % (ref 11.5–14.5)
EST. GFR  (NO RACE VARIABLE): 58.2 ML/MIN/1.73 M^2
EST. GFR  (NO RACE VARIABLE): >60 ML/MIN/1.73 M^2
GLUCOSE SERPL-MCNC: 124 MG/DL (ref 70–110)
GLUCOSE SERPL-MCNC: 211 MG/DL (ref 70–110)
HCT VFR BLD AUTO: 28.5 % (ref 37–48.5)
HCT VFR BLD AUTO: 28.7 % (ref 37–48.5)
HCT VFR BLD AUTO: 30.6 % (ref 37–48.5)
HGB BLD-MCNC: 10 G/DL (ref 12–16)
HGB BLD-MCNC: 10.1 G/DL (ref 12–16)
HGB BLD-MCNC: 10.3 G/DL (ref 12–16)
HYPOCHROMIA BLD QL SMEAR: ABNORMAL
IMM GRANULOCYTES # BLD AUTO: 0.09 K/UL (ref 0–0.04)
IMM GRANULOCYTES # BLD AUTO: 0.09 K/UL (ref 0–0.04)
IMM GRANULOCYTES # BLD AUTO: 0.13 K/UL (ref 0–0.04)
IMM GRANULOCYTES NFR BLD AUTO: 1 % (ref 0–0.5)
IMM GRANULOCYTES NFR BLD AUTO: 1.4 % (ref 0–0.5)
IMM GRANULOCYTES NFR BLD AUTO: 1.4 % (ref 0–0.5)
INR PPP: 1.4 (ref 0.8–1.2)
INR PPP: 1.4 (ref 0.8–1.2)
LYMPHOCYTES # BLD AUTO: 0.2 K/UL (ref 1–4.8)
LYMPHOCYTES # BLD AUTO: 0.2 K/UL (ref 1–4.8)
LYMPHOCYTES # BLD AUTO: 0.3 K/UL (ref 1–4.8)
LYMPHOCYTES NFR BLD: 2.5 % (ref 18–48)
LYMPHOCYTES NFR BLD: 2.8 % (ref 18–48)
LYMPHOCYTES NFR BLD: 3.6 % (ref 18–48)
MAGNESIUM SERPL-MCNC: 2.4 MG/DL (ref 1.6–2.6)
MAGNESIUM SERPL-MCNC: 2.5 MG/DL (ref 1.6–2.6)
MCH RBC QN AUTO: 32.6 PG (ref 27–31)
MCH RBC QN AUTO: 32.6 PG (ref 27–31)
MCH RBC QN AUTO: 33.6 PG (ref 27–31)
MCHC RBC AUTO-ENTMCNC: 33.7 G/DL (ref 32–36)
MCHC RBC AUTO-ENTMCNC: 34.8 G/DL (ref 32–36)
MCHC RBC AUTO-ENTMCNC: 35.4 G/DL (ref 32–36)
MCV RBC AUTO: 94 FL (ref 82–98)
MCV RBC AUTO: 95 FL (ref 82–98)
MCV RBC AUTO: 97 FL (ref 82–98)
MONOCYTES # BLD AUTO: 0.1 K/UL (ref 0.3–1)
MONOCYTES # BLD AUTO: 0.5 K/UL (ref 0.3–1)
MONOCYTES # BLD AUTO: 0.5 K/UL (ref 0.3–1)
MONOCYTES NFR BLD: 2 % (ref 4–15)
MONOCYTES NFR BLD: 5.5 % (ref 4–15)
MONOCYTES NFR BLD: 5.5 % (ref 4–15)
NEUTROPHILS # BLD AUTO: 6 K/UL (ref 1.8–7.7)
NEUTROPHILS # BLD AUTO: 8 K/UL (ref 1.8–7.7)
NEUTROPHILS # BLD AUTO: 8.3 K/UL (ref 1.8–7.7)
NEUTROPHILS NFR BLD: 90.5 % (ref 38–73)
NEUTROPHILS NFR BLD: 90.7 % (ref 38–73)
NEUTROPHILS NFR BLD: 93 % (ref 38–73)
NRBC BLD-RTO: 0 /100 WBC
OVALOCYTES BLD QL SMEAR: ABNORMAL
OVALOCYTES BLD QL SMEAR: ABNORMAL
PHOSPHATE SERPL-MCNC: 3.4 MG/DL (ref 2.7–4.5)
PHOSPHATE SERPL-MCNC: 4.9 MG/DL (ref 2.7–4.5)
PLATELET # BLD AUTO: 34 K/UL (ref 150–450)
PLATELET # BLD AUTO: 38 K/UL (ref 150–450)
PLATELET # BLD AUTO: 40 K/UL (ref 150–450)
PLATELET BLD QL SMEAR: ABNORMAL
PMV BLD AUTO: 12.6 FL (ref 9.2–12.9)
PMV BLD AUTO: 12.8 FL (ref 9.2–12.9)
PMV BLD AUTO: 13 FL (ref 9.2–12.9)
POCT GLUCOSE: 109 MG/DL (ref 70–110)
POCT GLUCOSE: 121 MG/DL (ref 70–110)
POCT GLUCOSE: 124 MG/DL (ref 70–110)
POCT GLUCOSE: 129 MG/DL (ref 70–110)
POCT GLUCOSE: 138 MG/DL (ref 70–110)
POCT GLUCOSE: 139 MG/DL (ref 70–110)
POCT GLUCOSE: 142 MG/DL (ref 70–110)
POCT GLUCOSE: 153 MG/DL (ref 70–110)
POCT GLUCOSE: 155 MG/DL (ref 70–110)
POCT GLUCOSE: 158 MG/DL (ref 70–110)
POCT GLUCOSE: 159 MG/DL (ref 70–110)
POCT GLUCOSE: 160 MG/DL (ref 70–110)
POCT GLUCOSE: 190 MG/DL (ref 70–110)
POCT GLUCOSE: 190 MG/DL (ref 70–110)
POCT GLUCOSE: 209 MG/DL (ref 70–110)
POCT GLUCOSE: 214 MG/DL (ref 70–110)
POCT GLUCOSE: 223 MG/DL (ref 70–110)
POCT GLUCOSE: 229 MG/DL (ref 70–110)
POCT GLUCOSE: 234 MG/DL (ref 70–110)
POCT GLUCOSE: 81 MG/DL (ref 70–110)
POIKILOCYTOSIS BLD QL SMEAR: ABNORMAL
POIKILOCYTOSIS BLD QL SMEAR: SLIGHT
POLYCHROMASIA BLD QL SMEAR: ABNORMAL
POLYCHROMASIA BLD QL SMEAR: ABNORMAL
POTASSIUM SERPL-SCNC: 4.8 MMOL/L (ref 3.5–5.1)
POTASSIUM SERPL-SCNC: 5.1 MMOL/L (ref 3.5–5.1)
PROT SERPL-MCNC: 4.9 G/DL (ref 6–8.4)
PROT SERPL-MCNC: 5.1 G/DL (ref 6–8.4)
PROTHROMBIN TIME: 14 SEC (ref 9–12.5)
PROTHROMBIN TIME: 14.5 SEC (ref 9–12.5)
RBC # BLD AUTO: 3.01 M/UL (ref 4–5.4)
RBC # BLD AUTO: 3.07 M/UL (ref 4–5.4)
RBC # BLD AUTO: 3.16 M/UL (ref 4–5.4)
SODIUM SERPL-SCNC: 136 MMOL/L (ref 136–145)
SODIUM SERPL-SCNC: 139 MMOL/L (ref 136–145)
SPHEROCYTES BLD QL SMEAR: ABNORMAL
TACROLIMUS BLD-MCNC: 7.2 NG/ML (ref 5–15)
WBC # BLD AUTO: 6.41 K/UL (ref 3.9–12.7)
WBC # BLD AUTO: 8.87 K/UL (ref 3.9–12.7)
WBC # BLD AUTO: 9.22 K/UL (ref 3.9–12.7)

## 2023-01-19 PROCEDURE — 25000003 PHARM REV CODE 250: Performed by: NURSE PRACTITIONER

## 2023-01-19 PROCEDURE — 25000003 PHARM REV CODE 250: Mod: TB,JG | Performed by: STUDENT IN AN ORGANIZED HEALTH CARE EDUCATION/TRAINING PROGRAM

## 2023-01-19 PROCEDURE — 99232 PR SUBSEQUENT HOSPITAL CARE,LEVL II: ICD-10-PCS | Mod: ,,, | Performed by: NURSE PRACTITIONER

## 2023-01-19 PROCEDURE — 85025 COMPLETE CBC W/AUTO DIFF WBC: CPT | Mod: 91

## 2023-01-19 PROCEDURE — 85730 THROMBOPLASTIN TIME PARTIAL: CPT | Performed by: STUDENT IN AN ORGANIZED HEALTH CARE EDUCATION/TRAINING PROGRAM

## 2023-01-19 PROCEDURE — 94761 N-INVAS EAR/PLS OXIMETRY MLT: CPT

## 2023-01-19 PROCEDURE — 82248 BILIRUBIN DIRECT: CPT | Performed by: STUDENT IN AN ORGANIZED HEALTH CARE EDUCATION/TRAINING PROGRAM

## 2023-01-19 PROCEDURE — 84100 ASSAY OF PHOSPHORUS: CPT | Mod: 91

## 2023-01-19 PROCEDURE — 27000221 HC OXYGEN, UP TO 24 HOURS

## 2023-01-19 PROCEDURE — 97161 PT EVAL LOW COMPLEX 20 MIN: CPT

## 2023-01-19 PROCEDURE — 82955 ASSAY OF G6PD ENZYME: CPT | Performed by: SURGERY

## 2023-01-19 PROCEDURE — 80053 COMPREHEN METABOLIC PANEL: CPT | Mod: 91

## 2023-01-19 PROCEDURE — 25000003 PHARM REV CODE 250: Performed by: STUDENT IN AN ORGANIZED HEALTH CARE EDUCATION/TRAINING PROGRAM

## 2023-01-19 PROCEDURE — 94799 UNLISTED PULMONARY SVC/PX: CPT

## 2023-01-19 PROCEDURE — 85025 COMPLETE CBC W/AUTO DIFF WBC: CPT | Mod: 91 | Performed by: SURGERY

## 2023-01-19 PROCEDURE — 99233 SBSQ HOSP IP/OBS HIGH 50: CPT | Mod: 24,,, | Performed by: STUDENT IN AN ORGANIZED HEALTH CARE EDUCATION/TRAINING PROGRAM

## 2023-01-19 PROCEDURE — 97530 THERAPEUTIC ACTIVITIES: CPT

## 2023-01-19 PROCEDURE — 85610 PROTHROMBIN TIME: CPT | Performed by: SURGERY

## 2023-01-19 PROCEDURE — 99900035 HC TECH TIME PER 15 MIN (STAT)

## 2023-01-19 PROCEDURE — 99232 SBSQ HOSP IP/OBS MODERATE 35: CPT | Mod: ,,, | Performed by: NURSE PRACTITIONER

## 2023-01-19 PROCEDURE — 25000242 PHARM REV CODE 250 ALT 637 W/ HCPCS

## 2023-01-19 PROCEDURE — 94640 AIRWAY INHALATION TREATMENT: CPT

## 2023-01-19 PROCEDURE — 82330 ASSAY OF CALCIUM: CPT | Performed by: SURGERY

## 2023-01-19 PROCEDURE — 83735 ASSAY OF MAGNESIUM: CPT | Performed by: SURGERY

## 2023-01-19 PROCEDURE — 84100 ASSAY OF PHOSPHORUS: CPT | Performed by: SURGERY

## 2023-01-19 PROCEDURE — 83735 ASSAY OF MAGNESIUM: CPT | Mod: 91

## 2023-01-19 PROCEDURE — 63600175 PHARM REV CODE 636 W HCPCS

## 2023-01-19 PROCEDURE — 84450 TRANSFERASE (AST) (SGOT): CPT | Performed by: SURGERY

## 2023-01-19 PROCEDURE — 85610 PROTHROMBIN TIME: CPT | Mod: 91 | Performed by: SURGERY

## 2023-01-19 PROCEDURE — 80053 COMPREHEN METABOLIC PANEL: CPT | Performed by: STUDENT IN AN ORGANIZED HEALTH CARE EDUCATION/TRAINING PROGRAM

## 2023-01-19 PROCEDURE — 63600175 PHARM REV CODE 636 W HCPCS: Performed by: NURSE PRACTITIONER

## 2023-01-19 PROCEDURE — 99233 PR SUBSEQUENT HOSPITAL CARE,LEVL III: ICD-10-PCS | Mod: 24,,, | Performed by: STUDENT IN AN ORGANIZED HEALTH CARE EDUCATION/TRAINING PROGRAM

## 2023-01-19 PROCEDURE — 63600175 PHARM REV CODE 636 W HCPCS: Performed by: STUDENT IN AN ORGANIZED HEALTH CARE EDUCATION/TRAINING PROGRAM

## 2023-01-19 PROCEDURE — 25000003 PHARM REV CODE 250

## 2023-01-19 PROCEDURE — 80197 ASSAY OF TACROLIMUS: CPT | Performed by: STUDENT IN AN ORGANIZED HEALTH CARE EDUCATION/TRAINING PROGRAM

## 2023-01-19 PROCEDURE — 97116 GAIT TRAINING THERAPY: CPT

## 2023-01-19 PROCEDURE — 20000000 HC ICU ROOM

## 2023-01-19 RX ORDER — METHOCARBAMOL 500 MG/1
500 TABLET, FILM COATED ORAL 3 TIMES DAILY
Status: DISCONTINUED | OUTPATIENT
Start: 2023-01-19 | End: 2023-01-26

## 2023-01-19 RX ORDER — FUROSEMIDE 10 MG/ML
40 INJECTION INTRAMUSCULAR; INTRAVENOUS ONCE
Status: COMPLETED | OUTPATIENT
Start: 2023-01-19 | End: 2023-01-19

## 2023-01-19 RX ORDER — IBUPROFEN 200 MG
24 TABLET ORAL
Status: DISCONTINUED | OUTPATIENT
Start: 2023-01-19 | End: 2023-01-22

## 2023-01-19 RX ORDER — INSULIN ASPART 100 [IU]/ML
4-8 INJECTION, SOLUTION INTRAVENOUS; SUBCUTANEOUS
Status: DISCONTINUED | OUTPATIENT
Start: 2023-01-19 | End: 2023-01-19

## 2023-01-19 RX ORDER — ALBUTEROL SULFATE 90 UG/1
2 AEROSOL, METERED RESPIRATORY (INHALATION) EVERY 6 HOURS PRN
Status: DISCONTINUED | OUTPATIENT
Start: 2023-01-19 | End: 2023-02-01 | Stop reason: HOSPADM

## 2023-01-19 RX ORDER — MYCOPHENOLATE MOFETIL 250 MG/1
1000 CAPSULE ORAL 2 TIMES DAILY
Status: DISCONTINUED | OUTPATIENT
Start: 2023-01-19 | End: 2023-01-26

## 2023-01-19 RX ORDER — FAMOTIDINE 20 MG/1
20 TABLET, FILM COATED ORAL NIGHTLY
Status: DISCONTINUED | OUTPATIENT
Start: 2023-01-19 | End: 2023-01-26

## 2023-01-19 RX ORDER — GLUCAGON 1 MG
1 KIT INJECTION
Status: DISCONTINUED | OUTPATIENT
Start: 2023-01-19 | End: 2023-01-19

## 2023-01-19 RX ORDER — TACROLIMUS 1 MG/1
1 CAPSULE ORAL 2 TIMES DAILY
Status: DISCONTINUED | OUTPATIENT
Start: 2023-01-19 | End: 2023-01-20

## 2023-01-19 RX ORDER — TALC
6 POWDER (GRAM) TOPICAL NIGHTLY
Status: DISCONTINUED | OUTPATIENT
Start: 2023-01-19 | End: 2023-01-28

## 2023-01-19 RX ORDER — GLUCAGON 1 MG
1 KIT INJECTION
Status: DISCONTINUED | OUTPATIENT
Start: 2023-01-19 | End: 2023-01-22

## 2023-01-19 RX ORDER — IBUPROFEN 200 MG
24 TABLET ORAL
Status: DISCONTINUED | OUTPATIENT
Start: 2023-01-19 | End: 2023-01-19

## 2023-01-19 RX ORDER — INSULIN ASPART 100 [IU]/ML
0-10 INJECTION, SOLUTION INTRAVENOUS; SUBCUTANEOUS
Status: DISCONTINUED | OUTPATIENT
Start: 2023-01-19 | End: 2023-01-22

## 2023-01-19 RX ORDER — INSULIN ASPART 100 [IU]/ML
0-5 INJECTION, SOLUTION INTRAVENOUS; SUBCUTANEOUS
Status: DISCONTINUED | OUTPATIENT
Start: 2023-01-19 | End: 2023-01-19

## 2023-01-19 RX ORDER — IBUPROFEN 200 MG
16 TABLET ORAL
Status: DISCONTINUED | OUTPATIENT
Start: 2023-01-19 | End: 2023-01-22

## 2023-01-19 RX ORDER — IBUPROFEN 200 MG
16 TABLET ORAL
Status: DISCONTINUED | OUTPATIENT
Start: 2023-01-19 | End: 2023-01-19

## 2023-01-19 RX ORDER — CALCIUM CARBONATE 200(500)MG
500 TABLET,CHEWABLE ORAL 2 TIMES DAILY PRN
Status: DISCONTINUED | OUTPATIENT
Start: 2023-01-19 | End: 2023-02-01 | Stop reason: HOSPADM

## 2023-01-19 RX ADMIN — OXYCODONE HYDROCHLORIDE 5 MG: 5 TABLET ORAL at 03:01

## 2023-01-19 RX ADMIN — HEPARIN SODIUM 5000 UNITS: 5000 INJECTION INTRAVENOUS; SUBCUTANEOUS at 02:01

## 2023-01-19 RX ADMIN — OXYCODONE HYDROCHLORIDE 10 MG: 10 TABLET ORAL at 07:01

## 2023-01-19 RX ADMIN — DEXTROSE AND SODIUM CHLORIDE: 5; 900 INJECTION, SOLUTION INTRAVENOUS at 12:01

## 2023-01-19 RX ADMIN — INSULIN HUMAN 1 UNITS/HR: 1 INJECTION, SOLUTION INTRAVENOUS at 10:01

## 2023-01-19 RX ADMIN — FUROSEMIDE 40 MG: 10 INJECTION, SOLUTION INTRAMUSCULAR; INTRAVENOUS at 07:01

## 2023-01-19 RX ADMIN — CALCIUM CARBONATE (ANTACID) CHEW TAB 500 MG 500 MG: 500 CHEW TAB at 02:01

## 2023-01-19 RX ADMIN — NYSTATIN 500000 UNITS: 500000 SUSPENSION ORAL at 09:01

## 2023-01-19 RX ADMIN — FAMOTIDINE 20 MG: 20 TABLET ORAL at 09:01

## 2023-01-19 RX ADMIN — CALCIUM GLUCONATE 2 G: 20 INJECTION, SOLUTION INTRAVENOUS at 05:01

## 2023-01-19 RX ADMIN — NYSTATIN 500000 UNITS: 500000 SUSPENSION ORAL at 02:01

## 2023-01-19 RX ADMIN — FAMOTIDINE 20 MG: 10 INJECTION INTRAVENOUS at 08:01

## 2023-01-19 RX ADMIN — MYCOPHENOLATE MOFETIL 1000 MG: 250 CAPSULE ORAL at 09:01

## 2023-01-19 RX ADMIN — HYDROMORPHONE HYDROCHLORIDE 0.5 MG: 1 INJECTION, SOLUTION INTRAMUSCULAR; INTRAVENOUS; SUBCUTANEOUS at 05:01

## 2023-01-19 RX ADMIN — INSULIN ASPART 4 UNITS: 100 INJECTION, SOLUTION INTRAVENOUS; SUBCUTANEOUS at 05:01

## 2023-01-19 RX ADMIN — METHOCARBAMOL 500 MG: 500 TABLET ORAL at 07:01

## 2023-01-19 RX ADMIN — ALBUTEROL SULFATE 2 PUFF: 108 INHALANT RESPIRATORY (INHALATION) at 09:01

## 2023-01-19 RX ADMIN — HEPARIN SODIUM 5000 UNITS: 5000 INJECTION INTRAVENOUS; SUBCUTANEOUS at 09:01

## 2023-01-19 RX ADMIN — HEPARIN SODIUM 5000 UNITS: 5000 INJECTION INTRAVENOUS; SUBCUTANEOUS at 06:01

## 2023-01-19 RX ADMIN — HYDROMORPHONE HYDROCHLORIDE 0.5 MG: 1 INJECTION, SOLUTION INTRAMUSCULAR; INTRAVENOUS; SUBCUTANEOUS at 11:01

## 2023-01-19 RX ADMIN — TACROLIMUS 1 MG: 1 CAPSULE ORAL at 07:01

## 2023-01-19 RX ADMIN — METHYLPREDNISOLONE SODIUM SUCCINATE 160 MG: 125 INJECTION, POWDER, FOR SOLUTION INTRAMUSCULAR; INTRAVENOUS at 08:01

## 2023-01-19 RX ADMIN — MELATONIN TAB 3 MG 6 MG: 3 TAB at 09:01

## 2023-01-19 RX ADMIN — TACROLIMUS 1 MG: 1 CAPSULE ORAL at 05:01

## 2023-01-19 RX ADMIN — NYSTATIN 500000 UNITS: 500000 SUSPENSION ORAL at 08:01

## 2023-01-19 RX ADMIN — METHOCARBAMOL 500 MG: 500 TABLET ORAL at 09:01

## 2023-01-19 RX ADMIN — OXYCODONE HYDROCHLORIDE 10 MG: 10 TABLET ORAL at 09:01

## 2023-01-19 RX ADMIN — MUPIROCIN 1 G: 20 OINTMENT TOPICAL at 09:01

## 2023-01-19 RX ADMIN — METHOCARBAMOL 500 MG: 500 TABLET ORAL at 02:01

## 2023-01-19 RX ADMIN — OXYCODONE HYDROCHLORIDE 10 MG: 10 TABLET ORAL at 01:01

## 2023-01-19 RX ADMIN — INSULIN ASPART 4 UNITS: 100 INJECTION, SOLUTION INTRAVENOUS; SUBCUTANEOUS at 11:01

## 2023-01-19 RX ADMIN — INSULIN ASPART 4 UNITS: 100 INJECTION, SOLUTION INTRAVENOUS; SUBCUTANEOUS at 09:01

## 2023-01-19 RX ADMIN — MUPIROCIN 1 G: 20 OINTMENT TOPICAL at 08:01

## 2023-01-19 RX ADMIN — LIDOCAINE 1 PATCH: 50 PATCH CUTANEOUS at 05:01

## 2023-01-19 NOTE — SUBJECTIVE & OBJECTIVE
Interval History/Significant Events: Did not sleep well overnight, reports feeling anxious and fidgety. VSS. On 1L by NC. UOP remains marginal, 30-40cc /hr. Tolerating clear liquids with occasional belching, but denies nausea or flatus. Incisional pain well controlled, chronic MSK aches more bothersome.     Follow-up For: Procedure(s) (LRB):  TRANSPLANT, LIVER (N/A)  CREATION, ANASTOMOSIS, HOANG-EN-Y  ULTRASOUND, DIAGNOSTIC (N/A)    Post-Operative Day: 2 Days Post-Op    Objective:     Vital Signs (Most Recent):  Temp: 97.9 °F (36.6 °C) (01/19/23 0300)  Pulse: 78 (01/19/23 0600)  Resp: 11 (01/19/23 0600)  BP: 120/65 (01/19/23 0530)  SpO2: 97 % (01/19/23 0600) Vital Signs (24h Range):  Temp:  [97.9 °F (36.6 °C)-98.6 °F (37 °C)] 97.9 °F (36.6 °C)  Pulse:  [] 78  Resp:  [11-31] 11  SpO2:  [90 %-100 %] 97 %  BP: (110-165)/(58-75) 120/65  Arterial Line BP: (113-160)/(46-69) 127/54     Weight: 90.9 kg (200 lb 6.4 oz)  Body mass index is 33.35 kg/m².      Intake/Output Summary (Last 24 hours) at 1/19/2023 0636  Last data filed at 1/19/2023 0600  Gross per 24 hour   Intake 5588.12 ml   Output 1300 ml   Net 4288.12 ml       Physical Exam  Vitals and nursing note reviewed.   Constitutional:       General: She is not in acute distress.     Appearance: Normal appearance. She is obese. She is not ill-appearing.   HENT:      Head: Normocephalic and atraumatic.   Eyes:      Extraocular Movements: Extraocular movements intact.   Neck:      Comments: R IJ CVC   Cardiovascular:      Rate and Rhythm: Normal rate and regular rhythm.      Pulses: Normal pulses.      Comments: L radial art line  R fem art line  Pulmonary:      Breath sounds: Normal breath sounds.      Comments: On 1L by NC  Abdominal:      Comments: Soft, nondistended. Chevron incision c/d/I.  2 RYLEY drains with serosanguinous drainage   Genitourinary:     Comments: Morrow in place with dark yellow urine  Musculoskeletal:      Cervical back: Normal range of motion.       Right lower leg: No edema.      Left lower leg: No edema.   Skin:     General: Skin is warm.      Capillary Refill: Capillary refill takes less than 2 seconds.   Neurological:      General: No focal deficit present.      Mental Status: She is alert and oriented to person, place, and time.       Vents:  Vent Mode: Spont (01/18/23 1052)  Set Rate: 20 BPM (01/18/23 1017)  Vt Set: 375 mL (01/18/23 1017)  Pressure Support: 5 cmH20 (01/18/23 1052)  PEEP/CPAP: 5 cmH20 (01/18/23 1052)  Oxygen Concentration (%): 30 (01/18/23 1052)  Peak Airway Pressure: 4.4 cmH20 (01/18/23 1052)  Plateau Pressure: 0 cmH20 (01/18/23 1052)  Total Ve: 8.22 L/m (01/18/23 1052)  Negative Inspiratory Force (cm H2O): -26 (01/18/23 1052)  F/VT Ratio<105 (RSBI): (!) 43.29 (01/18/23 1017)    Lines/Drains/Airways       Central Venous Catheter Line  Duration             Trialysis (Dialysis) Catheter 01/17/23 1238 right internal jugular 1 day              Drain  Duration                  Closed/Suction Drain 01/17/23 2011 Right Abdomen Bulb 19 Fr. 1 day         Closed/Suction Drain 01/17/23 2012 Right Abdomen Bulb 19 Fr. 1 day         Urethral Catheter 01/17/23 1159 Non-latex;Straight-tip 16 Fr. 1 day              Arterial Line  Duration             Arterial Line 01/17/23 1131 Left Radial 1 day              Peripheral Intravenous Line  Duration                  Peripheral IV - Single Lumen 01/17/23 0627 18 G Posterior;Right Forearm 2 days                    Significant Labs:    CBC/Anemia Profile:  Recent Labs   Lab 01/18/23 2007 01/19/23  0005 01/19/23  0353   WBC 9.13 9.22 8.87   HGB 10.0* 10.1* 10.0*   HCT 28.5* 28.5* 28.7*   PLT 34* 38* 34*   MCV 93 95 94   RDW 14.3 14.2 14.2        Chemistries:  Recent Labs   Lab 01/18/23  0404 01/18/23  0802 01/18/23  1252 01/18/23  1607 01/18/23 2007 01/19/23  0005 01/19/23  0353      < > 141  --  140  --  139   K 3.3*   < > 4.3  --  4.5  --  4.8      < > 112*  --  113*  --  111*   CO2 20*   < > 24   --  21*  --  21*   BUN 19   < > 26*  --  29*  --  31*   CREATININE 1.0   < > 0.8  --  0.8  --  0.7   CALCIUM 8.1*   < > 7.8*  --  7.6*  --  7.6*   ALBUMIN 2.9*  --  3.2*  --   --   --  3.1*   PROT 5.1*  --  5.0*  --   --   --  4.9*   BILITOT 9.4*  --  9.4*  --   --   --  8.8*   ALKPHOS 46*  --  39*  --   --   --  42*   *  --  596*  --   --   --  455*   *  635*   < > 426*   < > 328* 323* 319*   MG 2.6  --  2.5  --   --   --  2.5   PHOS 1.4*  --  3.2  --   --   --  3.4    < > = values in this interval not displayed.       All pertinent labs within the past 24 hours have been reviewed.    Significant Imaging:  I have reviewed all pertinent imaging results/findings within the past 24 hours.

## 2023-01-19 NOTE — HPI
Ms. Mickey Harris is a 56 y/o F admitted for living liver transplant from daughter for DORAN cirrhosis.

## 2023-01-19 NOTE — PROGRESS NOTES
[facility] - Surgical Intensive Care  Endocrinology  Progress Note    Admit Date: 2023     Reason for Consult: Management of Hyperglycemia     Surgical Procedure and Date: Liver Transplant 2023    Patient is not diabetic and does not currently take any oral/injectable antidiabetic/hypoglycemic medications.     Lab Results   Component Value Date    HGBA1C 4.5 2023           HPI: Mickey Harris is a 57 y.o. female who presented on 22 for living related donor liver transplant in the setting of DORAN cirrhosis. She was diagnosed approximately 4 years ago and did well up until one year ago when she began to develop lower extremity edema and ascites managed with diuretics. She has never required paracentesis. She has also developed hepatic encephalopathy managed with lactulose. There is no history of GI bleeding. Ms. Harris is now s/p living donor liver transplantation on 23. She received a right liver lobe which was quite large (~1000g). Biliary reconstruction was Becky-en-y reconstruction to the donor right hepatic duct. The procedure was performed without apparent complication and she was transferred to the SICU for postoperative care and management. Endocrine consulted to manage hyperglycemia in the pressence of high-dose steroids. Of note, patient on 12-24 units/hr of insulin while on IIP.               Interval HPI:   Overnight events: No acute events overnight. Patient in room 70646 CVICU/13581 CVICU A. Blood glucose improving. BG at and above goal on current insulin regimen (Transition Insulin Drip). Steroid use- Methylprednisolone  180 mg. 2 Days Post-Op  Renal function- Normal   Vasopressors-  None       Endocrine will continue to follow and manage insulin orders inpatient.         Diet full liquid [facility]; Consistent Carbohydrate, Cardiac (Low Na/Chol)     Eatin%  Nausea: No  Hypoglycemia and intervention: No  Fever: No  TPN and/or TF: No      BP (!) 120/57 (BP Location:  "Left arm, Patient Position: Lying)   Pulse 75   Temp 98.1 °F (36.7 °C) (Oral)   Resp 13   Ht 5' 5" (1.651 m)   Wt 90.9 kg (200 lb 6.4 oz)   SpO2 96%   Breastfeeding No   BMI 33.35 kg/m²     Labs Reviewed and Include    Recent Labs   Lab 01/19/23  0353   *   CALCIUM 7.6*   ALBUMIN 3.1*   PROT 4.9*      K 4.8   CO2 21*   *   BUN 31*   CREATININE 0.7   ALKPHOS 42*   *   *   BILITOT 8.8*     Lab Results   Component Value Date    WBC 8.87 01/19/2023    HGB 10.0 (L) 01/19/2023    HCT 28.7 (L) 01/19/2023    MCV 94 01/19/2023    PLT 34 (LL) 01/19/2023     No results for input(s): TSH, FREET4 in the last 168 hours.  Lab Results   Component Value Date    HGBA1C 4.5 01/17/2023       Nutritional status:   Body mass index is 33.35 kg/m².  Lab Results   Component Value Date    ALBUMIN 3.1 (L) 01/19/2023    ALBUMIN 3.2 (L) 01/18/2023    ALBUMIN 2.9 (L) 01/18/2023     No results found for: PREALBUMIN    Estimated Creatinine Clearance: 97.6 mL/min (based on SCr of 0.7 mg/dL).    Accu-Checks  Recent Labs     01/18/23  2102 01/18/23  2204 01/18/23  2257 01/19/23  0004 01/19/23  0005 01/19/23  0114 01/19/23  0157 01/19/23  0309 01/19/23  0355 01/19/23  0504   POCTGLUCOSE 190* 155* 160* 138* 153* 139* 124* 142* 129* 158*       Current Medications and/or Treatments Impacting Glycemic Control  Immunotherapy:    Immunosuppressants           Stop Route Frequency     tacrolimus capsule 1 mg         -- Oral 2 times daily     mycophenolate capsule 1,000 mg         -- Oral 2 times daily          Steroids:   Hormones (From admission, onward)      Start     Stop Route Frequency Ordered    01/23/23 0900  predniSONE tablet 20 mg  (methylprednisolone taper panel)        See Hyperspace for full Linked Orders Report.    -- Oral Daily 01/17/23 2148    01/22/23 0900  methylPREDNISolone sodium succinate injection 40 mg  (methylprednisolone taper panel)        See Hyperspace for full Linked Orders Report.    01/23 " 0859 IV Daily 01/17/23 2148    01/21/23 0900  methylPREDNISolone sodium succinate injection 80 mg  (methylprednisolone taper panel)        See Hyperspace for full Linked Orders Report.    01/22 0859 IV Daily 01/17/23 2148    01/20/23 0900  methylPREDNISolone sodium succinate injection 120 mg  (methylprednisolone taper panel)        See Hyperspace for full Linked Orders Report.    01/21 0859 IV Daily 01/17/23 2148    01/19/23 2100  melatonin tablet 6 mg         -- Oral Nightly 01/19/23 0636          Pressors:    Autonomic Drugs (From admission, onward)      None          Hyperglycemia/Diabetes Medications:   Antihyperglycemics (From admission, onward)      Start     Stop Route Frequency Ordered    01/19/23 1345  insulin regular in 0.9 % NaCl 100 unit/100 mL (1 unit/mL) infusion        Question:  Enter initial dose (Units/hr):  Answer:  2    -- IV Continuous 01/19/23 1330    01/19/23 1022  insulin aspart U-100 pen 0-10 Units         -- SubQ As needed (PRN) 01/19/23 0923            ASSESSMENT and PLAN    * Liver cirrhosis secondary to DORAN (nonalcoholic steatohepatitis)  Managed per primary team  Avoid hypoglycemia        Hyperglycemia  Endocrinology consulted for BG management.   BG goal 140-180     - D/C IIP  - Transition drip at 2 units/hr with step-down parameters.   - Novolog (aspart) insulin prn for BG excursions MDC SSI (150/25).  - BG checks AC/HS/0200  - Hypoglycemia protocol in place    ** Please notify Endocrine for any change and/or advance in diet**  ** Please call Endocrine for any BG related issues **    Discharge Planning:   TBD. Please notify endocrinology prior to discharge.        S/P liver transplant  Optimize BG control to improve wound healing        Adrenal corticosteroid causing adverse effect in therapeutic use  Glucocorticoids markedly increase glucose levels. Expect the steroid taper will help glucose control.              Jaison Laboy, DNP, FNP  Endocrinology  [facility] - Surgical  Intensive Care

## 2023-01-19 NOTE — PROGRESS NOTES
Dr. Lin notified of UO 30-45cc/hr, CVP now 10.  MIVF infusing @ 100ml/hr, CVP goal <8.  Orders received to stop MIVF.  Wctm.

## 2023-01-19 NOTE — PROGRESS NOTES
[facility]  Critical Care - Surgery  Progress Note    Patient Name: Mickey Harris  MRN: [id number]  Admission Date: 1/17/2023  Hospital Length of Stay: 2 days  Code Status: Full Code  Attending Provider: Juan Pablo Kimbrough MD  Primary Care Provider: Primary Doctor No   Principal Problem: Liver cirrhosis secondary to DORAN (nonalcoholic steatohepatitis)    Subjective:     Hospital/ICU Course:  No notes on file    Interval History/Significant Events: Did not sleep well overnight, reports feeling anxious and fidgety. VSS. On 1L by NC. UOP remains marginal, 30-40cc /hr. Tolerating clear liquids with occasional belching, but denies nausea or flatus. Incisional pain well controlled, chronic MSK aches more bothersome.     Follow-up For: Procedure(s) (LRB):  TRANSPLANT, LIVER (N/A)  CREATION, ANASTOMOSIS, HOANG-EN-Y  ULTRASOUND, DIAGNOSTIC (N/A)    Post-Operative Day: 2 Days Post-Op    Objective:     Vital Signs (Most Recent):  Temp: 97.9 °F (36.6 °C) (01/19/23 0300)  Pulse: 78 (01/19/23 0600)  Resp: 11 (01/19/23 0600)  BP: 120/65 (01/19/23 0530)  SpO2: 97 % (01/19/23 0600) Vital Signs (24h Range):  Temp:  [97.9 °F (36.6 °C)-98.6 °F (37 °C)] 97.9 °F (36.6 °C)  Pulse:  [] 78  Resp:  [11-31] 11  SpO2:  [90 %-100 %] 97 %  BP: (110-165)/(58-75) 120/65  Arterial Line BP: (113-160)/(46-69) 127/54     Weight: 90.9 kg (200 lb 6.4 oz)  Body mass index is 33.35 kg/m².      Intake/Output Summary (Last 24 hours) at 1/19/2023 0636  Last data filed at 1/19/2023 0600  Gross per 24 hour   Intake 5588.12 ml   Output 1300 ml   Net 4288.12 ml       Physical Exam  Vitals and nursing note reviewed.   Constitutional:       General: She is not in acute distress.     Appearance: Normal appearance. She is obese. She is not ill-appearing.   HENT:      Head: Normocephalic and atraumatic.   Eyes:      Extraocular Movements: Extraocular movements intact.   Neck:      Comments: R IJ CVC   Cardiovascular:      Rate and Rhythm:  Normal rate and regular rhythm.      Pulses: Normal pulses.      Comments: L radial art line  Pulmonary:      Breath sounds: Normal breath sounds.      Comments: On 1L by NC  Abdominal:      Comments: Soft, nondistended. Chevron incision c/d/I.  2 RYLEY drains with serosanguinous drainage   Genitourinary:     Comments: Morrow in place with dark yellow urine  Musculoskeletal:      Cervical back: Normal range of motion.      Right lower leg: No edema.      Left lower leg: No edema.   Skin:     General: Skin is warm.      Capillary Refill: Capillary refill takes less than 2 seconds.   Neurological:      General: No focal deficit present.      Mental Status: She is alert and oriented to person, place, and time.       Vents:  Vent Mode: Spont (01/18/23 1052)  Set Rate: 20 BPM (01/18/23 1017)  Vt Set: 375 mL (01/18/23 1017)  Pressure Support: 5 cmH20 (01/18/23 1052)  PEEP/CPAP: 5 cmH20 (01/18/23 1052)  Oxygen Concentration (%): 30 (01/18/23 1052)  Peak Airway Pressure: 4.4 cmH20 (01/18/23 1052)  Plateau Pressure: 0 cmH20 (01/18/23 1052)  Total Ve: 8.22 L/m (01/18/23 1052)  Negative Inspiratory Force (cm H2O): -26 (01/18/23 1052)  F/VT Ratio<105 (RSBI): (!) 43.29 (01/18/23 1017)    Lines/Drains/Airways       Central Venous Catheter Line  Duration             Trialysis (Dialysis) Catheter 01/17/23 1238 right internal jugular 1 day              Drain  Duration                  Closed/Suction Drain 01/17/23 2011 Right Abdomen Bulb 19 Fr. 1 day         Closed/Suction Drain 01/17/23 2012 Right Abdomen Bulb 19 Fr. 1 day         Urethral Catheter 01/17/23 1159 Non-latex;Straight-tip 16 Fr. 1 day              Arterial Line  Duration             Arterial Line 01/17/23 1131 Left Radial 1 day              Peripheral Intravenous Line  Duration                  Peripheral IV - Single Lumen 01/17/23 0627 18 G Posterior;Right Forearm 2 days                    Significant Labs:    CBC/Anemia Profile:  Recent Labs   Lab 01/18/23 2007  01/19/23 0005 01/19/23 0353   WBC 9.13 9.22 8.87   HGB 10.0* 10.1* 10.0*   HCT 28.5* 28.5* 28.7*   PLT 34* 38* 34*   MCV 93 95 94   RDW 14.3 14.2 14.2        Chemistries:  Recent Labs   Lab 01/18/23  0404 01/18/23  0802 01/18/23  1252 01/18/23  1607 01/18/23 2007 01/19/23 0005 01/19/23 0353      < > 141  --  140  --  139   K 3.3*   < > 4.3  --  4.5  --  4.8      < > 112*  --  113*  --  111*   CO2 20*   < > 24  --  21*  --  21*   BUN 19   < > 26*  --  29*  --  31*   CREATININE 1.0   < > 0.8  --  0.8  --  0.7   CALCIUM 8.1*   < > 7.8*  --  7.6*  --  7.6*   ALBUMIN 2.9*  --  3.2*  --   --   --  3.1*   PROT 5.1*  --  5.0*  --   --   --  4.9*   BILITOT 9.4*  --  9.4*  --   --   --  8.8*   ALKPHOS 46*  --  39*  --   --   --  42*   *  --  596*  --   --   --  455*   *  635*   < > 426*   < > 328* 323* 319*   MG 2.6  --  2.5  --   --   --  2.5   PHOS 1.4*  --  3.2  --   --   --  3.4    < > = values in this interval not displayed.       All pertinent labs within the past 24 hours have been reviewed.    Significant Imaging:  I have reviewed all pertinent imaging results/findings within the past 24 hours.    Assessment/Plan:     * Liver cirrhosis secondary to DORAN (nonalcoholic steatohepatitis)  Mickey Harris is a 57 y.o. female with DORAN cirrhosis complicated by hepatic encephalopathy on home lactulose who is now status post living related donor liver transplant on 1/17/22.      Neuro/Psych:   -- Sedation: N/A  -- Pain: Roboxin + lidocaine patches with breakthrough narcotics  -- Melatonin QHS             Cards:   -- HDS, off pressors  -- SBP >100      Pulm:   -- Goal O2 sat > 90%; On 1L by NC. Wean to off today      Renal:  -- Keep davis for strict I/O  -- Trend BUN/Cr  -- CMP QD  -- Lasix 40 x1 this AM, PM RFP + Mag today     FEN / GI:   -- s/p LRD liver txp on 1/17; LFTs trending down, CTM with daily labs  -- Replace lytes as needed  -- Nutrition: Continue CLD this AM, Advance to regular this  afternoon  -- Anti-rejection methylpred, mycophenolate, tacro per transplant pharmacy; daily tacro levels      ID:   -- Tm: afebrile  -- Antimicrobial PPX: Valganciclovir, Nystatin suspension      Heme/Onc:   -- Hb stable; Plts remain in the mid 30s despite 2u Plts given yesterday  -- CBC, PT/INR QD  -- Completed post-op Vitamin K regimen       Endo:   -- Gluc goal 140-180  -- Off insulin gtt; patient is non-diabetic. Transition to SSI with Accucheck /QHS  -- Endo consulted appreciate recommendations      PPx:   Feeding: Clears, anticipate advancement to regular pending continued tolerance  Analgesia/Sedation: Lidocaine patch, robaxin + breakthough  Thromboembolic prevention: SQH  HOB >30: Ordered  Stress Ulcer ppx: Famotidine Q12H  Glucose control: Critical care goal 140-180 g/dl, SSI    Lines/Drains/Airway: RIJ CVC , A line / RYLEY x2 ; Anticipate removal of a line and RYLEY x1 today - to discuss with staff     Dispo/Code Status/Palliative:   -- SICU / Full Code         Jaison Charles MD  Critical Care - Surgery  [facility]

## 2023-01-19 NOTE — PLAN OF CARE
"Dx: Liver cirrhosis secondary to DORAN (nonalcoholic steatohepatitis)    Shift Events: No acute events. Afebrile. NSR.  Maintained -160. Pain controlled with PRN pain meds. Labs trended. Electrolytes replaced. OOBTC.     Neuro: AAO x4, Follows Commands, and Moves All Extremities    Cards: NSR    Respiratory: 1L NC    Vital Signs: /66 (BP Location: Right arm, Patient Position: Lying)   Pulse 91   Temp 97.9 °F (36.6 °C) (Oral)   Resp 18   Ht 5' 5" (1.651 m)   Wt 90.9 kg (200 lb 6.4 oz)   SpO2 98%   Breastfeeding No   BMI 33.35 kg/m²     Diet: Clear Liquid, 1500 FR    Gtts: None    Urine Output: Urinary Catheter 375 cc/shift    Drains:   RYLEY drain 1- 265cc/shift, serosang. output  RYLEY drain 2- 0cc/shift     Labs/Accuchecks: Labs Q4hrs, accuchecks Q1hrs. .    Skin: No new skin break down noted. Abdominal incision cdi, foams on heels and sacrum.  Weight shift assistance provided Q2hrs.                "

## 2023-01-19 NOTE — SUBJECTIVE & OBJECTIVE
"Interval HPI:   Overnight events: No acute events overnight. Patient in room 92578 CVICU/24624 CVICU A. Blood glucose improving. BG at and above goal on current insulin regimen (Transition Insulin Drip). Steroid use- Methylprednisolone  180 mg. 2 Days Post-Op  Renal function- Normal   Vasopressors-  None       Endocrine will continue to follow and manage insulin orders inpatient.         Diet full liquid [facility]; Consistent Carbohydrate, Cardiac (Low Na/Chol)     Eatin%  Nausea: No  Hypoglycemia and intervention: No  Fever: No  TPN and/or TF: No      BP (!) 120/57 (BP Location: Left arm, Patient Position: Lying)   Pulse 75   Temp 98.1 °F (36.7 °C) (Oral)   Resp 13   Ht 5' 5" (1.651 m)   Wt 90.9 kg (200 lb 6.4 oz)   SpO2 96%   Breastfeeding No   BMI 33.35 kg/m²     Labs Reviewed and Include    Recent Labs   Lab 23  0353   *   CALCIUM 7.6*   ALBUMIN 3.1*   PROT 4.9*      K 4.8   CO2 21*   *   BUN 31*   CREATININE 0.7   ALKPHOS 42*   *   *   BILITOT 8.8*     Lab Results   Component Value Date    WBC 8.87 2023    HGB 10.0 (L) 2023    HCT 28.7 (L) 2023    MCV 94 2023    PLT 34 (LL) 2023     No results for input(s): TSH, FREET4 in the last 168 hours.  Lab Results   Component Value Date    HGBA1C 4.5 2023       Nutritional status:   Body mass index is 33.35 kg/m².  Lab Results   Component Value Date    ALBUMIN 3.1 (L) 2023    ALBUMIN 3.2 (L) 2023    ALBUMIN 2.9 (L) 2023     No results found for: PREALBUMIN    Estimated Creatinine Clearance: 97.6 mL/min (based on SCr of 0.7 mg/dL).    Accu-Checks  Recent Labs     23  2102 23  2204 23  2257 23  0004 23  0005 23  0114 23  0157 23  0309 23  0355 23  0504   POCTGLUCOSE 190* 155* 160* 138* 153* 139* 124* 142* 129* 158*       Current Medications and/or Treatments Impacting Glycemic Control  Immunotherapy: "    Immunosuppressants           Stop Route Frequency     tacrolimus capsule 1 mg         -- Oral 2 times daily     mycophenolate capsule 1,000 mg         -- Oral 2 times daily          Steroids:   Hormones (From admission, onward)      Start     Stop Route Frequency Ordered    01/23/23 0900  predniSONE tablet 20 mg  (methylprednisolone taper panel)        See Hyperspace for full Linked Orders Report.    -- Oral Daily 01/17/23 2148    01/22/23 0900  methylPREDNISolone sodium succinate injection 40 mg  (methylprednisolone taper panel)        See Hyperspace for full Linked Orders Report.    01/23 0859 IV Daily 01/17/23 2148    01/21/23 0900  methylPREDNISolone sodium succinate injection 80 mg  (methylprednisolone taper panel)        See Hyperspace for full Linked Orders Report.    01/22 0859 IV Daily 01/17/23 2148    01/20/23 0900  methylPREDNISolone sodium succinate injection 120 mg  (methylprednisolone taper panel)        See Hyperspace for full Linked Orders Report.    01/21 0859 IV Daily 01/17/23 2148    01/19/23 2100  melatonin tablet 6 mg         -- Oral Nightly 01/19/23 0636          Pressors:    Autonomic Drugs (From admission, onward)      None          Hyperglycemia/Diabetes Medications:   Antihyperglycemics (From admission, onward)      Start     Stop Route Frequency Ordered    01/19/23 1345  insulin regular in 0.9 % NaCl 100 unit/100 mL (1 unit/mL) infusion        Question:  Enter initial dose (Units/hr):  Answer:  2    -- IV Continuous 01/19/23 1330    01/19/23 1022  insulin aspart U-100 pen 0-10 Units         -- SubQ As needed (PRN) 01/19/23 0923

## 2023-01-19 NOTE — HOSPITAL COURSE
She is now status post living related donor liver transplant on 1/17/22. 2 drains placed intra-op. POD#1 Liver US showed minimally elevated intraparenchymal resistive indices, likely due to postoperative edema. Otherwise satisfactory Doppler evaluation of the liver. Transfer to TSU on POD#2. Pt w increased t bili and noted to have more bilious output in RYLEY drain. T bili from drain fluid was 41. CT A/P 1/22 showed pneumoperitoneum, mild ascites and scattered regions of subcutaneous and rectus sheath emphysema. Pt taken back to OR POD#6 for exploratory lap where the CBD appeared to be under pressure suggesting obstruction. The anterior wall of the duct anastomosis was taken down, small stent was placed from 6 Fr ped feeding tube and secured. Anterior wall was closed. There was extensive bile staining but no obvious sign of active bile leak after repair. After takeback, both RYLEY drains again had increased bilious output and she was taken back to OR again on POD #7 for continued bile leak. Biliary system was taken down and reconstructed. Bile leak has since resolved since reconstruction performed. RYLEY drainage negative for bile leak on 1/30. Tbili now trending down. Aerobic cx from peritoneal fluid during TB on 1/23 + for staph epi, susceptible to oxacillin, cont unasyn (completed 1/30). Pt had several episodes of paroxsymal Afib with RVR post-transplant, no known history of Afib. Cardiology consulted, cardizem started, now on 120 mg XR PO daily. CHADsVASC 2, aspirin 81mg PO daily started per cardiology recs.     Hospital Interval:  NAEON. Liver US yesterday reviewed with surgeon, no collections seen. Tbili continues to trend down. Lateral drain removed yesterday. Medial drain remains with serous output, keep drain for now to be able to monitor for delayed bile leak. Pt NSR past several days but had another episode Afib w/ RVR in 160s this morning, converted with Lopressor IV X 1. Reconsulted Cardiology, to see pt today.  Psych consulted for anxiety, seeing pt today. Pt reports feeling well. Diuresing well with PO Lasix. Cr stable. PT/OT following, recommend HH. VSS. Monitor.

## 2023-01-19 NOTE — PT/OT/SLP EVAL
Physical Therapy Evaluation and Treatment    Patient Name:  Mickey Harris   MRN:  [id number]  Admit Date: 1/17/2023  Admitting Diagnosis:  Liver cirrhosis secondary to DORAN (nonalcoholic steatohepatitis)   Length of Stay: 2 days  Recent Surgery: Procedure(s) (LRB):  TRANSPLANT, LIVER (N/A)  CREATION, ANASTOMOSIS, HOANG-EN-Y  ULTRASOUND, DIAGNOSTIC (N/A) 2 Days Post-Op    Recommendations:     Discharge Recommendations:  home health PT   Discharge Equipment Recommendations: none   Barriers to discharge: None    Plan:     During this hospitalization, patient to be seen 4 x/week to address the identified rehab impairments via gait training, therapeutic activities, therapeutic exercises, neuromuscular re-education and progress towards the established goals.  Plan of Care Expires:  02/18/23  Plan of Care Reviewed with: patient    Assessment:     Mickey Harris is a 57 y.o. female admitted with a medical diagnosis of Liver cirrhosis secondary to DORAN (nonalcoholic steatohepatitis). Pt tolerated initial evaluation well today. Patient is s/p liver transplant  on 1/17/2023. Pt demonstrated good functional strength and tolerance to upright activity on this date, requiring minimal to no physical assist for transfers and ambulation. VS remained stable throughout session. Pt did have generalized unsteadiness when standing requiring CGA and HHA throughout ambulation trial for patient safety. Pt with decreased overall endurance, however, and only able to ambulate a total of 14 ft x 2 trials on this date. Pt will continue to benefit from skilled PT services during this hospital admit to continue to improve transfer ability and efficiency as well as continue to progress pt's ambulation distance and cardiopulmonary endurance to maximize pt's functional independence and return to PLOF.     Problem List: weakness, impaired endurance, impaired self care skills, impaired functional mobility, gait instability, impaired balance, pain,  decreased safety awareness, impaired skin, decreased coordination.  Rehab Prognosis: Good; patient would benefit from acute skilled PT services to address these deficits and reach maximum level of function.      Goals:   Multidisciplinary Problems       Physical Therapy Goals          Problem: Physical Therapy    Goal Priority Disciplines Outcome Goal Variances Interventions   Physical Therapy Goal     PT, PT/OT Ongoing, Progressing     Description: Goals to be met by: 2023    Patient will increase functional independence with mobility by performin. Sit to stand transfer with Modified [place]  2. Bed to chair transfer with Supervision using LRAD  3. Gait  x 250 feet with Supervision using LRAD.   4. Ascend/descend 6 stair with bilateral Handrails Stand-by Assistance using no AD.   5. Lower extremity exercise program x30 reps per handout, with independence                         Subjective     RN notified prior to session. Son and  present upon PT entrance into room. Patient agreeable to PT evaluation.    Chief Complaint: No chief complaint on file.  Patient/Family Comments/goals: grogginess from pain medication  Pain/Comfort:  Pain Rating 1: 7/10  Location - Orientation 1: generalized  Location 1: sacral spine  Pain Addressed 1: Distraction, Reposition  Pain Rating Post-Intervention 1: other (see comments) (pain decreased when offloaded)    Social History:  Residence: pt resides in [place] but after discharge will be living in [place] with her  and son in a 1-story house with 4-6 SUSANNE and bilateral HR as well as ramp access to enter home. Pt's bathroom has a shower with built in seat as well as separate tub.  Support available: family  Equipment Used: none  Equipment Owned (not using): None  Prior level of function: independent  Hand Dominance: right.   Work: Disability.   Drive: yes.   Managing Medicines/Managing Home: yes.     Patient reports they will have assistance from  "/son upon discharge.    Objective:     Additional staff present: N/A    Patient found up in chair with: telemetry, blood pressure cuff, pulse ox (continuous), peripheral IV, davis catheter, Trialysis, RYLEY drain, oxygen     General Precautions: Standard, Cardiac fall   Orthopedic Precautions:N/A   Braces: N/A   Body mass index is 33.35 kg/m².  Oxygen Device: Nasal Cannula 1L  Vitals: BP (!) 120/57 (BP Location: Left arm, Patient Position: Lying)   Pulse 75   Temp 98.1 °F (36.7 °C) (Oral)   Resp 13   Ht 5' 5" (1.651 m)   Wt 90.9 kg (200 lb 6.4 oz)   SpO2 96%   Breastfeeding No   BMI 33.35 kg/m²     Exams:  Cognition:   Alert and Cooperative  AxOx4  Command following: Follows multistep verbal commands  Fluency: clear/fluent  Hearing: Intact  Vision:  Intact  Skin Integrity: Visible skin intact  Postural Assessment: slouched posture and rounded shoulders  Physical Exam:    Left UE Left LE Right UE Right LE   Edema absent absent absent absent   ROM AROM WFL AROM WFL AROM WFL AROM WFL   Strength adequate ROM, adequate strength adequate ROM, adequate strength adequate ROM, adequate strength adequate ROM, adequate strength   Sensation intact to light touch intact to light touch intact to light touch intact to light touch   Coordination normal normal normal normal     Outcome Measures:  AM-PAC 6 CLICK MOBILITY  Turning over in bed (including adjusting bedclothes, sheets and blankets)?: 3  Sitting down on and standing up from a chair with arms (e.g., wheelchair, bedside commode, etc.): 3  Moving from lying on back to sitting on the side of the bed?: 3  Moving to and from a bed to a chair (including a wheelchair)?: 3  Need to walk in hospital room?: 3  Climbing 3-5 steps with a railing?: 2  Basic Mobility Total Score: 17     Functional Mobility:    Bed Mobility:   Rolling/Turning to Left: stand by assistance and with side rail  Rolling/Turning to Right: stand by assistance and with side rail  Sit to Supine: " moderate assistance and to bring BLE into bed ; to Lt side of bed    Sitting Balance at Edge of Bed:  Static Sitting Balance: Good : able to maintain balance against moderate resistance  Dynamic Sitting Balance: Good- : able to sit unsupported and weight shift across midline minimally  Assistance Level Required: Stand-by Assistance    Transfers:   Sit <> Stand Transfer: minimum assistance with no assistive device   Stand <> Sit Transfer: minimum assistance with no assistive device   [id number] from bedside chair  Sit <> Stand Transfer: contact guard assistance with no assistive device   Stand <> Sit Transfer: contact guard assistance with no assistive device   [id number] from EOB    Standing Balance:  Static Standing Balance: Fair : able to stand unsupported without UE support and without LOB for 1 minute  Dynamic Standing Balance: Fair : stand independently unsupported, weight shift, and reach ipsilaterally. LOB noted when crossing midline.  Assistance Level Required: Contact Guard Assistance  Patient used: intermittent HHA on sink   Time: ~7 minutes at sink; ~3 minutes at bedside  Postural deviations noted: slouched posture, rounded shoulders, and forward head  Comments: Time in unsupported standing focused on cardiopulmonary tolerance to unsupported standing as well as strength/endurance to perform dynamic balance activity safely with no LOB and vital signs stable. Pt able to respond to with hip strategy to internal/external perturbations with appropriate anticipatory and reactive responses with no LOB. Intermittent use of 1 UE support needed throughout and pt able to complete balance challenges with anterior reaches inside NEREYDA with no LOB while standing at sink to perform balance task. Pt also able to stand for 3 minutes at EOB while changing sheets/gown with no LOB.      Gait:   Patient ambulated: 14 ft x 2   Patient required: contact guard  Patient used:   BUE on IV pole    Gait Pattern observed: reciprocal  gait  Gait Deviation(s): decreased step length, wide base of support, decreased weight shift, shuffle gait, flexed posture, and decreased mika  Impairments due to: pain and decreased endurance  all lines remained intact throughout ambulation trial  Comments: Pt with wide NEREYDA, short shuffling steps and FFP. Pt cued by PT to increase step length and improve upright posture. Pt able to perform but required min cueing to maintain throughout gait trial. Pt would benefit from future trials with RW to improve balance confidence and widen NEREYDA.    Education:  Time provided for education, counseling and discussion of health disposition in regards to patient's current status  All questions answered within PT scope of practice and to patient's satisfaction  PT role in POC to address current functional deficits  Pt educated on proper body mechanics, safety techniques, and energy conservation with PT facilitation and cueing throughout session  Call nursing/pct to transfer to chair/use bathroom. Pt stated understanding.  Whiteboard updated with therapist name and pt's current mobility status documented above  Safe to perform step transfer to/from chair/bedside commode CGA and HHA w/ nursing/PCT present  Importance of OOB tolerance prn hrs/day to improve lung ventilation and expansion as well as strengthen postural musculature    Patient left HOB elevated with all lines intact, call button in reach, and RN present.      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     Mild tricuspid regurgitation     Unspecified cirrhosis of liver        Past Surgical History:   Procedure Laterality Date    cholecystectomy  2003    COLONOSCOPY      6 polyps removed    COLONOSCOPY  07/07/2021    DIAGNOSTIC ULTRASOUND N/A 1/17/2023    Procedure: ULTRASOUND,  DIAGNOSTIC;  Surgeon: Juan Pablo Kimbrough MD;  [facility];  Service: Transplant;  Laterality: N/A;  INTRAOPERATIVE ULTRASOUND    ESOPHAGOGASTRODUODENOSCOPY      2 coulums of grade 1 varices    ESOPHAGOGASTRODUODENOSCOPY  2019    trace varices    ESOPHAGOGASTRODUODENOSCOPY  2021    ESOPHAGOGASTRODUODENOSCOPY N/A 2022    Procedure: EGD (ESOPHAGOGASTRODUODENOSCOPY);  Surgeon: Bruce Dominguez MD;  Location: [facility] ENDO (4TH FLR);  Service: Endoscopy;  Laterality: N/A;  labs prior  liver transplant candidate  screen for varices   fully vaccinated; instructions to portal-LW   pt rescheduled; updated instructions to portal-st    gynecologic surgery       removal of scar tissues and adhesions    HYSTERECTOMY      knee surgery      LIVER TRANSPLANT N/A 2023    Procedure: TRANSPLANT, LIVER;  Surgeon: Juan Pablo Kimbrough MD;  [facility];  Service: Transplant;  Laterality: N/A;    OOPHORECTOMY Right     OPEN hysterectomy      removed uterus and bilateral fallopian tubes and LEFT ovary stayed in place    ROTATOR CUFF REPAIR Right     HOANG-EN-Y PROCEDURE  2023    Procedure: CREATION, ANASTOMOSIS, HOANG-EN-Y;  Surgeon: Juan Pablo Kimbrough MD;  [facility];  Service: Transplant;;    TONSILLECTOMY         Family History   Problem Relation Age of Onset    Hypertension Father     Hyperlipidemia Father     Heart disease Father     Depression Father     Arrhythmia Brother     Heart disease Brother        Social History     Socioeconomic History    Marital status:     Number of children: 2   Tobacco Use    Smoking status: Former     Packs/day: 1.00     Types: Cigarettes     Quit date:      Years since quittin.0    Smokeless tobacco: Never   Substance and Sexual Activity    Alcohol use: Not Currently    Drug use: Not Currently    Sexual activity: Yes     Partners: Male     Social Determinants of Health     Financial Resource Strain:  Unknown    Difficulty of Paying Living Expenses: Patient refused   Food Insecurity: No Food Insecurity    Worried About Running Out of Food in the Last Year: Never true    Ran Out of Food in the Last Year: Never true   Transportation Needs: No Transportation Needs    Lack of Transportation (Medical): No    Lack of Transportation (Non-Medical): No   Physical Activity: Insufficiently Active    Days of Exercise per Week: 2 days    Minutes of Exercise per Session: 40 min   Stress: Stress Concern Present    Feeling of Stress : Very much   Social Connections: Unknown    Frequency of Communication with Friends and Family: More than three times a week    Active Member of Clubs or Organizations: No    Marital Status:    Housing Stability: Unknown    Unable to Pay for Housing in the Last Year: No    Unstable Housing in the Last Year: No       Time Tracking:     PT Received On: 01/19/23  PT Start Time: 1021     PT Stop Time: 1108  PT Total Time (min): 47 min     Billable Minutes: Evaluation 7 minutes, Gait Training 8 minutes, and Therapeutic Activity 30 minutes    Jacinta Lai PT, DPT  1/19/2023  Pager: [phone]

## 2023-01-19 NOTE — ASSESSMENT & PLAN NOTE
Endocrinology consulted for BG management.   BG goal 140-180     - D/C IIP  - Transition drip at 2 units/hr with step-down parameters.   - Novolog (aspart) insulin prn for BG excursions [facility] (150/25).  - BG checks AC/HS/0200  - Hypoglycemia protocol in place    ** Please notify Endocrine for any change and/or advance in diet**  ** Please call Endocrine for any BG related issues **    Discharge Planning:   TBD. Please notify endocrinology prior to discharge.

## 2023-01-19 NOTE — ASSESSMENT & PLAN NOTE
Mickey Harris is a 57 y.o. female with DORAN cirrhosis complicated by hepatic encephalopathy on home lactulose who is now status post living related donor liver transplant on 1/17/22.      Neuro/Psych:   -- Sedation: N/A  -- Pain: Roboxin + lidocaine patches with breakthrough narcotics  -- Melatonin QHS             Cards:   -- HDS, off pressors  -- SBP >100      Pulm:   -- Goal O2 sat > 90%; On 1L by NC. Wean to off today      Renal:  -- Keep davis for strict I/O  -- Trend BUN/Cr  -- CMP QD  -- Lasix 40 x1 this AM, PM RFP + Mag today     FEN / GI:   -- s/p LRD liver txp on 1/17; LFTs trending down, CTM with daily labs  -- Replace lytes as needed  -- Nutrition: Continue CLD this AM, Advance to regular this afternoon  -- Anti-rejection methylpred, mycophenolate, tacro per transplant pharmacy; daily tacro levels      ID:   -- Tm: afebrile  -- Antimicrobial PPX: Valganciclovir, Nystatin suspension      Heme/Onc:   -- Hb stable; Plts remain in the mid 30s despite 2u Plts given yesterday  -- CBC, PT/INR QD  -- Completed post-op Vitamin K regimen       Endo:   -- Gluc goal 140-180  -- Off insulin gtt; patient is non-diabetic. Transition to SSI with Accucheck AH/QHS  -- Endo consulted appreciate recommendations      PPx:   Feeding: Clears, anticipate advancement to regular pending continued tolerance  Analgesia/Sedation: Lidocaine patch, robaxin + breakthough  Thromboembolic prevention: SQH  HOB >30: Ordered  Stress Ulcer ppx: Famotidine Q12H  Glucose control: Critical care goal 140-180 g/dl, SSI    Lines/Drains/Airway: RIJ CVC , A line / RYLEY x2 ; Anticipate removal of a line and RYLEY x1 today - to discuss with staff     Dispo/Code Status/Palliative:   -- SICU / Full Code

## 2023-01-20 LAB
ALBUMIN SERPL BCP-MCNC: 3.2 G/DL (ref 3.5–5.2)
ALP SERPL-CCNC: 45 U/L (ref 55–135)
ALT SERPL W/O P-5'-P-CCNC: 437 U/L (ref 10–44)
ANION GAP SERPL CALC-SCNC: 8 MMOL/L (ref 8–16)
APTT BLDCRRT: 28.8 SEC (ref 21–32)
AST SERPL-CCNC: 219 U/L (ref 10–40)
BASOPHILS # BLD AUTO: 0 K/UL (ref 0–0.2)
BASOPHILS NFR BLD: 0 % (ref 0–1.9)
BILIRUB DIRECT SERPL-MCNC: 6.3 MG/DL (ref 0.1–0.3)
BILIRUB SERPL-MCNC: 9.4 MG/DL (ref 0.1–1)
BLD PROD TYP BPU: NORMAL
BLOOD UNIT EXPIRATION DATE: NORMAL
BLOOD UNIT TYPE CODE: 9500
BLOOD UNIT TYPE: NORMAL
BUN SERPL-MCNC: 45 MG/DL (ref 6–20)
CALCIUM SERPL-MCNC: 8.6 MG/DL (ref 8.7–10.5)
CHLORIDE SERPL-SCNC: 102 MMOL/L (ref 95–110)
CO2 SERPL-SCNC: 25 MMOL/L (ref 23–29)
CODING SYSTEM: NORMAL
CREAT SERPL-MCNC: 1.1 MG/DL (ref 0.5–1.4)
DIFFERENTIAL METHOD: ABNORMAL
DISPENSE STATUS: NORMAL
EOSINOPHIL # BLD AUTO: 0 K/UL (ref 0–0.5)
EOSINOPHIL NFR BLD: 0 % (ref 0–8)
ERYTHROCYTE [DISTWIDTH] IN BLOOD BY AUTOMATED COUNT: 13.5 % (ref 11.5–14.5)
EST. GFR  (NO RACE VARIABLE): 58.2 ML/MIN/1.73 M^2
G6PD RBC-CCNT: 10.5 U/G HB (ref 8–11.9)
GLUCOSE SERPL-MCNC: 154 MG/DL (ref 70–110)
HCT VFR BLD AUTO: 29.4 % (ref 37–48.5)
HGB BLD-MCNC: 10.4 G/DL (ref 12–16)
IMM GRANULOCYTES # BLD AUTO: 0.03 K/UL (ref 0–0.04)
IMM GRANULOCYTES NFR BLD AUTO: 0.5 % (ref 0–0.5)
INR PPP: 1.1 (ref 0.8–1.2)
LYMPHOCYTES # BLD AUTO: 0.3 K/UL (ref 1–4.8)
LYMPHOCYTES NFR BLD: 5.4 % (ref 18–48)
MAGNESIUM SERPL-MCNC: 2.5 MG/DL (ref 1.6–2.6)
MCH RBC QN AUTO: 33 PG (ref 27–31)
MCHC RBC AUTO-ENTMCNC: 35.4 G/DL (ref 32–36)
MCV RBC AUTO: 93 FL (ref 82–98)
MONOCYTES # BLD AUTO: 0.4 K/UL (ref 0.3–1)
MONOCYTES NFR BLD: 7.3 % (ref 4–15)
NEUTROPHILS # BLD AUTO: 5 K/UL (ref 1.8–7.7)
NEUTROPHILS NFR BLD: 86.8 % (ref 38–73)
NRBC BLD-RTO: 0 /100 WBC
PHOSPHATE SERPL-MCNC: 4.8 MG/DL (ref 2.7–4.5)
PLATELET # BLD AUTO: 40 K/UL (ref 150–450)
PMV BLD AUTO: 12.1 FL (ref 9.2–12.9)
POCT GLUCOSE: 192 MG/DL (ref 70–110)
POTASSIUM SERPL-SCNC: 5 MMOL/L (ref 3.5–5.1)
PROT SERPL-MCNC: 5.3 G/DL (ref 6–8.4)
PROTHROMBIN TIME: 11.8 SEC (ref 9–12.5)
RBC # BLD AUTO: 3.15 M/UL (ref 4–5.4)
SODIUM SERPL-SCNC: 135 MMOL/L (ref 136–145)
TACROLIMUS BLD-MCNC: 4.9 NG/ML (ref 5–15)
UNIT NUMBER: NORMAL
WBC # BLD AUTO: 5.73 K/UL (ref 3.9–12.7)

## 2023-01-20 PROCEDURE — 25000003 PHARM REV CODE 250: Performed by: STUDENT IN AN ORGANIZED HEALTH CARE EDUCATION/TRAINING PROGRAM

## 2023-01-20 PROCEDURE — 85025 COMPLETE CBC W/AUTO DIFF WBC: CPT | Performed by: SURGERY

## 2023-01-20 PROCEDURE — 85730 THROMBOPLASTIN TIME PARTIAL: CPT | Performed by: STUDENT IN AN ORGANIZED HEALTH CARE EDUCATION/TRAINING PROGRAM

## 2023-01-20 PROCEDURE — 82248 BILIRUBIN DIRECT: CPT | Performed by: STUDENT IN AN ORGANIZED HEALTH CARE EDUCATION/TRAINING PROGRAM

## 2023-01-20 PROCEDURE — P9035 PLATELET PHERES LEUKOREDUCED: HCPCS

## 2023-01-20 PROCEDURE — 85610 PROTHROMBIN TIME: CPT | Performed by: SURGERY

## 2023-01-20 PROCEDURE — 63600175 PHARM REV CODE 636 W HCPCS

## 2023-01-20 PROCEDURE — 25000003 PHARM REV CODE 250

## 2023-01-20 PROCEDURE — 83735 ASSAY OF MAGNESIUM: CPT | Performed by: SURGERY

## 2023-01-20 PROCEDURE — 25000003 PHARM REV CODE 250: Performed by: NURSE PRACTITIONER

## 2023-01-20 PROCEDURE — 80197 ASSAY OF TACROLIMUS: CPT | Performed by: STUDENT IN AN ORGANIZED HEALTH CARE EDUCATION/TRAINING PROGRAM

## 2023-01-20 PROCEDURE — 63600175 PHARM REV CODE 636 W HCPCS: Performed by: STUDENT IN AN ORGANIZED HEALTH CARE EDUCATION/TRAINING PROGRAM

## 2023-01-20 PROCEDURE — 80053 COMPREHEN METABOLIC PANEL: CPT | Performed by: STUDENT IN AN ORGANIZED HEALTH CARE EDUCATION/TRAINING PROGRAM

## 2023-01-20 PROCEDURE — 36430 TRANSFUSION BLD/BLD COMPNT: CPT

## 2023-01-20 PROCEDURE — 84100 ASSAY OF PHOSPHORUS: CPT | Performed by: SURGERY

## 2023-01-20 PROCEDURE — 63600175 PHARM REV CODE 636 W HCPCS: Performed by: SURGERY

## 2023-01-20 PROCEDURE — 94761 N-INVAS EAR/PLS OXIMETRY MLT: CPT

## 2023-01-20 PROCEDURE — 97530 THERAPEUTIC ACTIVITIES: CPT

## 2023-01-20 PROCEDURE — 20600001 HC STEP DOWN PRIVATE ROOM

## 2023-01-20 PROCEDURE — 99232 SBSQ HOSP IP/OBS MODERATE 35: CPT | Mod: ,,, | Performed by: NURSE PRACTITIONER

## 2023-01-20 PROCEDURE — 97116 GAIT TRAINING THERAPY: CPT

## 2023-01-20 PROCEDURE — 99232 PR SUBSEQUENT HOSPITAL CARE,LEVL II: ICD-10-PCS | Mod: ,,, | Performed by: NURSE PRACTITIONER

## 2023-01-20 RX ORDER — HYDROCODONE BITARTRATE AND ACETAMINOPHEN 500; 5 MG/1; MG/1
TABLET ORAL
Status: DISCONTINUED | OUTPATIENT
Start: 2023-01-20 | End: 2023-01-24

## 2023-01-20 RX ORDER — TACROLIMUS 0.5 MG/1
0.5 CAPSULE ORAL ONCE
Status: COMPLETED | OUTPATIENT
Start: 2023-01-20 | End: 2023-01-20

## 2023-01-20 RX ADMIN — ALBUTEROL SULFATE 2 PUFF: 108 INHALANT RESPIRATORY (INHALATION) at 01:01

## 2023-01-20 RX ADMIN — TACROLIMUS 1.5 MG: 1 CAPSULE ORAL at 05:01

## 2023-01-20 RX ADMIN — NYSTATIN 500000 UNITS: 500000 SUSPENSION ORAL at 07:01

## 2023-01-20 RX ADMIN — MUPIROCIN 1 G: 20 OINTMENT TOPICAL at 09:01

## 2023-01-20 RX ADMIN — METHYLPREDNISOLONE SODIUM SUCCINATE 120 MG: 125 INJECTION, POWDER, FOR SOLUTION INTRAMUSCULAR; INTRAVENOUS at 09:01

## 2023-01-20 RX ADMIN — NYSTATIN 500000 UNITS: 500000 SUSPENSION ORAL at 09:01

## 2023-01-20 RX ADMIN — HEPARIN SODIUM 5000 UNITS: 5000 INJECTION INTRAVENOUS; SUBCUTANEOUS at 06:01

## 2023-01-20 RX ADMIN — OXYCODONE HYDROCHLORIDE 10 MG: 10 TABLET ORAL at 10:01

## 2023-01-20 RX ADMIN — MELATONIN TAB 3 MG 6 MG: 3 TAB at 09:01

## 2023-01-20 RX ADMIN — HYDROMORPHONE HYDROCHLORIDE 0.5 MG: 1 INJECTION, SOLUTION INTRAMUSCULAR; INTRAVENOUS; SUBCUTANEOUS at 09:01

## 2023-01-20 RX ADMIN — NYSTATIN 500000 UNITS: 500000 SUSPENSION ORAL at 01:01

## 2023-01-20 RX ADMIN — INSULIN ASPART 4 UNITS: 100 INJECTION, SOLUTION INTRAVENOUS; SUBCUTANEOUS at 09:01

## 2023-01-20 RX ADMIN — HYDROMORPHONE HYDROCHLORIDE 0.5 MG: 1 INJECTION, SOLUTION INTRAMUSCULAR; INTRAVENOUS; SUBCUTANEOUS at 11:01

## 2023-01-20 RX ADMIN — INSULIN HUMAN 2 UNITS/HR: 1 INJECTION, SOLUTION INTRAVENOUS at 05:01

## 2023-01-20 RX ADMIN — MYCOPHENOLATE MOFETIL 1000 MG: 250 CAPSULE ORAL at 09:01

## 2023-01-20 RX ADMIN — METHOCARBAMOL 500 MG: 500 TABLET ORAL at 09:01

## 2023-01-20 RX ADMIN — ALBUTEROL SULFATE 2 PUFF: 108 INHALANT RESPIRATORY (INHALATION) at 04:01

## 2023-01-20 RX ADMIN — OXYCODONE HYDROCHLORIDE 10 MG: 10 TABLET ORAL at 07:01

## 2023-01-20 RX ADMIN — HEPARIN SODIUM 5000 UNITS: 5000 INJECTION INTRAVENOUS; SUBCUTANEOUS at 09:01

## 2023-01-20 RX ADMIN — TACROLIMUS 1 MG: 1 CAPSULE ORAL at 09:01

## 2023-01-20 RX ADMIN — HEPARIN SODIUM 5000 UNITS: 5000 INJECTION INTRAVENOUS; SUBCUTANEOUS at 01:01

## 2023-01-20 RX ADMIN — OXYCODONE HYDROCHLORIDE 10 MG: 10 TABLET ORAL at 03:01

## 2023-01-20 RX ADMIN — TACROLIMUS 0.5 MG: 0.5 CAPSULE ORAL at 09:01

## 2023-01-20 RX ADMIN — FAMOTIDINE 20 MG: 20 TABLET ORAL at 09:01

## 2023-01-20 RX ADMIN — METHOCARBAMOL 500 MG: 500 TABLET ORAL at 02:01

## 2023-01-20 NOTE — ASSESSMENT & PLAN NOTE
Mickey Harris is a 57 y.o. female with DORAN cirrhosis complicated by hepatic encephalopathy on home lactulose who is now status post living related donor liver transplant on 1/17/22.      Neuro/Psych:   -- Sedation: N/A  -- Pain: Roboxin + lidocaine patches with breakthrough narcotics  -- Melatonin QHS             Cards:   -- HDS, off pressors  -- SBP >100      Pulm:   -- Goal O2 sat > 90%; On RA      Renal:  -- Cr to 1.1 yesterday from 0.7, stable at 1.1 today  -- Keep davis for strict I/O  -- Trend BUN/Cr  -- CMP QD     FEN / GI:   -- s/p LRD liver txp on 1/17; LFTs trending down, CTM with daily labs  -- Replace lytes as needed  -- Nutrition: Continue FLD, will advance to regular with ROBF  -- Anti-rejection methylpred, mycophenolate, tacro per transplant pharmacy; daily tacro levels      ID:   -- Tm: afebrile  -- Antimicrobial PPX: Valganciclovir, Nystatin suspension      Heme/Onc:   -- Hb stable; Plts increased to 40  -- CBC, PT/INR QD  -- Completed post-op Vitamin K regimen       Endo:   -- Gluc goal 140-180  -- Continue SSI   -- Endo consulted appreciate recommendations      PPx:   Feeding: Clears, anticipate advancement to regular pending continued tolerance  Analgesia/Sedation: Lidocaine patch, robaxin + breakthough  Thromboembolic prevention: SQH  HOB >30: Ordered  Stress Ulcer ppx: Famotidine Q12H  Glucose control: Critical care goal 140-180 g/dl, SSI    Lines/Drains/Airway: RIJ CVC / RYLEY x2; will remove RIJ CVC and RYLEY x1     Dispo/Code Status/Palliative:   -- Transfer orders to TSU placed / Full Code

## 2023-01-20 NOTE — PROGRESS NOTES
[facility] - Surgical Intensive Care  Endocrinology  Progress Note    Admit Date: 2023     Reason for Consult: Management of Hyperglycemia     Surgical Procedure and Date: Liver Transplant 2023    Patient is not diabetic and does not currently take any oral/injectable antidiabetic/hypoglycemic medications.     Lab Results   Component Value Date    HGBA1C 4.5 2023           HPI: Mickey Harris is a 57 y.o. female who presented on 22 for living related donor liver transplant in the setting of DORAN cirrhosis. She was diagnosed approximately 4 years ago and did well up until one year ago when she began to develop lower extremity edema and ascites managed with diuretics. She has never required paracentesis. She has also developed hepatic encephalopathy managed with lactulose. There is no history of GI bleeding. Ms. Harris is now s/p living donor liver transplantation on 23. She received a right liver lobe which was quite large (~1000g). Biliary reconstruction was Becky-en-y reconstruction to the donor right hepatic duct. The procedure was performed without apparent complication and she was transferred to the SICU for postoperative care and management. Endocrine consulted to manage hyperglycemia in the pressence of high-dose steroids. Of note, patient on 12-24 units/hr of insulin while on IIP.               Interval HPI:   Overnight events: No acute events overnight. Patient in room 05767 CVICU/54796 CVICU A. Blood glucose improving. BG at and above goal on current insulin regimen (Transition Insulin Drip). Steroid use- Methylprednisolone  120 mg. 3 Days Post-Op  Renal function- Normal   Vasopressors-  None       Endocrine will continue to follow and manage insulin orders inpatient.         Diet full liquid [facility]; Consistent Carbohydrate, Cardiac (Low Na/Chol)     Eatin%  Nausea: No  Hypoglycemia and intervention: No  Fever: No  TPN and/or TF: No      /60 (BP Location:  "Left arm, Patient Position: Lying)   Pulse 80   Temp 98.7 °F (37.1 °C) (Oral)   Resp (!) 25   Ht 5' 5" (1.651 m)   Wt 90.9 kg (200 lb 6.4 oz)   SpO2 98%   Breastfeeding No   BMI 33.35 kg/m²     Labs Reviewed and Include    Recent Labs   Lab 01/20/23  0317   *   CALCIUM 8.6*   ALBUMIN 3.2*   PROT 5.3*   *   K 5.0   CO2 25      BUN 45*   CREATININE 1.1   ALKPHOS 45*   *   *   BILITOT 9.4*     Lab Results   Component Value Date    WBC 5.73 01/20/2023    HGB 10.4 (L) 01/20/2023    HCT 29.4 (L) 01/20/2023    MCV 93 01/20/2023    PLT 40 (L) 01/20/2023     No results for input(s): TSH, FREET4 in the last 168 hours.  Lab Results   Component Value Date    HGBA1C 4.5 01/17/2023       Nutritional status:   Body mass index is 33.35 kg/m².  Lab Results   Component Value Date    ALBUMIN 3.2 (L) 01/20/2023    ALBUMIN 3.2 (L) 01/19/2023    ALBUMIN 3.1 (L) 01/19/2023     No results found for: PREALBUMIN    Estimated Creatinine Clearance: 62.1 mL/min (based on SCr of 1.1 mg/dL).    Accu-Checks  Recent Labs     01/18/23  2102 01/18/23  2204 01/18/23  2257 01/19/23  0004 01/19/23  0005 01/19/23  0114 01/19/23  0157 01/19/23  0309 01/19/23  0355 01/19/23  0504   POCTGLUCOSE 190* 155* 160* 138* 153* 139* 124* 142* 129* 158*       Current Medications and/or Treatments Impacting Glycemic Control  Immunotherapy:    Immunosuppressants           Stop Route Frequency     tacrolimus capsule 1 mg         -- Oral 2 times daily     mycophenolate capsule 1,000 mg         -- Oral 2 times daily          Steroids:   Hormones (From admission, onward)      Start     Stop Route Frequency Ordered    01/23/23 0900  predniSONE tablet 20 mg  (methylprednisolone taper panel)        See Hyperspace for full Linked Orders Report.    -- Oral Daily 01/17/23 2148    01/22/23 0900  methylPREDNISolone sodium succinate injection 40 mg  (methylprednisolone taper panel)        See Hyperspace for full Linked Orders Report.    " 01/23 0859 IV Daily 01/17/23 2148    01/21/23 0900  methylPREDNISolone sodium succinate injection 80 mg  (methylprednisolone taper panel)        See Hyperspace for full Linked Orders Report.    01/22 0859 IV Daily 01/17/23 2148    01/20/23 0900  methylPREDNISolone sodium succinate injection 120 mg  (methylprednisolone taper panel)        See Hyperspace for full Linked Orders Report.    01/21 0859 IV Daily 01/17/23 2148    01/19/23 2100  melatonin tablet 6 mg         -- Oral Nightly 01/19/23 0636          Pressors:    Autonomic Drugs (From admission, onward)      None          Hyperglycemia/Diabetes Medications:   Antihyperglycemics (From admission, onward)      Start     Stop Route Frequency Ordered    01/19/23 1745  insulin regular in 0.9 % NaCl 100 unit/100 mL (1 unit/mL) infusion        Question:  Enter initial dose (Units/hr):  Answer:  2.5    -- IV Continuous 01/19/23 1733    01/19/23 1022  insulin aspart U-100 pen 0-10 Units         -- SubQ As needed (PRN) 01/19/23 0923            ASSESSMENT and PLAN    * Liver cirrhosis secondary to DORAN (nonalcoholic steatohepatitis)  Managed per primary team  Avoid hypoglycemia        Hyperglycemia  Endocrinology consulted for BG management.   BG goal 140-180     - Transition drip at 2.5 units/hr with step-down parameters.   - Novolog (aspart) insulin prn for BG excursions [facility] SSI (150/25).  - BG checks AC/HS/0200  - Hypoglycemia protocol in place    ** Please notify Endocrine for any change and/or advance in diet**  ** Please call Endocrine for any BG related issues **    Discharge Planning:   TBD. Please notify endocrinology prior to discharge.        S/P liver transplant  Optimize BG control to improve wound healing        Adrenal corticosteroid causing adverse effect in therapeutic use  Glucocorticoids markedly increase glucose levels. Expect the steroid taper will help glucose control.              Jaison Laboy, DNP, FNP  Endocrinology  [facility] - Surgical  Intensive Care

## 2023-01-20 NOTE — SUBJECTIVE & OBJECTIVE
"Interval HPI:   Overnight events: No acute events overnight. Patient in room 22887 CVICU/18956 CVICU A. Blood glucose improving. BG at and above goal on current insulin regimen (Transition Insulin Drip). Steroid use- Methylprednisolone  120 mg. 3 Days Post-Op  Renal function- Normal   Vasopressors-  None       Endocrine will continue to follow and manage insulin orders inpatient.         Diet full liquid [facility]; Consistent Carbohydrate, Cardiac (Low Na/Chol)     Eatin%  Nausea: No  Hypoglycemia and intervention: No  Fever: No  TPN and/or TF: No      /60 (BP Location: Left arm, Patient Position: Lying)   Pulse 80   Temp 98.7 °F (37.1 °C) (Oral)   Resp (!) 25   Ht 5' 5" (1.651 m)   Wt 90.9 kg (200 lb 6.4 oz)   SpO2 98%   Breastfeeding No   BMI 33.35 kg/m²     Labs Reviewed and Include    Recent Labs   Lab 23  0317   *   CALCIUM 8.6*   ALBUMIN 3.2*   PROT 5.3*   *   K 5.0   CO2 25      BUN 45*   CREATININE 1.1   ALKPHOS 45*   *   *   BILITOT 9.4*     Lab Results   Component Value Date    WBC 5.73 2023    HGB 10.4 (L) 2023    HCT 29.4 (L) 2023    MCV 93 2023    PLT 40 (L) 2023     No results for input(s): TSH, FREET4 in the last 168 hours.  Lab Results   Component Value Date    HGBA1C 4.5 2023       Nutritional status:   Body mass index is 33.35 kg/m².  Lab Results   Component Value Date    ALBUMIN 3.2 (L) 2023    ALBUMIN 3.2 (L) 2023    ALBUMIN 3.1 (L) 2023     No results found for: PREALBUMIN    Estimated Creatinine Clearance: 62.1 mL/min (based on SCr of 1.1 mg/dL).    Accu-Checks  Recent Labs     23  2102 23  2204 23  2257 23  0004 23  0005 23  0114 23  0157 23  0309 23  0355 23  0504   POCTGLUCOSE 190* 155* 160* 138* 153* 139* 124* 142* 129* 158*       Current Medications and/or Treatments Impacting Glycemic Control  Immunotherapy:  "   Immunosuppressants           Stop Route Frequency     tacrolimus capsule 1 mg         -- Oral 2 times daily     mycophenolate capsule 1,000 mg         -- Oral 2 times daily          Steroids:   Hormones (From admission, onward)      Start     Stop Route Frequency Ordered    01/23/23 0900  predniSONE tablet 20 mg  (methylprednisolone taper panel)        See Hyperspace for full Linked Orders Report.    -- Oral Daily 01/17/23 2148    01/22/23 0900  methylPREDNISolone sodium succinate injection 40 mg  (methylprednisolone taper panel)        See Hyperspace for full Linked Orders Report.    01/23 0859 IV Daily 01/17/23 2148    01/21/23 0900  methylPREDNISolone sodium succinate injection 80 mg  (methylprednisolone taper panel)        See Hyperspace for full Linked Orders Report.    01/22 0859 IV Daily 01/17/23 2148    01/20/23 0900  methylPREDNISolone sodium succinate injection 120 mg  (methylprednisolone taper panel)        See Hyperspace for full Linked Orders Report.    01/21 0859 IV Daily 01/17/23 2148    01/19/23 2100  melatonin tablet 6 mg         -- Oral Nightly 01/19/23 0636          Pressors:    Autonomic Drugs (From admission, onward)      None          Hyperglycemia/Diabetes Medications:   Antihyperglycemics (From admission, onward)      Start     Stop Route Frequency Ordered    01/19/23 1745  insulin regular in 0.9 % NaCl 100 unit/100 mL (1 unit/mL) infusion        Question:  Enter initial dose (Units/hr):  Answer:  2.5    -- IV Continuous 01/19/23 1733    01/19/23 1022  insulin aspart U-100 pen 0-10 Units         -- SubQ As needed (PRN) 01/19/23 0923

## 2023-01-20 NOTE — ASSESSMENT & PLAN NOTE
Endocrinology consulted for BG management.   BG goal 140-180     - Transition drip at 2.5 units/hr with step-down parameters.   - Novolog (aspart) insulin prn for BG excursions [facility] (150/25).  - BG checks /HS/0200  - Hypoglycemia protocol in place    ** Please notify Endocrine for any change and/or advance in diet**  ** Please call Endocrine for any BG related issues **    Discharge Planning:   TBD. Please notify endocrinology prior to discharge.

## 2023-01-20 NOTE — PT/OT/SLP PROGRESS
Physical Therapy Treatment    Patient Name:  Mickey Harris   MRN:  [id number]  Admitting Diagnosis:  Liver cirrhosis secondary to DORAN (nonalcoholic steatohepatitis)   Recent Surgery: Procedure(s) (LRB):  TRANSPLANT, LIVER (N/A)  CREATION, ANASTOMOSIS, HOANG-EN-Y  ULTRASOUND, DIAGNOSTIC (N/A) 3 Days Post-Op  Admit Date: 1/17/2023  Length of Stay: 3 days    Recommendations:     Discharge Recommendations:  other (see comments)   Discharge Equipment Recommendations: none   Barriers to discharge: None    Plan:     During this hospitalization, patient to be seen 4 x/week to address the identified rehab impairments via gait training, therapeutic activities, therapeutic exercises, neuromuscular re-education and progress towards the established goals.  Plan of Care Expires:  02/19/23  Plan of Care Reviewed with: patient    Assessment:     Mickey Harris is a 57 y.o. female admitted with a medical diagnosis of Liver cirrhosis secondary to DORAN (nonalcoholic steatohepatitis). Pt tolerated treatment session well. Pt showing improvement with functional mobility as she increased her ambulation distance during session today. She frequently complained of mucus build up and gas retention during session. Pt tolerated standing balance well, but she leaned onto sink during trial due to fatigue and balance impairments. Pt showed increased WOB during activity and required occasional rest breaks during gait trial, but patient's vital signs remained WNL during entire session. Patient would benefit from skilled therapy services to maximize safety and independence, decrease caregiver burden, improve QOL, improve patient's functional mobility, and decrease fall risk.      Problem List: weakness, impaired endurance, impaired self care skills, impaired functional mobility, gait instability, impaired balance, decreased safety awareness, impaired cardiopulmonary response to activity.  Rehab Prognosis: Good; patient would benefit from acute skilled  "PT services to address these deficits and reach maximum level of function.      Goals:   Multidisciplinary Problems       Physical Therapy Goals          Problem: Physical Therapy    Goal Priority Disciplines Outcome Goal Variances Interventions   Physical Therapy Goal     PT, PT/OT Ongoing, Progressing     Description: Goals to be met by: 2023    Patient will increase functional independence with mobility by performin. Sit to stand transfer with Modified [place]  2. Bed to chair transfer with Supervision using LRAD  3. Gait  x 250 feet with Supervision using LRAD.   4. Ascend/descend 6 stair with bilateral Handrails Stand-by Assistance using no AD.   5. Lower extremity exercise program x30 reps per handout, with independence                         Subjective     RN  notified prior to session. Spouse present upon PT entrance into room; spouse left briefly after PT arrival. Patient agreeable to PT treatment session.    Chief Complaint: decreased endurance  Patient/Family Comments/goals: "I want to keep moving to try and get this stuff up and so I can pass gas"  Pain/Comfort:  Pain Rating 1: other (see comments) (did not rate)  Pain Addressed 1: Reposition, Distraction  Pain Rating Post-Intervention 1: other (see comments) (did not rate)      Objective:     Additional staff present: Supervising PT    Patient found HOB elevated with: telemetry, blood pressure cuff, pulse ox (continuous), peripheral IV, oxygen, davis catheter, RYLEY drain   Cognition:   Alert and Cooperative  AxOx4  Command following: Follows two-step verbal commands  Fluency: clear/fluent  General Precautions: Standard, Cardiac fall   Orthopedic Precautions:N/A   Braces: N/A   Body mass index is 33.35 kg/m².  Oxygen Device: Pt found with NC 1 L upon arrival to room. Pt requested removal of O2 for activity and ambulation, so O2 removed for session. Pt requested O2 at conclusion of session, so pt left with NC 1 L.  Vitals: BP (!) 160/74 (BP " "Location: Left arm, Patient Position: Lying)   Pulse 78   Temp 98.6 °F (37 °C) (Oral)   Resp (!) 24   Ht 5' 5" (1.651 m)   Wt 90.9 kg (200 lb 6.4 oz)   SpO2 (!) 90%   Breastfeeding No   BMI 33.35 kg/m²     Outcome Measures:  AM-PAC 6 CLICK MOBILITY  Turning over in bed (including adjusting bedclothes, sheets and blankets)?: 3  Sitting down on and standing up from a chair with arms (e.g., wheelchair, bedside commode, etc.): 3  Moving from lying on back to sitting on the side of the bed?: 3  Moving to and from a bed to a chair (including a wheelchair)?: 3  Need to walk in hospital room?: 3  Climbing 3-5 steps with a railing?: 2  Basic Mobility Total Score: 17     Functional Mobility:    Bed Mobility:   Supine to Sit: moderate assistance; from L side of bed  Scooting anteriorly to EOB to have both feet planted on floor: contact guard assistance    Sitting Balance at Edge of Bed:  Static Sitting Balance: Good : able to maintain balance against moderate resistance  Dynamic Sitting Balance: Good- : able to sit unsupported and weight shift across midline minimally  Assistance Level Required: Supervision    Transfers:   Sit <> Stand Transfer: contact guard assistance with hand-held assist   Stand <> Sit Transfer: contact guard assistance with hand-held assist     Standing Balance:  Static Standing Balance: Good- : able to maintain standing balance against minimal resistance  Dynamic Standing Balance: Fair : stand independently unsupported, weight shift, and reach ipsilaterally. LOB noted when crossing midline.  Assistance Level Required: Stand-by Assistance  Patient used:  Pt UE on sink for balance     Time: ~10 minutes  Postural deviations noted: forward head posture, rounded shoulders  Comments: Pt required UE support on sink for dynamic standing balance. Pt did not experience any LOB during standing trial. Pt tolerated upright standing fairly well and her vitals signs remained WNL, but pt she showed slight " increased RR due to poor endurance. Pt reported minor dizziness throughout standing trial but continued to participate in session, and pt did not report increase in dizziness during entire session.      Gait:  Patient ambulated: 12 ft, 75 ft   Patient required: contact guard  Patient used:   B HHA on IV pole    Gait Pattern observed: reciprocal gait  Gait Deviation(s): occasional unsteady gait, decreased step length, wide base of support, and narrow base of support  Impairments due to: impaired balance, decreased strength, decreased endurance, and impaired postural control  all lines remained intact throughout ambulation trial  Comments: Pt demo'd occasional unsteadiness during trial, but did not experience any LOB. Pt required occasional rest breaks during trial due to decreased endurance. Pt required cueing for management of IV pole throughout trial. Pt demo'd increased RR intermittently during trial, but pt's vital signs remained WNL during trial.     Education:  Time provided for education, counseling and discussion of health disposition in regards to patient's current status  All questions answered within PT scope of practice and to patient's satisfaction  PT role in POC to address current functional deficits  Pt educated on proper body mechanics, safety techniques, and energy conservation with PT facilitation and cueing throughout session  Importance of OOB tolerance to improve lung ventilation and expansion as well as strengthen postural musculature    Patient left up in chair with all lines intact, call button in reach, and RN notified.    Time Tracking:     PT Received On: 01/20/23  PT Start Time: 1307     PT Stop Time: 1345  PT Total Time (min): 38 min     Billable Minutes:   Gait Training 10 minutes and Therapeutic Activity 28 minutes    Treatment Type: Treatment  PT/PTA: PT       EBONI Ruiz  1/20/2023

## 2023-01-20 NOTE — PROGRESS NOTES
[facility] - [facility] Intensive Care  Critical Care - Surgery  Progress Note    Patient Name: Mickey Harris  MRN: [id number]  Admission Date: 1/17/2023  Hospital Length of Stay: 3 days  Code Status: Full Code  Attending Provider: Juan Pablo Kimbrough MD  Primary Care Provider: Primary Doctor No   Principal Problem: Liver cirrhosis secondary to DORAN (nonalcoholic steatohepatitis)    Subjective:     Hospital/ICU Course:  No notes on file    Interval History/Significant Events: Improved sleep overnight. VSS. Afebrile. Awaiting bed on TSU    Follow-up For: Procedure(s) (LRB):  TRANSPLANT, LIVER (N/A)  CREATION, ANASTOMOSIS, HOANG-EN-Y  ULTRASOUND, DIAGNOSTIC (N/A)    Post-Operative Day: 3 Days Post-Op    Objective:     Vital Signs (Most Recent):  Temp: 98.7 °F (37.1 °C) (01/20/23 0300)  Pulse: 78 (01/20/23 0630)  Resp: 13 (01/20/23 0630)  BP: (!) 125/59 (01/20/23 0600)  SpO2: (!) 93 % (01/20/23 0630)   Vital Signs (24h Range):  Temp:  [97.8 °F (36.6 °C)-98.7 °F (37.1 °C)] 98.7 °F (37.1 °C)  Pulse:  [70-97] 78  Resp:  [11-47] 13  SpO2:  [89 %-100 %] 93 %  BP: (113-160)/(56-74) 125/59  Arterial Line BP: (111-168)/() 111/84     Weight: 90.9 kg (200 lb 6.4 oz)  Body mass index is 33.35 kg/m².      Intake/Output Summary (Last 24 hours) at 1/20/2023 0723  Last data filed at 1/20/2023 0600  Gross per 24 hour   Intake 1273.53 ml   Output 2295 ml   Net -1021.47 ml       Physical Exam  Vitals and nursing note reviewed.   Constitutional:       General: She is not in acute distress.     Appearance: Normal appearance. She is obese. She is not ill-appearing.   HENT:      Head: Normocephalic and atraumatic.   Eyes:      Extraocular Movements: Extraocular movements intact.   Neck:      Comments: R IJ CVC   Cardiovascular:      Rate and Rhythm: Normal rate and regular rhythm.      Pulses: Normal pulses.   Pulmonary:      Effort: Pulmonary effort is normal.   Abdominal:      Comments: Soft, nondistended. Chevron incision c/d/I.  2 RYLEY drains  with serosanguinous drainage   Genitourinary:     Comments: [place] in place with yellow urine  Musculoskeletal:      Cervical back: Normal range of motion.      Right lower leg: No edema.      Left lower leg: No edema.   Skin:     General: Skin is warm.      Capillary Refill: Capillary refill takes less than 2 seconds.   Neurological:      General: No focal deficit present.      Mental Status: She is alert and oriented to person, place, and time.       Lines/Drains/Airways       Central Venous Catheter Line  Duration             Trialysis (Dialysis) Catheter 01/17/23 1238 right internal jugular 2 days              Drain  Duration                  Closed/Suction Drain 01/17/23 2011 Right Abdomen Bulb 19 Fr. 2 days         Closed/Suction Drain 01/17/23 2012 Right Abdomen Bulb 19 Fr. 2 days         Urethral Catheter 01/17/23 1159 Non-latex;Straight-tip 16 Fr. 2 days              Peripheral Intravenous Line  Duration                  Peripheral IV - Single Lumen 01/17/23 0627 18 G Posterior;Right Forearm 3 days                    Significant Labs:    CBC/Anemia Profile:  Recent Labs   Lab 01/19/23  0353 01/19/23  1405 01/20/23  0317   WBC 8.87 6.41 5.73   HGB 10.0* 10.3* 10.4*   HCT 28.7* 30.6* 29.4*   PLT 34* 40* 40*   MCV 94 97 93   RDW 14.2 14.1 13.5        Chemistries:  Recent Labs   Lab 01/19/23  0353 01/19/23  1405 01/20/23  0317    136 135*   K 4.8 5.1 5.0   * 104 102   CO2 21* 23 25   BUN 31* 41* 45*   CREATININE 0.7 1.1 1.1   CALCIUM 7.6* 8.4* 8.6*   ALBUMIN 3.1* 3.2* 3.2*   PROT 4.9* 5.1* 5.3*   BILITOT 8.8* 9.8* 9.4*   ALKPHOS 42* 41* 45*   * 448* 437*   * 274* 219*   MG 2.5 2.4 2.5   PHOS 3.4 4.9* 4.8*       All pertinent labs within the past 24 hours have been reviewed.    Significant Imaging:  I have reviewed all pertinent imaging results/findings within the past 24 hours.    Assessment/Plan:     * Liver cirrhosis secondary to DORAN (nonalcoholic steatohepatitis)  Mickey [place] is  a 57 y.o. female with DORAN cirrhosis complicated by hepatic encephalopathy on home lactulose who is now status post living related donor liver transplant on 1/17/22.      Neuro/Psych:   -- Sedation: N/A  -- Pain: Roboxin + lidocaine patches with breakthrough narcotics  -- Melatonin QHS             Cards:   -- HDS, off pressors  -- SBP >100      Pulm:   -- Goal O2 sat > 90%; On RA      Renal:  -- Cr to 1.1 yesterday from 0.7, stable at 1.1 today  -- Keep davis for strict I/O  -- Trend BUN/Cr  -- CMP QD     FEN / GI:   -- s/p LRD liver txp on 1/17; LFTs trending down, CTM with daily labs  -- Replace lytes as needed  -- Nutrition: Continue FLD, will advance to regular with ROBF  -- Anti-rejection methylpred, mycophenolate, tacro per transplant pharmacy; daily tacro levels      ID:   -- Tm: afebrile  -- Antimicrobial PPX: Valganciclovir, Nystatin suspension      Heme/Onc:   -- Hb stable; Plts increased to 40  -- CBC, PT/INR QD  -- Completed post-op Vitamin K regimen       Endo:   -- Gluc goal 140-180  -- Continue SSI   -- Endo consulted appreciate recommendations      PPx:   Feeding: Clears, anticipate advancement to regular pending continued tolerance  Analgesia/Sedation: Lidocaine patch, robaxin + breakthough  Thromboembolic prevention: SQH  HOB >30: Ordered  Stress Ulcer ppx: Famotidine Q12H  Glucose control: Critical care goal 140-180 g/dl, SSI    Lines/Drains/Airway: RIJ CVC / RYLEY x2; will remove RIJ CVC and RYLEY x1     Dispo/Code Status/Palliative:   -- Transfer orders to TSU placed / Full Code         Jaison Charles MD  Critical Care - Surgery  [facility] - [facility]

## 2023-01-20 NOTE — PLAN OF CARE
Recommendations     1.) Recommend advancing diet to Low Na and carb controlled (DM) diet when medically feasible.      2.) Recommend continuing Boost Glucose Control TID.      3.) RD to monitor.     Goals: To meet % or EEN/EPN by next RD f/u.  Nutrition Goal Status: progressing towards goal  Communication of RD Recs:  (POC)

## 2023-01-20 NOTE — NURSING TRANSFER
Nursing Transfer Note      1/20/2023     Reason patient is being transferred: Step down from ICU    Transfer To: [id number]    Transfer via wheelchair    Transfer with cardiac monitoring    Transported by RN    Medicines sent: Mupirocin, Insulin aspart, and Insulin infusion.     Any special needs or follow-up needed: None    Chart send with patient: Yes    Notified: spouse in room with patient.     Patient reassessed at: 1/20/23 1500    Upon arrival to floor: Cardiac monitor applied, telemetry room called. Tele box number [id number]. Patient transferred to chair. Nurse Razo and patient's spouse at bedside.

## 2023-01-20 NOTE — PLAN OF CARE
"      SICU PLAN OF CARE NOTE    Dx: Liver Transplant    Shift Events: Pt's V/S stable over shift, no acute events over night. Pt able to rest intermittently over night (few hours reported per pt), PRN and scheduled pain and sleep meds given, working well per pt. Pt had had minimal but adequate UOP over shift. Pt OOBTC early this AM with x1 RN assist. Pt tolerating diet well. Plan to stepdown pt out of ICU today, advance diet when indicated, further independence with PT/OT. POC reviewed with pt and pt's spouse, all questions answered and encouraged.     Goals of Care: -160    Neuro: AAO x4, Follows Commands, and Moves All Extremities    Vital Signs: /66 (BP Location: Left arm, Patient Position: Lying)   Pulse 72   Temp 98.4 °F (36.9 °C) (Oral)   Resp 11   Ht 5' 5" (1.651 m)   Wt 90.9 kg (200 lb 6.4 oz)   SpO2 98%   Breastfeeding No   BMI 33.35 kg/m²     Cardiac: NSR, HR 70-90s, CVP 9 (MD aware)    Respiratory: Room Air    Diet: Full Liquid, diabetic / no salt    Gtts: Insulin    Urine Output: Urinary Catheter 660 cc/shift    Drains: RYLEY x2, total output of 180 ml/shift     Labs: daily, no lyte replacements this AM / Accuchecks: ACHS + 0200    Skin: No skin breakdown noted. Pt's chevron dressing removed, incision CDI, cleansed with betadine. Pt turned Q2H, pillow support provided. Chair cushion utilized. Skin foam in place for prevention, heel elevated on pillow. [place] bed plugged in and working properly, waffle mattress inflated.   "

## 2023-01-20 NOTE — PROGRESS NOTES
"[facility] - [facility]ensive [facility]  Adult Nutrition  Progress Note    SUMMARY       Recommendations    1.) Recommend advancing diet to Low Na and carb controlled (DM) diet when medically feasible.     2.) Recommend continuing Boost Glucose Control TID.     3.) RD to monitor.    Goals: To meet % or EEN/EPN by next RD f/u.  Nutrition Goal Status: progressing towards goal  Communication of RD Recs:  (POC)    Assessment and Plan    Nutrition Problem  Inability to consume sufficient energy    Related to (etiology):   S/p liver transplant    Signs and Symptoms (as evidenced by):   Full liquid diet    Interventions/Recommendations (treatment strategy):  Collaboration for Nutrition Care with other providers.  ONS     Nutrition Diagnosis Status:   New      Reason for Assessment    Reason For Assessment: RD follow-up  Diagnosis:  (cirrhosis (DORAN); s/p liver translplant)  Relevant Medical History: HLD  Interdisciplinary Rounds: did not attend  General Information Comments: Pt seen today for RD f/u. Pt denies any n/v/d/c. Pt states that their appetite is "non-existant" but tries to eat. Stated to RD that they consumed % of their breakfast.  RD went over some of the education materials that RD provided family during the initial consult. Pt stated that they understood. As poer visual NFPE, pt appears to be well nourished.  was present in the room, had no questions at this time.  Nutrition Discharge Planning: Post transplant nutrition education provided. Food safety/drug interactions emphasized. General healthy/low salt diet recommended. Education material left.  No other needs identified. Caregiver present.    Nutrition Risk Screen    Nutrition Risk Screen: no indicators present    Nutrition/Diet History    Patient Reported Diet/Restrictions/Preferences: low salt  Food Preferences: No seafood  Spiritual, Cultural Beliefs, [employer] Practices, Values that Affect Care: no  Food Allergies: " "[facility]    Anthropometrics    Temp: 98.6 °F (37 °C)  Height Method: Stated  Height: 5' 5" (165.1 cm)  Height (inches): 65 in  Weight Method: Bed Scale  Weight: 90.9 kg (200 lb 6.4 oz)  Weight (lb): 200.4 lb  Ideal Body Weight (IBW), Female: 125 lb  % Ideal Body Weight, Female (lb): 156.8 %  BMI (Calculated): 33.3  BMI Grade: 30 - 34.9- obesity - grade I     Lab/Procedures/Meds    Pertinent Labs Reviewed: reviewed  Pertinent Labs Comments: 1/20: h/h: 10.9/29.4, Na: 135, BUN: 45, GFR: 58.2, gluc: 154, Ca: 8.6, alb: 3.2, PRO: 5.3  Pertinent Medications Reviewed: reviewed  Pertinent Medications Comments: heparin    Estimated/Assessed Needs    Weight Used For Calorie Calculations: 90.9 kg (200 lb 6.4 oz)  Energy Calorie Requirements (kcal): 1863 kcal (MSJx1.25)  Energy Need Method: [place]  Protein Requirements: 86- 97g (1.5-1.7g/kg)  Weight Used For Protein Calculations: 57 kg (125 lb 10.6 oz) (using IBW)  Fluid Requirements (mL): 1ml/1kcal or per MD  Estimated Fluid Requirement Method: RDA Method  RDA Method (mL): 1863     Nutrition Prescription Ordered    Current Diet Order: Full liquid diet  Oral Nutrition Supplement: Boost Glucose control TID    Evaluation of Received Nutrient/Fluid Intake    Other Calories (kcal): 0  I/O: + 6538ml since admission  Protein Required: not meeting needs  Fluid Required: not meeting needs  Comments: LBM: 1/16  Tolerance: tolerating  % Intake of Estimated Energy Needs: 50 - 75 %  % Meal Intake: 75 - 100 %    Nutrition Risk    Level of Risk/Frequency of Follow-up:  (RD f/u x1/week)     Monitor and Evaluation    Food and Nutrient Intake: energy intake, food and beverage intake  Food and Nutrient Adminstration: diet order  Knowledge/Beliefs/Attitudes: food and nutrition knowledge/skill, beliefs and attitudes  Physical Activity and Function: factors affecting access to physical activity  Anthropometric Measurements: height/length, weight, weight change, body mass index  Biochemical " Data, Medical Tests and Procedures: electrolyte and renal panel, gastrointestinal profile, glucose/endocrine profile, inflammatory profile, lipid profile  Nutrition-Focused Physical Findings: overall appearance, extremities, muscles and bones, skin     Nutrition Follow-Up    RD Follow-up?: Yes

## 2023-01-20 NOTE — PLAN OF CARE
Significant events: Arterial line removed. OOBTC. Step down orders placed.   Vitals/Respiratory:  1L NC.   O2: >89%.   HR: 70-80's, NSR.  SBP: 100-160.  CVP:  3-7. Afebrile.   Gtts:  Insulin.   UOP:  1.3 L from davis catheter.       Last Bowel Movement:  1/16/23.   RYLEY drains: 205 cc serosanguinous output.   Neuro: Pupils equal and reactive. AAOx4, follows commands, and moves all extremities purposefully.      Diet:  Full liquid, diabetic/cardiac diet. 1.5 L fluid restriction.   Labs/Accuchecks: Daily labs. Accucheck ACHS.   Plan:   Step down from ICU care.     Skin:  Turned Q2 hrs. No new skin breakdown noted.  Waffle mattress, and chair cushion in use.

## 2023-01-20 NOTE — SUBJECTIVE & OBJECTIVE
Interval History/Significant Events: Improved sleep overnight. VSS. Afebrile. Awaiting bed on TSU    Follow-up For: Procedure(s) (LRB):  TRANSPLANT, LIVER (N/A)  CREATION, ANASTOMOSIS, HOANG-EN-Y  ULTRASOUND, DIAGNOSTIC (N/A)    Post-Operative Day: 3 Days Post-Op    Objective:     Vital Signs (Most Recent):  Temp: 98.7 °F (37.1 °C) (01/20/23 0300)  Pulse: 78 (01/20/23 0630)  Resp: 13 (01/20/23 0630)  BP: (!) 125/59 (01/20/23 0600)  SpO2: (!) 93 % (01/20/23 0630)   Vital Signs (24h Range):  Temp:  [97.8 °F (36.6 °C)-98.7 °F (37.1 °C)] 98.7 °F (37.1 °C)  Pulse:  [70-97] 78  Resp:  [11-47] 13  SpO2:  [89 %-100 %] 93 %  BP: (113-160)/(56-74) 125/59  Arterial Line BP: (111-168)/() 111/84     Weight: 90.9 kg (200 lb 6.4 oz)  Body mass index is 33.35 kg/m².      Intake/Output Summary (Last 24 hours) at 1/20/2023 0723  Last data filed at 1/20/2023 0600  Gross per 24 hour   Intake 1273.53 ml   Output 2295 ml   Net -1021.47 ml       Physical Exam  Vitals and nursing note reviewed.   Constitutional:       General: She is not in acute distress.     Appearance: Normal appearance. She is obese. She is not ill-appearing.   HENT:      Head: Normocephalic and atraumatic.   Eyes:      Extraocular Movements: Extraocular movements intact.   Neck:      Comments: R IJ CVC   Cardiovascular:      Rate and Rhythm: Normal rate and regular rhythm.      Pulses: Normal pulses.   Pulmonary:      Effort: Pulmonary effort is normal.   Abdominal:      Comments: Soft, nondistended. Chevron incision c/d/I.  2 RYLEY drains with serosanguinous drainage   Genitourinary:     Comments: Morrow in place with yellow urine  Musculoskeletal:      Cervical back: Normal range of motion.      Right lower leg: No edema.      Left lower leg: No edema.   Skin:     General: Skin is warm.      Capillary Refill: Capillary refill takes less than 2 seconds.   Neurological:      General: No focal deficit present.      Mental Status: She is alert and oriented to person,  place, and time.       Lines/Drains/Airways       Central Venous Catheter Line  Duration             Trialysis (Dialysis) Catheter 01/17/23 1238 right internal jugular 2 days              Drain  Duration                  Closed/Suction Drain 01/17/23 2011 Right Abdomen Bulb 19 Fr. 2 days         Closed/Suction Drain 01/17/23 2012 Right Abdomen Bulb 19 Fr. 2 days         Urethral Catheter 01/17/23 1159 Non-latex;Straight-tip 16 Fr. 2 days              Peripheral Intravenous Line  Duration                  Peripheral IV - Single Lumen 01/17/23 0627 18 G Posterior;Right Forearm 3 days                    Significant Labs:    CBC/Anemia Profile:  Recent Labs   Lab 01/19/23  0353 01/19/23  1405 01/20/23 0317   WBC 8.87 6.41 5.73   HGB 10.0* 10.3* 10.4*   HCT 28.7* 30.6* 29.4*   PLT 34* 40* 40*   MCV 94 97 93   RDW 14.2 14.1 13.5        Chemistries:  Recent Labs   Lab 01/19/23  0353 01/19/23  1405 01/20/23 0317    136 135*   K 4.8 5.1 5.0   * 104 102   CO2 21* 23 25   BUN 31* 41* 45*   CREATININE 0.7 1.1 1.1   CALCIUM 7.6* 8.4* 8.6*   ALBUMIN 3.1* 3.2* 3.2*   PROT 4.9* 5.1* 5.3*   BILITOT 8.8* 9.8* 9.4*   ALKPHOS 42* 41* 45*   * 448* 437*   * 274* 219*   MG 2.5 2.4 2.5   PHOS 3.4 4.9* 4.8*       All pertinent labs within the past 24 hours have been reviewed.    Significant Imaging:  I have reviewed all pertinent imaging results/findings within the past 24 hours.

## 2023-01-21 PROBLEM — Z79.60 LONG-TERM USE OF IMMUNOSUPPRESSANT MEDICATION: Status: ACTIVE | Noted: 2023-01-21

## 2023-01-21 PROBLEM — Z29.89 PROPHYLACTIC IMMUNOTHERAPY: Status: ACTIVE | Noted: 2023-01-21

## 2023-01-21 PROBLEM — Z91.89 AT RISK FOR OPPORTUNISTIC INFECTIONS: Status: ACTIVE | Noted: 2023-01-21

## 2023-01-21 PROBLEM — D62 ACUTE BLOOD LOSS ANEMIA: Status: ACTIVE | Noted: 2023-01-21

## 2023-01-21 LAB
ABO + RH BLD: NORMAL
ALBUMIN SERPL BCP-MCNC: 3.2 G/DL (ref 3.5–5.2)
ALP SERPL-CCNC: 51 U/L (ref 55–135)
ALT SERPL W/O P-5'-P-CCNC: 367 U/L (ref 10–44)
ANION GAP SERPL CALC-SCNC: 9 MMOL/L (ref 8–16)
APTT BLDCRRT: 27 SEC (ref 21–32)
AST SERPL-CCNC: 178 U/L (ref 10–40)
BASOPHILS # BLD AUTO: 0 K/UL (ref 0–0.2)
BASOPHILS NFR BLD: 0 % (ref 0–1.9)
BILIRUB DIRECT SERPL-MCNC: 9 MG/DL (ref 0.1–0.3)
BILIRUB SERPL-MCNC: 13.7 MG/DL (ref 0.1–1)
BLD GP AB SCN CELLS X3 SERPL QL: NORMAL
BLD PROD TYP BPU: NORMAL
BLOOD UNIT EXPIRATION DATE: NORMAL
BLOOD UNIT TYPE CODE: 600
BLOOD UNIT TYPE CODE: 6200
BLOOD UNIT TYPE: NORMAL
BUN SERPL-MCNC: 31 MG/DL (ref 6–20)
CALCIUM SERPL-MCNC: 8.4 MG/DL (ref 8.7–10.5)
CHLORIDE SERPL-SCNC: 105 MMOL/L (ref 95–110)
CO2 SERPL-SCNC: 23 MMOL/L (ref 23–29)
CODING SYSTEM: NORMAL
CREAT SERPL-MCNC: 0.9 MG/DL (ref 0.5–1.4)
DIFFERENTIAL METHOD: ABNORMAL
DISPENSE STATUS: NORMAL
EOSINOPHIL # BLD AUTO: 0 K/UL (ref 0–0.5)
EOSINOPHIL NFR BLD: 1.1 % (ref 0–8)
ERYTHROCYTE [DISTWIDTH] IN BLOOD BY AUTOMATED COUNT: 13.4 % (ref 11.5–14.5)
EST. GFR  (NO RACE VARIABLE): >60 ML/MIN/1.73 M^2
GLUCOSE SERPL-MCNC: 79 MG/DL (ref 70–110)
HCT VFR BLD AUTO: 29.7 % (ref 37–48.5)
HGB BLD-MCNC: 10.7 G/DL (ref 12–16)
IMM GRANULOCYTES # BLD AUTO: 0.03 K/UL (ref 0–0.04)
IMM GRANULOCYTES NFR BLD AUTO: 0.8 % (ref 0–0.5)
INR PPP: 1.1 (ref 0.8–1.2)
LYMPHOCYTES # BLD AUTO: 0.4 K/UL (ref 1–4.8)
LYMPHOCYTES NFR BLD: 12.1 % (ref 18–48)
MAGNESIUM SERPL-MCNC: 2.2 MG/DL (ref 1.6–2.6)
MCH RBC QN AUTO: 32.9 PG (ref 27–31)
MCHC RBC AUTO-ENTMCNC: 36 G/DL (ref 32–36)
MCV RBC AUTO: 91 FL (ref 82–98)
MONOCYTES # BLD AUTO: 0.3 K/UL (ref 0.3–1)
MONOCYTES NFR BLD: 9.1 % (ref 4–15)
NEUTROPHILS # BLD AUTO: 2.8 K/UL (ref 1.8–7.7)
NEUTROPHILS NFR BLD: 76.9 % (ref 38–73)
NRBC BLD-RTO: 0 /100 WBC
NUM UNITS TRANS FFP: NORMAL
NUM UNITS TRANS PACKED RBC: NORMAL
PHOSPHATE SERPL-MCNC: 2.6 MG/DL (ref 2.7–4.5)
PLATELET # BLD AUTO: 43 K/UL (ref 150–450)
PMV BLD AUTO: 13 FL (ref 9.2–12.9)
POCT GLUCOSE: 119 MG/DL (ref 70–110)
POCT GLUCOSE: 138 MG/DL (ref 70–110)
POCT GLUCOSE: 183 MG/DL (ref 70–110)
POCT GLUCOSE: 209 MG/DL (ref 70–110)
POCT GLUCOSE: 224 MG/DL (ref 70–110)
POCT GLUCOSE: 89 MG/DL (ref 70–110)
POCT GLUCOSE: 94 MG/DL (ref 70–110)
POTASSIUM SERPL-SCNC: 4.5 MMOL/L (ref 3.5–5.1)
PROT SERPL-MCNC: 5.2 G/DL (ref 6–8.4)
PROTHROMBIN TIME: 11.3 SEC (ref 9–12.5)
RBC # BLD AUTO: 3.25 M/UL (ref 4–5.4)
SODIUM SERPL-SCNC: 137 MMOL/L (ref 136–145)
TACROLIMUS BLD-MCNC: 4.3 NG/ML (ref 5–15)
WBC # BLD AUTO: 3.64 K/UL (ref 3.9–12.7)

## 2023-01-21 PROCEDURE — 63600175 PHARM REV CODE 636 W HCPCS

## 2023-01-21 PROCEDURE — 99900035 HC TECH TIME PER 15 MIN (STAT)

## 2023-01-21 PROCEDURE — 36415 COLL VENOUS BLD VENIPUNCTURE: CPT | Performed by: SURGERY

## 2023-01-21 PROCEDURE — 99232 PR SUBSEQUENT HOSPITAL CARE,LEVL II: ICD-10-PCS | Mod: ,,, | Performed by: NURSE PRACTITIONER

## 2023-01-21 PROCEDURE — 25000003 PHARM REV CODE 250

## 2023-01-21 PROCEDURE — 94664 DEMO&/EVAL PT USE INHALER: CPT

## 2023-01-21 PROCEDURE — 99233 SBSQ HOSP IP/OBS HIGH 50: CPT | Mod: 24,,, | Performed by: NURSE PRACTITIONER

## 2023-01-21 PROCEDURE — 85025 COMPLETE CBC W/AUTO DIFF WBC: CPT

## 2023-01-21 PROCEDURE — 83735 ASSAY OF MAGNESIUM: CPT

## 2023-01-21 PROCEDURE — 82248 BILIRUBIN DIRECT: CPT

## 2023-01-21 PROCEDURE — 63600175 PHARM REV CODE 636 W HCPCS: Performed by: NURSE PRACTITIONER

## 2023-01-21 PROCEDURE — 94640 AIRWAY INHALATION TREATMENT: CPT

## 2023-01-21 PROCEDURE — 85610 PROTHROMBIN TIME: CPT

## 2023-01-21 PROCEDURE — 99232 SBSQ HOSP IP/OBS MODERATE 35: CPT | Mod: ,,, | Performed by: NURSE PRACTITIONER

## 2023-01-21 PROCEDURE — 84100 ASSAY OF PHOSPHORUS: CPT

## 2023-01-21 PROCEDURE — 86900 BLOOD TYPING SEROLOGIC ABO: CPT | Performed by: SURGERY

## 2023-01-21 PROCEDURE — 25000003 PHARM REV CODE 250: Performed by: NURSE PRACTITIONER

## 2023-01-21 PROCEDURE — 63600175 PHARM REV CODE 636 W HCPCS: Performed by: SURGERY

## 2023-01-21 PROCEDURE — 99233 PR SUBSEQUENT HOSPITAL CARE,LEVL III: ICD-10-PCS | Mod: 24,,, | Performed by: NURSE PRACTITIONER

## 2023-01-21 PROCEDURE — 80053 COMPREHEN METABOLIC PANEL: CPT

## 2023-01-21 PROCEDURE — 85730 THROMBOPLASTIN TIME PARTIAL: CPT

## 2023-01-21 PROCEDURE — 20600001 HC STEP DOWN PRIVATE ROOM

## 2023-01-21 PROCEDURE — 80197 ASSAY OF TACROLIMUS: CPT

## 2023-01-21 RX ORDER — DAPSONE 100 MG/1
100 TABLET ORAL DAILY
Status: DISCONTINUED | OUTPATIENT
Start: 2023-01-27 | End: 2023-01-30

## 2023-01-21 RX ORDER — TACROLIMUS 1 MG/1
1 CAPSULE ORAL ONCE
Status: COMPLETED | OUTPATIENT
Start: 2023-01-21 | End: 2023-01-21

## 2023-01-21 RX ORDER — URSODIOL 300 MG/1
300 CAPSULE ORAL 3 TIMES DAILY
Status: DISCONTINUED | OUTPATIENT
Start: 2023-01-21 | End: 2023-02-01 | Stop reason: HOSPADM

## 2023-01-21 RX ORDER — FUROSEMIDE 10 MG/ML
20 INJECTION INTRAMUSCULAR; INTRAVENOUS ONCE
Status: COMPLETED | OUTPATIENT
Start: 2023-01-21 | End: 2023-01-21

## 2023-01-21 RX ORDER — TACROLIMUS 1 MG/1
4 CAPSULE ORAL 2 TIMES DAILY
Status: DISCONTINUED | OUTPATIENT
Start: 2023-01-21 | End: 2023-01-22

## 2023-01-21 RX ORDER — TRAZODONE HYDROCHLORIDE 50 MG/1
50 TABLET ORAL NIGHTLY
Status: DISCONTINUED | OUTPATIENT
Start: 2023-01-21 | End: 2023-01-28

## 2023-01-21 RX ORDER — TACROLIMUS 1 MG/1
3 CAPSULE ORAL 2 TIMES DAILY
Status: DISCONTINUED | OUTPATIENT
Start: 2023-01-21 | End: 2023-01-21

## 2023-01-21 RX ADMIN — NYSTATIN 500000 UNITS: 500000 SUSPENSION ORAL at 09:01

## 2023-01-21 RX ADMIN — HEPARIN SODIUM 5000 UNITS: 5000 INJECTION INTRAVENOUS; SUBCUTANEOUS at 08:01

## 2023-01-21 RX ADMIN — MUPIROCIN 1 G: 20 OINTMENT TOPICAL at 09:01

## 2023-01-21 RX ADMIN — HYDROMORPHONE HYDROCHLORIDE 0.5 MG: 1 INJECTION, SOLUTION INTRAMUSCULAR; INTRAVENOUS; SUBCUTANEOUS at 05:01

## 2023-01-21 RX ADMIN — HEPARIN SODIUM 5000 UNITS: 5000 INJECTION INTRAVENOUS; SUBCUTANEOUS at 02:01

## 2023-01-21 RX ADMIN — TACROLIMUS 1.5 MG: 1 CAPSULE ORAL at 09:01

## 2023-01-21 RX ADMIN — URSODIOL 300 MG: 300 CAPSULE ORAL at 08:01

## 2023-01-21 RX ADMIN — METHOCARBAMOL 500 MG: 500 TABLET ORAL at 08:01

## 2023-01-21 RX ADMIN — INSULIN HUMAN 1.5 UNITS/HR: 1 INJECTION, SOLUTION INTRAVENOUS at 02:01

## 2023-01-21 RX ADMIN — ALBUTEROL SULFATE 2 PUFF: 108 INHALANT RESPIRATORY (INHALATION) at 02:01

## 2023-01-21 RX ADMIN — INSULIN ASPART 2 UNITS: 100 INJECTION, SOLUTION INTRAVENOUS; SUBCUTANEOUS at 05:01

## 2023-01-21 RX ADMIN — TACROLIMUS 1 MG: 1 CAPSULE ORAL at 05:01

## 2023-01-21 RX ADMIN — HEPARIN SODIUM 5000 UNITS: 5000 INJECTION INTRAVENOUS; SUBCUTANEOUS at 05:01

## 2023-01-21 RX ADMIN — OXYCODONE HYDROCHLORIDE 10 MG: 10 TABLET ORAL at 08:01

## 2023-01-21 RX ADMIN — OXYCODONE HYDROCHLORIDE 10 MG: 10 TABLET ORAL at 11:01

## 2023-01-21 RX ADMIN — ALBUTEROL SULFATE 2 PUFF: 108 INHALANT RESPIRATORY (INHALATION) at 09:01

## 2023-01-21 RX ADMIN — METHOCARBAMOL 500 MG: 500 TABLET ORAL at 02:01

## 2023-01-21 RX ADMIN — MYCOPHENOLATE MOFETIL 1000 MG: 250 CAPSULE ORAL at 09:01

## 2023-01-21 RX ADMIN — TACROLIMUS 4 MG: 1 CAPSULE ORAL at 05:01

## 2023-01-21 RX ADMIN — INSULIN ASPART 4 UNITS: 100 INJECTION, SOLUTION INTRAVENOUS; SUBCUTANEOUS at 01:01

## 2023-01-21 RX ADMIN — ALBUTEROL SULFATE 2 PUFF: 108 INHALANT RESPIRATORY (INHALATION) at 08:01

## 2023-01-21 RX ADMIN — TRAZODONE HYDROCHLORIDE 50 MG: 50 TABLET ORAL at 08:01

## 2023-01-21 RX ADMIN — OXYCODONE HYDROCHLORIDE 10 MG: 10 TABLET ORAL at 02:01

## 2023-01-21 RX ADMIN — URSODIOL 300 MG: 300 CAPSULE ORAL at 02:01

## 2023-01-21 RX ADMIN — METHYLPREDNISOLONE SODIUM SUCCINATE 80 MG: 40 INJECTION, POWDER, FOR SOLUTION INTRAMUSCULAR; INTRAVENOUS at 09:01

## 2023-01-21 RX ADMIN — HYDROMORPHONE HYDROCHLORIDE 0.5 MG: 1 INJECTION, SOLUTION INTRAMUSCULAR; INTRAVENOUS; SUBCUTANEOUS at 11:01

## 2023-01-21 RX ADMIN — MYCOPHENOLATE MOFETIL 1000 MG: 250 CAPSULE ORAL at 08:01

## 2023-01-21 RX ADMIN — FAMOTIDINE 20 MG: 20 TABLET ORAL at 08:01

## 2023-01-21 RX ADMIN — FUROSEMIDE 20 MG: 10 INJECTION, SOLUTION INTRAMUSCULAR; INTRAVENOUS at 11:01

## 2023-01-21 RX ADMIN — NYSTATIN 500000 UNITS: 500000 SUSPENSION ORAL at 02:01

## 2023-01-21 RX ADMIN — MUPIROCIN 1 G: 20 OINTMENT TOPICAL at 08:01

## 2023-01-21 RX ADMIN — MELATONIN TAB 3 MG 6 MG: 3 TAB at 08:01

## 2023-01-21 RX ADMIN — METHOCARBAMOL 500 MG: 500 TABLET ORAL at 09:01

## 2023-01-21 NOTE — PLAN OF CARE
Pt remains AAO x 4 with VSS, afebrile, sats upper 90s on RA throughout shift  Chief complaint of abdominal pain mildly relieved with PRN Oxy and Dilaudid for BTP, scheduled Robaxin given with some relief  RLQ RYLEY drain with SS output  Chevron JEROMY with staples - Betadine applied   R FA 18g - CDI with Transitional insulin gtt infusing at 1.7 U/hr  R FA 20g - CDI, saline locked   LFT trending down, Tbili remains elevated 9.4, Liver US 1/20 satisfactory  Self meds made and stocked to be taught later today  CBG ACHS + 2 - last , 119 - endocrine following   NSR on tele monitor - HR 80s  Pt up standby assist PT/OT working with patient, voids in hat, LBM 1/16  Low Na diet, full liquid   Pt remains free from falls and injuries, call light in reach, bed in lowest position, nonskid socks on when OOB, side rails up x2  Will continue to monitor

## 2023-01-21 NOTE — PROGRESS NOTES
[facility] - Transplant Stepdown  Liver Transplant  Progress Note    Patient Name: Mickey Harris  MRN: [id number]  Admission Date: 1/17/2023  Hospital Length of Stay: 4 days  Code Status: Full Code  Primary Care Provider: Primary Doctor No  Post-Operative Day: 4    ORGAN:   RIGHT LIVER LOBE (SEGS 5,6,7,8) WITHOUT MIDDLE HEPATIC VEIN  Disease Etiology: Cirrhosis: Fatty Liver (DORAN)  Donor Type:   Living  CDC High Risk:     Donor CMV Status:   Donor CMV Status:   Donor HBcAB:     Donor HCV Status:     Donor HBV PALOMA:   Donor HCV PALOMA:   Whole or Partial: Split Liver  Biliary Anastomosis: Becky-en-Y  Arterial Anatomy:   Subjective:     History of Present Illness:  Ms. Mickey Harris is a 58 y/o F admitted for living liver transplant from daughter for DORAN cirrhosis.       Hospital Course:  She is now status post living related donor liver transplant on 1/17/22. She is progressing well post op. 2 drains in place. POD#1 Liver US showed minimally elevated intraparenchymal resistive indices, likely due to postoperative edema. Otherwise satisfactory Doppler evaluation of the liver. Transfer to TSU on POD#2    Hospital Interval: NAEON, patient reports feeling hungry will advance to regular diet, +flatus, -BM, 1 drain remains with large output.  Patient is ambulating in the halls with family. T-bili increased 13.7, started Ursodiol 1/21, Giving Lasix once today mild BLE edema. Vitals stable, Monitor          Scheduled Meds:   [START ON 1/27/2023] dapsone  100 mg Oral Daily    famotidine  20 mg Oral QHS    heparin (porcine)  5,000 Units Subcutaneous Q8H    LIDOcaine  1 patch Transdermal Q24H    melatonin  6 mg Oral Nightly    methocarbamoL  500 mg Oral TID    [START ON 1/22/2023] methylPREDNISolone sodium succinate injection  40 mg Intravenous Daily    Followed by    [START ON 1/23/2023] predniSONE  20 mg Oral Daily    mupirocin  1 g Nasal BID    mycophenolate  1,000 mg Oral BID    nystatin  500,000 Units Mouth/Throat TID  PC    tacrolimus  4 mg Oral BID    traZODone  50 mg Oral QHS    ursodioL  300 mg Oral TID    [START ON 1/27/2023] valGANciclovir  450 mg Oral Daily     Continuous Infusions:   sodium chloride 0.9% Stopped (01/19/23 2311)    insulin regular 1 units/mL infusion orderable (TRANSFER) 1.5 Units/hr (01/21/23 1433)     PRN Meds:sodium chloride, albuterol, artificial tears, calcium carbonate, dextrose 10%, dextrose 10%, dextrose 50%, dextrose 50%, glucagon (human recombinant), glucose, glucose, HYDROmorphone, insulin aspart U-100, oxyCODONE, oxyCODONE, sodium chloride 0.9%    Review of Systems   Constitutional:  Negative for activity change and appetite change.   HENT: Negative.     Eyes:  Negative for visual disturbance.   Respiratory:  Negative for shortness of breath.    Cardiovascular:  Negative for chest pain, palpitations and leg swelling.   Gastrointestinal:  Positive for abdominal pain. Negative for abdominal distention, diarrhea, nausea and vomiting.   Genitourinary:  Negative for difficulty urinating.   Musculoskeletal:  Negative for arthralgias, back pain and joint swelling.   Skin:  Positive for wound.   Allergic/Immunologic: Positive for immunocompromised state.   Neurological:  Negative for dizziness and facial asymmetry.   Hematological:  Negative for adenopathy. Does not bruise/bleed easily.   Psychiatric/Behavioral:  Negative for agitation and behavioral problems.    All other systems reviewed and are negative.  Objective:     Vital Signs (Most Recent):  Temp: 98 °F (36.7 °C) (01/21/23 1619)  Pulse: 77 (01/21/23 1619)  Resp: 18 (01/21/23 1704)  BP: 134/61 (01/21/23 1619)  SpO2: 96 % (01/21/23 1619) Vital Signs (24h Range):  Temp:  [98 °F (36.7 °C)-98.3 °F (36.8 °C)] 98 °F (36.7 °C)  Pulse:  [73-91] 77  Resp:  [16-20] 18  SpO2:  [94 %-96 %] 96 %  BP: (131-159)/(61-77) 134/61     Weight: 93.7 kg (206 lb 9.1 oz)  Body mass index is 34.38 kg/m².    Intake/Output - Last 3 Shifts         01/19 0700  01/20  0659 01/20 0700  01/21 0659 01/21 0700  01/22 0659    P.O. 1150 1060     I.V. (mL/kg) 118.8 (1.3) 45 (0.5)     Blood  204     Other  0     NG/GT       IV Piggyback 53.4      Total Intake(mL/kg) 1322.3 (14.5) 1309 (14)     Urine (mL/kg/hr) 2020 (0.9) 3380 (1.5) 2500 (2.6)    Emesis/NG output  0     Drains 385 510 200    Other  0     Stool  0     Blood  0     Total Output 2405 3890 2700    Net -1082.7 -0305 -5550           Urine Occurrence  2 x     Stool Occurrence  0 x     Emesis Occurrence  0 x             Physical Exam  Vitals and nursing note reviewed.   Constitutional:       Appearance: She is well-developed.   HENT:      Head: Normocephalic.   Eyes:      Conjunctiva/sclera: Conjunctivae normal.   Cardiovascular:      Rate and Rhythm: Normal rate and regular rhythm.      Heart sounds: No murmur heard.  Pulmonary:      Effort: Pulmonary effort is normal.      Breath sounds: Normal breath sounds.   Abdominal:      General: Bowel sounds are normal. There is no distension.      Palpations: Abdomen is soft.      Tenderness: There is no abdominal tenderness.       Musculoskeletal:         General: Normal range of motion.      Cervical back: Normal range of motion.      Right lower leg: Edema present.      Left lower leg: Edema present.   Skin:     General: Skin is warm and dry.      Capillary Refill: Capillary refill takes less than 2 seconds.   Neurological:      Mental Status: She is alert and oriented to person, place, and time.   Psychiatric:         Behavior: Behavior normal.         Thought Content: Thought content normal.         Judgment: Judgment normal.       Laboratory:  Immunosuppressants           Stop Route Frequency     tacrolimus capsule 4 mg         -- Oral 2 times daily     mycophenolate capsule 1,000 mg         -- Oral 2 times daily          CBC:   Recent Labs   Lab 01/21/23  0833   WBC 3.64*   RBC 3.25*   HGB 10.7*   HCT 29.7*   PLT 43*   MCV 91   MCH 32.9*   MCHC 36.0     BMP:   Recent Labs   Lab  01/21/23  0833   GLU 79      K 4.5      CO2 23   BUN 31*   CREATININE 0.9   CALCIUM 8.4*     CMP:   Recent Labs   Lab 01/21/23  0833   GLU 79   CALCIUM 8.4*   ALBUMIN 3.2*   PROT 5.2*      K 4.5   CO2 23      BUN 31*   CREATININE 0.9   ALKPHOS 51*   *   *   BILITOT 13.7*     Coagulation:   Recent Labs   Lab 01/21/23  0833   INR 1.1   APTT 27.0     Labs within the past 24 hours have been reviewed.    Diagnostic Results:  US Liver Transplant Post:  Results for orders placed during the hospital encounter of 01/17/23    US Doppler Liver Transplant Post (xpd)    Narrative  EXAMINATION:  US DOPPLER LIVER TRANSPLANT POST (XPD)    CLINICAL HISTORY:  Elevated bili s/p liver txp;    TECHNIQUE:  Ultrasound of the transplant liver with Doppler evaluation.  Color and spectral Doppler were performed.    COMPARISON:  None.    FINDINGS:  Patient is status post orthotopic liver transplant 01/17/2023.  The liver demonstrates homogeneous echotexture.  No focal hepatic lesions are seen.  No fluid collections.    The common duct is not dilated, measuring 3 mm.  No dilated intrahepatic radicles are seen.    Color flow and spectral waveform analysis was performed.  The main portal vein, right portal vein, middle hepatic vein, right hepatic vein, and IVC are patent with proper directional flow.    Anastomosis site of the main hepatic artery demonstrates a peak systolic velocity 98 cm/sec (previously 137 cm/sec).    Main hepatic artery demonstrates resistive index 0.85 (previously 0.87) with normal waveform.    Anterior branch of the right hepatic artery demonstrates resistive index 0.68 (previously 0.78) with normal waveform.    Posterior branch of the right hepatic artery demonstrates resistive index 0.79 (previously 0.81) with normal waveform.    Right pleural effusion.    Impression  Minimally elevated resistive index in the main hepatic artery, favored to be secondary to edema.  Otherwise  satisfactory Doppler evaluation of the liver allograft.    Electronically signed by resident: Toyin Barker  Date:    01/20/2023  Time:    10:30    Electronically signed by: Eduardo Nayak MD  Date:    01/20/2023  Time:    10:56    Debility/Functional status: No debility noted.    Assessment/Plan:     Prophylactic immunotherapy  - continue prograf  - continue to monitor for toxic side effects and check daily levels. Will adjust for therapeutic dose      Long-term use of immunosuppressant medication  - see prophylactic immunotherapy      At risk for opportunistic infections  - Bactrim for PCP ppx.  - Valcyte for CMV ppx.      Acute blood loss anemia  - H/H stable  - no overt signs of bleed  - continue to monitor with daily CBC      Hyperglycemia  - Endocrine following   - Insulin gtt       S/P liver transplant  -DORAN cirrhosis complicated by hepatic encephalopathy who is now status post living related donor liver transplant on 1/17/22. She is progressing well post op. 2 drains in place. POD#1 Liver US looked fine.   Minimally elevated intraparenchymal resistive indices, likely due to postoperative edema.          VTE Risk Mitigation (From admission, onward)         Ordered     Place sequential compression device  Until discontinued         01/17/23 2342     heparin (porcine) injection 5,000 Units  Every 8 hours         01/17/23 2148     IP VTE HIGH RISK PATIENT  Once         01/17/23 2148                The patients clinical status was discussed at multidisplinary rounds, involving transplant surgery, transplant medicine, pharmacy, nursing, nutrition, and social work    Discharge Planning:  No Patient Care Coordination Note on file.      Kathryn Cary, [id number]  Liver Transplant  [facility] - Transplant Stepdown

## 2023-01-21 NOTE — SUBJECTIVE & OBJECTIVE
"Interval HPI:   Overnight events: No acute events overnight. Patient in room 12650/07751 A. Blood glucose stable. BG at and above goal on current insulin regimen (Transition Insulin Drip). Steroid use- Methylprednisolone  80 mg. 4 Days Post-Op  Renal function- Normal   Vasopressors-  None       Endocrine will continue to follow and manage insulin orders inpatient.         Diet full liquid [facility]; Consistent Carbohydrate, Cardiac (Low Na/Chol)     Eatin%  Nausea: No  Hypoglycemia and intervention: No  Fever: No  TPN and/or TF: No    /63 (BP Location: Left arm, Patient Position: Lying)   Pulse 79   Temp 98.1 °F (36.7 °C) (Oral)   Resp 20   Ht 5' 5" (1.651 m)   Wt 93.7 kg (206 lb 9.1 oz)   SpO2 95%   Breastfeeding No   BMI 34.38 kg/m²     Labs Reviewed and Include    No results for input(s): GLU, CALCIUM, ALBUMIN, PROT, NA, K, CO2, CL, BUN, CREATININE, ALKPHOS, ALT, AST, BILITOT in the last 24 hours.  Lab Results   Component Value Date    WBC 5.73 2023    HGB 10.4 (L) 2023    HCT 29.4 (L) 2023    MCV 93 2023    PLT 40 (L) 2023     No results for input(s): TSH, FREET4 in the last 168 hours.  Lab Results   Component Value Date    HGBA1C 4.5 2023       Nutritional status:   Body mass index is 34.38 kg/m².  Lab Results   Component Value Date    ALBUMIN 3.2 (L) 2023    ALBUMIN 3.2 (L) 2023    ALBUMIN 3.1 (L) 2023     No results found for: PREALBUMIN    Estimated Creatinine Clearance: 63.1 mL/min (based on SCr of 1.1 mg/dL).    Accu-Checks  Recent Labs     23  0004 23  0005 23  0114 23  0157 23  0309 23  0355 23  0504 23  1705 23  2149 23  0228   POCTGLUCOSE 138* 153* 139* 124* 142* 129* 158* 192* 209* 119*       Current Medications and/or Treatments Impacting Glycemic Control  Immunotherapy:    Immunosuppressants           Stop Route Frequency     tacrolimus capsule 1.5 mg         " -- Oral 2 times daily     mycophenolate capsule 1,000 mg         -- Oral 2 times daily          Steroids:   Hormones (From admission, onward)      Start     Stop Route Frequency Ordered    01/23/23 0900  predniSONE tablet 20 mg  (methylprednisolone taper panel)        See Hyperspace for full Linked Orders Report.    -- Oral Daily 01/17/23 2148    01/22/23 0900  methylPREDNISolone sodium succinate injection 40 mg  (methylprednisolone taper panel)        See Hyperspace for full Linked Orders Report.    01/23 0859 IV Daily 01/17/23 2148    01/21/23 0900  methylPREDNISolone sodium succinate injection 80 mg  (methylprednisolone taper panel)        See Hyperspace for full Linked Orders Report.    01/22 0859 IV Daily 01/17/23 2148 01/19/23 2100  melatonin tablet 6 mg         -- Oral Nightly 01/19/23 0636          Pressors:    Autonomic Drugs (From admission, onward)      None          Hyperglycemia/Diabetes Medications:   Antihyperglycemics (From admission, onward)      Start     Stop Route Frequency Ordered    01/21/23 0815  insulin regular in 0.9 % NaCl 100 unit/100 mL (1 unit/mL) infusion        Question:  Enter initial dose (Units/hr):  Answer:  1.7    -- IV Continuous 01/21/23 0805    01/19/23 1022  insulin aspart U-100 pen 0-10 Units         -- SubQ As needed (PRN) 01/19/23 0923

## 2023-01-21 NOTE — ASSESSMENT & PLAN NOTE
Endocrinology consulted for BG management.   BG goal 140-180     - Transition drip at 1.7 units/hr with step-down parameters.   - Novolog (aspart) insulin prn for BG [facility] (150/25).  - BG checks /HS/0200  - Hypoglycemia protocol in place    ** Please notify Endocrine for any change and/or advance in diet**  ** Please call Endocrine for any BG related issues **    Discharge Planning:   TBD. Please notify endocrinology prior to discharge.

## 2023-01-21 NOTE — ASSESSMENT & PLAN NOTE
-DORAN cirrhosis complicated by hepatic encephalopathy who is now status post living related donor liver transplant on 1/17/22. She is progressing well post op. 2 drains in place. POD#1 Liver US looked fine.   Minimally elevated intraparenchymal resistive indices, likely due to postoperative edema.

## 2023-01-21 NOTE — PROGRESS NOTES
[facility] - Transplant Stepdown  Endocrinology  Progress Note    Admit Date: 2023     Reason for Consult: Management of Hyperglycemia     Surgical Procedure and Date: Liver Transplant 2023    Patient is not diabetic and does not currently take any oral/injectable antidiabetic/hypoglycemic medications.     Lab Results   Component Value Date    HGBA1C 4.5 2023           HPI: Mickey Harris is a 57 y.o. female who presented on 22 for living related donor liver transplant in the setting of DORAN cirrhosis. She was diagnosed approximately 4 years ago and did well up until one year ago when she began to develop lower extremity edema and ascites managed with diuretics. She has never required paracentesis. She has also developed hepatic encephalopathy managed with lactulose. There is no history of GI bleeding. Ms. Harris is now s/p living donor liver transplantation on 23. She received a right liver lobe which was quite large (~1000g). Biliary reconstruction was Becky-en-y reconstruction to the donor right hepatic duct. The procedure was performed without apparent complication and she was transferred to the SICU for postoperative care and management. Endocrine consulted to manage hyperglycemia in the pressence of high-dose steroids. Of note, patient on 12-24 units/hr of insulin while on IIP.               Interval HPI:   Overnight events: No acute events overnight. Patient in room 01522/37879 A. Blood glucose stable. BG at and above goal on current insulin regimen (Transition Insulin Drip). Steroid use- Methylprednisolone  80 mg. 4 Days Post-Op  Renal function- Normal   Vasopressors-  None       Endocrine will continue to follow and manage insulin orders inpatient.         Diet full liquid [facility]; Consistent Carbohydrate, Cardiac (Low Na/Chol)     Eatin%  Nausea: No  Hypoglycemia and intervention: No  Fever: No  TPN and/or TF: No    /63 (BP Location: Left arm, Patient  "Position: Lying)   Pulse 79   Temp 98.1 °F (36.7 °C) (Oral)   Resp 20   Ht 5' 5" (1.651 m)   Wt 93.7 kg (206 lb 9.1 oz)   SpO2 95%   Breastfeeding No   BMI 34.38 kg/m²     Labs Reviewed and Include    No results for input(s): GLU, CALCIUM, ALBUMIN, PROT, NA, K, CO2, CL, BUN, CREATININE, ALKPHOS, ALT, AST, BILITOT in the last 24 hours.  Lab Results   Component Value Date    WBC 5.73 01/20/2023    HGB 10.4 (L) 01/20/2023    HCT 29.4 (L) 01/20/2023    MCV 93 01/20/2023    PLT 40 (L) 01/20/2023     No results for input(s): TSH, FREET4 in the last 168 hours.  Lab Results   Component Value Date    HGBA1C 4.5 01/17/2023       Nutritional status:   Body mass index is 34.38 kg/m².  Lab Results   Component Value Date    ALBUMIN 3.2 (L) 01/20/2023    ALBUMIN 3.2 (L) 01/19/2023    ALBUMIN 3.1 (L) 01/19/2023     No results found for: PREALBUMIN    Estimated Creatinine Clearance: 63.1 mL/min (based on SCr of 1.1 mg/dL).    Accu-Checks  Recent Labs     01/19/23  0004 01/19/23  0005 01/19/23  0114 01/19/23  0157 01/19/23  0309 01/19/23  0355 01/19/23  0504 01/20/23  1705 01/20/23  2149 01/21/23  0228   POCTGLUCOSE 138* 153* 139* 124* 142* 129* 158* 192* 209* 119*       Current Medications and/or Treatments Impacting Glycemic Control  Immunotherapy:    Immunosuppressants           Stop Route Frequency     tacrolimus capsule 1.5 mg         -- Oral 2 times daily     mycophenolate capsule 1,000 mg         -- Oral 2 times daily          Steroids:   Hormones (From admission, onward)      Start     Stop Route Frequency Ordered    01/23/23 0900  predniSONE tablet 20 mg  (methylprednisolone taper panel)        See Hyperspace for full Linked Orders Report.    -- Oral Daily 01/17/23 2148    01/22/23 0900  methylPREDNISolone sodium succinate injection 40 mg  (methylprednisolone taper panel)        See Hyperspace for full Linked Orders Report.    01/23 0859 IV Daily 01/17/23 2148    01/21/23 0900  methylPREDNISolone sodium succinate " injection 80 mg  (methylprednisolone taper panel)        See Hyperspace for full Linked Orders Report.    01/22 0859 IV Daily 01/17/23 2148    01/19/23 2100  melatonin tablet 6 mg         -- Oral Nightly 01/19/23 0636          Pressors:    Autonomic Drugs (From admission, onward)      None          Hyperglycemia/Diabetes Medications:   Antihyperglycemics (From admission, onward)      Start     Stop Route Frequency Ordered    01/21/23 0815  insulin regular in 0.9 % NaCl 100 unit/100 mL (1 unit/mL) infusion        Question:  Enter initial dose (Units/hr):  Answer:  1.7    -- IV Continuous 01/21/23 0805    01/19/23 1022  insulin aspart U-100 pen 0-10 Units         -- SubQ As needed (PRN) 01/19/23 0923            ASSESSMENT and PLAN    * Liver cirrhosis secondary to DORAN (nonalcoholic steatohepatitis)  Managed per primary team  Avoid hypoglycemia        Hyperglycemia  Endocrinology consulted for BG management.   BG goal 140-180     - Transition drip at 1.7 units/hr with step-down parameters.   - Novolog (aspart) insulin prn for BG excursions [facility] SSI (150/25).  - BG checks /HS/0200  - Hypoglycemia protocol in place    ** Please notify Endocrine for any change and/or advance in diet**  ** Please call Endocrine for any BG related issues **    Discharge Planning:   TBD. Please notify endocrinology prior to discharge.        S/P liver transplant  Optimize BG control to improve wound healing        Adrenal corticosteroid causing adverse effect in therapeutic use  Glucocorticoids markedly increase glucose levels. Expect the steroid taper will help glucose control.              Jaison Laboy, DNP, FNP  Endocrinology  [facility] - Trans[facility]

## 2023-01-21 NOTE — PLAN OF CARE
Patient is AAOx4.  Afebrile  RA  Telemetry monitoring NSR  Self medications taught this shift. Patient and  pulled self meds at 100% with this RN.   Chevron incision JEROMY with staples, betadine applied. Proper incision care taught to patient and spouse.   PRN oxy and IV dilaudid administered for complaint of pain   Patient walked hallway with  this afternoon.   [facility]   Bed is in lowest position with wheels locked.   Instructed to call for assistance.   Nonskid socks when oob.   [facility]

## 2023-01-21 NOTE — SUBJECTIVE & OBJECTIVE
Scheduled Meds:   [START ON 1/27/2023] dapsone  100 mg Oral Daily    famotidine  20 mg Oral QHS    heparin (porcine)  5,000 Units Subcutaneous Q8H    LIDOcaine  1 patch Transdermal Q24H    melatonin  6 mg Oral Nightly    methocarbamoL  500 mg Oral TID    [START ON 1/22/2023] methylPREDNISolone sodium succinate injection  40 mg Intravenous Daily    Followed by    [START ON 1/23/2023] predniSONE  20 mg Oral Daily    mupirocin  1 g Nasal BID    mycophenolate  1,000 mg Oral BID    nystatin  500,000 Units Mouth/Throat TID PC    tacrolimus  4 mg Oral BID    traZODone  50 mg Oral QHS    ursodioL  300 mg Oral TID    [START ON 1/27/2023] valGANciclovir  450 mg Oral Daily     Continuous Infusions:   sodium chloride 0.9% Stopped (01/19/23 2311)    insulin regular 1 units/mL infusion orderable (TRANSFER) 1.5 Units/hr (01/21/23 1433)     PRN Meds:sodium chloride, albuterol, artificial tears, calcium carbonate, dextrose 10%, dextrose 10%, dextrose 50%, dextrose 50%, glucagon (human recombinant), glucose, glucose, HYDROmorphone, insulin aspart U-100, oxyCODONE, oxyCODONE, sodium chloride 0.9%    Review of Systems   Constitutional:  Negative for activity change and appetite change.   HENT: Negative.     Eyes:  Negative for visual disturbance.   Respiratory:  Negative for shortness of breath.    Cardiovascular:  Negative for chest pain, palpitations and leg swelling.   Gastrointestinal:  Positive for abdominal pain. Negative for abdominal distention, diarrhea, nausea and vomiting.   Genitourinary:  Negative for difficulty urinating.   Musculoskeletal:  Negative for arthralgias, back pain and joint swelling.   Skin:  Positive for wound.   Allergic/Immunologic: Positive for immunocompromised state.   Neurological:  Negative for dizziness and facial asymmetry.   Hematological:  Negative for adenopathy. Does not bruise/bleed easily.   Psychiatric/Behavioral:  Negative for agitation and behavioral problems.    All other systems reviewed  and are negative.  Objective:     Vital Signs (Most Recent):  Temp: 98 °F (36.7 °C) (01/21/23 1619)  Pulse: 77 (01/21/23 1619)  Resp: 18 (01/21/23 1704)  BP: 134/61 (01/21/23 1619)  SpO2: 96 % (01/21/23 1619) Vital Signs (24h Range):  Temp:  [98 °F (36.7 °C)-98.3 °F (36.8 °C)] 98 °F (36.7 °C)  Pulse:  [73-91] 77  Resp:  [16-20] 18  SpO2:  [94 %-96 %] 96 %  BP: (131-159)/(61-77) 134/61     Weight: 93.7 kg (206 lb 9.1 oz)  Body mass index is 34.38 kg/m².    Intake/Output - Last 3 Shifts         01/19 0700  01/20 0659 01/20 0700  01/21 0659 01/21 0700 01/22 0659    P.O. 1150 1060     I.V. (mL/kg) 118.8 (1.3) 45 (0.5)     Blood  204     Other  0     NG/GT       IV Piggyback 53.4      Total Intake(mL/kg) 1322.3 (14.5) 1309 (14)     Urine (mL/kg/hr) 2020 (0.9) 3380 (1.5) 2500 (2.6)    Emesis/NG output  0     Drains 385 510 200    Other  0     Stool  0     Blood  0     Total Output 2405 3890 2700    Net -1082.7 -2581 -2700           Urine Occurrence  2 x     Stool Occurrence  0 x     Emesis Occurrence  0 x             Physical Exam  Vitals and nursing note reviewed.   Constitutional:       Appearance: She is well-developed.   HENT:      Head: Normocephalic.   Eyes:      Conjunctiva/sclera: Conjunctivae normal.   Cardiovascular:      Rate and Rhythm: Normal rate and regular rhythm.      Heart sounds: No murmur heard.  Pulmonary:      Effort: Pulmonary effort is normal.      Breath sounds: Normal breath sounds.   Abdominal:      General: Bowel sounds are normal. There is no distension.      Palpations: Abdomen is soft.      Tenderness: There is no abdominal tenderness.       Musculoskeletal:         General: Normal range of motion.      Cervical back: Normal range of motion.      Right lower leg: Edema present.      Left lower leg: Edema present.   Skin:     General: Skin is warm and dry.      Capillary Refill: Capillary refill takes less than 2 seconds.   Neurological:      Mental Status: She is alert and oriented to  person, place, and time.   Psychiatric:         Behavior: Behavior normal.         Thought Content: Thought content normal.         Judgment: Judgment normal.       Laboratory:  Immunosuppressants           Stop Route Frequency     tacrolimus capsule 4 mg         -- Oral 2 times daily     mycophenolate capsule 1,000 mg         -- Oral 2 times daily          CBC:   Recent Labs   Lab 01/21/23  0833   WBC 3.64*   RBC 3.25*   HGB 10.7*   HCT 29.7*   PLT 43*   MCV 91   MCH 32.9*   MCHC 36.0     BMP:   Recent Labs   Lab 01/21/23  0833   GLU 79      K 4.5      CO2 23   BUN 31*   CREATININE 0.9   CALCIUM 8.4*     CMP:   Recent Labs   Lab 01/21/23  0833   GLU 79   CALCIUM 8.4*   ALBUMIN 3.2*   PROT 5.2*      K 4.5   CO2 23      BUN 31*   CREATININE 0.9   ALKPHOS 51*   *   *   BILITOT 13.7*     Coagulation:   Recent Labs   Lab 01/21/23  0833   INR 1.1   APTT 27.0     Labs within the past 24 hours have been reviewed.    Diagnostic Results:  US Liver Transplant Post:  Results for orders placed during the hospital encounter of 01/17/23    US Doppler Liver Transplant Post (xpd)    Narrative  EXAMINATION:  US DOPPLER LIVER TRANSPLANT POST (XPD)    CLINICAL HISTORY:  Elevated bili s/p liver txp;    TECHNIQUE:  Ultrasound of the transplant liver with Doppler evaluation.  Color and spectral Doppler were performed.    COMPARISON:  None.    FINDINGS:  Patient is status post orthotopic liver transplant 01/17/2023.  The liver demonstrates homogeneous echotexture.  No focal hepatic lesions are seen.  No fluid collections.    The common duct is not dilated, measuring 3 mm.  No dilated intrahepatic radicles are seen.    Color flow and spectral waveform analysis was performed.  The main portal vein, right portal vein, middle hepatic vein, right hepatic vein, and IVC are patent with proper directional flow.    Anastomosis site of the main hepatic artery demonstrates a peak systolic velocity 98 cm/sec  (previously 137 cm/sec).    Main hepatic artery demonstrates resistive index 0.85 (previously 0.87) with normal waveform.    Anterior branch of the right hepatic artery demonstrates resistive index 0.68 (previously 0.78) with normal waveform.    Posterior branch of the right hepatic artery demonstrates resistive index 0.79 (previously 0.81) with normal waveform.    Right pleural effusion.    Impression  Minimally elevated resistive index in the main hepatic artery, favored to be secondary to edema.  Otherwise satisfactory Doppler evaluation of the liver allograft.    Electronically signed by resident: Toyin Barker  Date:    01/20/2023  Time:    10:30    Electronically signed by: Eduardo Nayak MD  Date:    01/20/2023  Time:    10:56    Debility/Functional status: No debility noted.

## 2023-01-22 LAB
ALBUMIN SERPL BCP-MCNC: 3.5 G/DL (ref 3.5–5.2)
ALP SERPL-CCNC: 57 U/L (ref 55–135)
ALT SERPL W/O P-5'-P-CCNC: 407 U/L (ref 10–44)
ANION GAP SERPL CALC-SCNC: 11 MMOL/L (ref 8–16)
APTT BLDCRRT: 27.1 SEC (ref 21–32)
AST SERPL-CCNC: 175 U/L (ref 10–40)
BASOPHILS # BLD AUTO: 0.02 K/UL (ref 0–0.2)
BASOPHILS NFR BLD: 0.4 % (ref 0–1.9)
BILIRUB DIRECT SERPL-MCNC: >14 MG/DL (ref 0.1–0.3)
BILIRUB SERPL-MCNC: 22 MG/DL (ref 0.1–1)
BUN SERPL-MCNC: 34 MG/DL (ref 6–20)
CALCIUM SERPL-MCNC: 8.8 MG/DL (ref 8.7–10.5)
CHLORIDE SERPL-SCNC: 102 MMOL/L (ref 95–110)
CO2 SERPL-SCNC: 23 MMOL/L (ref 23–29)
CREAT SERPL-MCNC: 1 MG/DL (ref 0.5–1.4)
DIFFERENTIAL METHOD: ABNORMAL
EOSINOPHIL # BLD AUTO: 0.3 K/UL (ref 0–0.5)
EOSINOPHIL NFR BLD: 5 % (ref 0–8)
ERYTHROCYTE [DISTWIDTH] IN BLOOD BY AUTOMATED COUNT: 13.8 % (ref 11.5–14.5)
EST. GFR  (NO RACE VARIABLE): >60 ML/MIN/1.73 M^2
GLUCOSE SERPL-MCNC: 93 MG/DL (ref 70–110)
HCT VFR BLD AUTO: 32.4 % (ref 37–48.5)
HGB BLD-MCNC: 11.6 G/DL (ref 12–16)
IMM GRANULOCYTES # BLD AUTO: 0.1 K/UL (ref 0–0.04)
IMM GRANULOCYTES NFR BLD AUTO: 1.8 % (ref 0–0.5)
INR PPP: 1.1 (ref 0.8–1.2)
LYMPHOCYTES # BLD AUTO: 0.5 K/UL (ref 1–4.8)
LYMPHOCYTES NFR BLD: 8.6 % (ref 18–48)
MAGNESIUM SERPL-MCNC: 2.1 MG/DL (ref 1.6–2.6)
MCH RBC QN AUTO: 33 PG (ref 27–31)
MCHC RBC AUTO-ENTMCNC: 35.8 G/DL (ref 32–36)
MCV RBC AUTO: 92 FL (ref 82–98)
MONOCYTES # BLD AUTO: 0.5 K/UL (ref 0.3–1)
MONOCYTES NFR BLD: 9.9 % (ref 4–15)
NEUTROPHILS # BLD AUTO: 4.1 K/UL (ref 1.8–7.7)
NEUTROPHILS NFR BLD: 74.3 % (ref 38–73)
NRBC BLD-RTO: 0 /100 WBC
PHOSPHATE SERPL-MCNC: 2.6 MG/DL (ref 2.7–4.5)
PLATELET # BLD AUTO: 40 K/UL (ref 150–450)
PMV BLD AUTO: 12.6 FL (ref 9.2–12.9)
POCT GLUCOSE: 114 MG/DL (ref 70–110)
POCT GLUCOSE: 144 MG/DL (ref 70–110)
POCT GLUCOSE: 152 MG/DL (ref 70–110)
POCT GLUCOSE: 180 MG/DL (ref 70–110)
POCT GLUCOSE: 212 MG/DL (ref 70–110)
POCT GLUCOSE: 92 MG/DL (ref 70–110)
POTASSIUM SERPL-SCNC: 3.7 MMOL/L (ref 3.5–5.1)
PROT SERPL-MCNC: 5.9 G/DL (ref 6–8.4)
PROTHROMBIN TIME: 11.4 SEC (ref 9–12.5)
RBC # BLD AUTO: 3.51 M/UL (ref 4–5.4)
SODIUM SERPL-SCNC: 136 MMOL/L (ref 136–145)
TACROLIMUS BLD-MCNC: 5.9 NG/ML (ref 5–15)
WBC # BLD AUTO: 5.45 K/UL (ref 3.9–12.7)

## 2023-01-22 PROCEDURE — 85730 THROMBOPLASTIN TIME PARTIAL: CPT

## 2023-01-22 PROCEDURE — 63600175 PHARM REV CODE 636 W HCPCS: Performed by: SURGERY

## 2023-01-22 PROCEDURE — 36415 COLL VENOUS BLD VENIPUNCTURE: CPT

## 2023-01-22 PROCEDURE — 99232 SBSQ HOSP IP/OBS MODERATE 35: CPT | Mod: ,,, | Performed by: NURSE PRACTITIONER

## 2023-01-22 PROCEDURE — 63600175 PHARM REV CODE 636 W HCPCS: Performed by: NURSE PRACTITIONER

## 2023-01-22 PROCEDURE — 63600175 PHARM REV CODE 636 W HCPCS: Performed by: PHYSICIAN ASSISTANT

## 2023-01-22 PROCEDURE — 99232 PR SUBSEQUENT HOSPITAL CARE,LEVL II: ICD-10-PCS | Mod: ,,, | Performed by: NURSE PRACTITIONER

## 2023-01-22 PROCEDURE — 83735 ASSAY OF MAGNESIUM: CPT

## 2023-01-22 PROCEDURE — 94664 DEMO&/EVAL PT USE INHALER: CPT

## 2023-01-22 PROCEDURE — 99233 PR SUBSEQUENT HOSPITAL CARE,LEVL III: ICD-10-PCS | Mod: 24,,, | Performed by: NURSE PRACTITIONER

## 2023-01-22 PROCEDURE — 85610 PROTHROMBIN TIME: CPT

## 2023-01-22 PROCEDURE — 82248 BILIRUBIN DIRECT: CPT

## 2023-01-22 PROCEDURE — 20600001 HC STEP DOWN PRIVATE ROOM

## 2023-01-22 PROCEDURE — 85025 COMPLETE CBC W/AUTO DIFF WBC: CPT

## 2023-01-22 PROCEDURE — 63600175 PHARM REV CODE 636 W HCPCS

## 2023-01-22 PROCEDURE — 25000003 PHARM REV CODE 250

## 2023-01-22 PROCEDURE — 84100 ASSAY OF PHOSPHORUS: CPT

## 2023-01-22 PROCEDURE — 80053 COMPREHEN METABOLIC PANEL: CPT

## 2023-01-22 PROCEDURE — 80197 ASSAY OF TACROLIMUS: CPT

## 2023-01-22 PROCEDURE — 99233 SBSQ HOSP IP/OBS HIGH 50: CPT | Mod: 24,,, | Performed by: NURSE PRACTITIONER

## 2023-01-22 PROCEDURE — 25000003 PHARM REV CODE 250: Performed by: NURSE PRACTITIONER

## 2023-01-22 RX ORDER — TACROLIMUS 1 MG/1
5 CAPSULE ORAL 2 TIMES DAILY
Status: DISCONTINUED | OUTPATIENT
Start: 2023-01-22 | End: 2023-01-23

## 2023-01-22 RX ORDER — IBUPROFEN 200 MG
16 TABLET ORAL
Status: DISCONTINUED | OUTPATIENT
Start: 2023-01-22 | End: 2023-01-24

## 2023-01-22 RX ORDER — IBUPROFEN 200 MG
24 TABLET ORAL
Status: DISCONTINUED | OUTPATIENT
Start: 2023-01-22 | End: 2023-01-24

## 2023-01-22 RX ORDER — BISACODYL 10 MG
10 SUPPOSITORY, RECTAL RECTAL DAILY PRN
Status: DISCONTINUED | OUTPATIENT
Start: 2023-01-22 | End: 2023-02-01 | Stop reason: HOSPADM

## 2023-01-22 RX ORDER — ONDANSETRON 2 MG/ML
4 INJECTION INTRAMUSCULAR; INTRAVENOUS ONCE
Status: COMPLETED | OUTPATIENT
Start: 2023-01-22 | End: 2023-01-22

## 2023-01-22 RX ORDER — ONDANSETRON 2 MG/ML
4 INJECTION INTRAMUSCULAR; INTRAVENOUS EVERY 6 HOURS PRN
Status: DISCONTINUED | OUTPATIENT
Start: 2023-01-22 | End: 2023-02-01 | Stop reason: HOSPADM

## 2023-01-22 RX ORDER — SODIUM CHLORIDE 9 MG/ML
INJECTION, SOLUTION INTRAVENOUS CONTINUOUS
Status: DISCONTINUED | OUTPATIENT
Start: 2023-01-22 | End: 2023-01-24

## 2023-01-22 RX ORDER — PROCHLORPERAZINE EDISYLATE 5 MG/ML
5 INJECTION INTRAMUSCULAR; INTRAVENOUS EVERY 6 HOURS PRN
Status: DISCONTINUED | OUTPATIENT
Start: 2023-01-22 | End: 2023-02-01 | Stop reason: HOSPADM

## 2023-01-22 RX ORDER — INSULIN ASPART 100 [IU]/ML
1-10 INJECTION, SOLUTION INTRAVENOUS; SUBCUTANEOUS
Status: DISCONTINUED | OUTPATIENT
Start: 2023-01-22 | End: 2023-01-24

## 2023-01-22 RX ORDER — INSULIN ASPART 100 [IU]/ML
3-6 INJECTION, SOLUTION INTRAVENOUS; SUBCUTANEOUS
Status: DISCONTINUED | OUTPATIENT
Start: 2023-01-22 | End: 2023-01-24

## 2023-01-22 RX ORDER — HYDROXYZINE PAMOATE 25 MG/1
25 CAPSULE ORAL EVERY 8 HOURS PRN
Status: DISCONTINUED | OUTPATIENT
Start: 2023-01-22 | End: 2023-02-01 | Stop reason: HOSPADM

## 2023-01-22 RX ORDER — GLUCAGON 1 MG
1 KIT INJECTION
Status: DISCONTINUED | OUTPATIENT
Start: 2023-01-22 | End: 2023-01-24

## 2023-01-22 RX ADMIN — HEPARIN SODIUM 5000 UNITS: 5000 INJECTION INTRAVENOUS; SUBCUTANEOUS at 05:01

## 2023-01-22 RX ADMIN — METHYLPREDNISOLONE SODIUM SUCCINATE 40 MG: 40 INJECTION, POWDER, FOR SOLUTION INTRAMUSCULAR; INTRAVENOUS at 09:01

## 2023-01-22 RX ADMIN — MYCOPHENOLATE MOFETIL 1000 MG: 250 CAPSULE ORAL at 10:01

## 2023-01-22 RX ADMIN — TACROLIMUS 5 MG: 1 CAPSULE ORAL at 06:01

## 2023-01-22 RX ADMIN — HEPARIN SODIUM 5000 UNITS: 5000 INJECTION INTRAVENOUS; SUBCUTANEOUS at 08:01

## 2023-01-22 RX ADMIN — PROCHLORPERAZINE EDISYLATE 5 MG: 5 INJECTION INTRAMUSCULAR; INTRAVENOUS at 10:01

## 2023-01-22 RX ADMIN — NYSTATIN 500000 UNITS: 500000 SUSPENSION ORAL at 07:01

## 2023-01-22 RX ADMIN — HYDROMORPHONE HYDROCHLORIDE 0.5 MG: 1 INJECTION, SOLUTION INTRAMUSCULAR; INTRAVENOUS; SUBCUTANEOUS at 06:01

## 2023-01-22 RX ADMIN — METHOCARBAMOL 500 MG: 500 TABLET ORAL at 03:01

## 2023-01-22 RX ADMIN — OXYCODONE HYDROCHLORIDE 10 MG: 10 TABLET ORAL at 02:01

## 2023-01-22 RX ADMIN — SODIUM CHLORIDE: 9 INJECTION, SOLUTION INTRAVENOUS at 08:01

## 2023-01-22 RX ADMIN — NYSTATIN 500000 UNITS: 500000 SUSPENSION ORAL at 02:01

## 2023-01-22 RX ADMIN — METHOCARBAMOL 500 MG: 500 TABLET ORAL at 10:01

## 2023-01-22 RX ADMIN — URSODIOL 300 MG: 300 CAPSULE ORAL at 08:01

## 2023-01-22 RX ADMIN — ONDANSETRON 4 MG: 2 INJECTION INTRAMUSCULAR; INTRAVENOUS at 10:01

## 2023-01-22 RX ADMIN — HYDROMORPHONE HYDROCHLORIDE 0.5 MG: 1 INJECTION, SOLUTION INTRAMUSCULAR; INTRAVENOUS; SUBCUTANEOUS at 10:01

## 2023-01-22 RX ADMIN — OXYCODONE HYDROCHLORIDE 10 MG: 10 TABLET ORAL at 10:01

## 2023-01-22 RX ADMIN — ONDANSETRON 4 MG: 2 INJECTION INTRAMUSCULAR; INTRAVENOUS at 02:01

## 2023-01-22 RX ADMIN — URSODIOL 300 MG: 300 CAPSULE ORAL at 10:01

## 2023-01-22 RX ADMIN — OXYCODONE HYDROCHLORIDE 10 MG: 10 TABLET ORAL at 01:01

## 2023-01-22 RX ADMIN — FAMOTIDINE 20 MG: 20 TABLET ORAL at 08:01

## 2023-01-22 RX ADMIN — TACROLIMUS 4 MG: 1 CAPSULE ORAL at 10:01

## 2023-01-22 RX ADMIN — MYCOPHENOLATE MOFETIL 1000 MG: 250 CAPSULE ORAL at 08:01

## 2023-01-22 RX ADMIN — BISACODYL 10 MG: 10 SUPPOSITORY RECTAL at 10:01

## 2023-01-22 RX ADMIN — URSODIOL 300 MG: 300 CAPSULE ORAL at 03:01

## 2023-01-22 RX ADMIN — MUPIROCIN 1 G: 20 OINTMENT TOPICAL at 09:01

## 2023-01-22 RX ADMIN — ONDANSETRON 4 MG: 2 INJECTION INTRAMUSCULAR; INTRAVENOUS at 06:01

## 2023-01-22 NOTE — ASSESSMENT & PLAN NOTE
Endocrinology consulted for BG management.   BG goal 140-180     - D/C Transition drip   - Novolog 3-6 units with meals   - Novolog (aspart) insulin prn for BG excursions [facility] (150/25).  - BG checks AC/HS  - Hypoglycemia protocol in place    ** Please notify Endocrine for any change and/or advance in diet**  ** Please call Endocrine for any BG related issues **    Discharge Planning:   TBD. Please notify endocrinology prior to discharge.

## 2023-01-22 NOTE — SUBJECTIVE & OBJECTIVE
"Interval HPI:   Overnight events: No acute events overnight. Patient in room 55290/73375 A. Blood glucose stable. BG at and below goal on current insulin regimen (Transition Insulin Drip). Steroid use- Methylprednisolone  40 mg. 5 Days Post-Op  Renal function- Normal   Vasopressors-  None       Endocrine will continue to follow and manage insulin orders inpatient.         Diet NPO     Eating:   NPO  Nausea: No  Hypoglycemia and intervention: No  Fever: No  TPN and/or TF: No      BP (!) 142/65 (BP Location: Left arm, Patient Position: Lying)   Pulse 85   Temp 98 °F (36.7 °C) (Oral)   Resp 18   Ht 5' 5" (1.651 m)   Wt 91.7 kg (202 lb 2.6 oz)   SpO2 97%   Breastfeeding No   BMI 33.64 kg/m²     Labs Reviewed and Include    No results for input(s): GLU, CALCIUM, ALBUMIN, PROT, NA, K, CO2, CL, BUN, CREATININE, ALKPHOS, ALT, AST, BILITOT in the last 24 hours.  Lab Results   Component Value Date    WBC 5.45 01/22/2023    HGB 11.6 (L) 01/22/2023    HCT 32.4 (L) 01/22/2023    MCV 92 01/22/2023    PLT 40 (L) 01/22/2023     No results for input(s): TSH, FREET4 in the last 168 hours.  Lab Results   Component Value Date    HGBA1C 4.5 01/17/2023       Nutritional status:   Body mass index is 33.64 kg/m².  Lab Results   Component Value Date    ALBUMIN 3.2 (L) 01/21/2023    ALBUMIN 3.2 (L) 01/20/2023    ALBUMIN 3.2 (L) 01/19/2023     No results found for: PREALBUMIN    Estimated Creatinine Clearance: 76.3 mL/min (based on SCr of 0.9 mg/dL).    Accu-Checks  Recent Labs     01/20/23  1705 01/20/23  2149 01/21/23  0228 01/21/23  0913 01/21/23  0951 01/21/23  1259 01/21/23  1703 01/21/23  2142 01/22/23  0207   POCTGLUCOSE 192* 209* 119* 89 94 224* 183* 138* 114*       Current Medications and/or Treatments Impacting Glycemic Control  Immunotherapy:    Immunosuppressants           Stop Route Frequency     tacrolimus capsule 4 mg         -- Oral 2 times daily     mycophenolate capsule 1,000 mg         -- Oral 2 times daily      "     Steroids:   Hormones (From admission, onward)      Start     Stop Route Frequency Ordered    01/23/23 0900  predniSONE tablet 20 mg  (methylprednisolone taper panel)        See Hyperspace for full Linked Orders Report.    -- Oral Daily 01/17/23 2148    01/22/23 0900  methylPREDNISolone sodium succinate injection 40 mg  (methylprednisolone taper panel)        See Hyperspace for full Linked Orders Report.    01/23 0859 IV Daily 01/17/23 2148 01/19/23 2100  melatonin tablet 6 mg         -- Oral Nightly 01/19/23 0636          Pressors:    Autonomic Drugs (From admission, onward)      None          Hyperglycemia/Diabetes Medications:   Antihyperglycemics (From admission, onward)      Start     Stop Route Frequency Ordered    01/22/23 1130  insulin aspart U-100 pen 3-6 Units         -- SubQ 3 times daily with meals 01/22/23 0844    01/22/23 0943  insulin aspart U-100 pen 1-10 Units         -- SubQ Before meals & nightly PRN 01/22/23 0844

## 2023-01-22 NOTE — PLAN OF CARE
Pt AAOx4. C/o pain to mid back (spasm) and incisional pain. Oxycodone and dilaudid administered PRN along with scheduled robaxin. Trazodone and melatonin administered as sleep aid. Heat pad utilized. Pt unable to get comfortable throughout the night. Repositioned multiple times. Chevron incision CDI. Painted with iodine. RYLEY drain CDI, draining SS fluid. Site where RYLEY was removed leaking SS fluid. Abdomen rounded and tender. 2+ edema to BLE. Bruising noted to R inner elbow that is causing discomfort to the patient. VSS. Accu checks ACHS + 2am. Insulin drip at 0.9u/hr. Self meds taught to pt and spouse during day shift. Needs more reinforcement. Bed locked and low. Call light in reach. Educated to call for assist if needed.

## 2023-01-22 NOTE — PLAN OF CARE
Patient is AAOx4.  Afebrile  RA  Telemetry monitoring, NSR  Insulin gtt d/c'd per order.   Chevron incision JEROMY with staples. Betadine applied.   Self medications reinforced.   Patient is NPO.   Spouse is at bedside and participating in care.   RLQ RYLEY drain  Patient walked in hallway with  a few times this shift.   PRN oxy and IV dilaudid administered for complaint of pain.   Bed is in lowest position with wheels locked.   Call light remains within reach.   Instructed to call for assistance.   Nonskid socks when oob.   [facility]

## 2023-01-22 NOTE — PROGRESS NOTES
[facility] - Transplant Stepdown  Endocrinology  Progress Note    Admit Date: 1/17/2023     Reason for Consult: Management of Hyperglycemia     Surgical Procedure and Date: Liver Transplant 1/17/2023    Patient is not diabetic and does not currently take any oral/injectable antidiabetic/hypoglycemic medications.     Lab Results   Component Value Date    HGBA1C 4.5 01/17/2023           HPI: Mickey Harris is a 57 y.o. female who presented on 1/17/22 for living related donor liver transplant in the setting of DORAN cirrhosis. She was diagnosed approximately 4 years ago and did well up until one year ago when she began to develop lower extremity edema and ascites managed with diuretics. She has never required paracentesis. She has also developed hepatic encephalopathy managed with lactulose. There is no history of GI bleeding. Ms. Harris is now s/p living donor liver transplantation on 1/17/23. She received a right liver lobe which was quite large (~1000g). Biliary reconstruction was Becky-en-y reconstruction to the donor right hepatic duct. The procedure was performed without apparent complication and she was transferred to the SICU for postoperative care and management. Endocrine consulted to manage hyperglycemia in the pressence of high-dose steroids. Of note, patient on 12-24 units/hr of insulin while on IIP.               Interval HPI:   Overnight events: No acute events overnight. Patient in room 96604/17349 A. Blood glucose stable. BG at and below goal on current insulin regimen (Transition Insulin Drip). Steroid use- Methylprednisolone  40 mg. 5 Days Post-Op  Renal function- Normal   Vasopressors-  None       Endocrine will continue to follow and manage insulin orders inpatient.         Diet NPO     Eating:   NPO  Nausea: No  Hypoglycemia and intervention: No  Fever: No  TPN and/or TF: No      BP (!) 142/65 (BP Location: Left arm, Patient Position: Lying)   Pulse 85   Temp 98 °F (36.7 °C) (Oral)   Resp 18    "Ht 5' 5" (1.651 m)   Wt 91.7 kg (202 lb 2.6 oz)   SpO2 97%   Breastfeeding No   BMI 33.64 kg/m²     Labs Reviewed and Include    No results for input(s): GLU, CALCIUM, ALBUMIN, PROT, NA, K, CO2, CL, BUN, CREATININE, ALKPHOS, ALT, AST, BILITOT in the last 24 hours.  Lab Results   Component Value Date    WBC 5.45 01/22/2023    HGB 11.6 (L) 01/22/2023    HCT 32.4 (L) 01/22/2023    MCV 92 01/22/2023    PLT 40 (L) 01/22/2023     No results for input(s): TSH, FREET4 in the last 168 hours.  Lab Results   Component Value Date    HGBA1C 4.5 01/17/2023       Nutritional status:   Body mass index is 33.64 kg/m².  Lab Results   Component Value Date    ALBUMIN 3.2 (L) 01/21/2023    ALBUMIN 3.2 (L) 01/20/2023    ALBUMIN 3.2 (L) 01/19/2023     No results found for: PREALBUMIN    Estimated Creatinine Clearance: 76.3 mL/min (based on SCr of 0.9 mg/dL).    Accu-Checks  Recent Labs     01/20/23  1705 01/20/23  2149 01/21/23  0228 01/21/23  0913 01/21/23  0951 01/21/23  1259 01/21/23  1703 01/21/23  2142 01/22/23  0207   POCTGLUCOSE 192* 209* 119* 89 94 224* 183* 138* 114*       Current Medications and/or Treatments Impacting Glycemic Control  Immunotherapy:    Immunosuppressants           Stop Route Frequency     tacrolimus capsule 4 mg         -- Oral 2 times daily     mycophenolate capsule 1,000 mg         -- Oral 2 times daily          Steroids:   Hormones (From admission, onward)      Start     Stop Route Frequency Ordered    01/23/23 0900  predniSONE tablet 20 mg  (methylprednisolone taper panel)        See Hyperspace for full Linked Orders Report.    -- Oral Daily 01/17/23 2148 01/22/23 0900  methylPREDNISolone sodium succinate injection 40 mg  (methylprednisolone taper panel)        See Hyperspace for full Linked Orders Report.    01/23 0859 IV Daily 01/17/23 2148 01/19/23 2100  melatonin tablet 6 mg         -- Oral Nightly 01/19/23 9537          Pressors:    Autonomic Drugs (From admission, onward)      None      "     Hyperglycemia/Diabetes Medications:   Antihyperglycemics (From admission, onward)      Start     Stop Route Frequency Ordered    01/22/23 1130  insulin aspart U-100 pen 3-6 Units         -- SubQ 3 times daily with meals 01/22/23 0844    01/22/23 0943  insulin aspart U-100 pen 1-10 Units         -- SubQ Before meals & nightly PRN 01/22/23 0844            ASSESSMENT and PLAN    * Liver cirrhosis secondary to DORAN (nonalcoholic steatohepatitis)  Managed per primary team  Avoid hypoglycemia        Hyperglycemia  Endocrinology consulted for BG management.   BG goal 140-180     - D/C Transition drip   - Novolog 3-6 units with meals (Weight-based at 0.4 units/kg/day)  - Novolog (aspart) insulin prn for BG excursions [facility] SSI (150/25).  - BG checks AC/HS  - Hypoglycemia protocol in place    ** Please notify Endocrine for any change and/or advance in diet**  ** Please call Endocrine for any BG related issues **    Discharge Planning:   TBD. Please notify endocrinology prior to discharge.        S/P liver transplant  Optimize BG control to improve wound healing        Adrenal corticosteroid causing adverse effect in therapeutic use  Glucocorticoids markedly increase glucose levels. Expect the steroid taper will help glucose control.              Jaison Laboy, DNP, FNP  Endocrinology  [facility] - Transplant Stepdown

## 2023-01-22 NOTE — SUBJECTIVE & OBJECTIVE
Scheduled Meds:   [START ON 1/27/2023] dapsone  100 mg Oral Daily    famotidine  20 mg Oral QHS    heparin (porcine)  5,000 Units Subcutaneous Q8H    insulin aspart U-100  3-6 Units Subcutaneous TIDWM    LIDOcaine  1 patch Transdermal Q24H    melatonin  6 mg Oral Nightly    methocarbamoL  500 mg Oral TID    mycophenolate  1,000 mg Oral BID    nystatin  500,000 Units Mouth/Throat TID PC    [START ON 1/23/2023] predniSONE  20 mg Oral Daily    tacrolimus  4 mg Oral BID    traZODone  50 mg Oral QHS    ursodioL  300 mg Oral TID    [START ON 1/27/2023] valGANciclovir  450 mg Oral Daily     Continuous Infusions:   sodium chloride 0.9% Stopped (01/19/23 2311)     PRN Meds:sodium chloride, albuterol, artificial tears, bisacodyL, calcium carbonate, dextrose 10%, dextrose 10%, dextrose 10%, dextrose 10%, glucagon (human recombinant), glucose, glucose, HYDROmorphone, hydrOXYzine pamoate, insulin aspart U-100, ondansetron, oxyCODONE, oxyCODONE, sodium chloride 0.9%    Review of Systems   Constitutional:  Negative for activity change and appetite change.   HENT: Negative.     Eyes:  Negative for visual disturbance.   Respiratory:  Negative for shortness of breath.    Cardiovascular:  Negative for chest pain, palpitations and leg swelling.   Gastrointestinal:  Positive for abdominal distention, abdominal pain, constipation and nausea. Negative for diarrhea and vomiting.   Genitourinary:  Negative for difficulty urinating.   Musculoskeletal:  Negative for arthralgias, back pain and joint swelling.   Skin:  Positive for wound.   Allergic/Immunologic: Positive for immunocompromised state.   Neurological:  Negative for dizziness and facial asymmetry.   Hematological:  Negative for adenopathy. Does not bruise/bleed easily.   Psychiatric/Behavioral:  Negative for agitation and behavioral problems.    All other systems reviewed and are negative.  Objective:     Vital Signs (Most Recent):  Temp: 98.2 °F (36.8 °C) (01/22/23 0830)  Pulse:  78 (01/22/23 0830)  Resp: 18 (01/22/23 1312)  BP: 134/64 (01/22/23 0830)  SpO2: 95 % (01/22/23 0830) Vital Signs (24h Range):  Temp:  [97.8 °F (36.6 °C)-98.2 °F (36.8 °C)] 98.2 °F (36.8 °C)  Pulse:  [74-90] 78  Resp:  [16-19] 18  SpO2:  [95 %-97 %] 95 %  BP: (134-161)/(61-73) 134/64     Weight: 91.7 kg (202 lb 2.6 oz)  Body mass index is 33.64 kg/m².    Intake/Output - Last 3 Shifts         01/20 0700  01/21 0659 01/21 0700  01/22 0659 01/22 0700  01/23 0659    P.O. 1060 1670     I.V. (mL/kg) 45 (0.5)      Blood 204      Other 0      IV Piggyback       Total Intake(mL/kg) 1309 (14) 1670 (18.2)     Urine (mL/kg/hr) 3380 (1.5) 3925 (1.8)     Emesis/NG output 0 0     Drains 510 360     Other 0      Stool 0 0     Blood 0      Total Output 3890 4285     Net -2581 -2610            Urine Occurrence 2 x      Stool Occurrence 0 x      Emesis Occurrence 0 x 1 x             Physical Exam  Vitals and nursing note reviewed.   Constitutional:       Appearance: She is well-developed.   HENT:      Head: Normocephalic.   Eyes:      Conjunctiva/sclera: Conjunctivae normal.   Cardiovascular:      Rate and Rhythm: Normal rate and regular rhythm.      Heart sounds: No murmur heard.  Pulmonary:      Effort: Pulmonary effort is normal.      Breath sounds: Normal breath sounds.   Abdominal:      General: Bowel sounds are normal. There is no distension.      Palpations: Abdomen is soft.      Tenderness: There is no abdominal tenderness.       Musculoskeletal:         General: Normal range of motion.      Cervical back: Normal range of motion.      Right lower leg: Edema present.      Left lower leg: Edema present.   Skin:     General: Skin is warm and dry.      Capillary Refill: Capillary refill takes less than 2 seconds.      Coloration: Skin is jaundiced.   Neurological:      Mental Status: She is alert and oriented to person, place, and time.   Psychiatric:         Behavior: Behavior normal.         Thought Content: Thought content  normal.         Judgment: Judgment normal.       Laboratory:  Immunosuppressants           Stop Route Frequency     tacrolimus capsule 4 mg         -- Oral 2 times daily     mycophenolate capsule 1,000 mg         -- Oral 2 times daily          CBC:   Recent Labs   Lab 01/22/23  0829   WBC 5.45   RBC 3.51*   HGB 11.6*   HCT 32.4*   PLT 40*   MCV 92   MCH 33.0*   MCHC 35.8     BMP:   Recent Labs   Lab 01/22/23  0829   GLU 93      K 3.7      CO2 23   BUN 34*   CREATININE 1.0   CALCIUM 8.8     CMP:   Recent Labs   Lab 01/22/23  0829   GLU 93   CALCIUM 8.8   ALBUMIN 3.5   PROT 5.9*      K 3.7   CO2 23      BUN 34*   CREATININE 1.0   ALKPHOS 57   *   *   BILITOT 22.0*     Coagulation:   Recent Labs   Lab 01/22/23  0829   INR 1.1   APTT 27.1     Labs within the past 24 hours have been reviewed.    Diagnostic Results:  US Liver Transplant Post:  Results for orders placed during the hospital encounter of 01/17/23    US Doppler Liver Transplant Post (xpd)    Narrative  EXAMINATION:  US DOPPLER LIVER TRANSPLANT POST (XPD)    CLINICAL HISTORY:  Elevated bili s/p liver txp;    TECHNIQUE:  Ultrasound of the transplant liver with Doppler evaluation.  Color and spectral Doppler were performed.    COMPARISON:  None.    FINDINGS:  Patient is status post orthotopic liver transplant 01/17/2023.  The liver demonstrates homogeneous echotexture.  No focal hepatic lesions are seen.  No fluid collections.    The common duct is not dilated, measuring 3 mm.  No dilated intrahepatic radicles are seen.    Color flow and spectral waveform analysis was performed.  The main portal vein, right portal vein, middle hepatic vein, right hepatic vein, and IVC are patent with proper directional flow.    Anastomosis site of the main hepatic artery demonstrates a peak systolic velocity 98 cm/sec (previously 137 cm/sec).    Main hepatic artery demonstrates resistive index 0.85 (previously 0.87) with normal  waveform.    Anterior branch of the right hepatic artery demonstrates resistive index 0.68 (previously 0.78) with normal waveform.    Posterior branch of the right hepatic artery demonstrates resistive index 0.79 (previously 0.81) with normal waveform.    Right pleural effusion.    Impression  Minimally elevated resistive index in the main hepatic artery, favored to be secondary to edema.  Otherwise satisfactory Doppler evaluation of the liver allograft.    Electronically signed by resident: Toyin Barker  Date:    01/20/2023  Time:    10:30    Electronically signed by: Eduardo Nayak MD  Date:    01/20/2023  Time:    10:56    Debility/Functional status: No debility noted.

## 2023-01-22 NOTE — ASSESSMENT & PLAN NOTE
-DORAN cirrhosis complicated by hepatic encephalopathy who is now status post living related donor liver transplant on 1/17/22. She is progressing well post op. 2 drains in place. POD#1 Liver US looked fine.   Minimally elevated intraparenchymal resistive indices, likely due to postoperative edema.    - elevated t-bili started Ursodiol 1/21  - CT scan ABD 1/22-

## 2023-01-22 NOTE — PROGRESS NOTES
[facility] - Transplant Stepdown  Liver Transplant  Progress Note    Patient Name: Mickey Harris  MRN: [id number]  Admission Date: 1/17/2023  Hospital Length of Stay: 5 days  Code Status: Full Code  Primary Care Provider: Primary Doctor No  Post-Operative Day: 5    ORGAN:   RIGHT LIVER LOBE (SEGS 5,6,7,8) WITHOUT MIDDLE HEPATIC VEIN  Disease Etiology: Cirrhosis: Fatty Liver (DORAN)  Donor Type:   Living  CDC High Risk:     Donor CMV Status:   Donor CMV Status:   Donor HBcAB:     Donor HCV Status:     Donor HBV PALOMA:   Donor HCV PALOMA:   Whole or Partial: Split Liver  Biliary Anastomosis: Becky-en-Y  Arterial Anatomy:   Subjective:     History of Present Illness:  Ms. Mickey Harris is a 58 y/o F admitted for living liver transplant from daughter for DORAN cirrhosis.       Hospital Course:  She is now status post living related donor liver transplant on 1/17/22. She is progressing well post op. 2 drains in place. POD#1 Liver US showed minimally elevated intraparenchymal resistive indices, likely due to postoperative edema. Otherwise satisfactory Doppler evaluation of the liver. Transfer to TSU on POD#2    Hospital Interval: Nausea over night, made NPO, - flatus, -BM,  T-bili increased to 22, CT of Abd /pelvis - reviewed with Surgeon. Will keep NPO today possible U/S on Monday- gentle IV hydration.  Continue Ursodiol.  1 drain remains with large output raul-sang  draiange-.  Patient is ambulating in the halls with family.  Vitals stable, Monitor          Scheduled Meds:   [START ON 1/27/2023] dapsone  100 mg Oral Daily    famotidine  20 mg Oral QHS    heparin (porcine)  5,000 Units Subcutaneous Q8H    insulin aspart U-100  3-6 Units Subcutaneous TIDWM    LIDOcaine  1 patch Transdermal Q24H    melatonin  6 mg Oral Nightly    methocarbamoL  500 mg Oral TID    mycophenolate  1,000 mg Oral BID    nystatin  500,000 Units Mouth/Throat TID PC    [START ON 1/23/2023] predniSONE  20 mg Oral Daily    tacrolimus  4 mg Oral  BID    traZODone  50 mg Oral QHS    ursodioL  300 mg Oral TID    [START ON 1/27/2023] valGANciclovir  450 mg Oral Daily     Continuous Infusions:   sodium chloride 0.9% Stopped (01/19/23 2311)     PRN Meds:sodium chloride, albuterol, artificial tears, bisacodyL, calcium carbonate, dextrose 10%, dextrose 10%, dextrose 10%, dextrose 10%, glucagon (human recombinant), glucose, glucose, HYDROmorphone, hydrOXYzine pamoate, insulin aspart U-100, ondansetron, oxyCODONE, oxyCODONE, sodium chloride 0.9%    Review of Systems   Constitutional:  Negative for activity change and appetite change.   HENT: Negative.     Eyes:  Negative for visual disturbance.   Respiratory:  Negative for shortness of breath.    Cardiovascular:  Negative for chest pain, palpitations and leg swelling.   Gastrointestinal:  Positive for abdominal distention, abdominal pain, constipation and nausea. Negative for diarrhea and vomiting.   Genitourinary:  Negative for difficulty urinating.   Musculoskeletal:  Negative for arthralgias, back pain and joint swelling.   Skin:  Positive for wound.   Allergic/Immunologic: Positive for immunocompromised state.   Neurological:  Negative for dizziness and facial asymmetry.   Hematological:  Negative for adenopathy. Does not bruise/bleed easily.   Psychiatric/Behavioral:  Negative for agitation and behavioral problems.    All other systems reviewed and are negative.  Objective:     Vital Signs (Most Recent):  Temp: 98.2 °F (36.8 °C) (01/22/23 0830)  Pulse: 78 (01/22/23 0830)  Resp: 18 (01/22/23 1312)  BP: 134/64 (01/22/23 0830)  SpO2: 95 % (01/22/23 0830) Vital Signs (24h Range):  Temp:  [97.8 °F (36.6 °C)-98.2 °F (36.8 °C)] 98.2 °F (36.8 °C)  Pulse:  [74-90] 78  Resp:  [16-19] 18  SpO2:  [95 %-97 %] 95 %  BP: (134-161)/(61-73) 134/64     Weight: 91.7 kg (202 lb 2.6 oz)  Body mass index is 33.64 kg/m².    Intake/Output - Last 3 Shifts         01/20 0700  01/21 0659 01/21 0700 01/22 0659 01/22 0700 01/23 0659     P.O. 1060 1670     I.V. (mL/kg) 45 (0.5)      Blood 204      Other 0      IV Piggyback       Total Intake(mL/kg) 1309 (14) 1670 (18.2)     Urine (mL/kg/hr) 3380 (1.5) 3925 (1.8)     Emesis/NG output 0 0     Drains 510 360     Other 0      Stool 0 0     Blood 0      Total Output 3890 4282     Net -2583 -8355            Urine Occurrence 2 x      Stool Occurrence 0 x      Emesis Occurrence 0 x 1 x             Physical Exam  Vitals and nursing note reviewed.   Constitutional:       Appearance: She is well-developed.   HENT:      Head: Normocephalic.   Eyes:      Conjunctiva/sclera: Conjunctivae normal.   Cardiovascular:      Rate and Rhythm: Normal rate and regular rhythm.      Heart sounds: No murmur heard.  Pulmonary:      Effort: Pulmonary effort is normal.      Breath sounds: Normal breath sounds.   Abdominal:      General: Bowel sounds are normal. There is no distension.      Palpations: Abdomen is soft.      Tenderness: There is no abdominal tenderness.       Musculoskeletal:         General: Normal range of motion.      Cervical back: Normal range of motion.      Right lower leg: Edema present.      Left lower leg: Edema present.   Skin:     General: Skin is warm and dry.      Capillary Refill: Capillary refill takes less than 2 seconds.      Coloration: Skin is jaundiced.   Neurological:      Mental Status: She is alert and oriented to person, place, and time.   Psychiatric:         Behavior: Behavior normal.         Thought Content: Thought content normal.         Judgment: Judgment normal.       Laboratory:  Immunosuppressants           Stop Route Frequency     tacrolimus capsule 4 mg         -- Oral 2 times daily     mycophenolate capsule 1,000 mg         -- Oral 2 times daily          CBC:   Recent Labs   Lab 01/22/23  0829   WBC 5.45   RBC 3.51*   HGB 11.6*   HCT 32.4*   PLT 40*   MCV 92   MCH 33.0*   MCHC 35.8     BMP:   Recent Labs   Lab 01/22/23  0829   GLU 93      K 3.7      CO2 23   BUN  34*   CREATININE 1.0   CALCIUM 8.8     CMP:   Recent Labs   Lab 01/22/23  0829   GLU 93   CALCIUM 8.8   ALBUMIN 3.5   PROT 5.9*      K 3.7   CO2 23      BUN 34*   CREATININE 1.0   ALKPHOS 57   *   *   BILITOT 22.0*     Coagulation:   Recent Labs   Lab 01/22/23  0829   INR 1.1   APTT 27.1     Labs within the past 24 hours have been reviewed.    Diagnostic Results:  US Liver Transplant Post:  Results for orders placed during the hospital encounter of 01/17/23    US Doppler Liver Transplant Post (xpd)    Narrative  EXAMINATION:  US DOPPLER LIVER TRANSPLANT POST (XPD)    CLINICAL HISTORY:  Elevated bili s/p liver txp;    TECHNIQUE:  Ultrasound of the transplant liver with Doppler evaluation.  Color and spectral Doppler were performed.    COMPARISON:  None.    FINDINGS:  Patient is status post orthotopic liver transplant 01/17/2023.  The liver demonstrates homogeneous echotexture.  No focal hepatic lesions are seen.  No fluid collections.    The common duct is not dilated, measuring 3 mm.  No dilated intrahepatic radicles are seen.    Color flow and spectral waveform analysis was performed.  The main portal vein, right portal vein, middle hepatic vein, right hepatic vein, and IVC are patent with proper directional flow.    Anastomosis site of the main hepatic artery demonstrates a peak systolic velocity 98 cm/sec (previously 137 cm/sec).    Main hepatic artery demonstrates resistive index 0.85 (previously 0.87) with normal waveform.    Anterior branch of the right hepatic artery demonstrates resistive index 0.68 (previously 0.78) with normal waveform.    Posterior branch of the right hepatic artery demonstrates resistive index 0.79 (previously 0.81) with normal waveform.    Right pleural effusion.    Impression  Minimally elevated resistive index in the main hepatic artery, favored to be secondary to edema.  Otherwise satisfactory Doppler evaluation of the liver allograft.    Electronically  signed by resident: Toyin Barker  Date:    01/20/2023  Time:    10:30    Electronically signed by: Eduardo Nayak MD  Date:    01/20/2023  Time:    10:56    Debility/Functional status: No debility noted.    Assessment/Plan:     * Liver cirrhosis secondary to DORAN (nonalcoholic steatohepatitis)        Prophylactic immunotherapy  - continue prograf  - continue to monitor for toxic side effects and check daily levels. Will adjust for therapeutic dose      Long-term use of immunosuppressant medication  - see prophylactic immunotherapy      At risk for opportunistic infections  - Bactrim for PCP ppx.  - Valcyte for CMV ppx.      Acute blood loss anemia  - H/H stable  - no overt signs of bleed  - continue to monitor with daily CBC      Adrenal corticosteroid causing adverse effect in therapeutic use        Hyperglycemia  - Endocrine following         S/P liver transplant  -DORAN cirrhosis complicated by hepatic encephalopathy who is now status post living related donor liver transplant on 1/17/22. She is progressing well post op. 2 drains in place. POD#1 Liver US looked fine.   Minimally elevated intraparenchymal resistive indices, likely due to postoperative edema.    - elevated t-bili started Ursodiol 1/21  - CT scan ABD 1/22-       VTE Risk Mitigation (From admission, onward)         Ordered     Place sequential compression device  Until discontinued         01/17/23 2342     heparin (porcine) injection 5,000 Units  Every 8 hours         01/17/23 2148     IP VTE HIGH RISK PATIENT  Once         01/17/23 2148                The patients clinical status was discussed at multidisplinary rounds, involving transplant surgery, transplant medicine, pharmacy, nursing, nutrition, and social work    Discharge Planning:  No Patient Care Coordination Note on file.      ROSA ISELA Bansal  Liver Transplant  [facility] - Transplant Stepdown

## 2023-01-23 ENCOUNTER — ANESTHESIA (OUTPATIENT)
Dept: SURGERY | Facility: HOSPITAL | Age: 58
DRG: 006 | End: 2023-01-23
Payer: COMMERCIAL

## 2023-01-23 ENCOUNTER — ANESTHESIA EVENT (OUTPATIENT)
Dept: SURGERY | Facility: HOSPITAL | Age: 58
DRG: 006 | End: 2023-01-23
Payer: COMMERCIAL

## 2023-01-23 PROBLEM — K75.81 LIVER CIRRHOSIS SECONDARY TO NASH (NONALCOHOLIC STEATOHEPATITIS): Status: RESOLVED | Noted: 2022-11-30 | Resolved: 2023-01-23

## 2023-01-23 PROBLEM — K74.60 LIVER CIRRHOSIS SECONDARY TO NASH (NONALCOHOLIC STEATOHEPATITIS): Status: RESOLVED | Noted: 2022-11-30 | Resolved: 2023-01-23

## 2023-01-23 LAB
ALBUMIN SERPL BCP-MCNC: 2.6 G/DL (ref 3.5–5.2)
ALP SERPL-CCNC: 54 U/L (ref 55–135)
ALT SERPL W/O P-5'-P-CCNC: 258 U/L (ref 10–44)
ANION GAP SERPL CALC-SCNC: 8 MMOL/L (ref 8–16)
APTT BLDCRRT: 27.6 SEC (ref 21–32)
AST SERPL-CCNC: 76 U/L (ref 10–40)
BASOPHILS # BLD AUTO: 0.02 K/UL (ref 0–0.2)
BASOPHILS NFR BLD: 0.3 % (ref 0–1.9)
BILIRUB DIRECT SERPL-MCNC: >14 MG/DL (ref 0.1–0.3)
BILIRUB FLD-MCNC: 40.9 MG/DL
BILIRUB SERPL-MCNC: 21.1 MG/DL (ref 0.1–1)
BODY FLUID SOURCE, BILIRUBIN: NORMAL
BUN SERPL-MCNC: 31 MG/DL (ref 6–20)
CALCIUM SERPL-MCNC: 7.9 MG/DL (ref 8.7–10.5)
CHLORIDE SERPL-SCNC: 102 MMOL/L (ref 95–110)
CO2 SERPL-SCNC: 23 MMOL/L (ref 23–29)
CREAT SERPL-MCNC: 0.9 MG/DL (ref 0.5–1.4)
DIFFERENTIAL METHOD: ABNORMAL
EOSINOPHIL # BLD AUTO: 0.4 K/UL (ref 0–0.5)
EOSINOPHIL NFR BLD: 5.8 % (ref 0–8)
ERYTHROCYTE [DISTWIDTH] IN BLOOD BY AUTOMATED COUNT: 13.6 % (ref 11.5–14.5)
EST. GFR  (NO RACE VARIABLE): >60 ML/MIN/1.73 M^2
GLUCOSE SERPL-MCNC: 119 MG/DL (ref 70–110)
GRAM STN SPEC: NORMAL
GRAM STN SPEC: NORMAL
HCT VFR BLD AUTO: 28.8 % (ref 37–48.5)
HGB BLD-MCNC: 10.2 G/DL (ref 12–16)
IMM GRANULOCYTES # BLD AUTO: 0.16 K/UL (ref 0–0.04)
IMM GRANULOCYTES NFR BLD AUTO: 2.6 % (ref 0–0.5)
INR PPP: 1.1 (ref 0.8–1.2)
LYMPHOCYTES # BLD AUTO: 0.4 K/UL (ref 1–4.8)
LYMPHOCYTES NFR BLD: 5.9 % (ref 18–48)
MAGNESIUM SERPL-MCNC: 2.1 MG/DL (ref 1.6–2.6)
MCH RBC QN AUTO: 32.6 PG (ref 27–31)
MCHC RBC AUTO-ENTMCNC: 35.4 G/DL (ref 32–36)
MCV RBC AUTO: 92 FL (ref 82–98)
MONOCYTES # BLD AUTO: 0.5 K/UL (ref 0.3–1)
MONOCYTES NFR BLD: 8.5 % (ref 4–15)
NEUTROPHILS # BLD AUTO: 4.8 K/UL (ref 1.8–7.7)
NEUTROPHILS NFR BLD: 76.9 % (ref 38–73)
NRBC BLD-RTO: 0 /100 WBC
PHOSPHATE SERPL-MCNC: 2.5 MG/DL (ref 2.7–4.5)
PLATELET # BLD AUTO: 40 K/UL (ref 150–450)
PMV BLD AUTO: 12.2 FL (ref 9.2–12.9)
POCT GLUCOSE: 134 MG/DL (ref 70–110)
POCT GLUCOSE: 154 MG/DL (ref 70–110)
POCT GLUCOSE: 155 MG/DL (ref 70–110)
POCT GLUCOSE: 158 MG/DL (ref 70–110)
POCT GLUCOSE: 165 MG/DL (ref 70–110)
POCT GLUCOSE: 177 MG/DL (ref 70–110)
POCT GLUCOSE: 179 MG/DL (ref 70–110)
POCT GLUCOSE: 179 MG/DL (ref 70–110)
POCT GLUCOSE: 212 MG/DL (ref 70–110)
POCT GLUCOSE: 236 MG/DL (ref 70–110)
POCT GLUCOSE: 261 MG/DL (ref 70–110)
POCT GLUCOSE: >500 MG/DL (ref 70–110)
POTASSIUM SERPL-SCNC: 3.9 MMOL/L (ref 3.5–5.1)
PROT SERPL-MCNC: 4.5 G/DL (ref 6–8.4)
PROTHROMBIN TIME: 11.5 SEC (ref 9–12.5)
RBC # BLD AUTO: 3.13 M/UL (ref 4–5.4)
SODIUM SERPL-SCNC: 133 MMOL/L (ref 136–145)
TACROLIMUS BLD-MCNC: 18.4 NG/ML (ref 5–15)
WBC # BLD AUTO: 6.25 K/UL (ref 3.9–12.7)

## 2023-01-23 PROCEDURE — 63600175 PHARM REV CODE 636 W HCPCS: Performed by: NURSE PRACTITIONER

## 2023-01-23 PROCEDURE — 87102 FUNGUS ISOLATION CULTURE: CPT | Performed by: TRANSPLANT SURGERY

## 2023-01-23 PROCEDURE — 63600175 PHARM REV CODE 636 W HCPCS: Performed by: PHYSICIAN ASSISTANT

## 2023-01-23 PROCEDURE — 87070 CULTURE OTHR SPECIMN AEROBIC: CPT | Performed by: TRANSPLANT SURGERY

## 2023-01-23 PROCEDURE — 88313 SPECIAL STAINS GROUP 2: CPT | Performed by: PATHOLOGY

## 2023-01-23 PROCEDURE — 25000003 PHARM REV CODE 250: Performed by: NURSE PRACTITIONER

## 2023-01-23 PROCEDURE — 87075 CULTR BACTERIA EXCEPT BLOOD: CPT | Performed by: TRANSPLANT SURGERY

## 2023-01-23 PROCEDURE — 63600175 PHARM REV CODE 636 W HCPCS: Performed by: SURGERY

## 2023-01-23 PROCEDURE — 85025 COMPLETE CBC W/AUTO DIFF WBC: CPT

## 2023-01-23 PROCEDURE — 88307 PR  SURG PATH,LEVEL V: ICD-10-PCS | Mod: 26,,, | Performed by: PATHOLOGY

## 2023-01-23 PROCEDURE — 99233 PR SUBSEQUENT HOSPITAL CARE,LEVL III: ICD-10-PCS | Mod: 24,,, | Performed by: NURSE PRACTITIONER

## 2023-01-23 PROCEDURE — 20600001 HC STEP DOWN PRIVATE ROOM

## 2023-01-23 PROCEDURE — 87186 SC STD MICRODIL/AGAR DIL: CPT | Performed by: TRANSPLANT SURGERY

## 2023-01-23 PROCEDURE — 87077 CULTURE AEROBIC IDENTIFY: CPT | Performed by: TRANSPLANT SURGERY

## 2023-01-23 PROCEDURE — 85610 PROTHROMBIN TIME: CPT

## 2023-01-23 PROCEDURE — D9220A PRA ANESTHESIA: ICD-10-PCS | Mod: CRNA,,, | Performed by: STUDENT IN AN ORGANIZED HEALTH CARE EDUCATION/TRAINING PROGRAM

## 2023-01-23 PROCEDURE — 25000003 PHARM REV CODE 250

## 2023-01-23 PROCEDURE — 71000033 HC RECOVERY, INTIAL HOUR: Performed by: TRANSPLANT SURGERY

## 2023-01-23 PROCEDURE — 87116 MYCOBACTERIA CULTURE: CPT | Performed by: TRANSPLANT SURGERY

## 2023-01-23 PROCEDURE — 87205 SMEAR GRAM STAIN: CPT | Performed by: TRANSPLANT SURGERY

## 2023-01-23 PROCEDURE — D9220A PRA ANESTHESIA: Mod: CRNA,,, | Performed by: STUDENT IN AN ORGANIZED HEALTH CARE EDUCATION/TRAINING PROGRAM

## 2023-01-23 PROCEDURE — 83735 ASSAY OF MAGNESIUM: CPT

## 2023-01-23 PROCEDURE — 87206 SMEAR FLUORESCENT/ACID STAI: CPT | Performed by: TRANSPLANT SURGERY

## 2023-01-23 PROCEDURE — 35840 PR EXPLOR POSTOP BLEED,INFEC,CLOT-ABD: ICD-10-PCS | Mod: 82,78,, | Performed by: TRANSPLANT SURGERY

## 2023-01-23 PROCEDURE — 36000707: Performed by: TRANSPLANT SURGERY

## 2023-01-23 PROCEDURE — 36415 COLL VENOUS BLD VENIPUNCTURE: CPT

## 2023-01-23 PROCEDURE — 99233 SBSQ HOSP IP/OBS HIGH 50: CPT | Mod: 24,,, | Performed by: NURSE PRACTITIONER

## 2023-01-23 PROCEDURE — 25000003 PHARM REV CODE 250: Performed by: NURSE ANESTHETIST, CERTIFIED REGISTERED

## 2023-01-23 PROCEDURE — 82962 GLUCOSE BLOOD TEST: CPT | Performed by: TRANSPLANT SURGERY

## 2023-01-23 PROCEDURE — 88307 TISSUE EXAM BY PATHOLOGIST: CPT | Performed by: PATHOLOGY

## 2023-01-23 PROCEDURE — 80053 COMPREHEN METABOLIC PANEL: CPT

## 2023-01-23 PROCEDURE — 25000003 PHARM REV CODE 250: Performed by: ANESTHESIOLOGY

## 2023-01-23 PROCEDURE — 63600175 PHARM REV CODE 636 W HCPCS: Performed by: STUDENT IN AN ORGANIZED HEALTH CARE EDUCATION/TRAINING PROGRAM

## 2023-01-23 PROCEDURE — 84100 ASSAY OF PHOSPHORUS: CPT

## 2023-01-23 PROCEDURE — 36000706: Performed by: TRANSPLANT SURGERY

## 2023-01-23 PROCEDURE — 35840 PR EXPLOR POSTOP BLEED,INFEC,CLOT-ABD: ICD-10-PCS | Mod: 78,,, | Performed by: TRANSPLANT SURGERY

## 2023-01-23 PROCEDURE — 88313 PR  SPECIAL STAINS,GROUP II: ICD-10-PCS | Mod: 26,,, | Performed by: PATHOLOGY

## 2023-01-23 PROCEDURE — C1757 CATH, THROMBECTOMY/EMBOLECT: HCPCS | Performed by: TRANSPLANT SURGERY

## 2023-01-23 PROCEDURE — 63600175 PHARM REV CODE 636 W HCPCS

## 2023-01-23 PROCEDURE — 37000008 HC ANESTHESIA 1ST 15 MINUTES: Performed by: TRANSPLANT SURGERY

## 2023-01-23 PROCEDURE — 63600175 PHARM REV CODE 636 W HCPCS: Performed by: ANESTHESIOLOGY

## 2023-01-23 PROCEDURE — 71000015 HC POSTOP RECOV 1ST HR: Performed by: TRANSPLANT SURGERY

## 2023-01-23 PROCEDURE — C1729 CATH, DRAINAGE: HCPCS | Performed by: TRANSPLANT SURGERY

## 2023-01-23 PROCEDURE — D9220A PRA ANESTHESIA: Mod: ANES,,, | Performed by: ANESTHESIOLOGY

## 2023-01-23 PROCEDURE — 25000003 PHARM REV CODE 250: Performed by: STUDENT IN AN ORGANIZED HEALTH CARE EDUCATION/TRAINING PROGRAM

## 2023-01-23 PROCEDURE — 85730 THROMBOPLASTIN TIME PARTIAL: CPT

## 2023-01-23 PROCEDURE — D9220A PRA ANESTHESIA: ICD-10-PCS | Mod: ANES,,, | Performed by: ANESTHESIOLOGY

## 2023-01-23 PROCEDURE — 27201423 OPTIME MED/SURG SUP & DEVICES STERILE SUPPLY: Performed by: TRANSPLANT SURGERY

## 2023-01-23 PROCEDURE — 88313 SPECIAL STAINS GROUP 2: CPT | Mod: 26,,, | Performed by: PATHOLOGY

## 2023-01-23 PROCEDURE — 82248 BILIRUBIN DIRECT: CPT

## 2023-01-23 PROCEDURE — 80197 ASSAY OF TACROLIMUS: CPT

## 2023-01-23 PROCEDURE — 35840 EXPLORE ABDOMINAL VESSELS: CPT | Mod: 82,78,, | Performed by: TRANSPLANT SURGERY

## 2023-01-23 PROCEDURE — 88307 TISSUE EXAM BY PATHOLOGIST: CPT | Mod: 26,,, | Performed by: PATHOLOGY

## 2023-01-23 PROCEDURE — 82247 BILIRUBIN TOTAL: CPT | Performed by: NURSE PRACTITIONER

## 2023-01-23 PROCEDURE — 35840 EXPLORE ABDOMINAL VESSELS: CPT | Mod: 78,,, | Performed by: TRANSPLANT SURGERY

## 2023-01-23 PROCEDURE — 37000009 HC ANESTHESIA EA ADD 15 MINS: Performed by: TRANSPLANT SURGERY

## 2023-01-23 RX ORDER — MIDAZOLAM HYDROCHLORIDE 1 MG/ML
INJECTION INTRAMUSCULAR; INTRAVENOUS
Status: DISCONTINUED | OUTPATIENT
Start: 2023-01-23 | End: 2023-01-23

## 2023-01-23 RX ORDER — ONDANSETRON 2 MG/ML
INJECTION INTRAMUSCULAR; INTRAVENOUS
Status: DISCONTINUED | OUTPATIENT
Start: 2023-01-23 | End: 2023-01-23

## 2023-01-23 RX ORDER — FENTANYL CITRATE 50 UG/ML
25 INJECTION, SOLUTION INTRAMUSCULAR; INTRAVENOUS EVERY 5 MIN PRN
Status: COMPLETED | OUTPATIENT
Start: 2023-01-23 | End: 2023-01-23

## 2023-01-23 RX ORDER — SODIUM CHLORIDE 9 MG/ML
INJECTION, SOLUTION INTRAVENOUS CONTINUOUS PRN
Status: DISCONTINUED | OUTPATIENT
Start: 2023-01-23 | End: 2023-01-23

## 2023-01-23 RX ORDER — HYDROMORPHONE HYDROCHLORIDE 1 MG/ML
0.2 INJECTION, SOLUTION INTRAMUSCULAR; INTRAVENOUS; SUBCUTANEOUS EVERY 5 MIN PRN
Status: DISCONTINUED | OUTPATIENT
Start: 2023-01-23 | End: 2023-01-23 | Stop reason: HOSPADM

## 2023-01-23 RX ORDER — DIPHENHYDRAMINE HYDROCHLORIDE 50 MG/ML
25 INJECTION INTRAMUSCULAR; INTRAVENOUS EVERY 6 HOURS PRN
Status: DISCONTINUED | OUTPATIENT
Start: 2023-01-23 | End: 2023-01-23 | Stop reason: HOSPADM

## 2023-01-23 RX ORDER — FLUCONAZOLE 200 MG/1
200 TABLET ORAL DAILY
Status: DISCONTINUED | OUTPATIENT
Start: 2023-01-24 | End: 2023-01-23

## 2023-01-23 RX ORDER — SODIUM CHLORIDE 9 MG/ML
INJECTION, SOLUTION INTRAVENOUS CONTINUOUS
Status: DISCONTINUED | OUTPATIENT
Start: 2023-01-23 | End: 2023-01-24

## 2023-01-23 RX ORDER — ONDANSETRON 2 MG/ML
4 INJECTION INTRAMUSCULAR; INTRAVENOUS DAILY PRN
Status: DISCONTINUED | OUTPATIENT
Start: 2023-01-23 | End: 2023-01-23 | Stop reason: HOSPADM

## 2023-01-23 RX ORDER — DIPHENHYDRAMINE HYDROCHLORIDE 50 MG/ML
INJECTION INTRAMUSCULAR; INTRAVENOUS
Status: DISCONTINUED | OUTPATIENT
Start: 2023-01-23 | End: 2023-01-23

## 2023-01-23 RX ORDER — ROCURONIUM BROMIDE 10 MG/ML
INJECTION, SOLUTION INTRAVENOUS
Status: DISCONTINUED | OUTPATIENT
Start: 2023-01-23 | End: 2023-01-23

## 2023-01-23 RX ORDER — PSEUDOEPHEDRINE/ACETAMINOPHEN 30MG-500MG
100 TABLET ORAL ONCE
Status: DISCONTINUED | OUTPATIENT
Start: 2023-01-23 | End: 2023-01-26

## 2023-01-23 RX ORDER — FAMOTIDINE 10 MG/ML
20 INJECTION INTRAVENOUS 2 TIMES DAILY
Status: DISCONTINUED | OUTPATIENT
Start: 2023-01-23 | End: 2023-01-23

## 2023-01-23 RX ORDER — LIDOCAINE HCL/PF 100 MG/5ML
SYRINGE (ML) INTRAVENOUS
Status: DISCONTINUED | OUTPATIENT
Start: 2023-01-23 | End: 2023-01-23

## 2023-01-23 RX ORDER — SODIUM CITRATE AND CITRIC ACID MONOHYDRATE 334; 500 MG/5ML; MG/5ML
30 SOLUTION ORAL ONCE
Status: COMPLETED | OUTPATIENT
Start: 2023-01-23 | End: 2023-01-23

## 2023-01-23 RX ORDER — MEPERIDINE HYDROCHLORIDE 50 MG/ML
12.5 INJECTION INTRAMUSCULAR; INTRAVENOUS; SUBCUTANEOUS ONCE AS NEEDED
Status: DISCONTINUED | OUTPATIENT
Start: 2023-01-23 | End: 2023-01-23 | Stop reason: HOSPADM

## 2023-01-23 RX ORDER — SODIUM CHLORIDE 0.9 % (FLUSH) 0.9 %
3 SYRINGE (ML) INJECTION
Status: DISCONTINUED | OUTPATIENT
Start: 2023-01-23 | End: 2023-01-23 | Stop reason: HOSPADM

## 2023-01-23 RX ORDER — PROPOFOL 10 MG/ML
VIAL (ML) INTRAVENOUS
Status: DISCONTINUED | OUTPATIENT
Start: 2023-01-23 | End: 2023-01-23

## 2023-01-23 RX ORDER — SYRING-NEEDL,DISP,INSUL,0.3 ML 29 G X1/2"
296 SYRINGE, EMPTY DISPOSABLE MISCELLANEOUS
Status: DISCONTINUED | OUTPATIENT
Start: 2023-01-23 | End: 2023-01-23 | Stop reason: RX

## 2023-01-23 RX ORDER — FLUCONAZOLE 200 MG/1
200 TABLET ORAL DAILY
Status: DISCONTINUED | OUTPATIENT
Start: 2023-01-23 | End: 2023-02-01 | Stop reason: HOSPADM

## 2023-01-23 RX ORDER — FENTANYL CITRATE 50 UG/ML
INJECTION, SOLUTION INTRAMUSCULAR; INTRAVENOUS
Status: DISCONTINUED | OUTPATIENT
Start: 2023-01-23 | End: 2023-01-23

## 2023-01-23 RX ORDER — PROCHLORPERAZINE EDISYLATE 5 MG/ML
5 INJECTION INTRAMUSCULAR; INTRAVENOUS EVERY 30 MIN PRN
Status: DISCONTINUED | OUTPATIENT
Start: 2023-01-23 | End: 2023-01-23 | Stop reason: HOSPADM

## 2023-01-23 RX ORDER — KETAMINE HCL IN 0.9 % NACL 50 MG/5 ML
SYRINGE (ML) INTRAVENOUS
Status: DISCONTINUED | OUTPATIENT
Start: 2023-01-23 | End: 2023-01-23

## 2023-01-23 RX ORDER — LORAZEPAM 2 MG/ML
0.25 INJECTION INTRAMUSCULAR ONCE AS NEEDED
Status: DISCONTINUED | OUTPATIENT
Start: 2023-01-23 | End: 2023-01-23 | Stop reason: HOSPADM

## 2023-01-23 RX ORDER — DEXAMETHASONE SODIUM PHOSPHATE 4 MG/ML
INJECTION, SOLUTION INTRA-ARTICULAR; INTRALESIONAL; INTRAMUSCULAR; INTRAVENOUS; SOFT TISSUE
Status: DISCONTINUED | OUTPATIENT
Start: 2023-01-23 | End: 2023-01-23

## 2023-01-23 RX ORDER — SUCCINYLCHOLINE CHLORIDE 20 MG/ML
INJECTION INTRAMUSCULAR; INTRAVENOUS
Status: DISCONTINUED | OUTPATIENT
Start: 2023-01-23 | End: 2023-01-23

## 2023-01-23 RX ADMIN — ROCURONIUM BROMIDE 30 MG: 10 INJECTION INTRAVENOUS at 03:01

## 2023-01-23 RX ADMIN — OXYCODONE HYDROCHLORIDE 10 MG: 10 TABLET ORAL at 09:01

## 2023-01-23 RX ADMIN — ONDANSETRON 4 MG: 2 INJECTION INTRAMUSCULAR; INTRAVENOUS at 09:01

## 2023-01-23 RX ADMIN — SUCCINYLCHOLINE CHLORIDE 200 MG: 20 INJECTION, SOLUTION INTRAMUSCULAR; INTRAVENOUS at 02:01

## 2023-01-23 RX ADMIN — HYDROMORPHONE HYDROCHLORIDE 0.5 MG: 1 INJECTION, SOLUTION INTRAMUSCULAR; INTRAVENOUS; SUBCUTANEOUS at 03:01

## 2023-01-23 RX ADMIN — MYCOPHENOLATE MOFETIL 1000 MG: 250 CAPSULE ORAL at 08:01

## 2023-01-23 RX ADMIN — AMPICILLIN SODIUM AND SULBACTAM SODIUM 3 G: 2; 1 INJECTION, POWDER, FOR SOLUTION INTRAMUSCULAR; INTRAVENOUS at 12:01

## 2023-01-23 RX ADMIN — FENTANYL CITRATE 100 MCG: 50 INJECTION, SOLUTION INTRAMUSCULAR; INTRAVENOUS at 02:01

## 2023-01-23 RX ADMIN — ROCURONIUM BROMIDE 20 MG: 10 INJECTION INTRAVENOUS at 03:01

## 2023-01-23 RX ADMIN — SODIUM CHLORIDE: 0.9 INJECTION, SOLUTION INTRAVENOUS at 02:01

## 2023-01-23 RX ADMIN — TACROLIMUS 5 MG: 1 CAPSULE ORAL at 08:01

## 2023-01-23 RX ADMIN — Medication 10 MG: at 04:01

## 2023-01-23 RX ADMIN — Medication 20 MG: at 03:01

## 2023-01-23 RX ADMIN — HYDROMORPHONE HYDROCHLORIDE 0.2 MG: 1 INJECTION, SOLUTION INTRAMUSCULAR; INTRAVENOUS; SUBCUTANEOUS at 05:01

## 2023-01-23 RX ADMIN — PROPOFOL 130 MG: 10 INJECTION, EMULSION INTRAVENOUS at 02:01

## 2023-01-23 RX ADMIN — METHOCARBAMOL 500 MG: 500 TABLET ORAL at 08:01

## 2023-01-23 RX ADMIN — SODIUM CHLORIDE 250 ML: 9 INJECTION, SOLUTION INTRAVENOUS at 05:01

## 2023-01-23 RX ADMIN — MIDAZOLAM HYDROCHLORIDE 1 MG: 1 INJECTION, SOLUTION INTRAMUSCULAR; INTRAVENOUS at 02:01

## 2023-01-23 RX ADMIN — MYCOPHENOLATE MOFETIL 1000 MG: 250 CAPSULE ORAL at 09:01

## 2023-01-23 RX ADMIN — FENTANYL CITRATE 25 MCG: 50 INJECTION, SOLUTION INTRAMUSCULAR; INTRAVENOUS at 05:01

## 2023-01-23 RX ADMIN — OXYCODONE HYDROCHLORIDE 10 MG: 10 TABLET ORAL at 08:01

## 2023-01-23 RX ADMIN — AMPICILLIN SODIUM AND SULBACTAM SODIUM 3 G: 2; 1 INJECTION, POWDER, FOR SOLUTION INTRAMUSCULAR; INTRAVENOUS at 07:01

## 2023-01-23 RX ADMIN — FAMOTIDINE 20 MG: 20 TABLET ORAL at 09:01

## 2023-01-23 RX ADMIN — URSODIOL 300 MG: 300 CAPSULE ORAL at 08:01

## 2023-01-23 RX ADMIN — SODIUM CITRATE AND CITRIC ACID MONOHYDRATE 30 ML: 500; 334 SOLUTION ORAL at 02:01

## 2023-01-23 RX ADMIN — PREDNISONE 20 MG: 20 TABLET ORAL at 08:01

## 2023-01-23 RX ADMIN — HEPARIN SODIUM 5000 UNITS: 5000 INJECTION INTRAVENOUS; SUBCUTANEOUS at 09:01

## 2023-01-23 RX ADMIN — PROCHLORPERAZINE EDISYLATE 5 MG: 5 INJECTION INTRAMUSCULAR; INTRAVENOUS at 08:01

## 2023-01-23 RX ADMIN — SUGAMMADEX 200 MG: 100 INJECTION, SOLUTION INTRAVENOUS at 04:01

## 2023-01-23 RX ADMIN — ONDANSETRON 4 MG: 2 INJECTION INTRAMUSCULAR; INTRAVENOUS at 03:01

## 2023-01-23 RX ADMIN — NYSTATIN 500000 UNITS: 500000 SUSPENSION ORAL at 08:01

## 2023-01-23 RX ADMIN — DEXAMETHASONE SODIUM PHOSPHATE 4 MG: 4 INJECTION, SOLUTION INTRAMUSCULAR; INTRAVENOUS at 03:01

## 2023-01-23 RX ADMIN — URSODIOL 300 MG: 300 CAPSULE ORAL at 09:01

## 2023-01-23 RX ADMIN — HEPARIN SODIUM 5000 UNITS: 5000 INJECTION INTRAVENOUS; SUBCUTANEOUS at 05:01

## 2023-01-23 RX ADMIN — LIDOCAINE HYDROCHLORIDE 100 MG: 20 INJECTION, SOLUTION INTRAVENOUS at 02:01

## 2023-01-23 RX ADMIN — DIPHENHYDRAMINE HYDROCHLORIDE 12.5 MG: 50 INJECTION, SOLUTION INTRAMUSCULAR; INTRAVENOUS at 03:01

## 2023-01-23 NOTE — OP NOTE
Operative Report    Date of Procedure: 1/23/2023    Surgeons:  Surgeon(s) and Role:     * Jordon Lamb MD - Primary     * See Arce MD - Resident - Assisting     * Bg Hardy MD - Co-Surgeon    First Assistant Attestation:  The presence of an additional attending surgeon functioning as first assistant was required due to the complexity of the procedure relative to any available residents. I certify that no resident was available who was qualified to serve as first assistant. Duties performed by the assistant included assisting the primary surgeon.    Pre-operative Diagnosis: bile leak  Post-operative Diagnosis: same    Procedure(s) Perfomed: Exploration of abdomen, Open needle biopsy of liver, and Bile duct reconstruction with Becky-en-Y hepaticojejunostomy  Anesthesia: General endotracheal  Estimated Blood Loss: 50 mL  Blood Products Administered: 0  Fluids Administered: 400  Findings: Bile leak, apparently occluded duct, liver cholestatic in appearance  Specimens: Liver biopsy, fluid for culture  Drains: 19f David drains x 2    Preamble  Indications and Patient Counseling: The procedure was thoroughly explained to the patient and/or family members as appropriate, including potential risks, complications, and alternatives.  The risks associated with not undergoing the proposed procedure were also discussed.  All questions were answered, and the patient and/or family members voiced understanding and agreed to proceed with the operation.    Time-Out: A complete time out was carried out prior to incision, with confirmation of patient identity, correct procedure, correct operative site, appropriate antibiotic prophylaxis, review of any known allergies, and presence of all needed equipment.    Procedure in Detail  Following the induction of general endotracheal anesthesia, appropriate arterial and venous lines were placed by the anesthesia team.  Care was taken to pad all pressure points and avoid any potential  traction injuries from positioning. The urinary bladder was catheterized.  Sequential compression boots and Jarrod Huggers were used. The abdomen was sterilely prepped and draped.    The abdomen was entered by re-opening the liver transplant incision.  Cultures were obtained of any subcutaneous and peritoneal fluid or clot as appropriate.  Significant peritoneal fluid was present, and it was bilious in nature.  Significant hematoma was not present. There was no active bleeding. The bile duct was assessed, and there was visible evidence of a bile leak.  The hepatic artery was palpated, and the thrill was good. The liver itself was soft to palpation, and had a cholestatic appearance.  Additional intraabdominal findings included diffuse bile staining, but no signs of active bile leak from cut surface..    After the above exploration, all identifiable bleeding sites were addressed using electrocautery, argon beam coagulation, suture ligatures, and topical hemostatic agents as appropriate.  A needle biopsy of the liver was obtained.     The majority of bile collection and staining was around the hilum and duct. The anterior wall of the duct anastomosis was taken down. The bile appeared to be under pressure suggesting obstruction at the level of the anastomosis. A small stent was placed fashioned from 6 Fr ped feeding tube and secured with 6-0 PDS. The anterior wall was closed over the stent with interrupted 6-0 PDS. There was extensive bile staining, but no obvious signs of active bile leak after repair.        A final check for hemostasis was made.  2 19f David drains were placed through separate incisions below the transverse abdominal incision and placed in the usual positions. The cavity was irrigated with saline. The abdomen was closed in layers using running #1 PDS suture. The incision was irrigated and the skin was closed with staples. At the end of the case all instrument, needle, and sponge counts were correct.  The patient was taken from the OR in stable condition.

## 2023-01-23 NOTE — TRANSFER OF CARE
"Anesthesia Transfer of Care Note    Patient: Mickey Harris    Procedure(s) Performed: Procedure(s) (LRB):  LAPAROTOMY, EXPLORATORY (N/A)    Patient location: PACU    Anesthesia Type: general    Transport from OR: Transported from OR on 6-10 L/min O2 by face mask with adequate spontaneous ventilation    Post pain: adequate analgesia    Post assessment: no apparent anesthetic complications and tolerated procedure well    Post vital signs: stable    Level of consciousness: lethargic    Nausea/Vomiting: no nausea/vomiting    Complications: none    Transfer of care protocol was followed      Last vitals:   Visit Vitals  /61 (BP Location: Left arm, Patient Position: Lying)   Pulse 91   Temp 36.2 °C (97.1 °F) (Temporal)   Resp 16   Ht 5' 5" (1.651 m)   Wt 94.1 kg (207 lb 7.3 oz)   SpO2 95%   Breastfeeding No   BMI 34.52 kg/m²     "

## 2023-01-23 NOTE — ANESTHESIA PREPROCEDURE EVALUATION
01/23/2023  Mickey Harris is a 57 y.o., female.      Pre-op Assessment    I have reviewed the Patient Summary Reports.     I have reviewed the Nursing Notes.       Review of Systems  Anesthesia Hx:  No problems with previous Anesthesia    Hematology/Oncology:  Hematology Normal   Oncology Normal     EENT/Dental:EENT/Dental Normal   Cardiovascular:  Cardiovascular Normal     Pulmonary:   Asthma    Renal/:  Renal/ Normal     Hepatic/GI:   Liver Disease, Hepatitis S/P liver liver transplant   Musculoskeletal:  Musculoskeletal Normal    Neurological:  Neurology Normal    Endocrine:  Endocrine Normal    Dermatological:  Skin Normal    Psych:   Psychiatric History          Physical Exam  General: Well nourished    Airway:  Mallampati: II   Mouth Opening: Normal  TM Distance: Normal  Tongue: Normal  Neck ROM: Normal ROM    Dental:  Intact        Anesthesia Plan  Type of Anesthesia, risks & benefits discussed:    Anesthesia Type: Gen ETT  Intra-op Monitoring Plan: Standard ASA Monitors  Post Op Pain Control Plan: multimodal analgesia  Induction:  IV  Airway Plan: Direct  Informed Consent: Informed consent signed with the Patient and all parties understand the risks and agree with anesthesia plan.  All questions answered. Patient consented to blood products? No  ASA Score: 3  Day of Surgery Review of History & Physical: H&P Update referred to the surgeon/provider.    Ready For Surgery From Anesthesia Perspective.     .

## 2023-01-23 NOTE — SUBJECTIVE & OBJECTIVE
Scheduled Meds:   [START ON 1/27/2023] dapsone  100 mg Oral Daily    famotidine  20 mg Oral QHS    glycerin 99.5%  100 mL Rectal Once    And    magnesium citrate  296 mL Rectal ED 1 Time    And    sodium chloride 0.9%  500 mL Rectal ED 1 Time    heparin (porcine)  5,000 Units Subcutaneous Q8H    insulin aspart U-100  3-6 Units Subcutaneous TIDWM    LIDOcaine  1 patch Transdermal Q24H    melatonin  6 mg Oral Nightly    methocarbamoL  500 mg Oral TID    mycophenolate  1,000 mg Oral BID    nystatin  500,000 Units Mouth/Throat TID PC    predniSONE  20 mg Oral Daily    traZODone  50 mg Oral QHS    ursodioL  300 mg Oral TID    [START ON 1/27/2023] valGANciclovir  450 mg Oral Daily     Continuous Infusions:   sodium chloride 0.9% Stopped (01/19/23 2311)    sodium chloride 0.9% 50 mL/hr at 01/22/23 2034     PRN Meds:sodium chloride, albuterol, artificial tears, bisacodyL, calcium carbonate, dextrose 10%, dextrose 10%, dextrose 10%, dextrose 10%, glucagon (human recombinant), glucose, glucose, HYDROmorphone, hydrOXYzine pamoate, insulin aspart U-100, ondansetron, oxyCODONE, oxyCODONE, prochlorperazine, sodium chloride 0.9%    Review of Systems   Constitutional:  Negative for activity change and appetite change.   HENT: Negative.     Eyes:  Negative for visual disturbance.   Respiratory:  Negative for shortness of breath.    Cardiovascular:  Negative for chest pain, palpitations and leg swelling.   Gastrointestinal:  Positive for abdominal distention, abdominal pain, constipation and nausea. Negative for diarrhea and vomiting.   Genitourinary:  Negative for difficulty urinating.   Musculoskeletal:  Negative for arthralgias, back pain and joint swelling.   Skin:  Positive for wound.   Allergic/Immunologic: Positive for immunocompromised state.   Neurological:  Positive for weakness. Negative for dizziness and facial asymmetry.   Hematological:  Negative for adenopathy. Does not bruise/bleed easily.   Psychiatric/Behavioral:   Negative for agitation, behavioral problems and sleep disturbance.    All other systems reviewed and are negative.  Objective:     Vital Signs (Most Recent):  Temp: 98.1 °F (36.7 °C) (01/23/23 0330)  Pulse: 90 (01/23/23 0721)  Resp: 18 (01/23/23 0848)  BP: 122/60 (01/23/23 0330)  SpO2: 95 % (01/23/23 0330) Vital Signs (24h Range):  Temp:  [97.9 °F (36.6 °C)-98.2 °F (36.8 °C)] 98.1 °F (36.7 °C)  Pulse:  [84-92] 90  Resp:  [18] 18  SpO2:  [94 %-97 %] 95 %  BP: (112-129)/(58-66) 122/60     Weight: 94.1 kg (207 lb 7.3 oz)  Body mass index is 34.52 kg/m².    Intake/Output - Last 3 Shifts         01/21 0700  01/22 0659 01/22 0700  01/23 0659 01/23 0700  01/24 0659    P.O. 1670 0     I.V. (mL/kg)  358 (3.8)     Blood       Other       Total Intake(mL/kg) 1670 (18.2) 358 (3.8)     Urine (mL/kg/hr) 3925 (1.8) 1250 (0.6) 200 (0.7)    Emesis/NG output 0 0     Drains 360 180     Other  0     Stool 0 0 0    Blood  0     Total Output 4285 1430 200    Net -2615 -1072 -200           Urine Occurrence  1 x     Stool Occurrence  1 x 1 x    Emesis Occurrence 1 x 0 x             Physical Exam  Vitals and nursing note reviewed.   Constitutional:       Appearance: She is well-developed.      Comments: No hand or temporal muscle wasting noted     HENT:      Head: Normocephalic.   Eyes:      General: Scleral icterus present.      Conjunctiva/sclera: Conjunctivae normal.   Cardiovascular:      Rate and Rhythm: Normal rate and regular rhythm.      Heart sounds: No murmur heard.  Pulmonary:      Effort: Pulmonary effort is normal.      Breath sounds: Normal breath sounds.   Abdominal:      General: Bowel sounds are normal. There is no distension.      Palpations: Abdomen is soft.      Tenderness: There is abdominal tenderness.      Hernia: Hernia: over incision as expected.          Comments: RYLEY in place   Musculoskeletal:         General: Normal range of motion.      Cervical back: Normal range of motion.      Right lower leg: Edema present.       Left lower leg: Edema present.   Skin:     General: Skin is warm and dry.      Capillary Refill: Capillary refill takes less than 2 seconds.      Coloration: Skin is jaundiced.   Neurological:      General: No focal deficit present.      Mental Status: She is alert and oriented to person, place, and time.   Psychiatric:         Mood and Affect: Mood normal.         Behavior: Behavior normal.         Thought Content: Thought content normal.         Judgment: Judgment normal.       Laboratory:  Immunosuppressants           Stop Route Frequency     mycophenolate capsule 1,000 mg         -- Oral 2 times daily          CBC:   Recent Labs   Lab 01/23/23  0726   WBC 6.25   RBC 3.13*   HGB 10.2*   HCT 28.8*   PLT 40*   MCV 92   MCH 32.6*   MCHC 35.4       BMP:   Recent Labs   Lab 01/23/23  0726   *   *   K 3.9      CO2 23   BUN 31*   CREATININE 0.9   CALCIUM 7.9*       CMP:   Recent Labs   Lab 01/23/23  0726   *   CALCIUM 7.9*   ALBUMIN 2.6*   PROT 4.5*   *   K 3.9   CO2 23      BUN 31*   CREATININE 0.9   ALKPHOS 54*   *   AST 76*   BILITOT 21.1*       Coagulation:   Recent Labs   Lab 01/23/23  0726   INR 1.1   APTT 27.6       Labs within the past 24 hours have been reviewed.    Diagnostic Results:  US Liver Transplant Post:  Results for orders placed during the hospital encounter of 01/17/23    US Doppler Liver Transplant Post (xpd)    Narrative  EXAMINATION:  US DOPPLER LIVER TRANSPLANT POST (XPD)    CLINICAL HISTORY:  Elevated bili s/p liver txp;    TECHNIQUE:  Ultrasound of the transplant liver with Doppler evaluation.  Color and spectral Doppler were performed.    COMPARISON:  None.    FINDINGS:  Patient is status post orthotopic liver transplant 01/17/2023.  The liver demonstrates homogeneous echotexture.  No focal hepatic lesions are seen.  No fluid collections.    The common duct is not dilated, measuring 3 mm.  No dilated intrahepatic radicles are seen.    Color  flow and spectral waveform analysis was performed.  The main portal vein, right portal vein, middle hepatic vein, right hepatic vein, and IVC are patent with proper directional flow.    Anastomosis site of the main hepatic artery demonstrates a peak systolic velocity 98 cm/sec (previously 137 cm/sec).    Main hepatic artery demonstrates resistive index 0.85 (previously 0.87) with normal waveform.    Anterior branch of the right hepatic artery demonstrates resistive index 0.68 (previously 0.78) with normal waveform.    Posterior branch of the right hepatic artery demonstrates resistive index 0.79 (previously 0.81) with normal waveform.    Right pleural effusion.    Impression  Minimally elevated resistive index in the main hepatic artery, favored to be secondary to edema.  Otherwise satisfactory Doppler evaluation of the liver allograft.    Electronically signed by resident: Toyin Barker  Date:    01/20/2023  Time:    10:30    Electronically signed by: Eduardo Nayak MD  Date:    01/20/2023  Time:    10:56    Debility/Functional status: No debility noted.

## 2023-01-23 NOTE — SIGNIFICANT EVENT
Pt assessed on AM rounds. Bilious drain output and bilirubinemia concerning for bile leak. Booked for urgent abdominal exploration. Small amount of applesauce taken with AM meds. Discussed with anesthesia team to proceed urgently.    Jordon Lmab MD

## 2023-01-23 NOTE — CARE UPDATE
Care Update:     No acute events overnight. Patient on the TSU in room 68493/85104 A. Blood glucose stable. BG at goal on current insulin regimen (SSI and prandial insulin). Steroid use- Prednisone 20 mg. 6 Days Post-Op  Renal function- Normal   Vasopressors-  None       Diet NPO     POCT Glucose   Date Value Ref Range Status   01/22/2023 152 (H) 70 - 110 mg/dL Final   01/22/2023 180 (H) 70 - 110 mg/dL Final   01/22/2023 144 (H) 70 - 110 mg/dL Final   01/22/2023 92 70 - 110 mg/dL Final   01/22/2023 114 (H) 70 - 110 mg/dL Final   01/21/2023 138 (H) 70 - 110 mg/dL Final   01/21/2023 183 (H) 70 - 110 mg/dL Final   01/21/2023 224 (H) 70 - 110 mg/dL Final   01/21/2023 94 70 - 110 mg/dL Final   01/21/2023 89 70 - 110 mg/dL Final   01/21/2023 119 (H) 70 - 110 mg/dL Final   01/20/2023 209 (H) 70 - 110 mg/dL Final   01/20/2023 192 (H) 70 - 110 mg/dL Final     Lab Results   Component Value Date    HGBA1C 4.5 01/17/2023       Endocrinology consulted for BG management.   BG goal 140-180    Hospital Medications    AC/HS           glucagon (human recombinant) injection 1 mg 1 mg, Intramuscular, As needed (PRN), Turn patient on their side, give IM, and NOTIFY MD IMMEDIATELY.<BR><BR>Feed the patient as soon as patient awakens and is able to swallow.    glucose chewable tablet 16 g 16 g, Oral, As needed (PRN), (16 grams = #  four 4gm glucose tablets)    glucose chewable tablet 24 g 24 g, Oral, As needed (PRN), (24 grams = # six 4gm glucose tablets)    insulin aspart U-100 pen 1-10 Units 1-10 Units, Subcutaneous, Before meals &amp; nightly PRN, **MODERATE CORRECTION DOSE**<BR>Blood Glucose<BR>mg/dL                  Pre-meal                2200<BR>151-200                2 units                    1 unit<BR>201-250                4 units                    2 units  <BR>251-300                6 units                    3 units  <BR>301-350                8 units                    4 units <BR>&gt;350                     10 units                   5 units<BR>Administer subcutaneously if needed at times designated by monitoring schedule. <BR>DO NOT HOLD correction dose insulin for patients who are  NPO.<BR>&quot;HIGH ALERT MEDICATION&quot; - Administer with meals or TF/TPN.    insulin aspart U-100 pen 3-6 Units 3-6 Units, Subcutaneous, 3 times daily with meals, Administer  3 units if patient eats 25-50% of meal, administer  6  units if patient eats &gt; 50% of meal. <BR>Administer subcutaneously with meals. HOLD prandial (mealtime) insulin if patient is NPO, unable to eat, or if Blood Glucose less than 70 mg/dL.<BR><BR>If patient ate prior to BG check, administer scheduled Novolog only (do not cover with correction dose at this time).<BR>&quot;HIGH ALERT MEDICATION&quot; - Administer with meals or TF/TPN.              ** Please notify Endocrine for any change and/or advance in diet**  ** Please call Endocrine for any BG related issues **    Discharge Planning:   TBD. Please notify endocrinology prior to discharge.      Jaison Laboy DNP, FNP-C  Department of Endocrinology  Inpatient Glycemic Management

## 2023-01-23 NOTE — PLAN OF CARE
AAOX4  VVS  INCREASED ABD PAIN AND LEAKING FROM RYLEY DRAIN.  RYLEY FLUIDS SENT   PT SENT TO OR FOR EXLAP FOR POSSIBLE BILE LEAK .

## 2023-01-23 NOTE — ANESTHESIA PROCEDURE NOTES
Intubation    Date/Time: 1/23/2023 2:54 PM  Performed by: Christ Abbasi CRNA  Authorized by: Boo Gray Jr., MD     Intubation:     Induction:  Intravenous    Intubated:  Postinduction    Mask Ventilation:  Not attempted (RSI CLASSED CASE)    Attempts:  1    Attempted By:  CRNA    Method of Intubation:  Video laryngoscopy    Blade:  Horne 3    Laryngeal View Grade: Grade I - full view of cords      Difficult Airway Encountered?: No      Complications:  None    Airway Device:  Oral endotracheal tube    Airway Device Size:  7.5    Style/Cuff Inflation:  Cuffed    Inflation Amount (mL):  10    Tube secured:  21    Secured at:  The lips    Placement Verified By:  Capnometry and Revisualization with laryngoscopy    Complicating Factors:  None    Findings Post-Intubation:  BS equal bilateral and atraumatic/condition of teeth unchanged

## 2023-01-23 NOTE — ASSESSMENT & PLAN NOTE
-DORAN cirrhosis complicated by hepatic encephalopathy who is now status post living related donor liver transplant on 1/17/22. She is progressing well post op. 2 drains in place. POD#1 Liver US looked fine.   Minimally elevated intraparenchymal resistive indices, likely due to postoperative edema.    - Liver US 1/20 reviewed w CBD 3mm w no duct dilation  - elevated t-bili started Ursodiol 1/21  - CT scan ABD 1/22- reviewed  - t bili up to 22, 21.2 today. Fluid sent for t bili 40.9  - plan to take pt back to OR today for ex/lap

## 2023-01-23 NOTE — PLAN OF CARE
AAOx4, VSS, AC/HS; no coverage needed  Remains NPO for ileus seen on abdominal x-ray 1/22, CT confirmed no obstruction @ this time, nausea controlled with PRN zofran + compazine; no emesis overnight  PRN suppository given- very small BM but + for gas-- needs bowel regimen  NSR @ 50cc/hr; refer to flowsheet for UOP overnight  Chevron remains JEROMY with staples, pain controlleed with oxy 10 q6h PRN  T-bili trending up; PO Actigall started   RLQ RYLEY with SS output  Self meds done by    Non skid socks worn, call light within reach, will cont to monitor

## 2023-01-23 NOTE — PROGRESS NOTES
Upon arrive to [facility], pt informed RN that she ate a full cup of applesauce with her morning medication. Notified anesthesia Dr. Gray. Per Dr. Gray, pt still ok to go to OR as planned. Pt also arrived with RYLEY drain 100cc output, dressing over RYLEY drain has large amount of yellow drainage present. Chart reviewed. Preop nursing care complete per orders. Safe surgery checklist complete. Placed bilateral heel pads and SCDs.  at bedside.

## 2023-01-23 NOTE — PROGRESS NOTES
"[facility] - [facility] (2nd Fl)  Adult Nutrition  Progress Note    SUMMARY       Recommendations    When medically able, resume Regular diet or per MD    Continue Boost glucose control TID    RD to monitor and follow    Goals: To meet % or EEN/EPN by next RD f/u.  Nutrition Goal Status: progressing towards goal  Communication of RD Recs:  (POC)    Assessment and Plan    Nutrition Problem  Increased nutrient needs (protein, energy)    Related to (etiology):   Increased physiological demands    Signs and Symptoms (as evidenced by):   S/p Living Liver transplant (1/17)    Interventions/Recommendations (treatment strategy):  Collaboration with other providers  Nutrition education    Nutrition Diagnosis Status:   New    Reason for Assessment    Reason For Assessment: RD follow-up  Diagnosis:  (cirrhosis (DORAN); s/p liver translplant)  Relevant Medical History: HLD  Interdisciplinary Rounds: did not attend    General Information Comments:   1/23: S/p Living Liver transplant (1/17). Pt is NPO during RD visits today. OR today for exploratory lap due to concern for bile leak. Pt reports good PO intake PTA, usually eat 3 meals per day.  lb, gained wt 2/2 fluid. Small BM last night per pt. Reiterated post tx nutrition education today, pt and family verbalized understanding. No additional question. NFPE completed today, pt appears nourished.     1/20: Pt seen today for RD f/u. Pt denies any n/v/d/c. Pt states that their appetite is "non-existant" but tries to eat. Stated to RD that they consumed % of their breakfast.  RD went over some of the education materials that RD provided family during the initial consult. Pt stated that they understood. As poer visual NFPE, pt appears to be well nourished.  was present in the room, had no questions at this time.    Nutrition Discharge Planning: Post transplant nutrition education provided. Food safety/drug interactions emphasized. General healthy/low salt diet " "recommended. Education material left.  No other needs identified. Caregiver present.    Nutrition Risk Screen    Nutrition Risk Screen: no indicators present    Nutrition/Diet History    Patient Reported Diet/Restrictions/Preferences: low salt  Food Preferences: No seafood  Spiritual, Cultural Beliefs, Adventist Practices, Values that Affect Care: no  Food Allergies: NKFA    Anthropometrics    Temp: 97.8 °F (36.6 °C)  Height Method: Stated  Height: 5' 5" (165.1 cm)  Height (inches): 65 in  Weight Method: Bed Scale  Weight: 94.1 kg (207 lb 7.3 oz)  Weight (lb): 207.45 lb  Ideal Body Weight (IBW), Female: 125 lb  % Ideal Body Weight, Female (lb): 156.8 %  BMI (Calculated): 34.5  BMI Grade: 30 - 34.9- obesity - grade I     Lab/Procedures/Meds    Pertinent Labs Reviewed: reviewed  Pertinent Labs Comments: P 2.5, Na 133, glucose 119, BUN 31, Tbili 21.1, ALP 54, AST 76,   Pertinent Medications Reviewed: reviewed  Pertinent Medications Comments: prednisone, insulin, ursodiol, famotidine, mycophenolate, tacrolimus, Ca carbonate, heparin    Estimated/Assessed Needs    Weight Used For Calorie Calculations: 90.9 kg (200 lb 6.4 oz)  Energy Calorie Requirements (kcal): 1863 kcal (MSJx1.25)  Energy Need Method: Sergio Flaherty  Protein Requirements: 86- 97g (1.5-1.7g/kg)  Weight Used For Protein Calculations: 57 kg (125 lb 10.6 oz) (using IBW)  Fluid Requirements (mL): 1ml/1kcal or per MD  Estimated Fluid Requirement Method: RDA Method  RDA Method (mL): 1863     Nutrition Prescription Ordered    Current Diet Order: NPO  Oral Nutrition Supplement: Boost Glucose control TID    Evaluation of Received Nutrient/Fluid Intake    Other Calories (kcal): 0  I/O: -230ml since admit  Protein Required: not meeting needs  Fluid Required: not meeting needs  Comments: LBM: 1/16  Tolerance: tolerating    Nutrition Risk    Level of Risk/Frequency of Follow-up:  (RD f/u x1/week)     Monitor and Evaluation    Food and Nutrient Intake: energy " intake, food and beverage intake  Food and Nutrient Adminstration: diet order  Knowledge/Beliefs/Attitudes: food and nutrition knowledge/skill, beliefs and attitudes  Physical Activity and Function: factors affecting access to physical activity  Anthropometric Measurements: height/length, weight, weight change, body mass index  Biochemical Data, Medical Tests and Procedures: electrolyte and renal panel, gastrointestinal profile, glucose/endocrine profile, inflammatory profile, lipid profile  Nutrition-Focused Physical Findings: overall appearance, extremities, muscles and bones, skin     Nutrition Follow-Up    RD Follow-up?: Yes

## 2023-01-23 NOTE — PLAN OF CARE
Pt IV infusing. Pt abdomen incision CDI. Pt reports pain relief following administration of medication. RYLEY drains x2 CDI, to bulb suction

## 2023-01-23 NOTE — PROGRESS NOTES
"[facility] - Transplant Stepdown  Liver Transplant  Progress Note    Patient Name: Mickey Harris  MRN: [id number]  Admission Date: 1/17/2023  Hospital Length of Stay: 6 days  Code Status: Full Code  Primary Care Provider: Primary Doctor No  Post-Operative Day: 6    ORGAN:   RIGHT LIVER LOBE (SEGS 5,6,7,8) WITHOUT MIDDLE HEPATIC VEIN  Disease Etiology: Cirrhosis: Fatty Liver (DORAN)  Donor Type:   Living  CDC High Risk:     Donor CMV Status:   Donor CMV Status:   Donor HBcAB:     Donor HCV Status:     Donor HBV PALOMA:   Donor HCV PALOMA:   Whole or Partial: Split Liver  Biliary Anastomosis: Becky-en-Y  Arterial Anatomy:   Subjective:     History of Present Illness:  Ms. Mickey Harris is a 58 y/o F admitted for living liver transplant from daughter for DORAN cirrhosis.       Hospital Course:  She is now status post living related donor liver transplant on 1/17/22. She is progressing well post op. 2 drains in place. POD#1 Liver US showed minimally elevated intraparenchymal resistive indices, likely due to postoperative edema. Otherwise satisfactory Doppler evaluation of the liver. Transfer to TSU on POD#2    Hospital Interval: Pt cont to c/o "stomach feels hard", nauseated and discomfort.  She reports may be feeling some gas but not passing flatus, no BM.  CT A/P 1/22 showed pneumoperitoneum, mild ascites and scattered regions of subcutaneous and rectus sheath emphysema. Chevron w staples, CDI, no palpable subcu emphysema noted. One RYLEY drain w serosang/bilious drainage. T bili remains 21.1, AST/ALT improved. Fluid sent for t bili fr RYLEY- 41. Margo started 1/201/23. Last Liver US 3 days ago w CBD 3mm and no intrahepatic duct dilation  She remains NPO. Case discussed with team, plan to tale pt back to OR for exploratory lap today. Patient is fatigued this am despite her reporting getting a good nights rest.  Small BM w supp. CT noted large stool burden, enema ordered for later today. Patient is OOB, using IS. Vitals stable. " Monitor.         Scheduled Meds:   [START ON 1/27/2023] dapsone  100 mg Oral Daily    famotidine  20 mg Oral QHS    glycerin 99.5%  100 mL Rectal Once    And    magnesium citrate  296 mL Rectal ED 1 Time    And    sodium chloride 0.9%  500 mL Rectal ED 1 Time    heparin (porcine)  5,000 Units Subcutaneous Q8H    insulin aspart U-100  3-6 Units Subcutaneous TIDWM    LIDOcaine  1 patch Transdermal Q24H    melatonin  6 mg Oral Nightly    methocarbamoL  500 mg Oral TID    mycophenolate  1,000 mg Oral BID    nystatin  500,000 Units Mouth/Throat TID PC    predniSONE  20 mg Oral Daily    traZODone  50 mg Oral QHS    ursodioL  300 mg Oral TID    [START ON 1/27/2023] valGANciclovir  450 mg Oral Daily     Continuous Infusions:   sodium chloride 0.9% Stopped (01/19/23 2311)    sodium chloride 0.9% 50 mL/hr at 01/22/23 2034     PRN Meds:sodium chloride, albuterol, artificial tears, bisacodyL, calcium carbonate, dextrose 10%, dextrose 10%, dextrose 10%, dextrose 10%, glucagon (human recombinant), glucose, glucose, HYDROmorphone, hydrOXYzine pamoate, insulin aspart U-100, ondansetron, oxyCODONE, oxyCODONE, prochlorperazine, sodium chloride 0.9%    Review of Systems   Constitutional:  Negative for activity change and appetite change.   HENT: Negative.     Eyes:  Negative for visual disturbance.   Respiratory:  Negative for shortness of breath.    Cardiovascular:  Negative for chest pain, palpitations and leg swelling.   Gastrointestinal:  Positive for abdominal distention, abdominal pain, constipation and nausea. Negative for diarrhea and vomiting.   Genitourinary:  Negative for difficulty urinating.   Musculoskeletal:  Negative for arthralgias, back pain and joint swelling.   Skin:  Positive for wound.   Allergic/Immunologic: Positive for immunocompromised state.   Neurological:  Positive for weakness. Negative for dizziness and facial asymmetry.   Hematological:  Negative for adenopathy. Does not bruise/bleed  easily.   Psychiatric/Behavioral:  Negative for agitation, behavioral problems and sleep disturbance.    All other systems reviewed and are negative.  Objective:     Vital Signs (Most Recent):  Temp: 98.1 °F (36.7 °C) (01/23/23 0330)  Pulse: 90 (01/23/23 0721)  Resp: 18 (01/23/23 0848)  BP: 122/60 (01/23/23 0330)  SpO2: 95 % (01/23/23 0330) Vital Signs (24h Range):  Temp:  [97.9 °F (36.6 °C)-98.2 °F (36.8 °C)] 98.1 °F (36.7 °C)  Pulse:  [84-92] 90  Resp:  [18] 18  SpO2:  [94 %-97 %] 95 %  BP: (112-129)/(58-66) 122/60     Weight: 94.1 kg (207 lb 7.3 oz)  Body mass index is 34.52 kg/m².    Intake/Output - Last 3 Shifts         01/21 0700  01/22 0659 01/22 0700 01/23 0659 01/23 0700  01/24 0659    P.O. 1670 0     I.V. (mL/kg)  358 (3.8)     Blood       Other       Total Intake(mL/kg) 1670 (18.2) 358 (3.8)     Urine (mL/kg/hr) 3925 (1.8) 1250 (0.6) 200 (0.7)    Emesis/NG output 0 0     Drains 360 180     Other  0     Stool 0 0 0    Blood  0     Total Output 4285 1430 200    Net -2615 -1072 -200           Urine Occurrence  1 x     Stool Occurrence  1 x 1 x    Emesis Occurrence 1 x 0 x             Physical Exam  Vitals and nursing note reviewed.   Constitutional:       Appearance: She is well-developed.      Comments: No hand or temporal muscle wasting noted     HENT:      Head: Normocephalic.   Eyes:      General: Scleral icterus present.      Conjunctiva/sclera: Conjunctivae normal.   Cardiovascular:      Rate and Rhythm: Normal rate and regular rhythm.      Heart sounds: No murmur heard.  Pulmonary:      Effort: Pulmonary effort is normal.      Breath sounds: Normal breath sounds.   Abdominal:      General: Bowel sounds are normal. There is no distension.      Palpations: Abdomen is soft.      Tenderness: There is abdominal tenderness.      Hernia: Hernia: over incision as expected.          Comments: RYLEY in place   Musculoskeletal:         General: Normal range of motion.      Cervical back: Normal range of motion.       Right lower leg: Edema present.      Left lower leg: Edema present.   Skin:     General: Skin is warm and dry.      Capillary Refill: Capillary refill takes less than 2 seconds.      Coloration: Skin is jaundiced.   Neurological:      General: No focal deficit present.      Mental Status: She is alert and oriented to person, place, and time.   Psychiatric:         Mood and Affect: Mood normal.         Behavior: Behavior normal.         Thought Content: Thought content normal.         Judgment: Judgment normal.       Laboratory:  Immunosuppressants           Stop Route Frequency     mycophenolate capsule 1,000 mg         -- Oral 2 times daily          CBC:   Recent Labs   Lab 01/23/23  0726   WBC 6.25   RBC 3.13*   HGB 10.2*   HCT 28.8*   PLT 40*   MCV 92   MCH 32.6*   MCHC 35.4       BMP:   Recent Labs   Lab 01/23/23  0726   *   *   K 3.9      CO2 23   BUN 31*   CREATININE 0.9   CALCIUM 7.9*       CMP:   Recent Labs   Lab 01/23/23  0726   *   CALCIUM 7.9*   ALBUMIN 2.6*   PROT 4.5*   *   K 3.9   CO2 23      BUN 31*   CREATININE 0.9   ALKPHOS 54*   *   AST 76*   BILITOT 21.1*       Coagulation:   Recent Labs   Lab 01/23/23  0726   INR 1.1   APTT 27.6       Labs within the past 24 hours have been reviewed.    Diagnostic Results:  US Liver Transplant Post:  Results for orders placed during the hospital encounter of 01/17/23    US Doppler Liver Transplant Post (xpd)    Narrative  EXAMINATION:  US DOPPLER LIVER TRANSPLANT POST (XPD)    CLINICAL HISTORY:  Elevated bili s/p liver txp;    TECHNIQUE:  Ultrasound of the transplant liver with Doppler evaluation.  Color and spectral Doppler were performed.    COMPARISON:  None.    FINDINGS:  Patient is status post orthotopic liver transplant 01/17/2023.  The liver demonstrates homogeneous echotexture.  No focal hepatic lesions are seen.  No fluid collections.    The common duct is not dilated, measuring 3 mm.  No dilated  intrahepatic radicles are seen.    Color flow and spectral waveform analysis was performed.  The main portal vein, right portal vein, middle hepatic vein, right hepatic vein, and IVC are patent with proper directional flow.    Anastomosis site of the main hepatic artery demonstrates a peak systolic velocity 98 cm/sec (previously 137 cm/sec).    Main hepatic artery demonstrates resistive index 0.85 (previously 0.87) with normal waveform.    Anterior branch of the right hepatic artery demonstrates resistive index 0.68 (previously 0.78) with normal waveform.    Posterior branch of the right hepatic artery demonstrates resistive index 0.79 (previously 0.81) with normal waveform.    Right pleural effusion.    Impression  Minimally elevated resistive index in the main hepatic artery, favored to be secondary to edema.  Otherwise satisfactory Doppler evaluation of the liver allograft.    Electronically signed by resident: Toyin Barker  Date:    01/20/2023  Time:    10:30    Electronically signed by: Eduardo Nayak MD  Date:    01/20/2023  Time:    10:56    Debility/Functional status: No debility noted.    Assessment/Plan:     Prophylactic immunotherapy  - continue prograf  - continue to monitor for toxic side effects and check daily levels. Will adjust for therapeutic dose      Long-term use of immunosuppressant medication  - see prophylactic immunotherapy      At risk for opportunistic infections  - Bactrim for PCP ppx.  - Valcyte for CMV ppx.      Acute blood loss anemia  - H/H stable  - no overt signs of bleed  - continue to monitor with daily CBC      Adrenal corticosteroid causing adverse effect in therapeutic use  - endo consulted and following      Hyperglycemia  - likely steroid related, Endocrine following         S/P liver transplant  -DORAN cirrhosis complicated by hepatic encephalopathy who is now status post living related donor liver transplant on 1/17/22. She is progressing well post op. 2 drains in  place. POD#1 Liver US looked fine.   Minimally elevated intraparenchymal resistive indices, likely due to postoperative edema.    - Liver US 1/20 reviewed w CBD 3mm w no duct dilation  - elevated t-bili started Ursodiol 1/21  - CT scan ABD 1/22- reviewed  - t bili up to 22, 21.2 today. Fluid sent for t bili 40.9  - plan to take pt back to OR today for ex/lap        VTE Risk Mitigation (From admission, onward)         Ordered     Place sequential compression device  Until discontinued         01/17/23 2342     heparin (porcine) injection 5,000 Units  Every 8 hours         01/17/23 2148     IP VTE HIGH RISK PATIENT  Once         01/17/23 2148                The patients clinical status was discussed at multidisplinary rounds, involving transplant surgery, transplant medicine, pharmacy, nursing, nutrition, and social work    Discharge Planning:  Discussed plan of care.  No plan for discharge today.  Greater than 30 minutes spent with patient discussing diagnosis including reviewing labs and imaging and plan of care.      Kathryn Bui, NP  Liver Transplant  [facility]

## 2023-01-24 ENCOUNTER — ANESTHESIA (OUTPATIENT)
Dept: SURGERY | Facility: HOSPITAL | Age: 58
DRG: 006 | End: 2023-01-24
Payer: COMMERCIAL

## 2023-01-24 ENCOUNTER — ANESTHESIA EVENT (OUTPATIENT)
Dept: SURGERY | Facility: HOSPITAL | Age: 58
DRG: 006 | End: 2023-01-24
Payer: COMMERCIAL

## 2023-01-24 PROBLEM — K83.8 COMMON BILE DUCT LEAK OF TRANSPLANTED LIVER: Status: ACTIVE | Noted: 2023-01-24

## 2023-01-24 PROBLEM — D69.6 THROMBOCYTOPENIA, UNSPECIFIED: Status: ACTIVE | Noted: 2023-01-24

## 2023-01-24 PROBLEM — T86.49 COMMON BILE DUCT LEAK OF TRANSPLANTED LIVER: Status: ACTIVE | Noted: 2023-01-24

## 2023-01-24 LAB
ALBUMIN SERPL BCP-MCNC: 2.6 G/DL (ref 3.5–5.2)
ALP SERPL-CCNC: 56 U/L (ref 55–135)
ALT SERPL W/O P-5'-P-CCNC: 206 U/L (ref 10–44)
ANION GAP SERPL CALC-SCNC: 10 MMOL/L (ref 8–16)
APTT BLDCRRT: 27.9 SEC (ref 21–32)
AST SERPL-CCNC: 57 U/L (ref 10–40)
BASOPHILS # BLD AUTO: 0.09 K/UL (ref 0–0.2)
BASOPHILS NFR BLD: 0.6 % (ref 0–1.9)
BILIRUB DIRECT SERPL-MCNC: 10.4 MG/DL (ref 0.1–0.3)
BILIRUB FLD-MCNC: 70.2 MG/DL
BILIRUB FLD-MCNC: >125 MG/DL
BILIRUB SERPL-MCNC: 15 MG/DL (ref 0.1–1)
BODY FLUID SOURCE, BILIRUBIN: NORMAL
BODY FLUID SOURCE, BILIRUBIN: NORMAL
BUN SERPL-MCNC: 31 MG/DL (ref 6–20)
CALCIUM SERPL-MCNC: 7.4 MG/DL (ref 8.7–10.5)
CHLORIDE SERPL-SCNC: 101 MMOL/L (ref 95–110)
CO2 SERPL-SCNC: 19 MMOL/L (ref 23–29)
CREAT SERPL-MCNC: 1 MG/DL (ref 0.5–1.4)
DIFFERENTIAL METHOD: ABNORMAL
EOSINOPHIL # BLD AUTO: 0.6 K/UL (ref 0–0.5)
EOSINOPHIL NFR BLD: 4.1 % (ref 0–8)
ERYTHROCYTE [DISTWIDTH] IN BLOOD BY AUTOMATED COUNT: 15.8 % (ref 11.5–14.5)
EST. GFR  (NO RACE VARIABLE): >60 ML/MIN/1.73 M^2
GLUCOSE SERPL-MCNC: 151 MG/DL (ref 70–110)
HCT VFR BLD AUTO: 33.2 % (ref 37–48.5)
HGB BLD-MCNC: 11.6 G/DL (ref 12–16)
IMM GRANULOCYTES # BLD AUTO: 0.76 K/UL (ref 0–0.04)
IMM GRANULOCYTES NFR BLD AUTO: 5 % (ref 0–0.5)
INR PPP: 1.1 (ref 0.8–1.2)
LYMPHOCYTES # BLD AUTO: 0.9 K/UL (ref 1–4.8)
LYMPHOCYTES NFR BLD: 5.7 % (ref 18–48)
MAGNESIUM SERPL-MCNC: 2.1 MG/DL (ref 1.6–2.6)
MCH RBC QN AUTO: 32.9 PG (ref 27–31)
MCHC RBC AUTO-ENTMCNC: 34.9 G/DL (ref 32–36)
MCV RBC AUTO: 94 FL (ref 82–98)
MONOCYTES # BLD AUTO: 1.2 K/UL (ref 0.3–1)
MONOCYTES NFR BLD: 8.2 % (ref 4–15)
NEUTROPHILS # BLD AUTO: 11.6 K/UL (ref 1.8–7.7)
NEUTROPHILS NFR BLD: 76.4 % (ref 38–73)
NRBC BLD-RTO: 1 /100 WBC
PHOSPHATE SERPL-MCNC: 2.7 MG/DL (ref 2.7–4.5)
PLATELET # BLD AUTO: 73 K/UL (ref 150–450)
PMV BLD AUTO: 12.8 FL (ref 9.2–12.9)
POTASSIUM SERPL-SCNC: 4.5 MMOL/L (ref 3.5–5.1)
PROT SERPL-MCNC: 4.8 G/DL (ref 6–8.4)
PROTHROMBIN TIME: 11 SEC (ref 9–12.5)
RBC # BLD AUTO: 3.53 M/UL (ref 4–5.4)
SODIUM SERPL-SCNC: 130 MMOL/L (ref 136–145)
TACROLIMUS BLD-MCNC: 15.1 NG/ML (ref 5–15)
WBC # BLD AUTO: 15.13 K/UL (ref 3.9–12.7)

## 2023-01-24 PROCEDURE — 80053 COMPREHEN METABOLIC PANEL: CPT

## 2023-01-24 PROCEDURE — D9220A PRA ANESTHESIA: Mod: CRNA,,, | Performed by: NURSE ANESTHETIST, CERTIFIED REGISTERED

## 2023-01-24 PROCEDURE — 25000003 PHARM REV CODE 250

## 2023-01-24 PROCEDURE — C1751 CATH, INF, PER/CENT/MIDLINE: HCPCS

## 2023-01-24 PROCEDURE — A4216 STERILE WATER/SALINE, 10 ML: HCPCS | Performed by: SURGERY

## 2023-01-24 PROCEDURE — 99900031 HC PATIENT EDUCATION (STAT)

## 2023-01-24 PROCEDURE — 63600175 PHARM REV CODE 636 W HCPCS: Performed by: PHYSICIAN ASSISTANT

## 2023-01-24 PROCEDURE — 85730 THROMBOPLASTIN TIME PARTIAL: CPT

## 2023-01-24 PROCEDURE — 35840 PR EXPLOR POSTOP BLEED,INFEC,CLOT-ABD: ICD-10-PCS | Mod: 82,78,, | Performed by: TRANSPLANT SURGERY

## 2023-01-24 PROCEDURE — 27000221 HC OXYGEN, UP TO 24 HOURS

## 2023-01-24 PROCEDURE — 99233 PR SUBSEQUENT HOSPITAL CARE,LEVL III: ICD-10-PCS | Mod: 24,,, | Performed by: NURSE PRACTITIONER

## 2023-01-24 PROCEDURE — D9220A PRA ANESTHESIA: ICD-10-PCS | Mod: CRNA,,, | Performed by: NURSE ANESTHETIST, CERTIFIED REGISTERED

## 2023-01-24 PROCEDURE — 63600175 PHARM REV CODE 636 W HCPCS: Performed by: NURSE ANESTHETIST, CERTIFIED REGISTERED

## 2023-01-24 PROCEDURE — 76937 US GUIDE VASCULAR ACCESS: CPT

## 2023-01-24 PROCEDURE — 71000015 HC POSTOP RECOV 1ST HR: Performed by: TRANSPLANT SURGERY

## 2023-01-24 PROCEDURE — 37000008 HC ANESTHESIA 1ST 15 MINUTES: Performed by: TRANSPLANT SURGERY

## 2023-01-24 PROCEDURE — 35840 EXPLORE ABDOMINAL VESSELS: CPT | Mod: 82,78,, | Performed by: TRANSPLANT SURGERY

## 2023-01-24 PROCEDURE — 85610 PROTHROMBIN TIME: CPT

## 2023-01-24 PROCEDURE — 36000709 HC OR TIME LEV III EA ADD 15 MIN: Performed by: TRANSPLANT SURGERY

## 2023-01-24 PROCEDURE — 84100 ASSAY OF PHOSPHORUS: CPT

## 2023-01-24 PROCEDURE — 25000003 PHARM REV CODE 250: Performed by: STUDENT IN AN ORGANIZED HEALTH CARE EDUCATION/TRAINING PROGRAM

## 2023-01-24 PROCEDURE — 63600175 PHARM REV CODE 636 W HCPCS

## 2023-01-24 PROCEDURE — 99233 SBSQ HOSP IP/OBS HIGH 50: CPT | Mod: 24,,, | Performed by: NURSE PRACTITIONER

## 2023-01-24 PROCEDURE — 82247 BILIRUBIN TOTAL: CPT | Mod: 91 | Performed by: SURGERY

## 2023-01-24 PROCEDURE — 27201423 OPTIME MED/SURG SUP & DEVICES STERILE SUPPLY: Performed by: TRANSPLANT SURGERY

## 2023-01-24 PROCEDURE — D9220A PRA ANESTHESIA: Mod: ANES,,, | Performed by: ANESTHESIOLOGY

## 2023-01-24 PROCEDURE — 94761 N-INVAS EAR/PLS OXIMETRY MLT: CPT

## 2023-01-24 PROCEDURE — 37000009 HC ANESTHESIA EA ADD 15 MINS: Performed by: TRANSPLANT SURGERY

## 2023-01-24 PROCEDURE — 35840 PR EXPLOR POSTOP BLEED,INFEC,CLOT-ABD: ICD-10-PCS | Mod: 78,,, | Performed by: TRANSPLANT SURGERY

## 2023-01-24 PROCEDURE — 71000033 HC RECOVERY, INTIAL HOUR: Performed by: TRANSPLANT SURGERY

## 2023-01-24 PROCEDURE — 25000003 PHARM REV CODE 250: Performed by: NURSE PRACTITIONER

## 2023-01-24 PROCEDURE — 82248 BILIRUBIN DIRECT: CPT

## 2023-01-24 PROCEDURE — 36000708 HC OR TIME LEV III 1ST 15 MIN: Performed by: TRANSPLANT SURGERY

## 2023-01-24 PROCEDURE — 85027 COMPLETE CBC AUTOMATED: CPT

## 2023-01-24 PROCEDURE — 36573 INSJ PICC RS&I 5 YR+: CPT

## 2023-01-24 PROCEDURE — 80197 ASSAY OF TACROLIMUS: CPT

## 2023-01-24 PROCEDURE — 99900035 HC TECH TIME PER 15 MIN (STAT)

## 2023-01-24 PROCEDURE — 20600001 HC STEP DOWN PRIVATE ROOM

## 2023-01-24 PROCEDURE — 25000003 PHARM REV CODE 250: Performed by: NURSE ANESTHETIST, CERTIFIED REGISTERED

## 2023-01-24 PROCEDURE — 63600175 PHARM REV CODE 636 W HCPCS: Performed by: NURSE PRACTITIONER

## 2023-01-24 PROCEDURE — 63600175 PHARM REV CODE 636 W HCPCS: Performed by: STUDENT IN AN ORGANIZED HEALTH CARE EDUCATION/TRAINING PROGRAM

## 2023-01-24 PROCEDURE — 25000003 PHARM REV CODE 250: Performed by: SURGERY

## 2023-01-24 PROCEDURE — 35840 EXPLORE ABDOMINAL VESSELS: CPT | Mod: 78,,, | Performed by: TRANSPLANT SURGERY

## 2023-01-24 PROCEDURE — 85007 BL SMEAR W/DIFF WBC COUNT: CPT

## 2023-01-24 PROCEDURE — 83735 ASSAY OF MAGNESIUM: CPT

## 2023-01-24 PROCEDURE — D9220A PRA ANESTHESIA: ICD-10-PCS | Mod: ANES,,, | Performed by: ANESTHESIOLOGY

## 2023-01-24 RX ORDER — DEXAMETHASONE SODIUM PHOSPHATE 4 MG/ML
INJECTION, SOLUTION INTRA-ARTICULAR; INTRALESIONAL; INTRAMUSCULAR; INTRAVENOUS; SOFT TISSUE
Status: DISCONTINUED | OUTPATIENT
Start: 2023-01-24 | End: 2023-01-24

## 2023-01-24 RX ORDER — MIDAZOLAM HYDROCHLORIDE 1 MG/ML
INJECTION, SOLUTION INTRAMUSCULAR; INTRAVENOUS
Status: DISCONTINUED | OUTPATIENT
Start: 2023-01-24 | End: 2023-01-24

## 2023-01-24 RX ORDER — SODIUM CHLORIDE 9 MG/ML
INJECTION, SOLUTION INTRAVENOUS CONTINUOUS
Status: ACTIVE | OUTPATIENT
Start: 2023-01-24 | End: 2023-01-25

## 2023-01-24 RX ORDER — SODIUM CHLORIDE 0.9 % (FLUSH) 0.9 %
10 SYRINGE (ML) INJECTION
Status: DISCONTINUED | OUTPATIENT
Start: 2023-01-24 | End: 2023-02-01 | Stop reason: HOSPADM

## 2023-01-24 RX ORDER — FENTANYL CITRATE 50 UG/ML
INJECTION, SOLUTION INTRAMUSCULAR; INTRAVENOUS
Status: DISCONTINUED | OUTPATIENT
Start: 2023-01-24 | End: 2023-01-24

## 2023-01-24 RX ORDER — LIDOCAINE HYDROCHLORIDE 20 MG/ML
INJECTION INTRAVENOUS
Status: DISCONTINUED | OUTPATIENT
Start: 2023-01-24 | End: 2023-01-24

## 2023-01-24 RX ORDER — SODIUM CHLORIDE 0.9 % (FLUSH) 0.9 %
10 SYRINGE (ML) INJECTION EVERY 6 HOURS
Status: DISCONTINUED | OUTPATIENT
Start: 2023-01-24 | End: 2023-02-01 | Stop reason: HOSPADM

## 2023-01-24 RX ORDER — INSULIN ASPART 100 [IU]/ML
0-5 INJECTION, SOLUTION INTRAVENOUS; SUBCUTANEOUS EVERY 6 HOURS PRN
Status: DISCONTINUED | OUTPATIENT
Start: 2023-01-24 | End: 2023-01-26

## 2023-01-24 RX ORDER — ESMOLOL HYDROCHLORIDE 10 MG/ML
INJECTION INTRAVENOUS
Status: DISCONTINUED | OUTPATIENT
Start: 2023-01-24 | End: 2023-01-24

## 2023-01-24 RX ORDER — NEOSTIGMINE METHYLSULFATE 0.5 MG/ML
INJECTION, SOLUTION INTRAVENOUS
Status: DISCONTINUED | OUTPATIENT
Start: 2023-01-24 | End: 2023-01-24

## 2023-01-24 RX ORDER — ONDANSETRON 2 MG/ML
INJECTION INTRAMUSCULAR; INTRAVENOUS
Status: DISCONTINUED | OUTPATIENT
Start: 2023-01-24 | End: 2023-01-24

## 2023-01-24 RX ORDER — PROPOFOL 10 MG/ML
VIAL (ML) INTRAVENOUS
Status: DISCONTINUED | OUTPATIENT
Start: 2023-01-24 | End: 2023-01-24

## 2023-01-24 RX ORDER — GLUCAGON 1 MG
1 KIT INJECTION
Status: DISCONTINUED | OUTPATIENT
Start: 2023-01-24 | End: 2023-01-26

## 2023-01-24 RX ORDER — HYDROMORPHONE HYDROCHLORIDE 2 MG/ML
INJECTION, SOLUTION INTRAMUSCULAR; INTRAVENOUS; SUBCUTANEOUS
Status: DISCONTINUED | OUTPATIENT
Start: 2023-01-24 | End: 2023-01-24

## 2023-01-24 RX ORDER — ROCURONIUM BROMIDE 10 MG/ML
INJECTION, SOLUTION INTRAVENOUS
Status: DISCONTINUED | OUTPATIENT
Start: 2023-01-24 | End: 2023-01-24

## 2023-01-24 RX ORDER — HALOPERIDOL 5 MG/ML
INJECTION INTRAMUSCULAR
Status: DISCONTINUED | OUTPATIENT
Start: 2023-01-24 | End: 2023-01-24

## 2023-01-24 RX ORDER — KETAMINE HCL IN 0.9 % NACL 50 MG/5 ML
SYRINGE (ML) INTRAVENOUS
Status: DISCONTINUED | OUTPATIENT
Start: 2023-01-24 | End: 2023-01-24

## 2023-01-24 RX ADMIN — METHOCARBAMOL 500 MG: 500 TABLET ORAL at 09:01

## 2023-01-24 RX ADMIN — ROCURONIUM BROMIDE 10 MG: 10 INJECTION INTRAVENOUS at 03:01

## 2023-01-24 RX ADMIN — HYDROMORPHONE HYDROCHLORIDE 0.5 MG: 2 INJECTION INTRAMUSCULAR; INTRAVENOUS; SUBCUTANEOUS at 01:01

## 2023-01-24 RX ADMIN — AMPICILLIN SODIUM AND SULBACTAM SODIUM 3 G: 2; 1 INJECTION, POWDER, FOR SOLUTION INTRAMUSCULAR; INTRAVENOUS at 03:01

## 2023-01-24 RX ADMIN — FENTANYL CITRATE 50 MCG: 50 INJECTION, SOLUTION INTRAMUSCULAR; INTRAVENOUS at 12:01

## 2023-01-24 RX ADMIN — MYCOPHENOLATE MOFETIL 1000 MG: 250 CAPSULE ORAL at 08:01

## 2023-01-24 RX ADMIN — SODIUM CHLORIDE: 0.9 INJECTION, SOLUTION INTRAVENOUS at 12:01

## 2023-01-24 RX ADMIN — HYDROMORPHONE HYDROCHLORIDE 0.5 MG: 1 INJECTION, SOLUTION INTRAMUSCULAR; INTRAVENOUS; SUBCUTANEOUS at 08:01

## 2023-01-24 RX ADMIN — AMPICILLIN SODIUM AND SULBACTAM SODIUM 3 G: 2; 1 INJECTION, POWDER, FOR SOLUTION INTRAMUSCULAR; INTRAVENOUS at 01:01

## 2023-01-24 RX ADMIN — PREDNISONE 20 MG: 20 TABLET ORAL at 08:01

## 2023-01-24 RX ADMIN — URSODIOL 300 MG: 300 CAPSULE ORAL at 09:01

## 2023-01-24 RX ADMIN — ESMOLOL HYDROCHLORIDE 10 MG: 100 INJECTION, SOLUTION INTRAVENOUS at 03:01

## 2023-01-24 RX ADMIN — Medication 10 MG: at 02:01

## 2023-01-24 RX ADMIN — HALOPERIDOL LACTATE 1 MG: 5 INJECTION, SOLUTION INTRAMUSCULAR at 03:01

## 2023-01-24 RX ADMIN — ESMOLOL HYDROCHLORIDE 10 MG: 100 INJECTION, SOLUTION INTRAVENOUS at 04:01

## 2023-01-24 RX ADMIN — HEPARIN SODIUM 5000 UNITS: 5000 INJECTION INTRAVENOUS; SUBCUTANEOUS at 09:01

## 2023-01-24 RX ADMIN — FLUCONAZOLE 200 MG: 200 TABLET ORAL at 08:01

## 2023-01-24 RX ADMIN — ROCURONIUM BROMIDE 10 MG: 10 INJECTION INTRAVENOUS at 02:01

## 2023-01-24 RX ADMIN — URSODIOL 300 MG: 300 CAPSULE ORAL at 08:01

## 2023-01-24 RX ADMIN — Medication 20 MG: at 01:01

## 2023-01-24 RX ADMIN — AMPICILLIN SODIUM AND SULBACTAM SODIUM 3 G: 2; 1 INJECTION, POWDER, FOR SOLUTION INTRAMUSCULAR; INTRAVENOUS at 07:01

## 2023-01-24 RX ADMIN — Medication 10 MG: at 03:01

## 2023-01-24 RX ADMIN — FAMOTIDINE 20 MG: 20 TABLET ORAL at 08:01

## 2023-01-24 RX ADMIN — LIDOCAINE 1 PATCH: 50 PATCH CUTANEOUS at 05:01

## 2023-01-24 RX ADMIN — HYDROMORPHONE HYDROCHLORIDE 0.5 MG: 1 INJECTION, SOLUTION INTRAMUSCULAR; INTRAVENOUS; SUBCUTANEOUS at 02:01

## 2023-01-24 RX ADMIN — MYCOPHENOLATE MOFETIL 1000 MG: 250 CAPSULE ORAL at 09:01

## 2023-01-24 RX ADMIN — PROCHLORPERAZINE EDISYLATE 5 MG: 5 INJECTION INTRAMUSCULAR; INTRAVENOUS at 08:01

## 2023-01-24 RX ADMIN — OXYCODONE HYDROCHLORIDE 10 MG: 10 TABLET ORAL at 05:01

## 2023-01-24 RX ADMIN — MIDAZOLAM HYDROCHLORIDE 2 MG: 1 INJECTION, SOLUTION INTRAMUSCULAR; INTRAVENOUS at 12:01

## 2023-01-24 RX ADMIN — NEOSTIGMINE METHYLSULFATE 5 MG: 0.5 INJECTION, SOLUTION INTRAVENOUS at 04:01

## 2023-01-24 RX ADMIN — PROPOFOL 120 MG: 10 INJECTION, EMULSION INTRAVENOUS at 12:01

## 2023-01-24 RX ADMIN — GLYCOPYRROLATE 0.6 MG: 0.2 INJECTION, SOLUTION INTRAMUSCULAR; INTRAVENOUS at 04:01

## 2023-01-24 RX ADMIN — OXYCODONE HYDROCHLORIDE 10 MG: 10 TABLET ORAL at 04:01

## 2023-01-24 RX ADMIN — ROCURONIUM BROMIDE 50 MG: 10 INJECTION INTRAVENOUS at 12:01

## 2023-01-24 RX ADMIN — OXYCODONE HYDROCHLORIDE 10 MG: 10 TABLET ORAL at 11:01

## 2023-01-24 RX ADMIN — ROCURONIUM BROMIDE 10 MG: 10 INJECTION INTRAVENOUS at 01:01

## 2023-01-24 RX ADMIN — Medication 10 ML: at 05:01

## 2023-01-24 RX ADMIN — LIDOCAINE HYDROCHLORIDE 100 MG: 20 INJECTION INTRAVENOUS at 12:01

## 2023-01-24 RX ADMIN — AMPICILLIN SODIUM AND SULBACTAM SODIUM 3 G: 2; 1 INJECTION, POWDER, FOR SOLUTION INTRAMUSCULAR; INTRAVENOUS at 08:01

## 2023-01-24 RX ADMIN — SODIUM CHLORIDE: 9 INJECTION, SOLUTION INTRAVENOUS at 07:01

## 2023-01-24 RX ADMIN — DEXAMETHASONE SODIUM PHOSPHATE 4 MG: 4 INJECTION, SOLUTION INTRAMUSCULAR; INTRAVENOUS at 01:01

## 2023-01-24 RX ADMIN — SODIUM CHLORIDE: 9 INJECTION, SOLUTION INTRAVENOUS at 08:01

## 2023-01-24 RX ADMIN — ONDANSETRON 4 MG: 2 INJECTION INTRAMUSCULAR; INTRAVENOUS at 03:01

## 2023-01-24 RX ADMIN — HYDROMORPHONE HYDROCHLORIDE 0.5 MG: 2 INJECTION INTRAMUSCULAR; INTRAVENOUS; SUBCUTANEOUS at 04:01

## 2023-01-24 RX ADMIN — PROPOFOL 30 MG: 10 INJECTION, EMULSION INTRAVENOUS at 04:01

## 2023-01-24 RX ADMIN — HEPARIN SODIUM 5000 UNITS: 5000 INJECTION INTRAVENOUS; SUBCUTANEOUS at 04:01

## 2023-01-24 NOTE — PLAN OF CARE
AAOX4  VVS  INCREASED ABD PAIN,SENT PT SENT TO OR FOR EXLAP FOR POSSIBLE BILE LEAK REPAIR AGAIN.  FAMILY AT BEDSIDE   PICC PLACED TO RIGHT UPPER ARM.   ALL CONCERNS ADDRESSED WITH PT AND FAMILY BEFORE GOING TO OR .

## 2023-01-24 NOTE — PLAN OF CARE
AAOx4, VSS, AC/HS; no coverage needed  Remains NPO for ileus seen on abdominal x-ray 1/22, CT confirmed no obstruction @ this time, nausea controlled with PRN zofran + compazine; no emesis overnight  PRN suppository given- very small BM but + for gas-- needs bowel regimen  NSR @ 50cc/hr; refer to flowsheet for UOP overnight  Chevron remains JEROMY with staples, pain controlleed with oxy 10 q6h PRN  T-bili trending up; PO Actigall started; exlap done 1/23 to repair bile leak + place additional drain  2 RLQ RYLEY with SS output  Non skid socks worn, call light within reach, will cont to monitor

## 2023-01-24 NOTE — PROGRESS NOTES
On 1/18/23- I saw patient in the ICU.  Gave patient and family My New Journey: Living Smart After My Liver Transplant.  Asked them to read the book in preparation for education.  Allowed time for questions and answers.      Patient returned to OR yesterday and today.  Will meet with patient and family in the next few days for discharge education.

## 2023-01-24 NOTE — NURSING
PT TRANSFERRED FROM PACU TO ROOM 71796 NO ACUTE DISTRESS NOTED OR VOICED. VERBAL UNDERSTANDING NOTED TO CALL PRN

## 2023-01-24 NOTE — ANESTHESIA PROCEDURE NOTES
Intubation    Date/Time: 1/24/2023 12:56 PM  Performed by: Marita Comer CRNA  Authorized by: Hermann Knight MD     Intubation:     Induction:  Rapid sequence induction    Intubated:  Postinduction    Mask Ventilation:  Not attempted    Attempts:  1    Attempted By:  CRNA    Method of Intubation:  Direct    Blade:  Ghosh 2    Laryngeal View Grade: Grade I - full view of cords      Difficult Airway Encountered?: No      Complications:  None    Airway Device:  Oral endotracheal tube    Airway Device Size:  7.5    Style/Cuff Inflation:  Cuffed    Inflation Amount (mL):  5    Tube secured:  21    Secured at:  The lips    Placement Verified By:  Capnometry    Complicating Factors:  None    Findings Post-Intubation:  BS equal bilateral and atraumatic/condition of teeth unchanged

## 2023-01-24 NOTE — PROCEDURES
"Mickey Harris is a 57 y.o. female patient.    Temp: 98.7 °F (37.1 °C) (01/24/23 0814)  Pulse: 99 (01/24/23 0814)  Resp: 18 (01/24/23 0814)  BP: (!) 112/58 (01/24/23 0814)  SpO2: 96 % (01/24/23 0814)  Weight: 91.4 kg (201 lb 8 oz) (01/24/23 0448)  Height: 5' 5" (165.1 cm) (01/18/23 0759)    PICC  Date/Time: 1/24/2023 9:50 AM  Performed by: Sri Claudio RN  Assisting provider: Yuli Benitez RN  Post-operative diagnosis: PICC line placement  Consent Done: Yes  Time out: Immediately prior to procedure a time out was called to verify the correct patient, procedure, equipment, support staff and site/side marked as required  Indications: med administration and vascular access  Anesthesia: local infiltration  Local anesthetic: lidocaine 1% without epinephrine  Anesthetic Total (mL): 2  Description of findings: PICC line placement  Preparation: skin prepped with ChloraPrep  Skin prep agent dried: skin prep agent completely dried prior to procedure  Sterile barriers: all five maximum sterile barriers used - cap, mask, sterile gown, sterile gloves, and large sterile sheet  Hand hygiene: hand hygiene performed prior to central venous catheter insertion  Location details: right basilic  Catheter type: double lumen  Catheter size: 5 Fr  Catheter Length: 36cm    Ultrasound guidance: yes  Vessel Caliber: medium and patent, compressibility normal  Needle advanced into vessel with real time Ultrasound guidance.  Guidewire confirmed in vessel.  Image recorded and saved.  Sterile sheath used.  Number of attempts: 1  Post-procedure: blood return through all ports, chlorhexidine patch and sterile dressing applied  Technical procedures used: 3 CG  Specimens: No  Implants: No  Assessment: placement verified by x-ray  Complications: none        Name TYRONE Benitez RN  1/24/2023    "

## 2023-01-24 NOTE — ANESTHESIA PREPROCEDURE EVALUATION
[facility]  Anesthesia Pre-Operative Evaluation        Patient Name: Mickey Harris  YOB: 1965  MRN: [id number]    SUBJECTIVE:     Pre-operative Evaluation for Procedure(s) (LRB):  TRANSPLANT, LIVER (N/A)     01/24/2023    Mickey Harris is a 57 y.o. female with a PMHx significant for HLD and mild asthma with a four-year history of progressive DORAN cirrhosis which has been complicated by ascites, hepatic encephalopathy and small esophageal varices. Underwent living donor liver transplant 1/17. Her postoperative course is complicated by CBD leak prompting ex lap.    Current MELD Score:  MELD-Na score: 23 at 1/24/2023  8:10 AM  MELD score: 18 at 1/24/2023  8:10 AM  Calculated from:  Serum Creatinine: 1.0 mg/dL at 1/24/2023  8:10 AM  Serum Sodium: 130 mmol/L at 1/24/2023  8:10 AM  Total Bilirubin: 15.0 mg/dL at 1/24/2023  8:10 AM  INR(ratio): 1.1 at 1/24/2023  8:10 AM  Age: 57 years    She now presents for the above procedure(s) with Transplant Surgery - Dr. Kimbrough    Previous Airway:  Date/Time: 1/23/2023 2:54 PM  Performed by: Christ Abbasi CRNA  Authorized by: Boo Gray Jr., MD      Intubation:     Induction:  Intravenous    Intubated:  Postinduction    Mask Ventilation:  Not attempted (RSI CLASSED CASE)    Attempts:  1    Attempted By:  CRNA    Method of Intubation:  Video laryngoscopy    Blade:  Horne 3    Laryngeal View Grade: Grade I - full view of cords      Difficult Airway Encountered?: No      Complications:  None    Airway Device:  Oral endotracheal tube    Airway Device Size:  7.5    Style/Cuff Inflation:  Cuffed    Inflation Amount (mL):  10    Tube secured:  21    Secured at:  The lips    Placement Verified By:  Capnometry and Revisualization with laryngoscopy    Complicating Factors:  None    Findings Post-Intubation:  BS equal bilateral and atraumatic/condition of teeth unchanged    Patient Active Problem List   Diagnosis    History of recurrent UTI  (urinary tract infection)    Cough    Organ transplant candidate    S/P liver transplant    Hyperglycemia    Adrenal corticosteroid causing adverse effect in therapeutic use    Acute blood loss anemia    At risk for opportunistic infections    Long-term use of immunosuppressant medication    Prophylactic immunotherapy       Review of patient's allergies indicates:   Allergen Reactions    Levofloxacin Hives and Shortness Of Breath    Sulfa (sulfonamide antibiotics) Rash       Current Outpatient Medications   Medication Instructions    albuterol (PROVENTIL/VENTOLIN HFA) 90 mcg/actuation inhaler SMARTSI Puff(s) Via Inhaler Every 4 Hours PRN    biotin 1 mg, Oral, Daily    calcium carbonate-vitamin D3 600 mg-20 mcg (800 unit) Tab 1 tablet, Oral, Daily    cyanocobalamin, vitamin B-12, 50 mcg tablet 1 tablet, Oral, Daily    ergocalciferol (ERGOCALCIFEROL) 50,000 Units, Oral, Every 7 days    fluticasone propionate (FLOVENT HFA) 220 mcg/actuation inhaler 1 puff, Inhalation, Daily PRN    furosemide (LASIX) 40 mg, Oral, Daily    hydrOXYzine HCL (ATARAX) 25 mg, Oral, 3 times daily    lactulose (CHRONULAC) 10 gram/15 mL solution SMARTSIG:15 Milliliter(s) By Mouth 3 Times Daily    loratadine-pseudoephedrine  mg (CLARITIN-D 24-HOUR)  mg per 24 hr tablet 1 tablet, Oral, Daily    MG-PLUS-PROTEIN 133 mg tablet 1 tablet, Oral, Daily    mycophenolate (CELLCEPT) 1,000 mg, Oral, 2 times daily    nystatin (MYCOSTATIN) 500,000 Units, Oral, 3 times daily after meals    ondansetron (ZOFRAN) 4 mg, Oral, Daily PRN    predniSONE (DELTASONE) 5 MG tablet Take by mouth daily:  to  20 mg,  to  15 mg,  to  10 mg,  to  5 mg, then discontinue    spironolactone (ALDACTONE) 100 mg, Oral, Daily    tacrolimus (PROGRAF) 6 mg, Oral, Every 12 hours    valGANciclovir (VALCYTE) 450 mg, Oral, Daily, Stop 2023    vitamin E, dl,tocopheryl acet, (VITAMIN E, DL, ACETATE,) 45 mg (100  unit) Cap 1 tablet, Oral, Daily       Past Surgical History:   Procedure Laterality Date    cholecystectomy  2003    COLONOSCOPY      6 polyps removed    COLONOSCOPY  07/07/2021    DIAGNOSTIC ULTRASOUND N/A 1/17/2023    Procedure: ULTRASOUND, DIAGNOSTIC;  Surgeon: Juan Pablo Kimbrough MD;  Location: [facility] OR [address] [place] [address];  Service: Transplant;  Laterality: N/A;  INTRAOPERATIVE ULTRASOUND    ESOPHAGOGASTRODUODENOSCOPY      2 coulums of grade 1 varices    ESOPHAGOGASTRODUODENOSCOPY  11/01/2019    trace varices    ESOPHAGOGASTRODUODENOSCOPY  07/13/2021    ESOPHAGOGASTRODUODENOSCOPY N/A 9/20/2022    Procedure: EGD (ESOPHAGOGASTRODUODENOSCOPY);  Surgeon: Bruce Dominguez MD;  Location: [facility] ([address] [place] [address]);  Service: Endoscopy;  Laterality: N/A;  labs prior  liver transplant candidate  screen for varices  8/18 fully vaccinated; instructions to portal-LW  8/19 pt rescheduled; updated instructions to portal-st    gynecologic surgery       removal of scar tissues and adhesions    HYSTERECTOMY  1989    knee surgery  1992    LAPAROTOMY, EXPLORATORY N/A 1/23/2023    Procedure: LAPAROTOMY, EXPLORATORY;  Surgeon: Jordon Lamb MD;  Location: [address];  Service: Transplant;  Laterality: N/A;    LIVER TRANSPLANT N/A 1/17/2023    Procedure: TRANSPLANT, LIVER;  Surgeon: Juan Pablo Kimbruogh MD;  Location: [address];  Service: Transplant;  Laterality: N/A;    OOPHORECTOMY Right 1982    OPEN hysterectomy  1989    removed uterus and bilateral fallopian tubes and LEFT ovary stayed in place    ROTATOR CUFF REPAIR Right 2001    HOANG-EN-Y PROCEDURE  1/17/2023    Procedure: CREATION, ANASTOMOSIS, HOANG-EN-Y;  Surgeon: Juan Pablo Kimbrough MD;  Location: [address];  Service: Transplant;;    TONSILLECTOMY  1972       Social History     Substance and Sexual Activity   Drug Use Not Currently     Alcohol Use: Not At Risk    Frequency of Alcohol Consumption: Never    Average Number of Drinks: Patient does not drink    Frequency of  Binge Drinking: Never     Tobacco Use: Medium Risk    Smoking Tobacco Use: Former    Smokeless Tobacco Use: Never    Passive Exposure: Not on file       OBJECTIVE:     Vital Signs Range (Last 24H):  Temp:  [36.2 °C (97.1 °F)-37.2 °C (99 °F)]   Pulse:  []   Resp:  [16-36]   BP: (110-146)/(57-72)   SpO2:  [91 %-96 %]       Significant Labs    Heme Profile  Lab Results   Component Value Date    WBC 15.13 (H) 01/24/2023    HGB 11.6 (L) 01/24/2023    HCT 33.2 (L) 01/24/2023    PLT 73 (L) 01/24/2023       Coagulation Studies  Lab Results   Component Value Date    LABPROT 11.0 01/24/2023    INR 1.1 01/24/2023    APTT 27.9 01/24/2023       BMP  Lab Results   Component Value Date     (L) 01/24/2023    K 4.5 01/24/2023     01/24/2023    CO2 19 (L) 01/24/2023    BUN 31 (H) 01/24/2023    CREATININE 1.0 01/24/2023    MG 2.1 01/24/2023    PHOS 2.7 01/24/2023    CAION 0.97 (L) 01/19/2023       Liver Function Tests  Lab Results   Component Value Date    AST 57 (H) 01/24/2023     (H) 01/24/2023    ALKPHOS 56 01/24/2023    BILITOT 15.0 (H) 01/24/2023    PROT 4.8 (L) 01/24/2023    ALBUMIN 2.6 (L) 01/24/2023       Lipid Profile  No results found for: CHOL, HDL, LDLDIRECT, TRIG    Endocrine Profile  Lab Results   Component Value Date    HGBA1C 4.5 01/17/2023    TSH 1.363 06/30/2022       Diagnostic Studies    US Abdomen Complete with Doppler (11/30/2022):  Impression  Liver findings together with stable splenomegaly, main portal vein dilatation, patent umbilical vein and trace ascites, consistent with portal hypertension    Stable common bile duct dilatation.    Hepatic steatosis.      MRI Abdomen with/without Contrast (07/01/2022):  Impression  1. Morphologic changes of cirrhosis.  No concerning parenchymal lesions.  2. Sequela of portal hypertension including splenomegaly, recanalized umbilical vein, prominent gastroesophageal varices splenic collateral vessels and a trace volume of abdominal ascites.  3.  Subcentimeter T2 hyperintense pancreatic head lesion, possibly a small cyst, pseudocyst or cystic neoplasm.  4. Minimally complex left renal cyst.  5. Mild intra and extrahepatic bile duct dilatation.  No definite intraductal filling defects to suggest choledocholithiasis.      CT Chest without Contrast (06/21/2022):  Impression  There is splenomegaly. There are splenic hilar collaterals, mesenteric collaterals, lower esophageal varices, and the possible left splenorenal shunt. There are collaterals along the lesser curvature of the stomach. This is consistent with portal hypertension and liver disease.    Tiny micro nodules most of which are calcified likely representing granulomatous disease.  Because some of these are noncalcified and there is a smoking history a follow-up low-dose screening CT may be warranted in 12 months.    Coronary artery calcifications.      Cardiac Studies    EKG:   No results found for this or any previous visit.    Stress Echo (07/01/2022):  Interpretation Summary  · The patient reached the end of the protocol.  · There were no arrhythmias during stress.  · The ECG portion of this study is negative for myocardial ischemia.  · The left ventricle is normal in size with normal systolic function.  · The estimated ejection fraction is 65%.  · Normal left ventricular diastolic function.  · Mild left atrial enlargement.  · Normal right ventricular size with normal right ventricular systolic function.  · Moderate right atrial enlargement.  · Moderate tricuspid regurgitation.  · The stress echo portion of this study is negative for myocardial ischemia.      ASSESSMENT/PLAN:     Mickey Harris is a 57 y.o. female with DORAN cirrhosis presenting for living donor liver transplant.      Pre-op Assessment    I have reviewed the Patient Summary Reports.     I have reviewed the Nursing Notes. I have reviewed the NPO Status.   I have reviewed the Medications.     Review of Systems  Anesthesia Hx:  No  problems with previous Anesthesia  History of prior surgery of interest to airway management or planning: Denies Family Hx of Anesthesia complications.   Denies Personal Hx of Anesthesia complications.   Social:  Former Smoker  Denies Tobacco Use. Denies Alcohol Use.   Hematology/Oncology:         -- Denies Anemia: --  Thrombocytopenia: chronic and anticipate platelet transfusion    Cardiovascular:  Functional Capacity good / => 4 METS  Denies Cardiovascular Symptoms.  Denies Congestive Heart Failure (CHF)   Denies Hypertension.    Pulmonary:  Asthma:   Inhaler use is rescue inhaler PRN. Current breathing status is optimal, free of wheezing.  Denies Obstructive Sleep Apnea (TRUONG)   Renal/:  Denies Kidney Function/Disease    Hepatic/GI:  Denies Bowel Conditions  Denies Biliary Disease  Liver Disease , Cirrhosis , MELD Score of 16 Ascites, Esophageal Varices, Thrombocytopenia, Awaiting Liver TransplantDenies Portal Hypertension, Denies Spontaneous Bacterial Peritonitis and Denies Hepatic Neoplasm DORAN cirrhosis   Musculoskeletal:  Denies Musculoskeletal General/Symptoms  Denies Bone Disorder    Neurological:  Denies Seizure Disorder  Denies CVA - Cerebrovasular Accident    Endocrine:  Denies Diabetes  Denies Thyroid Disease  Metabolic Disorders, Hyperlipoproteinemia           Anesthesia Plan  Type of Anesthesia, risks & benefits discussed:    Anesthesia Type: Gen ETT  Intra-op Monitoring Plan: Standard ASA Monitors, Art Line, Central Line, PA and CO  Post Op Pain Control Plan: multimodal analgesia and IV/PO Opioids PRN  Induction:  IV  Airway Plan: Video, Post-Induction  Informed Consent: Informed consent signed with the Patient and all parties understand the risks and agree with anesthesia plan.  All questions answered.   ASA Score: 3  Day of Surgery Review of History & Physical: H&P Update referred to the surgeon/provider.    Ready For Surgery From Anesthesia Perspective.     .

## 2023-01-24 NOTE — PLAN OF CARE
RYLEY drains a bilious color, notified Shelly BARRERA, RYLEY drain fluid sent- results pending, will cont to monitor

## 2023-01-24 NOTE — PROGRESS NOTES
[facility] - Transplant Stepdown  Liver Transplant  Progress Note    Patient Name: Mickey Harris  MRN: [id number]  Admission Date: 1/17/2023  Hospital Length of Stay: 7 days  Code Status: Full Code  Primary Care Provider: Primary Doctor No  Post-Operative Day: 7    ORGAN:   RIGHT LIVER LOBE (SEGS 5,6,7,8) WITHOUT MIDDLE HEPATIC VEIN  Disease Etiology: Cirrhosis: Fatty Liver (DORAN)  Donor Type:   Living  CDC High Risk:     Donor CMV Status:   Donor CMV Status:   Donor HBcAB:     Donor HCV Status:     Donor HBV PALOMA:   Donor HCV PALOMA:   Whole or Partial: Split Liver  Biliary Anastomosis: Becky-en-Y  Arterial Anatomy:   Subjective:     History of Present Illness:  Ms. Mickey Harris is a 58 y/o F admitted for living liver transplant from daughter for DORAN cirrhosis.       Hospital Course:  She is now status post living related donor liver transplant on 1/17/22. She is progressing well post op. 2 drains in place. POD#1 Liver US showed minimally elevated intraparenchymal resistive indices, likely due to postoperative edema. Otherwise satisfactory Doppler evaluation of the liver. Transfer to TSU on POD#2. Pt w increased t bili and noted to have more bilious output in RYLEY drain.  T bili from drain fluid was 41. CT A/P 1/22 showed pneumoperitoneum, mild ascites and scattered regions of subcutaneous and rectus sheath emphysema. Pt taken back to OR POD#6 for exploratory lap where the CBD appeared to be under pressure suggesting obstruction. The anterior wall of the duct anastomosis was taken down, small stent was placed from 6 Fr ped feeding tube and secured. Anterior wall was closed. There was extensive bile staining but no obvious sign of active bile leak after repair. Pt transferred back to TSU post op.    Hospital Interval:  Overnight noted to have increased bilious output from lateral RYLEY (placed 1/17) and new RYLEY (placed 1/23). Fluid from both drains sent for bili, both elevated, <125 and 71. Decision made to take patient back  to OR for exploratory lap again today. Pt and  verbalized understaing. Pt remains on Abx. WBC trended up. T bili trended down.  Pt reports pain was across incision. She was NPO and denied N/V.  Chevron w staples, CDI, no palpable subcu emphysema or drainage noted RYLEY- 41. Margo started 1/201/23. Patient is OOB, using IS. Electrolytes replaced. Vital signs remain stable. Monitor.         Scheduled Meds:   ampicillin-sulbactim (UNASYN) IVPB  3 g Intravenous Q6H    [START ON 1/27/2023] dapsone  100 mg Oral Daily    famotidine  20 mg Oral QHS    fluconazole  200 mg Oral Daily    glycerin 99.5%  100 mL Rectal Once    heparin (porcine)  5,000 Units Subcutaneous Q8H    LIDOcaine  1 patch Transdermal Q24H    melatonin  6 mg Oral Nightly    methocarbamoL  500 mg Oral TID    mycophenolate  1,000 mg Oral BID    predniSONE  20 mg Oral Daily    sodium chloride 0.9%  10 mL Intravenous Q6H    traZODone  50 mg Oral QHS    ursodioL  300 mg Oral TID    [START ON 1/27/2023] valGANciclovir  450 mg Oral Daily     Continuous Infusions:   sodium chloride 0.9% Stopped (01/19/23 2311)    sodium chloride 0.9% 50 mL/hr at 01/24/23 0816     PRN Meds:albuterol, artificial tears, bisacodyL, calcium carbonate, dextrose 10%, dextrose 10%, dextrose 10%, dextrose 10%, glucagon (human recombinant), HYDROmorphone, hydrOXYzine pamoate, insulin aspart U-100, ondansetron, oxyCODONE, oxyCODONE, prochlorperazine, sodium chloride 0.9%, Flushing PICC Protocol **AND** sodium chloride 0.9% **AND** sodium chloride 0.9%    Review of Systems   Constitutional:  Negative for activity change and appetite change.   HENT: Negative.     Eyes:  Negative for visual disturbance.   Respiratory:  Negative for shortness of breath.    Cardiovascular:  Negative for chest pain, palpitations and leg swelling.   Gastrointestinal:  Positive for abdominal distention, abdominal pain, constipation and nausea. Negative for diarrhea and vomiting.   Genitourinary:   Negative for difficulty urinating.   Musculoskeletal:  Negative for arthralgias, back pain and joint swelling.   Skin:  Positive for wound.   Allergic/Immunologic: Positive for immunocompromised state.   Neurological:  Positive for weakness. Negative for dizziness and facial asymmetry.   Hematological:  Negative for adenopathy. Does not bruise/bleed easily.   Psychiatric/Behavioral:  Negative for agitation, behavioral problems and sleep disturbance.    All other systems reviewed and are negative.  Objective:     Vital Signs (Most Recent):  Temp: 98.1 °F (36.7 °C) (01/24/23 1159)  Pulse: (!) 119 (01/24/23 1159)  Resp: 18 (01/24/23 0814)  BP: (!) 111/59 (01/24/23 1159)  SpO2: (!) 90 % (01/24/23 1159) Vital Signs (24h Range):  Temp:  [97.1 °F (36.2 °C)-99 °F (37.2 °C)] 98.1 °F (36.7 °C)  Pulse:  [] 119  Resp:  [16-36] 18  SpO2:  [90 %-96 %] 90 %  BP: (110-130)/(57-72) 111/59     Weight: 91.4 kg (201 lb 8 oz)  Body mass index is 33.53 kg/m².    Intake/Output - Last 3 Shifts         01/22 0700  01/23 0659 01/23 0700  01/24 0659 01/24 0700  01/25 0659    P.O. 0      I.V. (mL/kg) 358 (3.8) 951 (10.4)     IV Piggyback   500    Total Intake(mL/kg) 358 (3.8) 951 (10.4) 500 (5.5)    Urine (mL/kg/hr) 1250 (0.6) 900 (0.4)     Emesis/NG output 0 0     Drains 180 520 160    Other 0 0     Stool 0 0     Blood 0 0     Total Output 1430 1420 160    Net -1072 -469 +340           Urine Occurrence 1 x 0 x     Stool Occurrence 1 x 1 x     Emesis Occurrence 0 x 0 x             Physical Exam  Vitals and nursing note reviewed.   Constitutional:       Appearance: She is well-developed.      Comments: No hand or temporal muscle wasting noted     HENT:      Head: Normocephalic.   Eyes:      General: Scleral icterus present.      Conjunctiva/sclera: Conjunctivae normal.   Cardiovascular:      Rate and Rhythm: Normal rate and regular rhythm.      Heart sounds: No murmur heard.  Pulmonary:      Effort: Pulmonary effort is normal.      Breath  sounds: Normal breath sounds.   Abdominal:      General: Bowel sounds are normal. There is no distension.      Palpations: Abdomen is soft.      Tenderness: There is abdominal tenderness (over incision as expected).          Comments: RYLEY in place   Musculoskeletal:         General: Normal range of motion.      Cervical back: Normal range of motion.      Right lower leg: Edema present.      Left lower leg: Edema present.   Skin:     General: Skin is warm and dry.      Capillary Refill: Capillary refill takes less than 2 seconds.      Coloration: Skin is jaundiced.   Neurological:      General: No focal deficit present.      Mental Status: She is alert and oriented to person, place, and time.   Psychiatric:         Mood and Affect: Mood normal.         Behavior: Behavior normal.         Thought Content: Thought content normal.         Judgment: Judgment normal.       Laboratory:  Immunosuppressants           Stop Route Frequency     mycophenolate capsule 1,000 mg         -- Oral 2 times daily          CBC:   Recent Labs   Lab 01/24/23  0810   WBC 15.13*   RBC 3.53*   HGB 11.6*   HCT 33.2*   PLT 73*   MCV 94   MCH 32.9*   MCHC 34.9       BMP:   Recent Labs   Lab 01/24/23  0810   *   *   K 4.5      CO2 19*   BUN 31*   CREATININE 1.0   CALCIUM 7.4*       CMP:   Recent Labs   Lab 01/24/23  0810   *   CALCIUM 7.4*   ALBUMIN 2.6*   PROT 4.8*   *   K 4.5   CO2 19*      BUN 31*   CREATININE 1.0   ALKPHOS 56   *   AST 57*   BILITOT 15.0*       Coagulation:   Recent Labs   Lab 01/24/23  0810   INR 1.1   APTT 27.9       Labs within the past 24 hours have been reviewed.    Diagnostic Results:  US Liver Transplant Post:  Results for orders placed during the hospital encounter of 01/17/23    US Doppler Liver Transplant Post (xpd)    Narrative  EXAMINATION:  US DOPPLER LIVER TRANSPLANT POST (XPD)    CLINICAL HISTORY:  Elevated bili s/p liver txp;    TECHNIQUE:  Ultrasound of the  transplant liver with Doppler evaluation.  Color and spectral Doppler were performed.    COMPARISON:  None.    FINDINGS:  Patient is status post orthotopic liver transplant 01/17/2023.  The liver demonstrates homogeneous echotexture.  No focal hepatic lesions are seen.  No fluid collections.    The common duct is not dilated, measuring 3 mm.  No dilated intrahepatic radicles are seen.    Color flow and spectral waveform analysis was performed.  The main portal vein, right portal vein, middle hepatic vein, right hepatic vein, and IVC are patent with proper directional flow.    Anastomosis site of the main hepatic artery demonstrates a peak systolic velocity 98 cm/sec (previously 137 cm/sec).    Main hepatic artery demonstrates resistive index 0.85 (previously 0.87) with normal waveform.    Anterior branch of the right hepatic artery demonstrates resistive index 0.68 (previously 0.78) with normal waveform.    Posterior branch of the right hepatic artery demonstrates resistive index 0.79 (previously 0.81) with normal waveform.    Right pleural effusion.    Impression  Minimally elevated resistive index in the main hepatic artery, favored to be secondary to edema.  Otherwise satisfactory Doppler evaluation of the liver allograft.    Electronically signed by resident: Toyin Barker  Date:    01/20/2023  Time:    10:30    Electronically signed by: Eduardo Nayak MD  Date:    01/20/2023  Time:    10:56    Debility/Functional status: No debility noted.    Assessment/Plan:     Common bile duct leak of transplanted liver  - see s/p liver transplant      Thrombocytopenia, unspecified  - cont to improve post transplant      Prophylactic immunotherapy  - continue prograf  - continue to monitor for toxic side effects and check daily levels. Will adjust for therapeutic dose      Long-term use of immunosuppressant medication  - see prophylactic immunotherapy      At risk for opportunistic infections  - Bactrim for PCP  ppx.  - Valcyte for CMV ppx.      Acute blood loss anemia  - H/H stable  - no overt signs of bleed  - continue to monitor with daily CBC      Adrenal corticosteroid causing adverse effect in therapeutic use  - endo consulted and following      Hyperglycemia  - likely steroid related, Endocrine following         S/P liver transplant  -DORAN cirrhosis complicated by hepatic encephalopathy who is now status post living related donor liver transplant on 1/17/22. She is progressing well post op. 2 drains in place. POD#1 Liver US looked fine.   Minimally elevated intraparenchymal resistive indices, likely due to postoperative edema.    - Liver US 1/20 reviewed w CBD 3mm w no duct dilation  - elevated t-bili started Ursodiol 1/21  - CT scan ABD 1/22- reviewed  - t bili up to 22, 21.2 today. Fluid sent for t bili on 1/23 was 40.9  - take back to OR 1/23/23 for ex/lap where CBD appeared to be under pressure suggesting obstruction. The anterior wall of the duct anastomosis was taken down, small stent was placed from 6 Fr ped feeding tube and secured. Anterior wall was closed. There was extensive bile staining but no obvious sign of active bile leak after repair  - increased bilious fluid from drains overnight, t bili improved to 15   - plan to take back to OR again today for ex lap and possible revision of angela  - cont to trend LFTs  - cont abx        VTE Risk Mitigation (From admission, onward)         Ordered     Place sequential compression device  Until discontinued         01/17/23 2342     heparin (porcine) injection 5,000 Units  Every 8 hours         01/17/23 2148     IP VTE HIGH RISK PATIENT  Once         01/17/23 2148                The patients clinical status was discussed at multidisplinary rounds, involving transplant surgery, transplant medicine, pharmacy, nursing, nutrition, and social work    Discharge Planning:  Discussed plan of care.  No plan for discharge today.  Greater than 30 minutes spent with patient  discussing diagnosis including reviewing labs and imaging and plan of care.      Kathryn Bui, NP  Liver Transplant  [facility] - Transplant Stepdown

## 2023-01-24 NOTE — CONSULTS
GAYLA placed double lumen PICC in Right basilic vein for long term abx and frequent lab draws, 36 cm in length and 0 cm exposed.  Arm circumference 35 cm.  Lot #[id number].

## 2023-01-24 NOTE — OP NOTE
Certification of Assistant at Surgery       Surgery Date: 1/23/2023     Participating Surgeons:  Surgeon(s) and Role:     * Jordon Lamb MD - Primary     * See Arce MD - Resident - Assisting     * Bg Hardy MD - Co-Surgeon    Procedures:  Procedure(s) (LRB):  LAPAROTOMY, EXPLORATORY (N/A)    Assistant Surgeon's Certification of Necessity:  I understand that section 1842 (b) (6) (d) of the Social Security Act generally prohibits Medicare Part B reasonable charge payment for the services of assistants at surgery in [facility] hospitals when qualified residents are available to furnish such services. I certify that the services for which payment is claimed were medically necessary, and that no qualified resident was available to perform the services. I further understand that these services are subject to post-payment review by the Medicare carrier.      Bg Hardy MD    01/24/2023  6:49 AM

## 2023-01-24 NOTE — PT/OT/SLP PROGRESS
Physical Therapy      Patient Name:  Mickey Harris   MRN:  [id number]    Patient not seen today secondary to  (Pt about to go to the OR.). PT unable to return for additional attempt in the PM. Will follow-up tomorrow as appropriate.

## 2023-01-24 NOTE — ASSESSMENT & PLAN NOTE
-DORAN cirrhosis complicated by hepatic encephalopathy who is now status post living related donor liver transplant on 1/17/22. She is progressing well post op. 2 drains in place. POD#1 Liver US looked fine.   Minimally elevated intraparenchymal resistive indices, likely due to postoperative edema.    - Liver US 1/20 reviewed w CBD 3mm w no duct dilation  - elevated t-bili started Ursodiol 1/21  - CT scan ABD 1/22- reviewed  - t bili up to 22, 21.2 today. Fluid sent for t bili on 1/23 was 40.9  - take back to OR 1/23/23 for ex/lap where CBD appeared to be under pressure suggesting obstruction. The anterior wall of the duct anastomosis was taken down, small stent was placed from 6 Fr ped feeding tube and secured. Anterior wall was closed. There was extensive bile staining but no obvious sign of active bile leak after repair  - increased bilious fluid from drains overnight, t bili improved to 15   - plan to take back to OR again today for ex lap and possible revision of angela  - cont to trend LFTs  - cont abx

## 2023-01-24 NOTE — OP NOTE
Operative Report    Date of Procedure: 1/24/2023    Surgeons:  Surgeon(s) and Role:     * Mac Ayala MD - Primary     * Jordon Lamb MD - Assisting     * See Arce MD - Fellow    First Assistant Attestation:  The presence of an additional attending surgeon functioning as first assistant was required due to the complexity of the procedure relative to any available residents. I certify that no resident was available who was qualified to serve as first assistant. Duties performed by the assistant included assisting the primary surgeon.    Pre-operative Diagnosis: bile leak  Post-operative Diagnosis: Same    Procedure(s) Perfomed: Exploration of abdomen and Bile duct reconstruction with Becky-en-Y hepaticojejunostomy  Anesthesia: General endotracheal  Estimated Blood Loss: Minimal  Blood Products Administered: None  Fluids Administered: 2000 ml  Findings: Right hepatic lobe looked cholestatic. No evidence of bleeding. There was a free bilious fluid, ~ 800 cc, around the medial side of the liver, Pickard space and pelvic fossa. There was a bile leak (~1 mm) in the posterior lateral aspect of hepaticojejunostomy. Good hepatic artery thrill and pulse.   Specimens: No  Drains: 19f David drains x 2    Preamble  Indications and Patient Counseling: The procedure was thoroughly explained to the patient and/or family members as appropriate, including potential risks, complications, and alternatives.  The risks associated with not undergoing the proposed procedure were also discussed.  All questions were answered, and the patient and/or family members voiced understanding and agreed to proceed with the operation.    Time-Out: A complete time out was carried out prior to incision, with confirmation of patient identity, correct procedure, correct operative site, appropriate antibiotic prophylaxis, review of any known allergies, and presence of all needed equipment.    Procedure in Detail  Following the induction of  general endotracheal anesthesia, appropriate arterial and venous lines were placed by the anesthesia team.  Care was taken to pad all pressure points and avoid any potential traction injuries from positioning. The urinary bladder was catheterized.  Sequential compression boots and Jarrod Huggers were used. The abdomen was sterilely prepped and draped.    The abdomen was entered by re-opening the liver transplant incision.  Findings as above.     After the above exploration, all identifiable bleeding sites were addressed using electrocautery, argon beam coagulation, suture ligatures, and topical hemostatic agents as appropriate.  A needle biopsy of the liver was not obtained.     Due to findings, the decision was to take down the biliary anastomosis. The original jejunostomy was closed in two layers using 4-0 PDS and 3-0 silk. The bile duct edges were refreshed. Little inflammatory tissue was left in the posterior lateral aspect of the biliary duct due to its proximity with a secondary branch biliary duct opening. The bile duct was reconstructed with a new Becky-en-Y hepaticojejunostomy using interrupted 6-0 PDS stiches. No biliary stent was left.      A final check for hemostasis was made.  2 19f David drains were placed through separate incisions below the transverse abdominal incision and placed in the usual positions. The cavity was irrigated with saline. The abdomen was closed in layers using running #1 PDS suture. Of note, the fascia quality has deteriorated due to multiple operations. The incision was irrigated and the skin was closed with staples. At the end of the case all instrument, needle, and sponge counts were correct. The patient was taken from the OR in good condition.

## 2023-01-24 NOTE — ANESTHESIA POSTPROCEDURE EVALUATION
Anesthesia Post Evaluation    Patient: Mickey Harris    Procedure(s) Performed: Procedure(s) (LRB):  LAPAROTOMY, EXPLORATORY (N/A)    Final Anesthesia Type: general      Patient location during evaluation: PACU  Patient participation: Yes- Able to Participate  Level of consciousness: awake and alert  Post-procedure vital signs: reviewed and stable  Pain management: adequate  Airway patency: patent    PONV status at discharge: No PONV  Anesthetic complications: no      Cardiovascular status: hemodynamically stable  Respiratory status: unassisted  Follow-up not needed.          Vitals Value Taken Time   /58 01/24/23 0814   Temp 37.1 °C (98.7 °F) 01/24/23 0814   Pulse 99 01/24/23 0814   Resp 18 01/24/23 0814   SpO2 96 % 01/24/23 0814         Event Time   Out of Recovery 01/23/2023 17:34:00         Pain/Misael Score: Pain Rating Prior to Med Admin: 7 (1/24/2023  8:14 AM)  Pain Rating Post Med Admin: 3 (1/24/2023  8:44 AM)  Misael Score: 9 (1/23/2023  5:34 PM)

## 2023-01-24 NOTE — TRANSFER OF CARE
"Anesthesia Transfer of Care Note    Patient: Mickey Harris    Procedure(s) Performed: Procedure(s) (LRB):  LAPAROTOMY, EXPLORATORY (N/A)  CHOLEDOCHOJEJUNOSTOMY    Patient location: PACU    Anesthesia Type: general    Transport from OR: Transported from OR on 6-10 L/min O2 by face mask with adequate spontaneous ventilation    Post pain: adequate analgesia    Post assessment: no apparent anesthetic complications    Post vital signs: stable    Level of consciousness: awake    Nausea/Vomiting: no nausea/vomiting    Complications: none    Transfer of care protocol was followed      Last vitals:   Visit Vitals  BP (!) 112/53   Pulse (!) 118   Temp 37.4 °C (99.3 °F) (Temporal)   Resp (!) 22   Ht 5' 5" (1.651 m)   Wt 91.4 kg (201 lb 8 oz)   SpO2 95%   Breastfeeding No   BMI 33.53 kg/m²     "

## 2023-01-24 NOTE — SUBJECTIVE & OBJECTIVE
Scheduled Meds:   ampicillin-sulbactim (UNASYN) IVPB  3 g Intravenous Q6H    [START ON 1/27/2023] dapsone  100 mg Oral Daily    famotidine  20 mg Oral QHS    fluconazole  200 mg Oral Daily    glycerin 99.5%  100 mL Rectal Once    heparin (porcine)  5,000 Units Subcutaneous Q8H    LIDOcaine  1 patch Transdermal Q24H    melatonin  6 mg Oral Nightly    methocarbamoL  500 mg Oral TID    mycophenolate  1,000 mg Oral BID    predniSONE  20 mg Oral Daily    sodium chloride 0.9%  10 mL Intravenous Q6H    traZODone  50 mg Oral QHS    ursodioL  300 mg Oral TID    [START ON 1/27/2023] valGANciclovir  450 mg Oral Daily     Continuous Infusions:   sodium chloride 0.9% Stopped (01/19/23 2311)    sodium chloride 0.9% 50 mL/hr at 01/24/23 0816     PRN Meds:albuterol, artificial tears, bisacodyL, calcium carbonate, dextrose 10%, dextrose 10%, dextrose 10%, dextrose 10%, glucagon (human recombinant), HYDROmorphone, hydrOXYzine pamoate, insulin aspart U-100, ondansetron, oxyCODONE, oxyCODONE, prochlorperazine, sodium chloride 0.9%, Flushing PICC Protocol **AND** sodium chloride 0.9% **AND** sodium chloride 0.9%    Review of Systems   Constitutional:  Negative for activity change and appetite change.   HENT: Negative.     Eyes:  Negative for visual disturbance.   Respiratory:  Negative for shortness of breath.    Cardiovascular:  Negative for chest pain, palpitations and leg swelling.   Gastrointestinal:  Positive for abdominal distention, abdominal pain, constipation and nausea. Negative for diarrhea and vomiting.   Genitourinary:  Negative for difficulty urinating.   Musculoskeletal:  Negative for arthralgias, back pain and joint swelling.   Skin:  Positive for wound.   Allergic/Immunologic: Positive for immunocompromised state.   Neurological:  Positive for weakness. Negative for dizziness and facial asymmetry.   Hematological:  Negative for adenopathy. Does not bruise/bleed easily.   Psychiatric/Behavioral:  Negative for agitation,  behavioral problems and sleep disturbance.    All other systems reviewed and are negative.  Objective:     Vital Signs (Most Recent):  Temp: 98.1 °F (36.7 °C) (01/24/23 1159)  Pulse: (!) 119 (01/24/23 1159)  Resp: 18 (01/24/23 0814)  BP: (!) 111/59 (01/24/23 1159)  SpO2: (!) 90 % (01/24/23 1159) Vital Signs (24h Range):  Temp:  [97.1 °F (36.2 °C)-99 °F (37.2 °C)] 98.1 °F (36.7 °C)  Pulse:  [] 119  Resp:  [16-36] 18  SpO2:  [90 %-96 %] 90 %  BP: (110-130)/(57-72) 111/59     Weight: 91.4 kg (201 lb 8 oz)  Body mass index is 33.53 kg/m².    Intake/Output - Last 3 Shifts         01/22 0700  01/23 0659 01/23 0700 01/24 0659 01/24 0700 01/25 0659    P.O. 0      I.V. (mL/kg) 358 (3.8) 951 (10.4)     IV Piggyback   500    Total Intake(mL/kg) 358 (3.8) 951 (10.4) 500 (5.5)    Urine (mL/kg/hr) 1250 (0.6) 900 (0.4)     Emesis/NG output 0 0     Drains 180 520 160    Other 0 0     Stool 0 0     Blood 0 0     Total Output 1430 1420 160    Net -1072 -469 +340           Urine Occurrence 1 x 0 x     Stool Occurrence 1 x 1 x     Emesis Occurrence 0 x 0 x             Physical Exam  Vitals and nursing note reviewed.   Constitutional:       Appearance: She is well-developed.      Comments: No hand or temporal muscle wasting noted     HENT:      Head: Normocephalic.   Eyes:      General: Scleral icterus present.      Conjunctiva/sclera: Conjunctivae normal.   Cardiovascular:      Rate and Rhythm: Normal rate and regular rhythm.      Heart sounds: No murmur heard.  Pulmonary:      Effort: Pulmonary effort is normal.      Breath sounds: Normal breath sounds.   Abdominal:      General: Bowel sounds are normal. There is no distension.      Palpations: Abdomen is soft.      Tenderness: There is abdominal tenderness (over incision as expected).          Comments: RYLEY in place   Musculoskeletal:         General: Normal range of motion.      Cervical back: Normal range of motion.      Right lower leg: Edema present.      Left lower leg:  Edema present.   Skin:     General: Skin is warm and dry.      Capillary Refill: Capillary refill takes less than 2 seconds.      Coloration: Skin is jaundiced.   Neurological:      General: No focal deficit present.      Mental Status: She is alert and oriented to person, place, and time.   Psychiatric:         Mood and Affect: Mood normal.         Behavior: Behavior normal.         Thought Content: Thought content normal.         Judgment: Judgment normal.       Laboratory:  Immunosuppressants           Stop Route Frequency     mycophenolate capsule 1,000 mg         -- Oral 2 times daily          CBC:   Recent Labs   Lab 01/24/23  0810   WBC 15.13*   RBC 3.53*   HGB 11.6*   HCT 33.2*   PLT 73*   MCV 94   MCH 32.9*   MCHC 34.9       BMP:   Recent Labs   Lab 01/24/23  0810   *   *   K 4.5      CO2 19*   BUN 31*   CREATININE 1.0   CALCIUM 7.4*       CMP:   Recent Labs   Lab 01/24/23  0810   *   CALCIUM 7.4*   ALBUMIN 2.6*   PROT 4.8*   *   K 4.5   CO2 19*      BUN 31*   CREATININE 1.0   ALKPHOS 56   *   AST 57*   BILITOT 15.0*       Coagulation:   Recent Labs   Lab 01/24/23  0810   INR 1.1   APTT 27.9       Labs within the past 24 hours have been reviewed.    Diagnostic Results:  US Liver Transplant Post:  Results for orders placed during the hospital encounter of 01/17/23    US Doppler Liver Transplant Post (xpd)    Narrative  EXAMINATION:  US DOPPLER LIVER TRANSPLANT POST (XPD)    CLINICAL HISTORY:  Elevated bili s/p liver txp;    TECHNIQUE:  Ultrasound of the transplant liver with Doppler evaluation.  Color and spectral Doppler were performed.    COMPARISON:  None.    FINDINGS:  Patient is status post orthotopic liver transplant 01/17/2023.  The liver demonstrates homogeneous echotexture.  No focal hepatic lesions are seen.  No fluid collections.    The common duct is not dilated, measuring 3 mm.  No dilated intrahepatic radicles are seen.    Color flow and spectral  waveform analysis was performed.  The main portal vein, right portal vein, middle hepatic vein, right hepatic vein, and IVC are patent with proper directional flow.    Anastomosis site of the main hepatic artery demonstrates a peak systolic velocity 98 cm/sec (previously 137 cm/sec).    Main hepatic artery demonstrates resistive index 0.85 (previously 0.87) with normal waveform.    Anterior branch of the right hepatic artery demonstrates resistive index 0.68 (previously 0.78) with normal waveform.    Posterior branch of the right hepatic artery demonstrates resistive index 0.79 (previously 0.81) with normal waveform.    Right pleural effusion.    Impression  Minimally elevated resistive index in the main hepatic artery, favored to be secondary to edema.  Otherwise satisfactory Doppler evaluation of the liver allograft.    Electronically signed by resident: Toyin Barker  Date:    01/20/2023  Time:    10:30    Electronically signed by: Eduardo Nayak MD  Date:    01/20/2023  Time:    10:56    Debility/Functional status: No debility noted.

## 2023-01-24 NOTE — NURSING TRANSFER
Nursing Transfer Note      1/23/2023     Reason patient is being transferred: Meets criteria    Transfer To: [id number]    Transfer via stretcher    Transfer with cardiac monitoring    Transported by transport    Medicines sent: none    Any special needs or follow-up needed: none    Chart send with patient: Yes    Notified: spouse    Patient reassessed at: 1/23

## 2023-01-24 NOTE — CARE UPDATE
-Glucose Goal 140-180    -A1C:   Hemoglobin A1C   Date Value Ref Range Status   01/17/2023 4.5 4.0 - 5.6 % Final     Comment:     ADA Screening Guidelines:  5.7-6.4%  Consistent with prediabetes  >or=6.5%  Consistent with diabetes    High levels of fetal hemoglobin interfere with the HbA1C  assay. Heterozygous hemoglobin variants (HbS, HgC, etc)do  not significantly interfere with this assay.   However, presence of multiple variants may affect accuracy.           -HOME REGIMEN: none    -INPATIENT REGIMEN: correction scale     -GLUCOSE TREND FOR THE PAST 24HRS:   Recent Labs   Lab 01/22/23  1307 01/22/23  1805 01/22/23  2209 01/23/23  1345 01/23/23  1719 01/23/23  2156   POCTGLUCOSE 144* 180* 152* 165* 158* 179*         -NO HYPOGYCEMIAS NOTED     - Diet  Diet NPO    -Steroids - prednisone 20 mg daily    Remains in TSU. NAEON. NPO for ileus per notes. BG well controlled without insulin. 1 Day Post-Op.       Plan:   -  change to low dose correction scale and discontinue scheduled novolog.   - POCT Glucose every 6 hours  - Hypoglycemia protocol in place      ** Please notify Endocrine for any change and/or advance in diet**  ** Please call Endocrine for any BG related issues **     Discharge Planning:   TBD. Please notify endocrinology prior to discharge.

## 2023-01-25 PROBLEM — E87.70 HYPERVOLEMIA: Status: ACTIVE | Noted: 2023-01-25

## 2023-01-25 LAB
ALBUMIN SERPL BCP-MCNC: 2.1 G/DL (ref 3.5–5.2)
ALP SERPL-CCNC: 54 U/L (ref 55–135)
ALT SERPL W/O P-5'-P-CCNC: 149 U/L (ref 10–44)
ANION GAP SERPL CALC-SCNC: 7 MMOL/L (ref 8–16)
APTT BLDCRRT: 30.7 SEC (ref 21–32)
AST SERPL-CCNC: 53 U/L (ref 10–40)
BASOPHILS # BLD AUTO: 0.05 K/UL (ref 0–0.2)
BASOPHILS NFR BLD: 0.3 % (ref 0–1.9)
BILIRUB DIRECT SERPL-MCNC: 9.8 MG/DL (ref 0.1–0.3)
BILIRUB SERPL-MCNC: 14.1 MG/DL (ref 0.1–1)
BUN SERPL-MCNC: 27 MG/DL (ref 6–20)
CALCIUM SERPL-MCNC: 7.2 MG/DL (ref 8.7–10.5)
CHLORIDE SERPL-SCNC: 100 MMOL/L (ref 95–110)
CO2 SERPL-SCNC: 22 MMOL/L (ref 23–29)
CREAT SERPL-MCNC: 0.8 MG/DL (ref 0.5–1.4)
DIFFERENTIAL METHOD: ABNORMAL
EOSINOPHIL # BLD AUTO: 0.8 K/UL (ref 0–0.5)
EOSINOPHIL NFR BLD: 5.1 % (ref 0–8)
ERYTHROCYTE [DISTWIDTH] IN BLOOD BY AUTOMATED COUNT: 15.7 % (ref 11.5–14.5)
EST. GFR  (NO RACE VARIABLE): >60 ML/MIN/1.73 M^2
FINAL PATHOLOGIC DIAGNOSIS: NORMAL
GLUCOSE SERPL-MCNC: 143 MG/DL (ref 70–110)
GROSS: NORMAL
HCT VFR BLD AUTO: 29.9 % (ref 37–48.5)
HGB BLD-MCNC: 10.4 G/DL (ref 12–16)
IMM GRANULOCYTES # BLD AUTO: 0.75 K/UL (ref 0–0.04)
IMM GRANULOCYTES NFR BLD AUTO: 4.9 % (ref 0–0.5)
INR PPP: 1.1 (ref 0.8–1.2)
LYMPHOCYTES # BLD AUTO: 0.8 K/UL (ref 1–4.8)
LYMPHOCYTES NFR BLD: 5.3 % (ref 18–48)
Lab: NORMAL
MAGNESIUM SERPL-MCNC: 2.2 MG/DL (ref 1.6–2.6)
MCH RBC QN AUTO: 32.5 PG (ref 27–31)
MCHC RBC AUTO-ENTMCNC: 34.8 G/DL (ref 32–36)
MCV RBC AUTO: 93 FL (ref 82–98)
MONOCYTES # BLD AUTO: 1.4 K/UL (ref 0.3–1)
MONOCYTES NFR BLD: 9.1 % (ref 4–15)
NEUTROPHILS # BLD AUTO: 11.6 K/UL (ref 1.8–7.7)
NEUTROPHILS NFR BLD: 75.3 % (ref 38–73)
NRBC BLD-RTO: 1 /100 WBC
PHOSPHATE SERPL-MCNC: 2 MG/DL (ref 2.7–4.5)
PLATELET # BLD AUTO: 92 K/UL (ref 150–450)
PMV BLD AUTO: 12.8 FL (ref 9.2–12.9)
POCT GLUCOSE: 135 MG/DL (ref 70–110)
POCT GLUCOSE: 149 MG/DL (ref 70–110)
POTASSIUM SERPL-SCNC: 4.5 MMOL/L (ref 3.5–5.1)
PROT SERPL-MCNC: 4.4 G/DL (ref 6–8.4)
PROTHROMBIN TIME: 11 SEC (ref 9–12.5)
RBC # BLD AUTO: 3.2 M/UL (ref 4–5.4)
SODIUM SERPL-SCNC: 129 MMOL/L (ref 136–145)
TACROLIMUS BLD-MCNC: 10.3 NG/ML (ref 5–15)
WBC # BLD AUTO: 15.44 K/UL (ref 3.9–12.7)

## 2023-01-25 PROCEDURE — 84100 ASSAY OF PHOSPHORUS: CPT

## 2023-01-25 PROCEDURE — 99233 PR SUBSEQUENT HOSPITAL CARE,LEVL III: ICD-10-PCS | Mod: 24,,,

## 2023-01-25 PROCEDURE — 82248 BILIRUBIN DIRECT: CPT

## 2023-01-25 PROCEDURE — 63600175 PHARM REV CODE 636 W HCPCS

## 2023-01-25 PROCEDURE — 20600001 HC STEP DOWN PRIVATE ROOM

## 2023-01-25 PROCEDURE — 83735 ASSAY OF MAGNESIUM: CPT

## 2023-01-25 PROCEDURE — 63600175 PHARM REV CODE 636 W HCPCS: Performed by: NURSE PRACTITIONER

## 2023-01-25 PROCEDURE — 25000003 PHARM REV CODE 250

## 2023-01-25 PROCEDURE — 99233 SBSQ HOSP IP/OBS HIGH 50: CPT | Mod: 24,,,

## 2023-01-25 PROCEDURE — 85610 PROTHROMBIN TIME: CPT

## 2023-01-25 PROCEDURE — 80053 COMPREHEN METABOLIC PANEL: CPT

## 2023-01-25 PROCEDURE — 94799 UNLISTED PULMONARY SVC/PX: CPT

## 2023-01-25 PROCEDURE — 94761 N-INVAS EAR/PLS OXIMETRY MLT: CPT

## 2023-01-25 PROCEDURE — 25000003 PHARM REV CODE 250: Performed by: STUDENT IN AN ORGANIZED HEALTH CARE EDUCATION/TRAINING PROGRAM

## 2023-01-25 PROCEDURE — 80197 ASSAY OF TACROLIMUS: CPT

## 2023-01-25 PROCEDURE — 97110 THERAPEUTIC EXERCISES: CPT | Mod: CQ

## 2023-01-25 PROCEDURE — 99900035 HC TECH TIME PER 15 MIN (STAT)

## 2023-01-25 PROCEDURE — 85025 COMPLETE CBC W/AUTO DIFF WBC: CPT

## 2023-01-25 PROCEDURE — 94664 DEMO&/EVAL PT USE INHALER: CPT

## 2023-01-25 PROCEDURE — 25000003 PHARM REV CODE 250: Performed by: NURSE PRACTITIONER

## 2023-01-25 PROCEDURE — 27000221 HC OXYGEN, UP TO 24 HOURS

## 2023-01-25 PROCEDURE — 85730 THROMBOPLASTIN TIME PARTIAL: CPT

## 2023-01-25 RX ORDER — GABAPENTIN 300 MG/1
300 CAPSULE ORAL 3 TIMES DAILY
Status: DISCONTINUED | OUTPATIENT
Start: 2023-01-25 | End: 2023-01-28

## 2023-01-25 RX ORDER — FUROSEMIDE 10 MG/ML
40 INJECTION INTRAMUSCULAR; INTRAVENOUS ONCE
Status: COMPLETED | OUTPATIENT
Start: 2023-01-25 | End: 2023-01-25

## 2023-01-25 RX ORDER — OXYCODONE HYDROCHLORIDE 10 MG/1
10 TABLET ORAL EVERY 4 HOURS PRN
Status: DISCONTINUED | OUTPATIENT
Start: 2023-01-25 | End: 2023-02-01 | Stop reason: HOSPADM

## 2023-01-25 RX ADMIN — AMPICILLIN SODIUM AND SULBACTAM SODIUM 3 G: 2; 1 INJECTION, POWDER, FOR SOLUTION INTRAMUSCULAR; INTRAVENOUS at 12:01

## 2023-01-25 RX ADMIN — OXYCODONE HYDROCHLORIDE 10 MG: 10 TABLET ORAL at 01:01

## 2023-01-25 RX ADMIN — FLUCONAZOLE 200 MG: 200 TABLET ORAL at 08:01

## 2023-01-25 RX ADMIN — HYDROXYZINE PAMOATE 25 MG: 25 CAPSULE ORAL at 03:01

## 2023-01-25 RX ADMIN — GABAPENTIN 300 MG: 300 CAPSULE ORAL at 11:01

## 2023-01-25 RX ADMIN — METHOCARBAMOL 500 MG: 500 TABLET ORAL at 09:01

## 2023-01-25 RX ADMIN — URSODIOL 300 MG: 300 CAPSULE ORAL at 05:01

## 2023-01-25 RX ADMIN — AMPICILLIN SODIUM AND SULBACTAM SODIUM 3 G: 2; 1 INJECTION, POWDER, FOR SOLUTION INTRAMUSCULAR; INTRAVENOUS at 11:01

## 2023-01-25 RX ADMIN — MYCOPHENOLATE MOFETIL 1000 MG: 250 CAPSULE ORAL at 08:01

## 2023-01-25 RX ADMIN — URSODIOL 300 MG: 300 CAPSULE ORAL at 07:01

## 2023-01-25 RX ADMIN — URSODIOL 300 MG: 300 CAPSULE ORAL at 08:01

## 2023-01-25 RX ADMIN — HYDROMORPHONE HYDROCHLORIDE 0.5 MG: 1 INJECTION, SOLUTION INTRAMUSCULAR; INTRAVENOUS; SUBCUTANEOUS at 01:01

## 2023-01-25 RX ADMIN — GABAPENTIN 300 MG: 300 CAPSULE ORAL at 05:01

## 2023-01-25 RX ADMIN — OXYCODONE HYDROCHLORIDE 10 MG: 10 TABLET ORAL at 05:01

## 2023-01-25 RX ADMIN — FAMOTIDINE 20 MG: 20 TABLET ORAL at 07:01

## 2023-01-25 RX ADMIN — FUROSEMIDE 40 MG: 10 INJECTION, SOLUTION INTRAMUSCULAR; INTRAVENOUS at 11:01

## 2023-01-25 RX ADMIN — AMPICILLIN SODIUM AND SULBACTAM SODIUM 3 G: 2; 1 INJECTION, POWDER, FOR SOLUTION INTRAMUSCULAR; INTRAVENOUS at 05:01

## 2023-01-25 RX ADMIN — HYDROXYZINE PAMOATE 25 MG: 25 CAPSULE ORAL at 10:01

## 2023-01-25 RX ADMIN — HEPARIN SODIUM 5000 UNITS: 5000 INJECTION INTRAVENOUS; SUBCUTANEOUS at 08:01

## 2023-01-25 RX ADMIN — OXYCODONE HYDROCHLORIDE 10 MG: 10 TABLET ORAL at 07:01

## 2023-01-25 RX ADMIN — LIDOCAINE 1 PATCH: 50 PATCH CUTANEOUS at 05:01

## 2023-01-25 RX ADMIN — PREDNISONE 20 MG: 20 TABLET ORAL at 08:01

## 2023-01-25 RX ADMIN — MYCOPHENOLATE MOFETIL 1000 MG: 250 CAPSULE ORAL at 07:01

## 2023-01-25 RX ADMIN — HEPARIN SODIUM 5000 UNITS: 5000 INJECTION INTRAVENOUS; SUBCUTANEOUS at 05:01

## 2023-01-25 RX ADMIN — HYDROMORPHONE HYDROCHLORIDE 0.5 MG: 1 INJECTION, SOLUTION INTRAMUSCULAR; INTRAVENOUS; SUBCUTANEOUS at 08:01

## 2023-01-25 NOTE — ASSESSMENT & PLAN NOTE
-DORAN cirrhosis complicated by hepatic encephalopathy who is now status post living related donor liver transplant on 1/17/22. She is progressing well post op. 2 drains in place. POD#1 Liver US looked fine.   Minimally elevated intraparenchymal resistive indices, likely due to postoperative edema.    - Liver US 1/20 reviewed w CBD 3mm w no duct dilation  - elevated t-bili started Ursodiol 1/21  - CT scan ABD 1/22- reviewed  - t bili up to 22, 21.2 today. Fluid sent for t bili on 1/23 was 40.9  - take back to OR 1/23/23 for ex/lap where CBD appeared to be under pressure suggesting obstruction. The anterior wall of the duct anastomosis was taken down, small stent was placed from 6 Fr ped feeding tube and secured. Anterior wall was closed. There was extensive bile staining but no obvious sign of active bile leak after repair  - TB 1/24 for increased bilious fluid from drains, anastomosis redone, t bili cont to decrease  - cont to trend LFTs  - cont abx

## 2023-01-25 NOTE — PT/OT/SLP PROGRESS
Physical Therapy Treatment    Patient Name:  Mickey Harris   MRN:  [id number]    Recommendations:     Discharge Recommendations: other (see comments)  Discharge Equipment Recommendations: none  Barriers to discharge: None    Assessment:     Mickey aHrris is a 57 y.o. female admitted with a medical diagnosis of Liver cirrhosis secondary to DORAN (nonalcoholic steatohepatitis).  She presents with the following impairments/functional limitations: impaired endurance, weakness, impaired self care skills, impaired functional mobility, gait instability, impaired balance, decreased safety awareness, impaired cardiopulmonary response to activity. Pt agreed to seated BLE exercises only on this date. Pt tolerated treatment well, and will continue to benefit from skilled PT services to improve all deficits noted above. Resume PT POC as indicated.     Rehab Prognosis: Good; patient would benefit from acute skilled PT services to address these deficits and reach maximum level of function.    Recent Surgery: Procedure(s) (LRB):  LAPAROTOMY, EXPLORATORY (N/A)  CHOLEDOCHOJEJUNOSTOMY 1 Day Post-Op    Plan:     During this hospitalization, patient to be seen 4 x/week to address the identified rehab impairments via gait training, therapeutic activities, therapeutic exercises and progress toward the following goals:    Plan of Care Expires:  02/19/23    Subjective     Chief Complaint: none stated  Patient/Family Comments/goals: none stated  Pain/Comfort:  Pain Rating 1: 0/10  Pain Rating Post-Intervention 1: 0/10      Objective:     Communicated with nursing prior to session.  Patient found up in chair with BLE elevated (all lines intact) upon PT entry to room.     General Precautions: Standard, fall  Orthopedic Precautions: N/A  Braces: N/A     Functional Mobility:  -Not performed on this date 2/2 pt refusal. Pt stated she was having a rough day.       AM-PAC 6 CLICK MOBILITY  Turning over in bed (including adjusting bedclothes, sheets  and blankets)?: 3  Sitting down on and standing up from a chair with arms (e.g., wheelchair, bedside commode, etc.): 3  Moving from lying on back to sitting on the side of the bed?: 3  Moving to and from a bed to a chair (including a wheelchair)?: 3  Need to walk in hospital room?: 3  Climbing 3-5 steps with a railing?: 2  Basic Mobility Total Score: 17       Treatment & Education:  -BLE therex 2x10 reps: AP,LAQ,HF,Hip abd/add  -All questions/concerns answered within PTA scope of practice.     Patient left up in chair with all lines intact, call button in reach, nursing notified, and spouse present..    GOALS:   Multidisciplinary Problems       Physical Therapy Goals          Problem: Physical Therapy    Goal Priority Disciplines Outcome Goal Variances Interventions   Physical Therapy Goal     PT, PT/OT Ongoing, Progressing     Description: Goals to be met by: 2023    Patient will increase functional independence with mobility by performin. Sit to stand transfer with Modified [place]  2. Bed to chair transfer with Supervision using LRAD  3. Gait  x 250 feet with Supervision using LRAD.   4. Ascend/descend 6 stair with bilateral Handrails Stand-by Assistance using no AD.   5. Lower extremity exercise program x30 reps per handout, with independence                         Time Tracking:     PT Received On: 23  PT Start Time: 1410     PT Stop Time: 1426  PT Total Time (min): 16 min     Billable Minutes: Therapeutic Exercise 16    Treatment Type: Treatment  PT/PTA: PTA     PTA Visit Number: 1     2023

## 2023-01-25 NOTE — ANESTHESIA POSTPROCEDURE EVALUATION
Anesthesia Post Evaluation    Patient: Mickey Harris    Procedure(s) Performed: Procedure(s) (LRB):  LAPAROTOMY, EXPLORATORY (N/A)  CHOLEDOCHOJEJUNOSTOMY    Final Anesthesia Type: general      Patient location during evaluation: PACU  Patient participation: Yes- Able to Participate  Level of consciousness: awake and alert  Post-procedure vital signs: reviewed and stable  Pain management: adequate  Airway patency: patent    PONV status at discharge: No PONV  Anesthetic complications: no      Cardiovascular status: blood pressure returned to baseline  Respiratory status: unassisted  Hydration status: euvolemic  Follow-up not needed.          Vitals Value Taken Time   /56 01/24/23 1932   Temp 36.5 °C (97.7 °F) 01/24/23 1932   Pulse 101 01/24/23 1932   Resp 19 01/24/23 2014   SpO2 92 % 01/24/23 2000         Event Time   Out of Recovery 01/24/2023 17:30:00         Pain/Misael Score: Pain Rating Prior to Med Admin: 9 (1/24/2023  8:14 PM)  Pain Rating Post Med Admin: 3 (1/24/2023  8:44 AM)  Misael Score: 9 (1/24/2023  5:30 PM)

## 2023-01-25 NOTE — PLAN OF CARE
AAOX4  VVS  LASIX GIVEN WITH GOOD URINE OUT   RYLEY X 4 WITH SS DRAINAGE   NO ACUTE DISTRESS NOTED,PAIN MEDS GIVEN AS ORDERED   PT IN CHAIR MOST OF THE DAY   CALL LIGHT WITHIN REACH AND VERBAL UNDERSTANDING NOTED TO CALL PRN

## 2023-01-25 NOTE — PROGRESS NOTES
Update     presented to the patient's room for follow up and continuity of care. Patient observed sitting up in bedside chair. Patient presents alert and oriented x4, pleasant and communicative. Patient presented with pts . SW discussed and explained  recommendations for home health PT. Patient verbalized understanding. SW will arrange home health at discharge. Patient denied any concerns or needs.    SW remains available and will continue to follow, providing psychosocial support, education and assistance as needed.

## 2023-01-25 NOTE — NURSING TRANSFER
Nursing Transfer Note      1/24/2023     Reason patient is being transferred: post procedure    Transfer To: [id number]    Transfer via bed    Transfer with 2L NC oxygen    Transported by pct    Medicines sent: none    Any special needs or follow-up needed: routine    Chart send with patient: Yes    Notified: spouse    Patient reassessed at: 9935 1/24/23

## 2023-01-25 NOTE — CARE UPDATE
-Glucose Goal 140-180    -A1C:   Hemoglobin A1C   Date Value Ref Range Status   01/17/2023 4.5 4.0 - 5.6 % Final     Comment:     ADA Screening Guidelines:  5.7-6.4%  Consistent with prediabetes  >or=6.5%  Consistent with diabetes    High levels of fetal hemoglobin interfere with the HbA1C  assay. Heterozygous hemoglobin variants (HbS, HgC, etc)do  not significantly interfere with this assay.   However, presence of multiple variants may affect accuracy.           -HOME REGIMEN: none    -INPATIENT REGIMEN: correction scale     -GLUCOSE TREND FOR THE PAST 24HRS:   Recent Labs   Lab 01/22/23  1805 01/22/23  2209 01/23/23  1345 01/23/23  1719 01/23/23  2156 01/24/23  2153   POCTGLUCOSE 180* 152* 165* 158* 179* 149*           -NO HYPOGYCEMIAS NOTED     - Diet  Diet NPO    -Steroids - prednisone 20 mg daily    Remains in TSU. NAEON. OR today.  BG well controlled without insulin. 1 Day Post-Op.       Plan:   - continue low dose correction scale and   - POCT Glucose every 6 hours  - Hypoglycemia protocol in place      ** Please notify Endocrine for any change and/or advance in diet**  ** Please call Endocrine for any BG related issues **     Discharge Planning:   TBD. Please notify endocrinology prior to discharge.

## 2023-01-25 NOTE — SUBJECTIVE & OBJECTIVE
Scheduled Meds:   ampicillin-sulbactim (UNASYN) IVPB  3 g Intravenous Q6H    [START ON 1/27/2023] dapsone  100 mg Oral Daily    famotidine  20 mg Oral QHS    fluconazole  200 mg Oral Daily    gabapentin  300 mg Oral TID    glycerin 99.5%  100 mL Rectal Once    heparin (porcine)  5,000 Units Subcutaneous Q8H    LIDOcaine  1 patch Transdermal Q24H    melatonin  6 mg Oral Nightly    methocarbamoL  500 mg Oral TID    mycophenolate  1,000 mg Oral BID    predniSONE  20 mg Oral Daily    sodium chloride 0.9%  10 mL Intravenous Q6H    traZODone  50 mg Oral QHS    ursodioL  300 mg Oral TID    [START ON 1/27/2023] valGANciclovir  450 mg Oral Daily     Continuous Infusions:   sodium chloride 0.9% Stopped (01/19/23 2311)     PRN Meds:albuterol, artificial tears, bisacodyL, calcium carbonate, dextrose 10%, dextrose 10%, dextrose 10%, dextrose 10%, glucagon (human recombinant), HYDROmorphone, hydrOXYzine pamoate, insulin aspart U-100, ondansetron, oxyCODONE, oxyCODONE, prochlorperazine, sodium chloride 0.9%, Flushing PICC Protocol **AND** sodium chloride 0.9% **AND** sodium chloride 0.9%    Review of Systems   Constitutional:  Positive for fatigue. Negative for activity change, appetite change and fever.   HENT: Negative.     Eyes:  Negative for visual disturbance.   Respiratory:  Negative for shortness of breath.    Cardiovascular:  Negative for chest pain, palpitations and leg swelling.   Gastrointestinal:  Positive for abdominal pain and nausea. Negative for abdominal distention, constipation, diarrhea and vomiting.   Genitourinary:  Negative for decreased urine volume, difficulty urinating and dysuria.   Musculoskeletal:  Negative for arthralgias, back pain and joint swelling.   Skin:  Positive for wound.   Allergic/Immunologic: Positive for immunocompromised state.   Neurological:  Positive for weakness. Negative for dizziness and facial asymmetry.   Hematological:  Negative for adenopathy. Does not bruise/bleed easily.    Psychiatric/Behavioral:  Negative for agitation, behavioral problems and sleep disturbance.    All other systems reviewed and are negative.  Objective:     Vital Signs (Most Recent):  Temp: 98 °F (36.7 °C) (01/25/23 1540)  Pulse: 81 (01/25/23 1540)  Resp: 16 (01/25/23 1540)  BP: (!) 128/57 (01/25/23 1540)  SpO2: 96 % (01/25/23 1540)   Vital Signs (24h Range):  Temp:  [97.7 °F (36.5 °C)-99.3 °F (37.4 °C)] 98 °F (36.7 °C)  Pulse:  [] 81  Resp:  [16-22] 16  SpO2:  [90 %-97 %] 96 %  BP: (100-128)/(51-62) 128/57     Weight: 91.1 kg (200 lb 14.5 oz)  Body mass index is 33.43 kg/m².    Intake/Output - Last 3 Shifts         01/23 0700  01/24 0659 01/24 0700  01/25 0659 01/25 0700  01/26 0659    P.O.  0 240    I.V. (mL/kg) 951 (10.4) 1051.7 (11.5)     Other  0     IV Piggyback  1010     Total Intake(mL/kg) 951 (10.4) 2061.7 (22.6) 240 (2.6)    Urine (mL/kg/hr) 900 (0.4) 720 (0.3) 500 (0.6)    Emesis/NG output 0 0     Drains 520 260     Other 0 0     Stool 0 0     Blood 0 0     Total Output 1420 980 500    Net -469 +1081.7 -260           Urine Occurrence 0 x 0 x     Stool Occurrence 1 x 0 x     Emesis Occurrence 0 x 0 x             Physical Exam  Vitals and nursing note reviewed.   Constitutional:       Appearance: She is well-developed.      Comments: No hand or temporal muscle wasting noted     HENT:      Head: Normocephalic.   Eyes:      General: Scleral icterus present.      Conjunctiva/sclera: Conjunctivae normal.   Cardiovascular:      Rate and Rhythm: Normal rate and regular rhythm.      Heart sounds: No murmur heard.  Pulmonary:      Effort: Pulmonary effort is normal.      Breath sounds: Normal breath sounds.   Abdominal:      General: Bowel sounds are normal. There is no distension.      Palpations: Abdomen is soft.      Tenderness: There is abdominal tenderness (over incision as expected).          Comments: RYLEY x 2 in place   Musculoskeletal:         General: Normal range of motion.      Cervical back:  Normal range of motion.      Right lower leg: Edema present.      Left lower leg: Edema present.   Skin:     General: Skin is warm and dry.      Capillary Refill: Capillary refill takes less than 2 seconds.      Coloration: Skin is jaundiced.   Neurological:      General: No focal deficit present.      Mental Status: She is alert and oriented to person, place, and time.   Psychiatric:         Mood and Affect: Mood normal.         Behavior: Behavior normal.         Thought Content: Thought content normal.         Judgment: Judgment normal.       Laboratory:  Immunosuppressants           Stop Route Frequency     mycophenolate capsule 1,000 mg         -- Oral 2 times daily          CBC:   Recent Labs   Lab 01/25/23 0526   WBC 15.44*   RBC 3.20*   HGB 10.4*   HCT 29.9*   PLT 92*   MCV 93   MCH 32.5*   MCHC 34.8     CMP:   Recent Labs   Lab 01/25/23 0526   *   CALCIUM 7.2*   ALBUMIN 2.1*   PROT 4.4*   *   K 4.5   CO2 22*      BUN 27*   CREATININE 0.8   ALKPHOS 54*   *   AST 53*   BILITOT 14.1*     Labs within the past 24 hours have been reviewed.    Diagnostic Results:  I have personally reviewed all pertinent imaging studies.    Debility/Functional status: No debility noted.

## 2023-01-25 NOTE — PROGRESS NOTES
[facility] - Transplant Stepdown  Liver Transplant  Progress Note    Patient Name: Mickey Harris  MRN: [id number]  Admission Date: 1/17/2023  Hospital Length of Stay: 8 days  Code Status: Full Code  Primary Care Provider: Primary Doctor No  Post-Operative Day: 8    ORGAN:   RIGHT LIVER LOBE (SEGS 5,6,7,8) WITHOUT MIDDLE HEPATIC VEIN  Disease Etiology: Cirrhosis: Fatty Liver (DORAN)  Donor Type:   Living  CDC High Risk:     Donor CMV Status:   Donor CMV Status:   Donor HBcAB:     Donor HCV Status:     Donor HBV PALOMA:   Donor HCV PALOMA:   Whole or Partial: Split Liver  Biliary Anastomosis: Becky-en-Y  Arterial Anatomy:   Subjective:     History of Present Illness:  Ms. Mickey Harris is a 58 y/o F admitted for living liver transplant from daughter for DORAN cirrhosis.       Hospital Course:  She is now status post living related donor liver transplant on 1/17/22. She is progressing well post op. 2 drains in place. POD#1 Liver US showed minimally elevated intraparenchymal resistive indices, likely due to postoperative edema. Otherwise satisfactory Doppler evaluation of the liver. Transfer to TSU on POD#2. Pt w increased t bili and noted to have more bilious output in RYLEY drain.  T bili from drain fluid was 41. CT A/P 1/22 showed pneumoperitoneum, mild ascites and scattered regions of subcutaneous and rectus sheath emphysema. Pt taken back to OR POD#6 for exploratory lap where the CBD appeared to be under pressure suggesting obstruction. The anterior wall of the duct anastomosis was taken down, small stent was placed from 6 Fr ped feeding tube and secured. Anterior wall was closed. There was extensive bile staining but no obvious sign of active bile leak after repair. Pt transferred back to TSU post op.    Hospital Interval:    NAEON. No s/p ExLap 1/24/23, biliary system was taken down and reconstructed. Patient c/o abdominal pain and soreness, adjusting pain medication regimen. Denies NV, fever, chills. Does endorse some SOB and  leg edema, gave IV lasix. RYLEY drains with SS output. Pt remains on abx for bile leak, aerobic cx + for staph epi, susceptibilities pending. T bili trending down, LFTs WNL. Patient is OOB, using IS. Electrolytes replaced. VSS. Monitor.      Scheduled Meds:   ampicillin-sulbactim (UNASYN) IVPB  3 g Intravenous Q6H    [START ON 1/27/2023] dapsone  100 mg Oral Daily    famotidine  20 mg Oral QHS    fluconazole  200 mg Oral Daily    gabapentin  300 mg Oral TID    glycerin 99.5%  100 mL Rectal Once    heparin (porcine)  5,000 Units Subcutaneous Q8H    LIDOcaine  1 patch Transdermal Q24H    melatonin  6 mg Oral Nightly    methocarbamoL  500 mg Oral TID    mycophenolate  1,000 mg Oral BID    predniSONE  20 mg Oral Daily    sodium chloride 0.9%  10 mL Intravenous Q6H    traZODone  50 mg Oral QHS    ursodioL  300 mg Oral TID    [START ON 1/27/2023] valGANciclovir  450 mg Oral Daily     Continuous Infusions:   sodium chloride 0.9% Stopped (01/19/23 2311)     PRN Meds:albuterol, artificial tears, bisacodyL, calcium carbonate, dextrose 10%, dextrose 10%, dextrose 10%, dextrose 10%, glucagon (human recombinant), HYDROmorphone, hydrOXYzine pamoate, insulin aspart U-100, ondansetron, oxyCODONE, oxyCODONE, prochlorperazine, sodium chloride 0.9%, Flushing PICC Protocol **AND** sodium chloride 0.9% **AND** sodium chloride 0.9%    Review of Systems   Constitutional:  Positive for fatigue. Negative for activity change, appetite change and fever.   HENT: Negative.     Eyes:  Negative for visual disturbance.   Respiratory:  Negative for shortness of breath.    Cardiovascular:  Negative for chest pain, palpitations and leg swelling.   Gastrointestinal:  Positive for abdominal pain and nausea. Negative for abdominal distention, constipation, diarrhea and vomiting.   Genitourinary:  Negative for decreased urine volume, difficulty urinating and dysuria.   Musculoskeletal:  Negative for arthralgias, back pain and joint  swelling.   Skin:  Positive for wound.   Allergic/Immunologic: Positive for immunocompromised state.   Neurological:  Positive for weakness. Negative for dizziness and facial asymmetry.   Hematological:  Negative for adenopathy. Does not bruise/bleed easily.   Psychiatric/Behavioral:  Negative for agitation, behavioral problems and sleep disturbance.    All other systems reviewed and are negative.  Objective:     Vital Signs (Most Recent):  Temp: 98 °F (36.7 °C) (01/25/23 1540)  Pulse: 81 (01/25/23 1540)  Resp: 16 (01/25/23 1540)  BP: (!) 128/57 (01/25/23 1540)  SpO2: 96 % (01/25/23 1540)   Vital Signs (24h Range):  Temp:  [97.7 °F (36.5 °C)-99.3 °F (37.4 °C)] 98 °F (36.7 °C)  Pulse:  [] 81  Resp:  [16-22] 16  SpO2:  [90 %-97 %] 96 %  BP: (100-128)/(51-62) 128/57     Weight: 91.1 kg (200 lb 14.5 oz)  Body mass index is 33.43 kg/m².    Intake/Output - Last 3 Shifts         01/23 0700  01/24 0659 01/24 0700  01/25 0659 01/25 0700  01/26 0659    P.O.  0 240    I.V. (mL/kg) 951 (10.4) 1051.7 (11.5)     Other  0     IV Piggyback  1010     Total Intake(mL/kg) 951 (10.4) 2061.7 (22.6) 240 (2.6)    Urine (mL/kg/hr) 900 (0.4) 720 (0.3) 500 (0.6)    Emesis/NG output 0 0     Drains 520 260     Other 0 0     Stool 0 0     Blood 0 0     Total Output 1420 980 500    Net -469 +1081.7 -260           Urine Occurrence 0 x 0 x     Stool Occurrence 1 x 0 x     Emesis Occurrence 0 x 0 x             Physical Exam  Vitals and nursing note reviewed.   Constitutional:       Appearance: She is well-developed.      Comments: No hand or temporal muscle wasting noted     HENT:      Head: Normocephalic.   Eyes:      General: Scleral icterus present.      Conjunctiva/sclera: Conjunctivae normal.   Cardiovascular:      Rate and Rhythm: Normal rate and regular rhythm.      Heart sounds: No murmur heard.  Pulmonary:      Effort: Pulmonary effort is normal.      Breath sounds: Normal breath sounds.   Abdominal:      General: Bowel sounds are  normal. There is no distension.      Palpations: Abdomen is soft.      Tenderness: There is abdominal tenderness (over incision as expected).          Comments: RYLEY x 2 in place   Musculoskeletal:         General: Normal range of motion.      Cervical back: Normal range of motion.      Right lower leg: Edema present.      Left lower leg: Edema present.   Skin:     General: Skin is warm and dry.      Capillary Refill: Capillary refill takes less than 2 seconds.      Coloration: Skin is jaundiced.   Neurological:      General: No focal deficit present.      Mental Status: She is alert and oriented to person, place, and time.   Psychiatric:         Mood and Affect: Mood normal.         Behavior: Behavior normal.         Thought Content: Thought content normal.         Judgment: Judgment normal.       Laboratory:  Immunosuppressants           Stop Route Frequency     mycophenolate capsule 1,000 mg         -- Oral 2 times daily          CBC:   Recent Labs   Lab 01/25/23 0526   WBC 15.44*   RBC 3.20*   HGB 10.4*   HCT 29.9*   PLT 92*   MCV 93   MCH 32.5*   MCHC 34.8     CMP:   Recent Labs   Lab 01/25/23 0526   *   CALCIUM 7.2*   ALBUMIN 2.1*   PROT 4.4*   *   K 4.5   CO2 22*      BUN 27*   CREATININE 0.8   ALKPHOS 54*   *   AST 53*   BILITOT 14.1*     Labs within the past 24 hours have been reviewed.    Diagnostic Results:  I have personally reviewed all pertinent imaging studies.    Debility/Functional status: No debility noted.    Assessment/Plan:     Hypervolemia  - edema seen in LEs, c/o SOB   - IV lasix given       Common bile duct leak of transplanted liver  - see s/p liver transplant      Thrombocytopenia, unspecified  - cont to improve post transplant      Prophylactic immunotherapy  - continue prograf  - continue to monitor for toxic side effects and check daily levels. Will adjust for therapeutic dose      Long-term use of immunosuppressant medication  - see prophylactic  immunotherapy      At risk for opportunistic infections  - Bactrim for PCP ppx.  - Valcyte for CMV ppx.      Acute blood loss anemia  - H/H stable  - no overt signs of bleed  - continue to monitor with daily CBC      Adrenal corticosteroid causing adverse effect in therapeutic use  - endo consulted and following      Hyperglycemia  - likely steroid related, Endocrine following         S/P liver transplant  -DORAN cirrhosis complicated by hepatic encephalopathy who is now status post living related donor liver transplant on 1/17/22. She is progressing well post op. 2 drains in place. POD#1 Liver US looked fine.   Minimally elevated intraparenchymal resistive indices, likely due to postoperative edema.    - Liver US 1/20 reviewed w CBD 3mm w no duct dilation  - elevated t-bili started Ursodiol 1/21  - CT scan ABD 1/22- reviewed  - t bili up to 22, 21.2 today. Fluid sent for t bili on 1/23 was 40.9  - take back to OR 1/23/23 for ex/lap where CBD appeared to be under pressure suggesting obstruction. The anterior wall of the duct anastomosis was taken down, small stent was placed from 6 Fr ped feeding tube and secured. Anterior wall was closed. There was extensive bile staining but no obvious sign of active bile leak after repair  - TB 1/24 for increased bilious fluid from drains, anastomosis redone, t bili cont to decrease  - cont to trend LFTs  - cont abx        VTE Risk Mitigation (From admission, onward)         Ordered     Place sequential compression device  Until discontinued         01/17/23 2342     heparin (porcine) injection 5,000 Units  Every 8 hours         01/17/23 2148     IP VTE HIGH RISK PATIENT  Once         01/17/23 2148                The patients clinical status was discussed at multidisplinary rounds, involving transplant surgery, transplant medicine, pharmacy, nursing, nutrition, and social work    Discharge Planning:  Not yet stable for caden Padilla PA-C  Liver Transplant  [facility] - Transplant Stepdown

## 2023-01-26 ENCOUNTER — TELEPHONE (OUTPATIENT)
Dept: TRANSPLANT | Facility: CLINIC | Age: 58
End: 2023-01-26
Payer: COMMERCIAL

## 2023-01-26 ENCOUNTER — PATIENT MESSAGE (OUTPATIENT)
Dept: TRANSPLANT | Facility: CLINIC | Age: 58
End: 2023-01-26
Payer: COMMERCIAL

## 2023-01-26 ENCOUNTER — CONFERENCE (OUTPATIENT)
Dept: TRANSPLANT | Facility: CLINIC | Age: 58
End: 2023-01-26
Payer: COMMERCIAL

## 2023-01-26 PROBLEM — I47.9 PAROXYSMAL TACHYCARDIA: Status: ACTIVE | Noted: 2023-01-26

## 2023-01-26 LAB
ALBUMIN SERPL BCP-MCNC: 2.1 G/DL (ref 3.5–5.2)
ALP SERPL-CCNC: 65 U/L (ref 55–135)
ALT SERPL W/O P-5'-P-CCNC: 117 U/L (ref 10–44)
ANION GAP SERPL CALC-SCNC: 9 MMOL/L (ref 8–16)
AST SERPL-CCNC: 42 U/L (ref 10–40)
BASOPHILS NFR BLD: 0 % (ref 0–1.9)
BILIRUB SERPL-MCNC: 13 MG/DL (ref 0.1–1)
BUN SERPL-MCNC: 19 MG/DL (ref 6–20)
CALCIUM SERPL-MCNC: 7.6 MG/DL (ref 8.7–10.5)
CHLORIDE SERPL-SCNC: 100 MMOL/L (ref 95–110)
CK MB SERPL-MCNC: 1.6 NG/ML (ref 0.1–6.5)
CK MB SERPL-RTO: 1.1 % (ref 0–5)
CK SERPL-CCNC: 147 U/L (ref 20–180)
CO2 SERPL-SCNC: 23 MMOL/L (ref 23–29)
CREAT SERPL-MCNC: 0.7 MG/DL (ref 0.5–1.4)
DIFFERENTIAL METHOD: ABNORMAL
EOSINOPHIL NFR BLD: 1 % (ref 0–8)
ERYTHROCYTE [DISTWIDTH] IN BLOOD BY AUTOMATED COUNT: 16 % (ref 11.5–14.5)
EST. GFR  (NO RACE VARIABLE): >60 ML/MIN/1.73 M^2
GLUCOSE SERPL-MCNC: 142 MG/DL (ref 70–110)
HCT VFR BLD AUTO: 30.3 % (ref 37–48.5)
HGB BLD-MCNC: 10.3 G/DL (ref 12–16)
IMM GRANULOCYTES # BLD AUTO: ABNORMAL K/UL (ref 0–0.04)
IMM GRANULOCYTES NFR BLD AUTO: ABNORMAL % (ref 0–0.5)
LYMPHOCYTES NFR BLD: 1 % (ref 18–48)
MAGNESIUM SERPL-MCNC: 2.2 MG/DL (ref 1.6–2.6)
MCH RBC QN AUTO: 32.2 PG (ref 27–31)
MCHC RBC AUTO-ENTMCNC: 34 G/DL (ref 32–36)
MCV RBC AUTO: 95 FL (ref 82–98)
METAMYELOCYTES NFR BLD MANUAL: 2 %
MONOCYTES NFR BLD: 4 % (ref 4–15)
NEUTROPHILS NFR BLD: 92 % (ref 38–73)
NRBC BLD-RTO: 1 /100 WBC
PLATELET # BLD AUTO: 127 K/UL (ref 150–450)
PLATELET BLD QL SMEAR: ABNORMAL
PMV BLD AUTO: 12.6 FL (ref 9.2–12.9)
POCT GLUCOSE: 171 MG/DL (ref 70–110)
POCT GLUCOSE: 199 MG/DL (ref 70–110)
POTASSIUM SERPL-SCNC: 4.3 MMOL/L (ref 3.5–5.1)
PROT SERPL-MCNC: 4.7 G/DL (ref 6–8.4)
RBC # BLD AUTO: 3.2 M/UL (ref 4–5.4)
SODIUM SERPL-SCNC: 132 MMOL/L (ref 136–145)
TACROLIMUS BLD-MCNC: 7.3 NG/ML (ref 5–15)
TROPONIN I SERPL DL<=0.01 NG/ML-MCNC: <0.006 NG/ML (ref 0–0.03)
WBC # BLD AUTO: 16.3 K/UL (ref 3.9–12.7)

## 2023-01-26 PROCEDURE — 63600175 PHARM REV CODE 636 W HCPCS

## 2023-01-26 PROCEDURE — 80197 ASSAY OF TACROLIMUS: CPT

## 2023-01-26 PROCEDURE — 83735 ASSAY OF MAGNESIUM: CPT

## 2023-01-26 PROCEDURE — 99233 PR SUBSEQUENT HOSPITAL CARE,LEVL III: ICD-10-PCS | Mod: 24,,,

## 2023-01-26 PROCEDURE — 93005 ELECTROCARDIOGRAM TRACING: CPT

## 2023-01-26 PROCEDURE — 82553 CREATINE MB FRACTION: CPT | Performed by: NURSE PRACTITIONER

## 2023-01-26 PROCEDURE — A4216 STERILE WATER/SALINE, 10 ML: HCPCS | Performed by: SURGERY

## 2023-01-26 PROCEDURE — 93010 EKG 12-LEAD: ICD-10-PCS | Mod: 76,,, | Performed by: INTERNAL MEDICINE

## 2023-01-26 PROCEDURE — 20600001 HC STEP DOWN PRIVATE ROOM

## 2023-01-26 PROCEDURE — 85007 BL SMEAR W/DIFF WBC COUNT: CPT

## 2023-01-26 PROCEDURE — 25000003 PHARM REV CODE 250: Performed by: NURSE PRACTITIONER

## 2023-01-26 PROCEDURE — 25000003 PHARM REV CODE 250

## 2023-01-26 PROCEDURE — P9047 ALBUMIN (HUMAN), 25%, 50ML: HCPCS | Mod: JG

## 2023-01-26 PROCEDURE — 80053 COMPREHEN METABOLIC PANEL: CPT

## 2023-01-26 PROCEDURE — 93010 ELECTROCARDIOGRAM REPORT: CPT | Mod: 76,,, | Performed by: INTERNAL MEDICINE

## 2023-01-26 PROCEDURE — 25000003 PHARM REV CODE 250: Performed by: STUDENT IN AN ORGANIZED HEALTH CARE EDUCATION/TRAINING PROGRAM

## 2023-01-26 PROCEDURE — 99233 SBSQ HOSP IP/OBS HIGH 50: CPT | Mod: 24,,,

## 2023-01-26 PROCEDURE — 63600175 PHARM REV CODE 636 W HCPCS: Performed by: TRANSPLANT SURGERY

## 2023-01-26 PROCEDURE — 84484 ASSAY OF TROPONIN QUANT: CPT | Performed by: NURSE PRACTITIONER

## 2023-01-26 PROCEDURE — 93010 ELECTROCARDIOGRAM REPORT: CPT | Mod: ,,, | Performed by: INTERNAL MEDICINE

## 2023-01-26 PROCEDURE — 25000003 PHARM REV CODE 250: Performed by: SURGERY

## 2023-01-26 PROCEDURE — 85027 COMPLETE CBC AUTOMATED: CPT

## 2023-01-26 RX ORDER — INSULIN ASPART 100 [IU]/ML
0-5 INJECTION, SOLUTION INTRAVENOUS; SUBCUTANEOUS
Status: DISCONTINUED | OUTPATIENT
Start: 2023-01-26 | End: 2023-01-31

## 2023-01-26 RX ORDER — SYRING-NEEDL,DISP,INSUL,0.3 ML 29 G X1/2"
296 SYRINGE, EMPTY DISPOSABLE MISCELLANEOUS
Status: DISCONTINUED | OUTPATIENT
Start: 2023-01-26 | End: 2023-01-26

## 2023-01-26 RX ORDER — MYCOPHENOLATE MOFETIL 250 MG/1
500 CAPSULE ORAL 2 TIMES DAILY
Status: DISCONTINUED | OUTPATIENT
Start: 2023-01-26 | End: 2023-01-31

## 2023-01-26 RX ORDER — PANTOPRAZOLE SODIUM 40 MG/1
40 TABLET, DELAYED RELEASE ORAL DAILY
Status: DISCONTINUED | OUTPATIENT
Start: 2023-01-26 | End: 2023-02-01 | Stop reason: HOSPADM

## 2023-01-26 RX ORDER — TACROLIMUS 1 MG/1
3 CAPSULE ORAL 2 TIMES DAILY
Status: DISCONTINUED | OUTPATIENT
Start: 2023-01-26 | End: 2023-01-28

## 2023-01-26 RX ORDER — METOPROLOL TARTRATE 1 MG/ML
10 INJECTION, SOLUTION INTRAVENOUS ONCE
Status: COMPLETED | OUTPATIENT
Start: 2023-01-26 | End: 2023-01-26

## 2023-01-26 RX ORDER — BISACODYL 5 MG
10 TABLET, DELAYED RELEASE (ENTERIC COATED) ORAL DAILY
Status: DISCONTINUED | OUTPATIENT
Start: 2023-01-26 | End: 2023-02-01 | Stop reason: HOSPADM

## 2023-01-26 RX ORDER — METOPROLOL TARTRATE 25 MG/1
25 TABLET, FILM COATED ORAL 2 TIMES DAILY
Status: DISCONTINUED | OUTPATIENT
Start: 2023-01-26 | End: 2023-01-27

## 2023-01-26 RX ORDER — ALBUMIN HUMAN 250 G/1000ML
25 SOLUTION INTRAVENOUS ONCE
Status: COMPLETED | OUTPATIENT
Start: 2023-01-26 | End: 2023-01-26

## 2023-01-26 RX ORDER — IBUPROFEN 200 MG
24 TABLET ORAL
Status: DISCONTINUED | OUTPATIENT
Start: 2023-01-26 | End: 2023-02-01 | Stop reason: HOSPADM

## 2023-01-26 RX ORDER — GLUCAGON 1 MG
1 KIT INJECTION
Status: DISCONTINUED | OUTPATIENT
Start: 2023-01-26 | End: 2023-02-01 | Stop reason: HOSPADM

## 2023-01-26 RX ORDER — IBUPROFEN 200 MG
16 TABLET ORAL
Status: DISCONTINUED | OUTPATIENT
Start: 2023-01-26 | End: 2023-02-01 | Stop reason: HOSPADM

## 2023-01-26 RX ORDER — SIMETHICONE 80 MG
1 TABLET,CHEWABLE ORAL 3 TIMES DAILY PRN
Status: DISCONTINUED | OUTPATIENT
Start: 2023-01-26 | End: 2023-01-26

## 2023-01-26 RX ORDER — FUROSEMIDE 10 MG/ML
40 INJECTION INTRAMUSCULAR; INTRAVENOUS ONCE
Status: COMPLETED | OUTPATIENT
Start: 2023-01-26 | End: 2023-01-26

## 2023-01-26 RX ORDER — PSEUDOEPHEDRINE/ACETAMINOPHEN 30MG-500MG
100 TABLET ORAL
Status: DISPENSED | OUTPATIENT
Start: 2023-01-26 | End: 2023-01-27

## 2023-01-26 RX ORDER — SIMETHICONE 80 MG
1 TABLET,CHEWABLE ORAL
Status: DISCONTINUED | OUTPATIENT
Start: 2023-01-26 | End: 2023-01-31

## 2023-01-26 RX ORDER — POLYETHYLENE GLYCOL 3350 17 G/17G
17 POWDER, FOR SOLUTION ORAL DAILY
Status: DISCONTINUED | OUTPATIENT
Start: 2023-01-26 | End: 2023-02-01 | Stop reason: HOSPADM

## 2023-01-26 RX ADMIN — AMPICILLIN SODIUM AND SULBACTAM SODIUM 3 G: 2; 1 INJECTION, POWDER, FOR SOLUTION INTRAMUSCULAR; INTRAVENOUS at 05:01

## 2023-01-26 RX ADMIN — SIMETHICONE 80 MG: 80 TABLET, CHEWABLE ORAL at 09:01

## 2023-01-26 RX ADMIN — HEPARIN SODIUM 5000 UNITS: 5000 INJECTION INTRAVENOUS; SUBCUTANEOUS at 05:01

## 2023-01-26 RX ADMIN — METOPROLOL TARTRATE 25 MG: 25 TABLET, FILM COATED ORAL at 08:01

## 2023-01-26 RX ADMIN — AMPICILLIN SODIUM AND SULBACTAM SODIUM 3 G: 2; 1 INJECTION, POWDER, FOR SOLUTION INTRAMUSCULAR; INTRAVENOUS at 10:01

## 2023-01-26 RX ADMIN — OXYCODONE HYDROCHLORIDE 10 MG: 10 TABLET ORAL at 03:01

## 2023-01-26 RX ADMIN — AMPICILLIN SODIUM AND SULBACTAM SODIUM 3 G: 2; 1 INJECTION, POWDER, FOR SOLUTION INTRAMUSCULAR; INTRAVENOUS at 04:01

## 2023-01-26 RX ADMIN — FLUCONAZOLE 200 MG: 200 TABLET ORAL at 09:01

## 2023-01-26 RX ADMIN — MYCOPHENOLATE MOFETIL 1000 MG: 250 CAPSULE ORAL at 09:01

## 2023-01-26 RX ADMIN — PREDNISONE 20 MG: 20 TABLET ORAL at 09:01

## 2023-01-26 RX ADMIN — URSODIOL 300 MG: 300 CAPSULE ORAL at 03:01

## 2023-01-26 RX ADMIN — OXYCODONE HYDROCHLORIDE 10 MG: 10 TABLET ORAL at 05:01

## 2023-01-26 RX ADMIN — OXYCODONE HYDROCHLORIDE 10 MG: 10 TABLET ORAL at 11:01

## 2023-01-26 RX ADMIN — SIMETHICONE 80 MG: 80 TABLET, CHEWABLE ORAL at 02:01

## 2023-01-26 RX ADMIN — PANTOPRAZOLE SODIUM 40 MG: 40 TABLET, DELAYED RELEASE ORAL at 09:01

## 2023-01-26 RX ADMIN — AMPICILLIN SODIUM AND SULBACTAM SODIUM 3 G: 2; 1 INJECTION, POWDER, FOR SOLUTION INTRAMUSCULAR; INTRAVENOUS at 11:01

## 2023-01-26 RX ADMIN — ALBUMIN (HUMAN) 25 G: 12.5 SOLUTION INTRAVENOUS at 03:01

## 2023-01-26 RX ADMIN — TACROLIMUS 3 MG: 1 CAPSULE ORAL at 06:01

## 2023-01-26 RX ADMIN — FUROSEMIDE 40 MG: 10 INJECTION, SOLUTION INTRAMUSCULAR; INTRAVENOUS at 10:01

## 2023-01-26 RX ADMIN — URSODIOL 300 MG: 300 CAPSULE ORAL at 09:01

## 2023-01-26 RX ADMIN — DOCUSATE SODIUM 50 MG: 50 CAPSULE, LIQUID FILLED ORAL at 09:01

## 2023-01-26 RX ADMIN — Medication 10 ML: at 06:01

## 2023-01-26 RX ADMIN — SIMETHICONE 80 MG: 80 TABLET, CHEWABLE ORAL at 08:01

## 2023-01-26 RX ADMIN — MYCOPHENOLATE MOFETIL 500 MG: 250 CAPSULE ORAL at 08:01

## 2023-01-26 RX ADMIN — URSODIOL 300 MG: 300 CAPSULE ORAL at 08:01

## 2023-01-26 RX ADMIN — POLYETHYLENE GLYCOL 3350 17 G: 17 POWDER, FOR SOLUTION ORAL at 09:01

## 2023-01-26 RX ADMIN — TRAZODONE HYDROCHLORIDE 50 MG: 50 TABLET ORAL at 09:01

## 2023-01-26 RX ADMIN — HYDROXYZINE PAMOATE 25 MG: 25 CAPSULE ORAL at 03:01

## 2023-01-26 RX ADMIN — METOPROLOL TARTRATE 5 MG: 5 INJECTION INTRAVENOUS at 01:01

## 2023-01-26 RX ADMIN — HEPARIN SODIUM 5000 UNITS: 5000 INJECTION INTRAVENOUS; SUBCUTANEOUS at 08:01

## 2023-01-26 RX ADMIN — Medication 10 ML: at 12:01

## 2023-01-26 RX ADMIN — LIDOCAINE 1 PATCH: 50 PATCH CUTANEOUS at 03:01

## 2023-01-26 RX ADMIN — OXYCODONE HYDROCHLORIDE 10 MG: 10 TABLET ORAL at 09:01

## 2023-01-26 NOTE — SUBJECTIVE & OBJECTIVE
Scheduled Meds:   albumin human 25%  25 g Intravenous Once    ampicillin-sulbactim (UNASYN) IVPB  3 g Intravenous Q6H    bisacodyL  10 mg Oral Daily    [START ON 1/27/2023] dapsone  100 mg Oral Daily    docusate sodium  50 mg Oral Daily    fluconazole  200 mg Oral Daily    gabapentin  300 mg Oral TID    heparin (porcine)  5,000 Units Subcutaneous Q8H    LIDOcaine  1 patch Transdermal Q24H    melatonin  6 mg Oral Nightly    mycophenolate  500 mg Oral BID    pantoprazole  40 mg Oral Daily    polyethylene glycol  17 g Oral Daily    predniSONE  20 mg Oral Daily    simethicone  1 tablet Oral TID PC    sodium chloride 0.9%  10 mL Intravenous Q6H    tacrolimus  3 mg Oral BID    traZODone  50 mg Oral QHS    ursodioL  300 mg Oral TID    [START ON 1/27/2023] valGANciclovir  450 mg Oral Daily     Continuous Infusions:   sodium chloride 0.9% Stopped (01/19/23 2311)     PRN Meds:albuterol, artificial tears, bisacodyL, calcium carbonate, dextrose 10%, dextrose 10%, glucagon (human recombinant), glucose, glucose, hydrOXYzine pamoate, insulin aspart U-100, ondansetron, oxyCODONE, oxyCODONE, prochlorperazine, sodium chloride 0.9%, Flushing PICC Protocol **AND** sodium chloride 0.9% **AND** sodium chloride 0.9%    Review of Systems   Constitutional:  Positive for fatigue. Negative for activity change, appetite change and fever.   HENT: Negative.     Eyes:  Negative for visual disturbance.   Respiratory:  Negative for shortness of breath.    Cardiovascular:  Negative for chest pain, palpitations and leg swelling.   Gastrointestinal:  Positive for abdominal pain, constipation and nausea. Negative for abdominal distention, diarrhea and vomiting.   Genitourinary:  Negative for decreased urine volume, difficulty urinating and dysuria.   Musculoskeletal:  Negative for arthralgias, back pain and joint swelling.   Skin:  Positive for wound.   Allergic/Immunologic: Positive for immunocompromised state.   Neurological:  Positive for weakness.  Negative for dizziness and facial asymmetry.   Hematological:  Negative for adenopathy. Does not bruise/bleed easily.   Psychiatric/Behavioral:  Negative for agitation, behavioral problems and sleep disturbance.    All other systems reviewed and are negative.  Objective:     Vital Signs (Most Recent):  Temp: 98.2 °F (36.8 °C) (01/26/23 1516)  Pulse: 85 (01/26/23 1516)  Resp: (!) 24 (01/26/23 1516)  BP: (!) 120/55 (01/26/23 1516)  SpO2: 96 % (01/26/23 1516)   Vital Signs (24h Range):  Temp:  [97.7 °F (36.5 °C)-98.3 °F (36.8 °C)] 98.2 °F (36.8 °C)  Pulse:  [] 85  Resp:  [15-32] 24  SpO2:  [93 %-98 %] 96 %  BP: (100-137)/(55-76) 120/55     Weight: 92.6 kg (204 lb 2.3 oz)  Body mass index is 33.97 kg/m².    Intake/Output - Last 3 Shifts         01/24 0700  01/25 0659 01/25 0700  01/26 0659 01/26 0700  01/27 0659    P.O. 0 1500 360    I.V. (mL/kg) 1051.7 (11.5) 690.9 (7.5)     Other 0 0     IV Piggyback 1010 298     Total Intake(mL/kg) 2061.7 (22.6) 2488.9 (26.9) 360 (3.9)    Urine (mL/kg/hr) 720 (0.3) 3650 (1.6) 700 (0.9)    Emesis/NG output 0 0     Drains 260 180 85    Other 0 0     Stool 0 0     Blood 0 0     Total Output 980 3830 785    Net +1081.7 -1341.1 -425           Urine Occurrence 0 x 0 x     Stool Occurrence 0 x 0 x     Emesis Occurrence 0 x 0 x             Physical Exam  Vitals and nursing note reviewed.   Constitutional:       Appearance: She is well-developed.      Comments: No hand or temporal muscle wasting noted     HENT:      Head: Normocephalic.   Eyes:      General: Scleral icterus present.      Conjunctiva/sclera: Conjunctivae normal.   Cardiovascular:      Rate and Rhythm: Normal rate and regular rhythm.      Heart sounds: No murmur heard.  Pulmonary:      Effort: Pulmonary effort is normal.      Breath sounds: Normal breath sounds.   Abdominal:      General: Bowel sounds are normal. There is no distension.      Palpations: Abdomen is soft.      Tenderness: There is abdominal tenderness (over  incision as expected).          Comments: RYLEY x 2 in place   Musculoskeletal:         General: Normal range of motion.      Cervical back: Normal range of motion.      Right lower leg: Edema present.      Left lower leg: Edema present.   Skin:     General: Skin is warm and dry.      Capillary Refill: Capillary refill takes less than 2 seconds.      Coloration: Skin is jaundiced.   Neurological:      General: No focal deficit present.      Mental Status: She is alert and oriented to person, place, and time.   Psychiatric:         Mood and Affect: Mood normal.         Behavior: Behavior normal.         Thought Content: Thought content normal.         Judgment: Judgment normal.       Laboratory:  Immunosuppressants           Stop Route Frequency     mycophenolate capsule 500 mg         -- Oral 2 times daily     tacrolimus capsule 3 mg         -- Oral 2 times daily          CBC:   Recent Labs   Lab 01/26/23 0528   WBC 16.30*   RBC 3.20*   HGB 10.3*   HCT 30.3*   *   MCV 95   MCH 32.2*   MCHC 34.0     CMP:   Recent Labs   Lab 01/26/23 0528   *   CALCIUM 7.6*   ALBUMIN 2.1*   PROT 4.7*   *   K 4.3   CO2 23      BUN 19   CREATININE 0.7   ALKPHOS 65   *   AST 42*   BILITOT 13.0*     Labs within the past 24 hours have been reviewed.    Diagnostic Results:  X-Ray Abdomen AP 1 View [[id number]] Resulted: 01/26/23 1516   Order Status: Completed Updated: 01/26/23 1518   Narrative:     EXAMINATION:   XR ABDOMEN AP 1 VIEW     CLINICAL HISTORY:   ABD Pain;     TECHNIQUE:   AP View(s) of the abdomen was performed.     COMPARISON:   January 22, 2023     FINDINGS:   Surgical drains right upper quadrant.  Skin staples noted.  Right pleural effusion.  Multiple air dilated loops of bowel some feces in the right colon.  Paucity of stool and bowel gas in the rectosigmoid region.  Bones are intact.    Impression:       No significant change.  Small right effusion.       Electronically signed by: Dave  MD [place]   Date: 01/26/2023   Time: 15:16       Debility/Functional status: No debility noted.

## 2023-01-26 NOTE — NURSING
0725:PT Noted to Be in STV at 168-170 on Tele Monitor , Pt states Fullness In chest, But Denies Chest Pain, Carmelo Almanzar NP at bedside , Stat EKG ordered. PT encouraged to Cough, HR noted to decreased to 94-95 on monitor.    0737 : EKG at bedside     0740 : EKG given to Carmelo Almanzar NP, NSR at 93 noted on EKG.

## 2023-01-26 NOTE — PROGRESS NOTES
HR noted 160's sustained on telemetry. Alerted bedside RN and ROBERT Carmelo.   Stat EKG ordered. Pt siitting in chair  HR now 90's and BP WNL.   ROBERT at bedside.

## 2023-01-26 NOTE — CARE UPDATE
-Glucose Goal 140-180    -A1C:   Hemoglobin A1C   Date Value Ref Range Status   01/17/2023 4.5 4.0 - 5.6 % Final     Comment:     ADA Screening Guidelines:  5.7-6.4%  Consistent with prediabetes  >or=6.5%  Consistent with diabetes    High levels of fetal hemoglobin interfere with the HbA1C  assay. Heterozygous hemoglobin variants (HbS, HgC, etc)do  not significantly interfere with this assay.   However, presence of multiple variants may affect accuracy.           -HOME REGIMEN: none    -INPATIENT REGIMEN: correction scale     -GLUCOSE TREND FOR THE PAST 24HRS:   Recent Labs   Lab 01/23/23  1345 01/23/23  1719 01/23/23  2156 01/24/23  2153 01/25/23  1721 01/25/23  2352   POCTGLUCOSE 165* 158* 179* 149* 199* 135*           -NO HYPOGYCEMIAS NOTED     - Diet  Diet Adult Regular (IDDSI Level 7) 25-50%    -Steroids - prednisone 20 mg daily    Remains in TSU. NAEON.  BG well controlled without insulin but remains with decreased PO intake. 2 Days Post-Op.       Plan:   - continue low dose correction scale and   - POCT Glucose before meals and at bedtime  - Hypoglycemia protocol in place      ** Please notify Endocrine for any change and/or advance in diet**  ** Please call Endocrine for any BG related issues **     Discharge Planning:   TBD. Please notify endocrinology prior to discharge.

## 2023-01-26 NOTE — PROGRESS NOTES
Met with Ms. Peña's children to discuss Ms. Peña's overall condition and goals of care, including their perspective on attempts at resuscitation in the event of decompensation. They do not wish for her to undergo any resuscitative efforts and would like to focus on Ms. Vicki Peña's comfort. Her children were all in agreement that all antimicrobials would be discontinued tonight and that comfort measures would be initiated with the goal to have her return home with home hospice tomorrow 2/19. All questions answered and concerns addressed.     I will work with case management to arrange home hospice 2/19 AM.    Akhil Gandara M.D. PGY-2  [facility]  7:58 PM  2/18/2019  Pager [phone]   Met with patient and her  in [facility]. Patient states due to her return to OR the last few days, she has not begun reading.   Asked them to read My New Journey: Living Smart After My Liver Transplant in preparation for education tomorrow.  Patient states her mother is in [place] but will want to listen to the education tomorrow by phone.

## 2023-01-26 NOTE — PLAN OF CARE
Pt remains AAO x 4 with VSS, afebrile, sats upper 90s on RA/2 L NC throughout shift  Chief complaint of incisional pain and back pain - mildly relieved with PRN Oxy, scheduled Robaxin. PRN Atarax given for itching with relief  2 RLQ RYLEY drains with SS output - Abd pad covering from leakage at site  Chevron JEROMY with staples   R UA Picc - CDI, saline locked. Ampicillin given q6hrs for bile leak.   LFTs stable, Tbili 14.1 (prev 15.0) - Actigall continued.   NSR/ ST on tele monitor - HR 90-100s. HR reading 150-160s resting - pt reports feeling dizzy, VSS, after 5 min back to NSR/ST  - Seema, NP notified - EKG - NSR, Troponin, CK-MB WNL. Pt c/o indigestion gas pain at the time - PRN Simethicone added and given with relief  CBG q6hrs - last  - no SSI indicated  Pt up independent and ambulatory, voids in hat, LBM 1/22  Regular diet   Pt remains free from falls and injuries, call light in reach, bed in lowest position, nonskid socks on when OOB, side rails up x2  Will continue to monitor

## 2023-01-26 NOTE — NURSING
Pt refusing Enema At this time, Pt states she is trying to take her meds still and needs to rest.

## 2023-01-26 NOTE — PROGRESS NOTES
[facility] Trans[facility] Stepdown  Liver Transplant  Progress Note    Patient Name: Mickey Harris  MRN: [id number]  Admission Date: 1/17/2023  Hospital Length of Stay: 9 days  Code Status: Full Code  Primary Care Provider: Primary Doctor No  Post-Operative Day: 9    ORGAN:   RIGHT LIVER LOBE (SEGS 5,6,7,8) WITHOUT MIDDLE HEPATIC VEIN  Disease Etiology: Cirrhosis: Fatty Liver (DORAN)  Donor Type:   Living  CDC High Risk:     Donor CMV Status:   Donor CMV Status:   Donor HBcAB:     Donor HCV Status:     Donor HBV PALOMA:   Donor HCV PALOMA:   Whole or Partial: Split Liver  Biliary Anastomosis: Becky-en-Y  Arterial Anatomy:   Subjective:     History of Present Illness:  Ms. Mickey Harris is a 56 y/o F admitted for living liver transplant from daughter for DORAN cirrhosis.       Hospital Course:  She is now status post living related donor liver transplant on 1/17/22. She is progressing well post op. 2 drains in place. POD#1 Liver US showed minimally elevated intraparenchymal resistive indices, likely due to postoperative edema. Otherwise satisfactory Doppler evaluation of the liver. Transfer to TSU on POD#2. Pt w increased t bili and noted to have more bilious output in RYLEY drain.  T bili from drain fluid was 41. CT A/P 1/22 showed pneumoperitoneum, mild ascites and scattered regions of subcutaneous and rectus sheath emphysema. Pt taken back to OR POD#6 for exploratory lap where the CBD appeared to be under pressure suggesting obstruction. The anterior wall of the duct anastomosis was taken down, small stent was placed from 6 Fr ped feeding tube and secured. Anterior wall was closed. There was extensive bile staining but no obvious sign of active bile leak after repair. The 2 drains had increased bilious output and she was taken back to OR again on POD #7. Now s/p ExLap 1/24/23, biliary system was taken down and reconstructed.    Hospital Interval:    ON with short episode of tachycardia, EKG and troponins without change. Later  this afternoon with similar symptoms, tachy into 160s, EKG with sinus tach. Rate controlled with lopressor 5mg IV x 2. Cont to endorse LE edema, gave IV lasix and 25% albumin. C/o abdominal soreness and trouble with intake, adjusted bowel regimen and reflux meds. Denies NV, fever, chills, SOB, CP. RYLEY drains with SS output. Pt remains on abx for bile leak, aerobic cx + for staph epi, susceptibilities pending. T bili trending down, LFTs WNL, Cr stable. Patient is OOB, using IS. Electrolytes replaced. VSS. Monitor.      Scheduled Meds:   albumin human 25%  25 g Intravenous Once    ampicillin-sulbactim (UNASYN) IVPB  3 g Intravenous Q6H    bisacodyL  10 mg Oral Daily    [START ON 1/27/2023] dapsone  100 mg Oral Daily    docusate sodium  50 mg Oral Daily    fluconazole  200 mg Oral Daily    gabapentin  300 mg Oral TID    heparin (porcine)  5,000 Units Subcutaneous Q8H    LIDOcaine  1 patch Transdermal Q24H    melatonin  6 mg Oral Nightly    mycophenolate  500 mg Oral BID    pantoprazole  40 mg Oral Daily    polyethylene glycol  17 g Oral Daily    predniSONE  20 mg Oral Daily    simethicone  1 tablet Oral TID PC    sodium chloride 0.9%  10 mL Intravenous Q6H    tacrolimus  3 mg Oral BID    traZODone  50 mg Oral QHS    ursodioL  300 mg Oral TID    [START ON 1/27/2023] valGANciclovir  450 mg Oral Daily     Continuous Infusions:   sodium chloride 0.9% Stopped (01/19/23 2311)     PRN Meds:albuterol, artificial tears, bisacodyL, calcium carbonate, dextrose 10%, dextrose 10%, glucagon (human recombinant), glucose, glucose, hydrOXYzine pamoate, insulin aspart U-100, ondansetron, oxyCODONE, oxyCODONE, prochlorperazine, sodium chloride 0.9%, Flushing PICC Protocol **AND** sodium chloride 0.9% **AND** sodium chloride 0.9%    Review of Systems   Constitutional:  Positive for fatigue. Negative for activity change, appetite change and fever.   HENT: Negative.     Eyes:  Negative for visual disturbance.    Respiratory:  Negative for shortness of breath.    Cardiovascular:  Negative for chest pain, palpitations and leg swelling.   Gastrointestinal:  Positive for abdominal pain, constipation and nausea. Negative for abdominal distention, diarrhea and vomiting.   Genitourinary:  Negative for decreased urine volume, difficulty urinating and dysuria.   Musculoskeletal:  Negative for arthralgias, back pain and joint swelling.   Skin:  Positive for wound.   Allergic/Immunologic: Positive for immunocompromised state.   Neurological:  Positive for weakness. Negative for dizziness and facial asymmetry.   Hematological:  Negative for adenopathy. Does not bruise/bleed easily.   Psychiatric/Behavioral:  Negative for agitation, behavioral problems and sleep disturbance.    All other systems reviewed and are negative.  Objective:     Vital Signs (Most Recent):  Temp: 98.2 °F (36.8 °C) (01/26/23 1516)  Pulse: 85 (01/26/23 1516)  Resp: (!) 24 (01/26/23 1516)  BP: (!) 120/55 (01/26/23 1516)  SpO2: 96 % (01/26/23 1516)   Vital Signs (24h Range):  Temp:  [97.7 °F (36.5 °C)-98.3 °F (36.8 °C)] 98.2 °F (36.8 °C)  Pulse:  [] 85  Resp:  [15-32] 24  SpO2:  [93 %-98 %] 96 %  BP: (100-137)/(55-76) 120/55     Weight: 92.6 kg (204 lb 2.3 oz)  Body mass index is 33.97 kg/m².    Intake/Output - Last 3 Shifts         01/24 0700  01/25 0659 01/25 0700 01/26 0659 01/26 0700  01/27 0659    P.O. 0 1500 360    I.V. (mL/kg) 1051.7 (11.5) 690.9 (7.5)     Other 0 0     IV Piggyback 1010 298     Total Intake(mL/kg) 2061.7 (22.6) 2488.9 (26.9) 360 (3.9)    Urine (mL/kg/hr) 720 (0.3) 3650 (1.6) 700 (0.9)    Emesis/NG output 0 0     Drains 260 180 85    Other 0 0     Stool 0 0     Blood 0 0     Total Output 980 3830 785    Net +1081.7 -1341.1 -425           Urine Occurrence 0 x 0 x     Stool Occurrence 0 x 0 x     Emesis Occurrence 0 x 0 x             Physical Exam  Vitals and nursing note reviewed.   Constitutional:       Appearance: She is  well-developed.      Comments: No hand or temporal muscle wasting noted     HENT:      Head: Normocephalic.   Eyes:      General: Scleral icterus present.      Conjunctiva/sclera: Conjunctivae normal.   Cardiovascular:      Rate and Rhythm: Normal rate and regular rhythm.      Heart sounds: No murmur heard.  Pulmonary:      Effort: Pulmonary effort is normal.      Breath sounds: Normal breath sounds.   Abdominal:      General: Bowel sounds are normal. There is no distension.      Palpations: Abdomen is soft.      Tenderness: There is abdominal tenderness (over incision as expected).          Comments: RYLEY x 2 in place   Musculoskeletal:         General: Normal range of motion.      Cervical back: Normal range of motion.      Right lower leg: Edema present.      Left lower leg: Edema present.   Skin:     General: Skin is warm and dry.      Capillary Refill: Capillary refill takes less than 2 seconds.      Coloration: Skin is jaundiced.   Neurological:      General: No focal deficit present.      Mental Status: She is alert and oriented to person, place, and time.   Psychiatric:         Mood and Affect: Mood normal.         Behavior: Behavior normal.         Thought Content: Thought content normal.         Judgment: Judgment normal.       Laboratory:  Immunosuppressants           Stop Route Frequency     mycophenolate capsule 500 mg         -- Oral 2 times daily     tacrolimus capsule 3 mg         -- Oral 2 times daily          CBC:   Recent Labs   Lab 01/26/23  0528   WBC 16.30*   RBC 3.20*   HGB 10.3*   HCT 30.3*   *   MCV 95   MCH 32.2*   MCHC 34.0     CMP:   Recent Labs   Lab 01/26/23  0528   *   CALCIUM 7.6*   ALBUMIN 2.1*   PROT 4.7*   *   K 4.3   CO2 23      BUN 19   CREATININE 0.7   ALKPHOS 65   *   AST 42*   BILITOT 13.0*     Labs within the past 24 hours have been reviewed.    Diagnostic Results:  X-Ray Abdomen AP 1 View [[id number]] Resulted: 01/26/23 1516   Order Status:  Completed Updated: 01/26/23 1518   Narrative:     EXAMINATION:   XR ABDOMEN AP 1 VIEW     CLINICAL HISTORY:   ABD Pain;     TECHNIQUE:   AP View(s) of the abdomen was performed.     COMPARISON:   January 22, 2023     FINDINGS:   Surgical drains right upper quadrant.  Skin staples noted.  Right pleural effusion.  Multiple air dilated loops of bowel some feces in the right colon.  Paucity of stool and bowel gas in the rectosigmoid region.  Bones are intact.    Impression:       No significant change.  Small right effusion.       Electronically signed by: Dave Deras MD   Date: 01/26/2023   Time: 15:16       Debility/Functional status: No debility noted.    Assessment/Plan:     Paroxysmal tachycardia  - resolved spontaneously or with lopressor 5mg x 2   - troponins WNL   - EKGs with sinus tachycardia   - cont to monitor closely   - keep tele connected     Hypervolemia  - edema seen in LEs, c/o SOB   - IV lasix given       Common bile duct leak of transplanted liver  - see s/p liver transplant      Thrombocytopenia, unspecified  - cont to improve post transplant      Prophylactic immunotherapy  - continue prograf  - continue to monitor for toxic side effects and check daily levels. Will adjust for therapeutic dose      Long-term use of immunosuppressant medication  - see prophylactic immunotherapy      At risk for opportunistic infections  - Bactrim for PCP ppx.  - Valcyte for CMV ppx.      Acute blood loss anemia  - H/H stable  - no overt signs of bleed  - continue to monitor with daily CBC      Adrenal corticosteroid causing adverse effect in therapeutic use  - endo consulted and following      Hyperglycemia  - likely steroid related, Endocrine following         S/P liver transplant  -DORAN cirrhosis complicated by hepatic encephalopathy who is now status post living related donor liver transplant on 1/17/22. She is progressing well post op. 2 drains in place. POD#1 Liver US looked fine.   Minimally elevated  intraparenchymal resistive indices, likely due to postoperative edema.    - Liver US 1/20 reviewed w CBD 3mm w no duct dilation  - elevated t-bili started Ursodiol 1/21  - CT scan ABD 1/22- reviewed  - t bili up to 22, 21.2 today. Fluid sent for t bili on 1/23 was 40.9  - take back to OR 1/23/23 for ex/lap where CBD appeared to be under pressure suggesting obstruction. The anterior wall of the duct anastomosis was taken down, small stent was placed from 6 Fr ped feeding tube and secured. Anterior wall was closed. There was extensive bile staining but no obvious sign of active bile leak after repair  - TB 1/24 for increased bilious fluid from drains, anastomosis redone, t bili cont to decrease  - cont to trend LFTs  - cont abx        VTE Risk Mitigation (From admission, onward)         Ordered     Place sequential compression device  Until discontinued         01/17/23 2342     heparin (porcine) injection 5,000 Units  Every 8 hours         01/17/23 2148     IP VTE HIGH RISK PATIENT  Once         01/17/23 2148                The patients clinical status was discussed at multidisplinary rounds, involving transplant surgery, transplant medicine, pharmacy, nursing, nutrition, and social work    Discharge Planning:  Not yet stable for dc     Liana Padilla PA-C  Liver Transplant  [facility] Step[facility]

## 2023-01-26 NOTE — NURSING
1255: Pt found to have -170s in SVT/AFIB on Tele Monitor. PA at bedside. Placed on Bedside Monitor and rate notes at 150s. Pt C/O of Gas Pressure in chest.     1300: Lopressor 5 Mg Given Ayesha Wood RN Slow IVP, Lopressor 10 mg Ordered But PT BP unable to Tolerate . See Flow Sheet for Vital Signs.     1304: EKG At Bedside for Stat EKG. , EKG S tach  110 , PA at bedside to review. Pt denies any Sob or Chest Pain at present,.

## 2023-01-26 NOTE — ASSESSMENT & PLAN NOTE
- resolved spontaneously or with lopressor 5mg x 2   - troponins WNL   - EKGs with sinus tachycardia   - cont to monitor closely   - keep tele connected    03-Aug-2020 23:00

## 2023-01-27 LAB
ALBUMIN SERPL BCP-MCNC: 2.2 G/DL (ref 3.5–5.2)
ALP SERPL-CCNC: 81 U/L (ref 55–135)
ALT SERPL W/O P-5'-P-CCNC: 86 U/L (ref 10–44)
ANION GAP SERPL CALC-SCNC: 10 MMOL/L (ref 8–16)
ANISOCYTOSIS BLD QL SMEAR: SLIGHT
AST SERPL-CCNC: 36 U/L (ref 10–40)
BACTERIA SPEC AEROBE CULT: ABNORMAL
BACTERIA SPEC ANAEROBE CULT: NORMAL
BASOPHILS # BLD AUTO: ABNORMAL K/UL (ref 0–0.2)
BASOPHILS NFR BLD: 0 % (ref 0–1.9)
BILIRUB SERPL-MCNC: 9.1 MG/DL (ref 0.1–1)
BUN SERPL-MCNC: 17 MG/DL (ref 6–20)
CALCIUM SERPL-MCNC: 8.1 MG/DL (ref 8.7–10.5)
CHLORIDE SERPL-SCNC: 102 MMOL/L (ref 95–110)
CO2 SERPL-SCNC: 21 MMOL/L (ref 23–29)
CREAT SERPL-MCNC: 0.7 MG/DL (ref 0.5–1.4)
DIFFERENTIAL METHOD: ABNORMAL
EOSINOPHIL # BLD AUTO: ABNORMAL K/UL (ref 0–0.5)
EOSINOPHIL NFR BLD: 4 % (ref 0–8)
ERYTHROCYTE [DISTWIDTH] IN BLOOD BY AUTOMATED COUNT: 16.1 % (ref 11.5–14.5)
EST. GFR  (NO RACE VARIABLE): >60 ML/MIN/1.73 M^2
GLUCOSE SERPL-MCNC: 142 MG/DL (ref 70–110)
HCT VFR BLD AUTO: 24.9 % (ref 37–48.5)
HGB BLD-MCNC: 8.4 G/DL (ref 12–16)
IMM GRANULOCYTES # BLD AUTO: ABNORMAL K/UL (ref 0–0.04)
IMM GRANULOCYTES NFR BLD AUTO: ABNORMAL % (ref 0–0.5)
LYMPHOCYTES # BLD AUTO: ABNORMAL K/UL (ref 1–4.8)
LYMPHOCYTES NFR BLD: 2 % (ref 18–48)
MAGNESIUM SERPL-MCNC: 2 MG/DL (ref 1.6–2.6)
MCH RBC QN AUTO: 32.4 PG (ref 27–31)
MCHC RBC AUTO-ENTMCNC: 33.7 G/DL (ref 32–36)
MCV RBC AUTO: 96 FL (ref 82–98)
METAMYELOCYTES NFR BLD MANUAL: 1 %
MONOCYTES # BLD AUTO: ABNORMAL K/UL (ref 0.3–1)
MONOCYTES NFR BLD: 6 % (ref 4–15)
MYELOCYTES NFR BLD MANUAL: 3 %
NEUTROPHILS NFR BLD: 84 % (ref 38–73)
NRBC BLD-RTO: 1 /100 WBC
OVALOCYTES BLD QL SMEAR: ABNORMAL
PLATELET # BLD AUTO: 83 K/UL (ref 150–450)
PLATELET BLD QL SMEAR: ABNORMAL
PMV BLD AUTO: 12.5 FL (ref 9.2–12.9)
POCT GLUCOSE: 148 MG/DL (ref 70–110)
POCT GLUCOSE: 149 MG/DL (ref 70–110)
POTASSIUM SERPL-SCNC: 4.1 MMOL/L (ref 3.5–5.1)
PROT SERPL-MCNC: 4.3 G/DL (ref 6–8.4)
RBC # BLD AUTO: 2.59 M/UL (ref 4–5.4)
SODIUM SERPL-SCNC: 133 MMOL/L (ref 136–145)
TACROLIMUS BLD-MCNC: 8.4 NG/ML (ref 5–15)
WBC # BLD AUTO: 8.78 K/UL (ref 3.9–12.7)

## 2023-01-27 PROCEDURE — 25000003 PHARM REV CODE 250: Performed by: NURSE PRACTITIONER

## 2023-01-27 PROCEDURE — 63600175 PHARM REV CODE 636 W HCPCS

## 2023-01-27 PROCEDURE — 85007 BL SMEAR W/DIFF WBC COUNT: CPT

## 2023-01-27 PROCEDURE — 63600175 PHARM REV CODE 636 W HCPCS: Performed by: TRANSPLANT SURGERY

## 2023-01-27 PROCEDURE — 80053 COMPREHEN METABOLIC PANEL: CPT

## 2023-01-27 PROCEDURE — 25000003 PHARM REV CODE 250

## 2023-01-27 PROCEDURE — 25000003 PHARM REV CODE 250: Performed by: STUDENT IN AN ORGANIZED HEALTH CARE EDUCATION/TRAINING PROGRAM

## 2023-01-27 PROCEDURE — 85027 COMPLETE CBC AUTOMATED: CPT

## 2023-01-27 PROCEDURE — 97530 THERAPEUTIC ACTIVITIES: CPT

## 2023-01-27 PROCEDURE — 99233 SBSQ HOSP IP/OBS HIGH 50: CPT | Mod: 24,,,

## 2023-01-27 PROCEDURE — A4216 STERILE WATER/SALINE, 10 ML: HCPCS | Performed by: SURGERY

## 2023-01-27 PROCEDURE — 99233 PR SUBSEQUENT HOSPITAL CARE,LEVL III: ICD-10-PCS | Mod: 24,,,

## 2023-01-27 PROCEDURE — 25000003 PHARM REV CODE 250: Performed by: SURGERY

## 2023-01-27 PROCEDURE — 83735 ASSAY OF MAGNESIUM: CPT

## 2023-01-27 PROCEDURE — 20600001 HC STEP DOWN PRIVATE ROOM

## 2023-01-27 PROCEDURE — 80197 ASSAY OF TACROLIMUS: CPT

## 2023-01-27 RX ORDER — METOPROLOL TARTRATE 25 MG/1
25 TABLET, FILM COATED ORAL 2 TIMES DAILY
Status: DISCONTINUED | OUTPATIENT
Start: 2023-01-27 | End: 2023-01-28

## 2023-01-27 RX ORDER — PREDNISONE 20 MG/1
20 TABLET ORAL DAILY
Status: COMPLETED | OUTPATIENT
Start: 2023-01-28 | End: 2023-01-29

## 2023-01-27 RX ORDER — MAG HYDROX/ALUMINUM HYD/SIMETH 200-200-20
30 SUSPENSION, ORAL (FINAL DOSE FORM) ORAL ONCE
Status: COMPLETED | OUTPATIENT
Start: 2023-01-27 | End: 2023-01-27

## 2023-01-27 RX ORDER — PREDNISONE 5 MG/1
5 TABLET ORAL DAILY
Status: DISCONTINUED | OUTPATIENT
Start: 2023-02-13 | End: 2023-02-01 | Stop reason: HOSPADM

## 2023-01-27 RX ORDER — PREDNISONE 10 MG/1
10 TABLET ORAL DAILY
Status: DISCONTINUED | OUTPATIENT
Start: 2023-02-06 | End: 2023-02-01 | Stop reason: HOSPADM

## 2023-01-27 RX ORDER — LIDOCAINE HYDROCHLORIDE 20 MG/ML
15 SOLUTION OROPHARYNGEAL ONCE
Status: COMPLETED | OUTPATIENT
Start: 2023-01-27 | End: 2023-01-27

## 2023-01-27 RX ORDER — DOCUSATE SODIUM 100 MG/1
100 CAPSULE, LIQUID FILLED ORAL 2 TIMES DAILY
Status: DISCONTINUED | OUTPATIENT
Start: 2023-01-27 | End: 2023-02-01 | Stop reason: HOSPADM

## 2023-01-27 RX ADMIN — BISACODYL 10 MG: 5 TABLET, COATED ORAL at 12:01

## 2023-01-27 RX ADMIN — FLUCONAZOLE 200 MG: 200 TABLET ORAL at 09:01

## 2023-01-27 RX ADMIN — URSODIOL 300 MG: 300 CAPSULE ORAL at 04:01

## 2023-01-27 RX ADMIN — DOCUSATE SODIUM 50 MG: 50 CAPSULE, LIQUID FILLED ORAL at 09:01

## 2023-01-27 RX ADMIN — DOCUSATE SODIUM 100 MG: 100 CAPSULE ORAL at 10:01

## 2023-01-27 RX ADMIN — ALUMINUM HYDROXIDE, MAGNESIUM HYDROXIDE, AND SIMETHICONE 30 ML: 200; 200; 20 SUSPENSION ORAL at 12:01

## 2023-01-27 RX ADMIN — METOPROLOL TARTRATE 25 MG: 25 TABLET, FILM COATED ORAL at 10:01

## 2023-01-27 RX ADMIN — MYCOPHENOLATE MOFETIL 500 MG: 250 CAPSULE ORAL at 09:01

## 2023-01-27 RX ADMIN — METOPROLOL TARTRATE 25 MG: 25 TABLET, FILM COATED ORAL at 09:01

## 2023-01-27 RX ADMIN — VALGANCICLOVIR 450 MG: 450 TABLET, FILM COATED ORAL at 09:01

## 2023-01-27 RX ADMIN — MYCOPHENOLATE MOFETIL 500 MG: 250 CAPSULE ORAL at 10:01

## 2023-01-27 RX ADMIN — TACROLIMUS 3 MG: 1 CAPSULE ORAL at 09:01

## 2023-01-27 RX ADMIN — PREDNISONE 20 MG: 20 TABLET ORAL at 09:01

## 2023-01-27 RX ADMIN — Medication 10 ML: at 12:01

## 2023-01-27 RX ADMIN — OXYCODONE HYDROCHLORIDE 10 MG: 10 TABLET ORAL at 09:01

## 2023-01-27 RX ADMIN — TRAZODONE HYDROCHLORIDE 50 MG: 50 TABLET ORAL at 10:01

## 2023-01-27 RX ADMIN — URSODIOL 300 MG: 300 CAPSULE ORAL at 09:01

## 2023-01-27 RX ADMIN — SIMETHICONE 80 MG: 80 TABLET, CHEWABLE ORAL at 04:01

## 2023-01-27 RX ADMIN — Medication 10 ML: at 06:01

## 2023-01-27 RX ADMIN — TACROLIMUS 3 MG: 1 CAPSULE ORAL at 06:01

## 2023-01-27 RX ADMIN — SIMETHICONE 80 MG: 80 TABLET, CHEWABLE ORAL at 09:01

## 2023-01-27 RX ADMIN — OXYCODONE HYDROCHLORIDE 10 MG: 10 TABLET ORAL at 03:01

## 2023-01-27 RX ADMIN — POLYETHYLENE GLYCOL 3350 17 G: 17 POWDER, FOR SOLUTION ORAL at 09:01

## 2023-01-27 RX ADMIN — HEPARIN SODIUM 5000 UNITS: 5000 INJECTION INTRAVENOUS; SUBCUTANEOUS at 05:01

## 2023-01-27 RX ADMIN — OXYCODONE HYDROCHLORIDE 10 MG: 10 TABLET ORAL at 11:01

## 2023-01-27 RX ADMIN — AMPICILLIN SODIUM AND SULBACTAM SODIUM 3 G: 2; 1 INJECTION, POWDER, FOR SOLUTION INTRAMUSCULAR; INTRAVENOUS at 06:01

## 2023-01-27 RX ADMIN — LIDOCAINE HYDROCHLORIDE 15 ML: 20 SOLUTION ORAL at 12:01

## 2023-01-27 RX ADMIN — DAPSONE 100 MG: 100 TABLET ORAL at 09:01

## 2023-01-27 RX ADMIN — URSODIOL 300 MG: 300 CAPSULE ORAL at 10:01

## 2023-01-27 RX ADMIN — PANTOPRAZOLE SODIUM 40 MG: 40 TABLET, DELAYED RELEASE ORAL at 09:01

## 2023-01-27 RX ADMIN — AMPICILLIN SODIUM AND SULBACTAM SODIUM 3 G: 2; 1 INJECTION, POWDER, FOR SOLUTION INTRAMUSCULAR; INTRAVENOUS at 12:01

## 2023-01-27 RX ADMIN — AMPICILLIN SODIUM AND SULBACTAM SODIUM 3 G: 2; 1 INJECTION, POWDER, FOR SOLUTION INTRAMUSCULAR; INTRAVENOUS at 11:01

## 2023-01-27 RX ADMIN — AMPICILLIN SODIUM AND SULBACTAM SODIUM 3 G: 2; 1 INJECTION, POWDER, FOR SOLUTION INTRAMUSCULAR; INTRAVENOUS at 05:01

## 2023-01-27 RX ADMIN — SIMETHICONE 80 MG: 80 TABLET, CHEWABLE ORAL at 07:01

## 2023-01-27 RX ADMIN — HEPARIN SODIUM 5000 UNITS: 5000 INJECTION INTRAVENOUS; SUBCUTANEOUS at 10:01

## 2023-01-27 NOTE — ASSESSMENT & PLAN NOTE
- resolved spontaneously or with lopressor 5mg x 2   - now on lopressor BID  - troponins WNL   - EKGs with sinus tachycardia   - cont to monitor closely   - keep tele connected

## 2023-01-27 NOTE — TELEPHONE ENCOUNTER
Returned phone call. Reviewed EKG results with patient.  Explained the plain going forward to transition to a post coordinator when she gets discharged. Explained that her and her family will receive education while she is inpatient. Allowed time for questions and answers.

## 2023-01-27 NOTE — PROGRESS NOTES
Met with patient and her .  Patient is requesting to postpone education until Monday.  Asked patient to read the book in preparation for education on Monday.  Allowed time for questions and answers.

## 2023-01-27 NOTE — CARE UPDATE
-Glucose Goal 140-180    -A1C:   Hemoglobin A1C   Date Value Ref Range Status   01/17/2023 4.5 4.0 - 5.6 % Final     Comment:     ADA Screening Guidelines:  5.7-6.4%  Consistent with prediabetes  >or=6.5%  Consistent with diabetes    High levels of fetal hemoglobin interfere with the HbA1C  assay. Heterozygous hemoglobin variants (HbS, HgC, etc)do  not significantly interfere with this assay.   However, presence of multiple variants may affect accuracy.           -HOME REGIMEN: none    -INPATIENT REGIMEN: correction scale     -GLUCOSE TREND FOR THE PAST 24HRS:   Recent Labs   Lab 01/24/23  2153 01/25/23  1721 01/25/23  2352 01/26/23  1215 01/26/23  2038 01/27/23  0919   POCTGLUCOSE 149* 199* 135* 149* 171* 148*           -NO HYPOGYCEMIAS NOTED     - Diet  Diet Adult Regular (IDDSI Level 7) 25-50%    -Steroids - prednisone 20 mg daily    Remains in TSU. NAEON.  BG well controlled without insulin but remains with decreased PO intake. 3 Days Post-Op. Ultrasound today.       Plan:   - continue low dose correction scale and   - POCT Glucose before meals and at bedtime  - Hypoglycemia protocol in place      ** Please notify Endocrine for any change and/or advance in diet**  ** Please call Endocrine for any BG related issues **     Discharge Planning:   TBD. Please notify endocrinology prior to discharge.

## 2023-01-27 NOTE — PT/OT/SLP PROGRESS
"Physical Therapy Treatment    Patient Name:  Mickye Harris   MRN:  [id number]    Recommendations:     Discharge Recommendations: home health PT  Discharge Equipment Recommendations: none  Barriers to discharge: None    Assessment:     Mickey Harris is a 57 y.o. female admitted with a medical diagnosis of Liver cirrhosis secondary to DORAN (nonalcoholic steatohepatitis).  She presents with the following impairments/functional limitations: impaired endurance, impaired functional mobility, gait instability, impaired balance, impaired cardiopulmonary response to activity Pt declined physical therapy treatment due to planning on receiving an enema this afternoon. Pt was receptive to physical therapy education provided. Pt reported amb with spouse out in hallway multiple times a day. Plan to incorporate stair trials on next visit.    Rehab Prognosis: Good; patient would benefit from acute skilled PT services to address these deficits and reach maximum level of function.    Recent Surgery: Procedure(s) (LRB):  LAPAROTOMY, EXPLORATORY (N/A)  CHOLEDOCHOJEJUNOSTOMY 3 Days Post-Op    Plan:     During this hospitalization, patient to be seen 4 x/week to address the identified rehab impairments via gait training, therapeutic activities, therapeutic exercises and progress toward the following goals:    Plan of Care Expires:  02/19/23    Subjective     Chief Complaint: no new c/c  Patient/Family Comments/goals: "I already have in my head that I don't want to do anything because I am receiving an enema later"  Pain/Comfort:  Pain Rating 1: 0/10  Pain Rating Post-Intervention 1: 0/10      Objective:     Communicated with nurse prior to session.  Patient found HOB elevated with RYLEY drain, PICC line upon PT entry to room.     General Precautions: Standard, fall  Orthopedic Precautions: N/A  Braces: N/A  Respiratory Status: Room air     Functional Mobility:  Not tested due to pt refusing.      AM-PAC 6 CLICK MOBILITY  Turning over in bed " (including adjusting bedclothes, sheets and blankets)?: 3  Sitting down on and standing up from a chair with arms (e.g., wheelchair, bedside commode, etc.): 3  Moving from lying on back to sitting on the side of the bed?: 3  Moving to and from a bed to a chair (including a wheelchair)?: 3  Need to walk in hospital room?: 3  Climbing 3-5 steps with a railing?: 2  Basic Mobility Total Score: 17       Treatment & Education:  Discussed POC and incorporation of stair trials with pt who verbalized understanding. Pt educated on safety with mobility and continued amb for improvement OOB tolerance during hospital stay.     Patient left HOB elevated with all lines intact and call button in reach..    GOALS:   Multidisciplinary Problems       Physical Therapy Goals          Problem: Physical Therapy    Goal Priority Disciplines Outcome Goal Variances Interventions   Physical Therapy Goal     PT, PT/OT Ongoing, Progressing     Description: Goals to be met by: 2023    Patient will increase functional independence with mobility by performin. Sit to stand transfer with Modified [place]  2. Bed to chair transfer with Supervision using LRAD  3. Gait  x 250 feet with Supervision using LRAD.   4. Ascend/descend 6 stair with bilateral Handrails Stand-by Assistance using no AD.   5. Lower extremity exercise program x30 reps per handout, with independence                         Time Tracking:     PT Received On: 23  PT Start Time: 1449     PT Stop Time: 1503  PT Total Time (min): 14 min     Billable Minutes: Therapeutic Activity 14    Treatment Type: Treatment  PT/PTA: PT     PTA Visit Number: 1     2023

## 2023-01-27 NOTE — PLAN OF CARE
Pt remains AAO x 4 with VSS, afebrile, sats upper 90s on RA/2 L NC throughout shift  Chief complaint of incisional pain and back pain - mildly relieved with PRN Oxy  2 RLQ RYLEY drains with SS output - Abd pad covering from leakage at site  Chevron JEROMY with staples - scant leakage on R side  R UA Picc - CDI, saline locked. Ampicillin given q6hrs for bile leak.   LFTs stable, Tbili13 (prev 14.1) - Actigall continued.   NSR/ ST on tele monitor - HR 90-100s. Lopressor started PO this shift for elevated HR earlier in day  Pt refusing enema at this time, reports passing gas and taking Simethicone with relief in gas pain  CBG ACHS - last  - no SSI indicated  Pt up independent and ambulatory, voids in hat, LBM 1/22  Regular diet   Pt remains free from falls and injuries, call light in reach, bed in lowest position, nonskid socks on when OOB, side rails up x2  Will continue to monitor

## 2023-01-27 NOTE — SUBJECTIVE & OBJECTIVE
Scheduled Meds:   ampicillin-sulbactim (UNASYN) IVPB  3 g Intravenous Q6H    bisacodyL  10 mg Oral Daily    dapsone  100 mg Oral Daily    docusate sodium  100 mg Oral BID    fluconazole  200 mg Oral Daily    gabapentin  300 mg Oral TID    heparin (porcine)  5,000 Units Subcutaneous Q8H    LIDOcaine  1 patch Transdermal Q24H    melatonin  6 mg Oral Nightly    metoprolol tartrate  25 mg Oral BID    mycophenolate  500 mg Oral BID    pantoprazole  40 mg Oral Daily    polyethylene glycol  17 g Oral Daily    [START ON 1/28/2023] predniSONE  20 mg Oral Daily    Followed by    [START ON 1/30/2023] predniSONE  15 mg Oral Daily    Followed by    [START ON 2/6/2023] predniSONE  10 mg Oral Daily    Followed by    [START ON 2/13/2023] predniSONE  5 mg Oral Daily    simethicone  1 tablet Oral TID PC    sodium chloride 0.9%  10 mL Intravenous Q6H    tacrolimus  3 mg Oral BID    traZODone  50 mg Oral QHS    ursodioL  300 mg Oral TID    valGANciclovir  450 mg Oral Daily     Continuous Infusions:   sodium chloride 0.9% Stopped (01/19/23 2311)     PRN Meds:albuterol, artificial tears, bisacodyL, calcium carbonate, dextrose 10%, dextrose 10%, glucagon (human recombinant), glucose, glucose, hydrOXYzine pamoate, insulin aspart U-100, ondansetron, oxyCODONE, oxyCODONE, prochlorperazine, sodium chloride 0.9%, Flushing PICC Protocol **AND** sodium chloride 0.9% **AND** sodium chloride 0.9%    Review of Systems   Constitutional:  Positive for fatigue. Negative for activity change, appetite change and fever.   HENT: Negative.     Eyes:  Negative for visual disturbance.   Respiratory:  Negative for shortness of breath.    Cardiovascular:  Negative for chest pain, palpitations and leg swelling.   Gastrointestinal:  Positive for abdominal pain and constipation. Negative for abdominal distention, diarrhea, nausea and vomiting.   Genitourinary:  Negative for decreased urine volume, difficulty urinating and dysuria.   Musculoskeletal:  Negative for  arthralgias, back pain and joint swelling.   Skin:  Positive for wound.   Allergic/Immunologic: Positive for immunocompromised state.   Neurological:  Positive for weakness. Negative for dizziness and facial asymmetry.   Hematological:  Negative for adenopathy. Bruises/bleeds easily.   Psychiatric/Behavioral:  Positive for sleep disturbance. Negative for agitation and behavioral problems.    All other systems reviewed and are negative.  Objective:     Vital Signs (Most Recent):  Temp: 98.5 °F (36.9 °C) (01/27/23 0911)  Pulse: 68 (01/27/23 1502)  Resp: 16 (01/27/23 1510)  BP: (!) 118/55 (01/27/23 0911)  SpO2: 97 % (01/27/23 0911)   Vital Signs (24h Range):  Temp:  [97.6 °F (36.4 °C)-98.5 °F (36.9 °C)] 98.5 °F (36.9 °C)  Pulse:  [68-90] 68  Resp:  [16-18] 16  SpO2:  [95 %-97 %] 97 %  BP: (118-120)/(55-59) 118/55     Weight: 92 kg (202 lb 13.2 oz)  Body mass index is 33.75 kg/m².    Intake/Output - Last 3 Shifts         01/25 0700  01/26 0659 01/26 0700 01/27 0659 01/27 0700  01/28 0659    P.O. 1500 840 0    I.V. (mL/kg) 690.9 (7.5) 100 (1.1)     Other 0 0     IV Piggyback 298 386.8     Total Intake(mL/kg) 2488.9 (26.9) 1326.8 (14.4) 0 (0)    Urine (mL/kg/hr) 3650 (1.6) 2750 (1.2) 150 (0.2)    Emesis/NG output 0 0     Drains 180 405 120    Other 0 0     Stool 0 0     Blood 0 0     Total Output 3830 3155 270    Net -1341.1 -1828.2 -270           Urine Occurrence 0 x 0 x     Stool Occurrence 0 x 0 x     Emesis Occurrence 0 x 0 x             Physical Exam  Vitals and nursing note reviewed.   Constitutional:       Appearance: She is well-developed.      Comments: No hand or temporal muscle wasting noted     HENT:      Head: Normocephalic.   Eyes:      General: Scleral icterus present.      Conjunctiva/sclera: Conjunctivae normal.   Cardiovascular:      Rate and Rhythm: Normal rate and regular rhythm.      Heart sounds: No murmur heard.  Pulmonary:      Effort: Pulmonary effort is normal.      Breath sounds: Normal breath  sounds.   Abdominal:      General: Bowel sounds are normal. There is no distension.      Palpations: Abdomen is soft.      Tenderness: There is abdominal tenderness (over incision as expected).          Comments: RYLEY x 2 in place   Musculoskeletal:         General: Normal range of motion.      Cervical back: Normal range of motion.      Right lower leg: Edema present.      Left lower leg: Edema present.   Skin:     General: Skin is warm and dry.      Capillary Refill: Capillary refill takes less than 2 seconds.      Coloration: Skin is jaundiced.   Neurological:      General: No focal deficit present.      Mental Status: She is alert and oriented to person, place, and time.   Psychiatric:         Mood and Affect: Mood normal.         Behavior: Behavior normal.         Thought Content: Thought content normal.         Judgment: Judgment normal.       Laboratory:  Immunosuppressants           Stop Route Frequency     mycophenolate capsule 500 mg         -- Oral 2 times daily     tacrolimus capsule 3 mg         -- Oral 2 times daily          CBC:   Recent Labs   Lab 01/27/23  0520   WBC 8.78   RBC 2.59*   HGB 8.4*   HCT 24.9*   PLT 83*   MCV 96   MCH 32.4*   MCHC 33.7     CMP:   Recent Labs   Lab 01/27/23  0520   *   CALCIUM 8.1*   ALBUMIN 2.2*   PROT 4.3*   *   K 4.1   CO2 21*      BUN 17   CREATININE 0.7   ALKPHOS 81   ALT 86*   AST 36   BILITOT 9.1*     Labs within the past 24 hours have been reviewed.    Diagnostic Results:  US Liver Transplant Post:  Results for orders placed during the hospital encounter of 01/17/23    US Doppler Liver Transplant Post (xpd)    Narrative  EXAMINATION:  US DOPPLER LIVER TRANSPLANT POST (XPD)    CLINICAL HISTORY:  routine post op exam;    TECHNIQUE:  Limited abdominal ultrasound of the transplant liver with Doppler evaluation.  Color and spectral Doppler were performed.    COMPARISON:  CT abdomen pelvis without contrast 01/22/2023 and Doppler liver transplant  ultrasound 01/20/2023.    FINDINGS:  Patient is status post orthotopic liver transplant on 01/17/2023 (living donor right lobe).  The patient returned to the OR on 01/23/2023 and 01/24/2023 for bile duct reconstruction with Becky-en-Y hepaticojejunostomy.    The liver demonstrates homogeneous echotexture.  No focal hepatic lesions are seen.    No peritransplant fluid collections.    The common duct is not dilated, measuring 3 mm.  No dilated intrahepatic radicles are seen.    Color flow and spectral waveform analysis was performed.  Hepatopetal flow within the main and right portal veins.  Middle hepatic vein velocity measures 80 centimeter/second with proper directional flow (previously 88 centimeter/second).  Right hepatic vein velocity measures 61 centimeter/second with proper direction of flow (previously 57 centimeter/second).  Elevated velocities in the IVC, measuring 317 cm/sec at the anastomosis with turbulent flow compared to 269 cm/sec at the dome and 164 cm/sec inferiorly.    Anastomosis site of the main hepatic artery demonstrates a peak systolic velocity 100 cm/sec (previously 98 centimeter/second).    Main hepatic artery demonstrates resistive index 0.87 with normal waveform (previously 0.85).    Anterior branch of the right hepatic artery demonstrates resistive index 0.84 with normal waveform (previously 0.68).    Posterior branch of the right hepatic artery demonstrates resistive index 0.76 with normal waveform (previously 0.79).    Right pleural effusion.    Impression  Elevated arterial resistive indices, which could represent edema or rejection in the early postoperative setting.    Elevated velocities at the IVC anastomosis.  Attention on follow-up.    Electronically signed by resident: Shelly Salazar  Date:    01/27/2023  Time:    13:11    Electronically signed by: Hunter Siu  Date:    01/27/2023  Time:    14:50    Debility/Functional status: No debility noted.

## 2023-01-27 NOTE — PROGRESS NOTES
[facility] - Transplant Stepdown  Liver Transplant  Progress Note    Patient Name: Mickey Harris  MRN: [id number]  Admission Date: 1/17/2023  Hospital Length of Stay: 10 days  Code Status: Full Code  Primary Care Provider: Primary Doctor No  Post-Operative Day: 10    ORGAN:   RIGHT LIVER LOBE (SEGS 5,6,7,8) WITHOUT MIDDLE HEPATIC VEIN  Disease Etiology: Cirrhosis: Fatty Liver (DORAN)  Donor Type:   Living  CDC High Risk:     Donor CMV Status:   Donor CMV Status:   Donor HBcAB:     Donor HCV Status:     Donor HBV PALOMA:   Donor HCV PALOMA:   Whole or Partial: Split Liver  Biliary Anastomosis: Becky-en-Y  Arterial Anatomy:   Subjective:     History of Present Illness:  Ms. Mickey Harris is a 58 y/o F admitted for living liver transplant from daughter for DORAN cirrhosis.       Hospital Course:  She is now status post living related donor liver transplant on 1/17/22. She is progressing well post op. 2 drains in place. POD#1 Liver US showed minimally elevated intraparenchymal resistive indices, likely due to postoperative edema. Otherwise satisfactory Doppler evaluation of the liver. Transfer to TSU on POD#2. Pt w increased t bili and noted to have more bilious output in RYLEY drain.  T bili from drain fluid was 41. CT A/P 1/22 showed pneumoperitoneum, mild ascites and scattered regions of subcutaneous and rectus sheath emphysema. Pt taken back to OR POD#6 for exploratory lap where the CBD appeared to be under pressure suggesting obstruction. The anterior wall of the duct anastomosis was taken down, small stent was placed from 6 Fr ped feeding tube and secured. Anterior wall was closed. There was extensive bile staining but no obvious sign of active bile leak after repair. The 2 drains had increased bilious output and she was taken back to OR again on POD #7. Now s/p ExLap 1/24/23, biliary system was taken down and reconstructed.     Hospital Interval:    ON with cont tachycardia, now on PO lopressor. C/o trouble with intake, giving  enema and GI cocktail. RYLEY drains with SS output. Pt remains on abx for bile leak, aerobic cx + for staph epi, susceptibilities pending. T bili trending down, LFTs WNL, Cr stable. Routine post op US to be reviewed with surgeon. Patient is OOB, using IS. Electrolytes replaced. VSS. Monitor.      Scheduled Meds:   ampicillin-sulbactim (UNASYN) IVPB  3 g Intravenous Q6H    bisacodyL  10 mg Oral Daily    dapsone  100 mg Oral Daily    docusate sodium  100 mg Oral BID    fluconazole  200 mg Oral Daily    gabapentin  300 mg Oral TID    heparin (porcine)  5,000 Units Subcutaneous Q8H    LIDOcaine  1 patch Transdermal Q24H    melatonin  6 mg Oral Nightly    metoprolol tartrate  25 mg Oral BID    mycophenolate  500 mg Oral BID    pantoprazole  40 mg Oral Daily    polyethylene glycol  17 g Oral Daily    [START ON 1/28/2023] predniSONE  20 mg Oral Daily    Followed by    [START ON 1/30/2023] predniSONE  15 mg Oral Daily    Followed by    [START ON 2/6/2023] predniSONE  10 mg Oral Daily    Followed by    [START ON 2/13/2023] predniSONE  5 mg Oral Daily    simethicone  1 tablet Oral TID PC    sodium chloride 0.9%  10 mL Intravenous Q6H    tacrolimus  3 mg Oral BID    traZODone  50 mg Oral QHS    ursodioL  300 mg Oral TID    valGANciclovir  450 mg Oral Daily     Continuous Infusions:   sodium chloride 0.9% Stopped (01/19/23 2311)     PRN Meds:albuterol, artificial tears, bisacodyL, calcium carbonate, dextrose 10%, dextrose 10%, glucagon (human recombinant), glucose, glucose, hydrOXYzine pamoate, insulin aspart U-100, ondansetron, oxyCODONE, oxyCODONE, prochlorperazine, sodium chloride 0.9%, Flushing PICC Protocol **AND** sodium chloride 0.9% **AND** sodium chloride 0.9%    Review of Systems   Constitutional:  Positive for fatigue. Negative for activity change, appetite change and fever.   HENT: Negative.     Eyes:  Negative for visual disturbance.   Respiratory:  Negative for shortness of breath.     Cardiovascular:  Negative for chest pain, palpitations and leg swelling.   Gastrointestinal:  Positive for abdominal pain and constipation. Negative for abdominal distention, diarrhea, nausea and vomiting.   Genitourinary:  Negative for decreased urine volume, difficulty urinating and dysuria.   Musculoskeletal:  Negative for arthralgias, back pain and joint swelling.   Skin:  Positive for wound.   Allergic/Immunologic: Positive for immunocompromised state.   Neurological:  Positive for weakness. Negative for dizziness and facial asymmetry.   Hematological:  Negative for adenopathy. Bruises/bleeds easily.   Psychiatric/Behavioral:  Positive for sleep disturbance. Negative for agitation and behavioral problems.    All other systems reviewed and are negative.  Objective:     Vital Signs (Most Recent):  Temp: 98.5 °F (36.9 °C) (01/27/23 0911)  Pulse: 68 (01/27/23 1502)  Resp: 16 (01/27/23 1510)  BP: (!) 118/55 (01/27/23 0911)  SpO2: 97 % (01/27/23 0911)   Vital Signs (24h Range):  Temp:  [97.6 °F (36.4 °C)-98.5 °F (36.9 °C)] 98.5 °F (36.9 °C)  Pulse:  [68-90] 68  Resp:  [16-18] 16  SpO2:  [95 %-97 %] 97 %  BP: (118-120)/(55-59) 118/55     Weight: 92 kg (202 lb 13.2 oz)  Body mass index is 33.75 kg/m².    Intake/Output - Last 3 Shifts         01/25 0700  01/26 0659 01/26 0700 01/27 0659 01/27 0700 01/28 0659    P.O. 1500 840 0    I.V. (mL/kg) 690.9 (7.5) 100 (1.1)     Other 0 0     IV Piggyback 298 386.8     Total Intake(mL/kg) 2488.9 (26.9) 1326.8 (14.4) 0 (0)    Urine (mL/kg/hr) 3650 (1.6) 2750 (1.2) 150 (0.2)    Emesis/NG output 0 0     Drains 180 405 120    Other 0 0     Stool 0 0     Blood 0 0     Total Output 3830 3155 270    Net -1341.1 -1828.2 -270           Urine Occurrence 0 x 0 x     Stool Occurrence 0 x 0 x     Emesis Occurrence 0 x 0 x             Physical Exam  Vitals and nursing note reviewed.   Constitutional:       Appearance: She is well-developed.      Comments: No hand or temporal muscle wasting  noted     HENT:      Head: Normocephalic.   Eyes:      General: Scleral icterus present.      Conjunctiva/sclera: Conjunctivae normal.   Cardiovascular:      Rate and Rhythm: Normal rate and regular rhythm.      Heart sounds: No murmur heard.  Pulmonary:      Effort: Pulmonary effort is normal.      Breath sounds: Normal breath sounds.   Abdominal:      General: Bowel sounds are normal. There is no distension.      Palpations: Abdomen is soft.      Tenderness: There is abdominal tenderness (over incision as expected).          Comments: RYLEY x 2 in place   Musculoskeletal:         General: Normal range of motion.      Cervical back: Normal range of motion.      Right lower leg: Edema present.      Left lower leg: Edema present.   Skin:     General: Skin is warm and dry.      Capillary Refill: Capillary refill takes less than 2 seconds.      Coloration: Skin is jaundiced.   Neurological:      General: No focal deficit present.      Mental Status: She is alert and oriented to person, place, and time.   Psychiatric:         Mood and Affect: Mood normal.         Behavior: Behavior normal.         Thought Content: Thought content normal.         Judgment: Judgment normal.       Laboratory:  Immunosuppressants           Stop Route Frequency     mycophenolate capsule 500 mg         -- Oral 2 times daily     tacrolimus capsule 3 mg         -- Oral 2 times daily          CBC:   Recent Labs   Lab 01/27/23  0520   WBC 8.78   RBC 2.59*   HGB 8.4*   HCT 24.9*   PLT 83*   MCV 96   MCH 32.4*   MCHC 33.7     CMP:   Recent Labs   Lab 01/27/23  0520   *   CALCIUM 8.1*   ALBUMIN 2.2*   PROT 4.3*   *   K 4.1   CO2 21*      BUN 17   CREATININE 0.7   ALKPHOS 81   ALT 86*   AST 36   BILITOT 9.1*     Labs within the past 24 hours have been reviewed.    Diagnostic Results:  US Liver Transplant Post:  Results for orders placed during the hospital encounter of 01/17/23    US Doppler Liver Transplant Post  (xpd)    Narrative  EXAMINATION:  US DOPPLER LIVER TRANSPLANT POST (XPD)    CLINICAL HISTORY:  routine post op exam;    TECHNIQUE:  Limited abdominal ultrasound of the transplant liver with Doppler evaluation.  Color and spectral Doppler were performed.    COMPARISON:  CT abdomen pelvis without contrast 01/22/2023 and Doppler liver transplant ultrasound 01/20/2023.    FINDINGS:  Patient is status post orthotopic liver transplant on 01/17/2023 (living donor right lobe).  The patient returned to the OR on 01/23/2023 and 01/24/2023 for bile duct reconstruction with Becky-en-Y hepaticojejunostomy.    The liver demonstrates homogeneous echotexture.  No focal hepatic lesions are seen.    No peritransplant fluid collections.    The common duct is not dilated, measuring 3 mm.  No dilated intrahepatic radicles are seen.    Color flow and spectral waveform analysis was performed.  Hepatopetal flow within the main and right portal veins.  Middle hepatic vein velocity measures 80 centimeter/second with proper directional flow (previously 88 centimeter/second).  Right hepatic vein velocity measures 61 centimeter/second with proper direction of flow (previously 57 centimeter/second).  Elevated velocities in the IVC, measuring 317 cm/sec at the anastomosis with turbulent flow compared to 269 cm/sec at the dome and 164 cm/sec inferiorly.    Anastomosis site of the main hepatic artery demonstrates a peak systolic velocity 100 cm/sec (previously 98 centimeter/second).    Main hepatic artery demonstrates resistive index 0.87 with normal waveform (previously 0.85).    Anterior branch of the right hepatic artery demonstrates resistive index 0.84 with normal waveform (previously 0.68).    Posterior branch of the right hepatic artery demonstrates resistive index 0.76 with normal waveform (previously 0.79).    Right pleural effusion.    Impression  Elevated arterial resistive indices, which could represent edema or rejection in the early  postoperative setting.    Elevated velocities at the IVC anastomosis.  Attention on follow-up.    Electronically signed by resident: Shelly Salazar  Date:    01/27/2023  Time:    13:11    Electronically signed by: Hunter Siu  Date:    01/27/2023  Time:    14:50    Debility/Functional status: No debility noted.    Assessment/Plan:     Paroxysmal tachycardia  - resolved spontaneously or with lopressor 5mg x 2   - now on lopressor BID  - troponins WNL   - EKGs with sinus tachycardia   - cont to monitor closely   - keep tele connected     Hypervolemia  - edema seen in LEs, c/o SOB   - IV lasix given       Common bile duct leak of transplanted liver  - see s/p liver transplant      Thrombocytopenia, unspecified  - cont to improve post transplant      Prophylactic immunotherapy  - continue prograf  - continue to monitor for toxic side effects and check daily levels. Will adjust for therapeutic dose      Long-term use of immunosuppressant medication  - see prophylactic immunotherapy      At risk for opportunistic infections  - Bactrim for PCP ppx.  - Valcyte for CMV ppx.      Acute blood loss anemia  - H/H stable  - no overt signs of bleed  - continue to monitor with daily CBC      Adrenal corticosteroid causing adverse effect in therapeutic use  - endo consulted and following      Hyperglycemia  - likely steroid related, Endocrine following         S/P liver transplant  -DORAN cirrhosis complicated by hepatic encephalopathy who is now status post living related donor liver transplant on 1/17/22. She is progressing well post op. 2 drains in place. POD#1 Liver US looked fine.   Minimally elevated intraparenchymal resistive indices, likely due to postoperative edema.    - Liver US 1/20 reviewed w CBD 3mm w no duct dilation  - elevated t-bili started Ursodiol 1/21  - CT scan ABD 1/22- reviewed  - t bili up to 22, 21.2 today. Fluid sent for t bili on 1/23 was 40.9  - take back to OR 1/23/23 for ex/lap where CBD appeared to be  under pressure suggesting obstruction. The anterior wall of the duct anastomosis was taken down, small stent was placed from 6 Fr ped feeding tube and secured. Anterior wall was closed. There was extensive bile staining but no obvious sign of active bile leak after repair  - TB 1/24 for increased bilious fluid from drains, anastomosis redone, t bili cont to decrease  - cont to trend LFTs  - cont abx        VTE Risk Mitigation (From admission, onward)         Ordered     Place sequential compression device  Until discontinued         01/17/23 2342     heparin (porcine) injection 5,000 Units  Every 8 hours         01/17/23 2148     IP VTE HIGH RISK PATIENT  Once         01/17/23 2148                The patients clinical status was discussed at multidisplinary rounds, involving transplant surgery, transplant medicine, pharmacy, nursing, nutrition, and social work    Discharge Planning:  Not yet stable for dc    Liana Padilla PA-C  Liver Transplant  [facility] - [facility]plant Step[facility]

## 2023-01-28 ENCOUNTER — PATIENT MESSAGE (OUTPATIENT)
Dept: TRANSPLANT | Facility: CLINIC | Age: 58
End: 2023-01-28
Payer: COMMERCIAL

## 2023-01-28 PROBLEM — I48.0 PAROXYSMAL A-FIB: Status: ACTIVE | Noted: 2023-01-28

## 2023-01-28 LAB
ALBUMIN SERPL BCP-MCNC: 2 G/DL (ref 3.5–5.2)
ALP SERPL-CCNC: 133 U/L (ref 55–135)
ALT SERPL W/O P-5'-P-CCNC: 91 U/L (ref 10–44)
ANION GAP SERPL CALC-SCNC: 7 MMOL/L (ref 8–16)
AST SERPL-CCNC: 38 U/L (ref 10–40)
BASOPHILS # BLD AUTO: 0.02 K/UL (ref 0–0.2)
BASOPHILS NFR BLD: 0.2 % (ref 0–1.9)
BILIRUB SERPL-MCNC: 7.2 MG/DL (ref 0.1–1)
BUN SERPL-MCNC: 20 MG/DL (ref 6–20)
CALCIUM SERPL-MCNC: 7.9 MG/DL (ref 8.7–10.5)
CHLORIDE SERPL-SCNC: 101 MMOL/L (ref 95–110)
CO2 SERPL-SCNC: 24 MMOL/L (ref 23–29)
CREAT SERPL-MCNC: 0.8 MG/DL (ref 0.5–1.4)
DIFFERENTIAL METHOD: ABNORMAL
EOSINOPHIL # BLD AUTO: 0.1 K/UL (ref 0–0.5)
EOSINOPHIL NFR BLD: 1.7 % (ref 0–8)
ERYTHROCYTE [DISTWIDTH] IN BLOOD BY AUTOMATED COUNT: 16.6 % (ref 11.5–14.5)
EST. GFR  (NO RACE VARIABLE): >60 ML/MIN/1.73 M^2
GLUCOSE SERPL-MCNC: 150 MG/DL (ref 70–110)
HCT VFR BLD AUTO: 25 % (ref 37–48.5)
HGB BLD-MCNC: 8.3 G/DL (ref 12–16)
IMM GRANULOCYTES # BLD AUTO: 0.38 K/UL (ref 0–0.04)
IMM GRANULOCYTES NFR BLD AUTO: 4.7 % (ref 0–0.5)
LYMPHOCYTES # BLD AUTO: 0.8 K/UL (ref 1–4.8)
LYMPHOCYTES NFR BLD: 10 % (ref 18–48)
MAGNESIUM SERPL-MCNC: 1.9 MG/DL (ref 1.6–2.6)
MCH RBC QN AUTO: 32.2 PG (ref 27–31)
MCHC RBC AUTO-ENTMCNC: 33.2 G/DL (ref 32–36)
MCV RBC AUTO: 97 FL (ref 82–98)
MONOCYTES # BLD AUTO: 0.6 K/UL (ref 0.3–1)
MONOCYTES NFR BLD: 7.6 % (ref 4–15)
NEUTROPHILS # BLD AUTO: 6.2 K/UL (ref 1.8–7.7)
NEUTROPHILS NFR BLD: 75.8 % (ref 38–73)
NRBC BLD-RTO: 1 /100 WBC
PLATELET # BLD AUTO: 91 K/UL (ref 150–450)
PMV BLD AUTO: 12.9 FL (ref 9.2–12.9)
POCT GLUCOSE: 175 MG/DL (ref 70–110)
POCT GLUCOSE: 199 MG/DL (ref 70–110)
POCT GLUCOSE: 228 MG/DL (ref 70–110)
POTASSIUM SERPL-SCNC: 4.3 MMOL/L (ref 3.5–5.1)
PROT SERPL-MCNC: 4.2 G/DL (ref 6–8.4)
RBC # BLD AUTO: 2.58 M/UL (ref 4–5.4)
SODIUM SERPL-SCNC: 132 MMOL/L (ref 136–145)
TACROLIMUS BLD-MCNC: 11 NG/ML (ref 5–15)
WBC # BLD AUTO: 8.14 K/UL (ref 3.9–12.7)

## 2023-01-28 PROCEDURE — 63600175 PHARM REV CODE 636 W HCPCS: Performed by: NURSE PRACTITIONER

## 2023-01-28 PROCEDURE — 25000003 PHARM REV CODE 250: Performed by: STUDENT IN AN ORGANIZED HEALTH CARE EDUCATION/TRAINING PROGRAM

## 2023-01-28 PROCEDURE — 93005 ELECTROCARDIOGRAM TRACING: CPT

## 2023-01-28 PROCEDURE — 63600175 PHARM REV CODE 636 W HCPCS: Performed by: TRANSPLANT SURGERY

## 2023-01-28 PROCEDURE — 93010 EKG 12-LEAD: ICD-10-PCS | Mod: ,,, | Performed by: INTERNAL MEDICINE

## 2023-01-28 PROCEDURE — 99233 PR SUBSEQUENT HOSPITAL CARE,LEVL III: ICD-10-PCS | Mod: 24,,,

## 2023-01-28 PROCEDURE — 63600175 PHARM REV CODE 636 W HCPCS

## 2023-01-28 PROCEDURE — 94761 N-INVAS EAR/PLS OXIMETRY MLT: CPT

## 2023-01-28 PROCEDURE — 99900035 HC TECH TIME PER 15 MIN (STAT)

## 2023-01-28 PROCEDURE — 80053 COMPREHEN METABOLIC PANEL: CPT

## 2023-01-28 PROCEDURE — 20600001 HC STEP DOWN PRIVATE ROOM

## 2023-01-28 PROCEDURE — 25000003 PHARM REV CODE 250: Performed by: NURSE PRACTITIONER

## 2023-01-28 PROCEDURE — 85025 COMPLETE CBC W/AUTO DIFF WBC: CPT

## 2023-01-28 PROCEDURE — 93010 ELECTROCARDIOGRAM REPORT: CPT | Mod: ,,, | Performed by: INTERNAL MEDICINE

## 2023-01-28 PROCEDURE — 80197 ASSAY OF TACROLIMUS: CPT

## 2023-01-28 PROCEDURE — P9047 ALBUMIN (HUMAN), 25%, 50ML: HCPCS | Mod: JG

## 2023-01-28 PROCEDURE — 25000003 PHARM REV CODE 250: Performed by: SURGERY

## 2023-01-28 PROCEDURE — 83735 ASSAY OF MAGNESIUM: CPT

## 2023-01-28 PROCEDURE — 99233 SBSQ HOSP IP/OBS HIGH 50: CPT | Mod: 24,,,

## 2023-01-28 PROCEDURE — 25000003 PHARM REV CODE 250

## 2023-01-28 RX ORDER — TRAZODONE HYDROCHLORIDE 50 MG/1
100 TABLET ORAL NIGHTLY
Status: DISCONTINUED | OUTPATIENT
Start: 2023-01-28 | End: 2023-01-31

## 2023-01-28 RX ORDER — ALBUMIN HUMAN 250 G/1000ML
25 SOLUTION INTRAVENOUS EVERY 6 HOURS
Status: COMPLETED | OUTPATIENT
Start: 2023-01-28 | End: 2023-01-28

## 2023-01-28 RX ORDER — FUROSEMIDE 10 MG/ML
40 INJECTION INTRAMUSCULAR; INTRAVENOUS DAILY
Status: DISCONTINUED | OUTPATIENT
Start: 2023-01-28 | End: 2023-01-30

## 2023-01-28 RX ORDER — TACROLIMUS 1 MG/1
2 CAPSULE ORAL 2 TIMES DAILY
Status: DISCONTINUED | OUTPATIENT
Start: 2023-01-29 | End: 2023-02-01 | Stop reason: HOSPADM

## 2023-01-28 RX ORDER — MAG HYDROX/ALUMINUM HYD/SIMETH 200-200-20
30 SUSPENSION, ORAL (FINAL DOSE FORM) ORAL ONCE
Status: DISCONTINUED | OUTPATIENT
Start: 2023-01-28 | End: 2023-01-31

## 2023-01-28 RX ORDER — ASPIRIN 81 MG/1
81 TABLET ORAL DAILY
Status: DISCONTINUED | OUTPATIENT
Start: 2023-01-28 | End: 2023-02-01 | Stop reason: HOSPADM

## 2023-01-28 RX ORDER — DILTIAZEM HYDROCHLORIDE 30 MG/1
30 TABLET, FILM COATED ORAL EVERY 6 HOURS
Status: DISCONTINUED | OUTPATIENT
Start: 2023-01-28 | End: 2023-01-30

## 2023-01-28 RX ORDER — METOPROLOL TARTRATE 1 MG/ML
5 INJECTION, SOLUTION INTRAVENOUS EVERY 5 MIN PRN
Status: DISCONTINUED | OUTPATIENT
Start: 2023-01-28 | End: 2023-02-01 | Stop reason: HOSPADM

## 2023-01-28 RX ORDER — LIDOCAINE HYDROCHLORIDE 20 MG/ML
15 SOLUTION OROPHARYNGEAL ONCE
Status: DISCONTINUED | OUTPATIENT
Start: 2023-01-28 | End: 2023-01-31

## 2023-01-28 RX ORDER — METOPROLOL TARTRATE 1 MG/ML
INJECTION, SOLUTION INTRAVENOUS
Status: COMPLETED
Start: 2023-01-28 | End: 2023-01-28

## 2023-01-28 RX ADMIN — SIMETHICONE 80 MG: 80 TABLET, CHEWABLE ORAL at 06:01

## 2023-01-28 RX ADMIN — ALBUMIN (HUMAN) 25 G: 12.5 SOLUTION INTRAVENOUS at 06:01

## 2023-01-28 RX ADMIN — INSULIN ASPART 1 UNITS: 100 INJECTION, SOLUTION INTRAVENOUS; SUBCUTANEOUS at 10:01

## 2023-01-28 RX ADMIN — METOROPROLOL TARTRATE 5 MG: 5 INJECTION, SOLUTION INTRAVENOUS at 07:01

## 2023-01-28 RX ADMIN — ALBUMIN (HUMAN) 25 G: 12.5 SOLUTION INTRAVENOUS at 02:01

## 2023-01-28 RX ADMIN — SIMETHICONE 80 MG: 80 TABLET, CHEWABLE ORAL at 09:01

## 2023-01-28 RX ADMIN — AMPICILLIN SODIUM AND SULBACTAM SODIUM 3 G: 2; 1 INJECTION, POWDER, FOR SOLUTION INTRAMUSCULAR; INTRAVENOUS at 05:01

## 2023-01-28 RX ADMIN — PANTOPRAZOLE SODIUM 40 MG: 40 TABLET, DELAYED RELEASE ORAL at 09:01

## 2023-01-28 RX ADMIN — DOCUSATE SODIUM 100 MG: 100 CAPSULE ORAL at 10:01

## 2023-01-28 RX ADMIN — HEPARIN SODIUM 5000 UNITS: 5000 INJECTION INTRAVENOUS; SUBCUTANEOUS at 10:01

## 2023-01-28 RX ADMIN — HEPARIN SODIUM 5000 UNITS: 5000 INJECTION INTRAVENOUS; SUBCUTANEOUS at 02:01

## 2023-01-28 RX ADMIN — POLYETHYLENE GLYCOL 3350 17 G: 17 POWDER, FOR SOLUTION ORAL at 09:01

## 2023-01-28 RX ADMIN — ASPIRIN 81 MG: 81 TABLET, COATED ORAL at 03:01

## 2023-01-28 RX ADMIN — PREDNISONE 20 MG: 20 TABLET ORAL at 09:01

## 2023-01-28 RX ADMIN — URSODIOL 300 MG: 300 CAPSULE ORAL at 10:01

## 2023-01-28 RX ADMIN — HEPARIN SODIUM 5000 UNITS: 5000 INJECTION INTRAVENOUS; SUBCUTANEOUS at 05:01

## 2023-01-28 RX ADMIN — URSODIOL 300 MG: 300 CAPSULE ORAL at 02:01

## 2023-01-28 RX ADMIN — OXYCODONE HYDROCHLORIDE 10 MG: 10 TABLET ORAL at 09:01

## 2023-01-28 RX ADMIN — AMPICILLIN SODIUM AND SULBACTAM SODIUM 3 G: 2; 1 INJECTION, POWDER, FOR SOLUTION INTRAMUSCULAR; INTRAVENOUS at 02:01

## 2023-01-28 RX ADMIN — DOCUSATE SODIUM 100 MG: 100 CAPSULE ORAL at 09:01

## 2023-01-28 RX ADMIN — URSODIOL 300 MG: 300 CAPSULE ORAL at 09:01

## 2023-01-28 RX ADMIN — DAPSONE 100 MG: 100 TABLET ORAL at 09:01

## 2023-01-28 RX ADMIN — MYCOPHENOLATE MOFETIL 500 MG: 250 CAPSULE ORAL at 10:01

## 2023-01-28 RX ADMIN — MYCOPHENOLATE MOFETIL 500 MG: 250 CAPSULE ORAL at 09:01

## 2023-01-28 RX ADMIN — FUROSEMIDE 40 MG: 10 INJECTION, SOLUTION INTRAMUSCULAR; INTRAVENOUS at 02:01

## 2023-01-28 RX ADMIN — FLUCONAZOLE 200 MG: 200 TABLET ORAL at 09:01

## 2023-01-28 RX ADMIN — AMPICILLIN SODIUM AND SULBACTAM SODIUM 3 G: 2; 1 INJECTION, POWDER, FOR SOLUTION INTRAMUSCULAR; INTRAVENOUS at 08:01

## 2023-01-28 RX ADMIN — BISACODYL 10 MG: 5 TABLET, COATED ORAL at 09:01

## 2023-01-28 RX ADMIN — VALGANCICLOVIR 450 MG: 450 TABLET, FILM COATED ORAL at 09:01

## 2023-01-28 RX ADMIN — DILTIAZEM HYDROCHLORIDE 30 MG: 30 TABLET, FILM COATED ORAL at 07:01

## 2023-01-28 RX ADMIN — OXYCODONE HYDROCHLORIDE 10 MG: 10 TABLET ORAL at 02:01

## 2023-01-28 RX ADMIN — TACROLIMUS 3 MG: 1 CAPSULE ORAL at 09:01

## 2023-01-28 RX ADMIN — CALCIUM CARBONATE (ANTACID) CHEW TAB 500 MG 500 MG: 500 CHEW TAB at 04:01

## 2023-01-28 RX ADMIN — TRAZODONE HYDROCHLORIDE 100 MG: 50 TABLET ORAL at 10:01

## 2023-01-28 RX ADMIN — OXYCODONE HYDROCHLORIDE 5 MG: 5 TABLET ORAL at 04:01

## 2023-01-28 NOTE — CARE UPDATE
-Glucose Goal 140-180    -A1C:   Hemoglobin A1C   Date Value Ref Range Status   01/17/2023 4.5 4.0 - 5.6 % Final     Comment:     ADA Screening Guidelines:  5.7-6.4%  Consistent with prediabetes  >or=6.5%  Consistent with diabetes    High levels of fetal hemoglobin interfere with the HbA1C  assay. Heterozygous hemoglobin variants (HbS, HgC, etc)do  not significantly interfere with this assay.   However, presence of multiple variants may affect accuracy.           -HOME REGIMEN: none    -INPATIENT REGIMEN: correction scale     -GLUCOSE TREND FOR THE PAST 24HRS:   Recent Labs   Lab 01/25/23  1721 01/25/23  2352 01/26/23  1215 01/26/23  2038 01/27/23  0919 01/27/23  2212   POCTGLUCOSE 199* 135* 149* 171* 148* 175*           -NO HYPOGYCEMIAS NOTED     - Diet  Diet Adult Regular (IDDSI Level 7) <50%    -Steroids - prednisone 20 mg daily    Remains in TSU. NAEON.  BG well controlled without insulin but remains with decreased PO intake. 4 Days Post-Op.       Plan:   - continue low dose correction scale and   - POCT Glucose before meals and at bedtime  - Hypoglycemia protocol in place      ** Please notify Endocrine for any change and/or advance in diet**  ** Please call Endocrine for any BG related issues **     Discharge Planning:   TBD. Please notify endocrinology prior to discharge.

## 2023-01-28 NOTE — ASSESSMENT & PLAN NOTE
-DORAN cirrhosis complicated by hepatic encephalopathy who is now status post living related donor liver transplant on 1/17/22. She is progressing well post op. 2 drains in place. POD#1 Liver US looked fine.   Minimally elevated intraparenchymal resistive indices, likely due to postoperative edema.    - Liver US 1/20 reviewed w CBD 3mm w no duct dilation  - elevated t-bili started Ursodiol 1/21  - CT scan ABD 1/22- reviewed  - t bili up to 22, 21.2 today. Fluid sent for t bili on 1/23 was 40.9  - take back to OR 1/23/23 for ex/lap where CBD appeared to be under pressure suggesting obstruction. The anterior wall of the duct anastomosis was taken down, small stent was placed from 6 Fr ped feeding tube and secured. Anterior wall was closed. There was extensive bile staining but no obvious sign of active bile leak after repair  - TB 1/24 for increased bilious fluid from drains, anastomosis redone, t bili cont to decrease  - routine US 1/27 stable, reviewed by surgeon   - cont to trend LFTs  - unasyn x 10 days for IA prophylaxis/ + cx (EOT 1/30)

## 2023-01-28 NOTE — ASSESSMENT & PLAN NOTE
- resolved spontaneously or with lopressor 5mg x 2   - lopressor BID dc'd for hypotension   - troponins WNL   - EKGs with sinus tachycardia   - cont to monitor closely   - keep tele connected

## 2023-01-28 NOTE — PROCEDURES
1 additional suture placed to each RYLEY drain site to prevent leaking. Site was cleaned with alcohol, numbed with lidocaine. Patient tolerated procedure well.

## 2023-01-28 NOTE — SUBJECTIVE & OBJECTIVE
Scheduled Meds:   albumin human 25%  25 g Intravenous Q6H    aluminum-magnesium hydroxide-simethicone  30 mL Oral Once    And    LIDOcaine HCl 2%  15 mL Oral Once    ampicillin-sulbactim (UNASYN) IVPB  3 g Intravenous Q6H    aspirin  81 mg Oral Daily    bisacodyL  10 mg Oral Daily    dapsone  100 mg Oral Daily    docusate sodium  100 mg Oral BID    fluconazole  200 mg Oral Daily    furosemide (LASIX) injection  40 mg Intravenous Daily    heparin (porcine)  5,000 Units Subcutaneous Q8H    mycophenolate  500 mg Oral BID    pantoprazole  40 mg Oral Daily    polyethylene glycol  17 g Oral Daily    predniSONE  20 mg Oral Daily    Followed by    [START ON 1/30/2023] predniSONE  15 mg Oral Daily    Followed by    [START ON 2/6/2023] predniSONE  10 mg Oral Daily    Followed by    [START ON 2/13/2023] predniSONE  5 mg Oral Daily    simethicone  1 tablet Oral TID PC    sodium chloride 0.9%  10 mL Intravenous Q6H    tacrolimus  3 mg Oral BID    traZODone  50 mg Oral QHS    ursodioL  300 mg Oral TID    valGANciclovir  450 mg Oral Daily     Continuous Infusions:   sodium chloride 0.9% Stopped (01/19/23 2311)     PRN Meds:albuterol, artificial tears, bisacodyL, calcium carbonate, dextrose 10%, dextrose 10%, glucagon (human recombinant), glucose, glucose, hydrOXYzine pamoate, insulin aspart U-100, ondansetron, oxyCODONE, oxyCODONE, prochlorperazine, sodium chloride 0.9%, Flushing PICC Protocol **AND** sodium chloride 0.9% **AND** sodium chloride 0.9%    Review of Systems   Constitutional:  Positive for fatigue. Negative for activity change, appetite change and fever.   HENT: Negative.     Eyes:  Negative for visual disturbance.   Respiratory:  Negative for shortness of breath.    Cardiovascular:  Negative for chest pain, palpitations and leg swelling.   Gastrointestinal:  Positive for abdominal distention, abdominal pain and constipation. Negative for diarrhea, nausea and vomiting.   Genitourinary:  Negative for decreased urine  volume, difficulty urinating and dysuria.   Musculoskeletal:  Negative for arthralgias, back pain and joint swelling.   Skin:  Positive for wound.   Allergic/Immunologic: Positive for immunocompromised state.   Neurological:  Positive for weakness. Negative for dizziness and facial asymmetry.   Hematological:  Negative for adenopathy. Bruises/bleeds easily.   Psychiatric/Behavioral:  Negative for agitation, behavioral problems and sleep disturbance.    All other systems reviewed and are negative.  Objective:     Vital Signs (Most Recent):  Temp: 98.1 °F (36.7 °C) (01/28/23 1130)  Pulse: 66 (01/28/23 1155)  Resp: 16 (01/28/23 1130)  BP: (!) 114/53 (01/28/23 1130)  SpO2: 97 % (01/28/23 1130)   Vital Signs (24h Range):  Temp:  [97.3 °F (36.3 °C)-98.1 °F (36.7 °C)] 98.1 °F (36.7 °C)  Pulse:  [66-90] 66  Resp:  [16-18] 16  SpO2:  [90 %-98 %] 97 %  BP: ()/(49-64) 114/53     Weight: 92 kg (202 lb 13.2 oz)  Body mass index is 33.75 kg/m².    Intake/Output - Last 3 Shifts         01/26 0700  01/27 0659 01/27 0700  01/28 0659 01/28 0700  01/29 0659    P.O. 840 0 360    I.V. (mL/kg) 100 (1.1)      Other 0      IV Piggyback 386.8 390.6     Total Intake(mL/kg) 1326.8 (14.4) 390.6 (4.2) 360 (3.9)    Urine (mL/kg/hr) 2750 (1.2) 350 (0.2) 400 (0.9)    Emesis/NG output 0      Drains 405 320     Other 0      Stool 0  0    Blood 0      Total Output 3155 670 400    Net -1828.2 -279.4 -40           Urine Occurrence 0 x      Stool Occurrence 0 x  1 x    Emesis Occurrence 0 x              Physical Exam  Vitals and nursing note reviewed.   Constitutional:       Appearance: She is well-developed.      Comments: No hand or temporal muscle wasting noted     HENT:      Head: Normocephalic.   Eyes:      General: Scleral icterus present.      Conjunctiva/sclera: Conjunctivae normal.   Cardiovascular:      Rate and Rhythm: Normal rate and regular rhythm.      Heart sounds: Normal heart sounds. No murmur heard.  Pulmonary:      Effort:  Pulmonary effort is normal.      Breath sounds: Normal breath sounds.   Abdominal:      General: Bowel sounds are normal. There is no distension.      Palpations: Abdomen is soft.      Tenderness: There is abdominal tenderness (over incision as expected).          Comments: RYLEY x 2 in place   Musculoskeletal:         General: Normal range of motion.      Cervical back: Normal range of motion.      Right lower leg: Edema present.      Left lower leg: Edema present.   Skin:     General: Skin is warm and dry.      Capillary Refill: Capillary refill takes less than 2 seconds.      Coloration: Skin is jaundiced.   Neurological:      General: No focal deficit present.      Mental Status: She is alert and oriented to person, place, and time.   Psychiatric:         Mood and Affect: Mood normal.         Behavior: Behavior normal.         Thought Content: Thought content normal.         Judgment: Judgment normal.       Laboratory:  Immunosuppressants           Stop Route Frequency     mycophenolate capsule 500 mg         -- Oral 2 times daily     tacrolimus capsule 3 mg         -- Oral 2 times daily          CBC:   Recent Labs   Lab 01/28/23  0532   WBC 8.14   RBC 2.58*   HGB 8.3*   HCT 25.0*   PLT 91*   MCV 97   MCH 32.2*   MCHC 33.2     CMP:   Recent Labs   Lab 01/28/23  0532   *   CALCIUM 7.9*   ALBUMIN 2.0*   PROT 4.2*   *   K 4.3   CO2 24      BUN 20   CREATININE 0.8   ALKPHOS 133   ALT 91*   AST 38   BILITOT 7.2*     Labs within the past 24 hours have been reviewed.    Diagnostic Results:  US Liver Transplant Post:  Results for orders placed during the hospital encounter of 01/17/23    US Doppler Liver Transplant Post (xpd)    Narrative  EXAMINATION:  US DOPPLER LIVER TRANSPLANT POST (XPD)    CLINICAL HISTORY:  routine post op exam;    TECHNIQUE:  Limited abdominal ultrasound of the transplant liver with Doppler evaluation.  Color and spectral Doppler were performed.    COMPARISON:  CT abdomen pelvis  without contrast 01/22/2023 and Doppler liver transplant ultrasound 01/20/2023.    FINDINGS:  Patient is status post orthotopic liver transplant on 01/17/2023 (living donor right lobe).  The patient returned to the OR on 01/23/2023 and 01/24/2023 for bile duct reconstruction with Becky-en-Y hepaticojejunostomy.    The liver demonstrates homogeneous echotexture.  No focal hepatic lesions are seen.    No peritransplant fluid collections.    The common duct is not dilated, measuring 3 mm.  No dilated intrahepatic radicles are seen.    Color flow and spectral waveform analysis was performed.  Hepatopetal flow within the main and right portal veins.  Middle hepatic vein velocity measures 80 centimeter/second with proper directional flow (previously 88 centimeter/second).  Right hepatic vein velocity measures 61 centimeter/second with proper direction of flow (previously 57 centimeter/second).  Elevated velocities in the IVC, measuring 317 cm/sec at the anastomosis with turbulent flow compared to 269 cm/sec at the dome and 164 cm/sec inferiorly.    Anastomosis site of the main hepatic artery demonstrates a peak systolic velocity 100 cm/sec (previously 98 centimeter/second).    Main hepatic artery demonstrates resistive index 0.87 with normal waveform (previously 0.85).    Anterior branch of the right hepatic artery demonstrates resistive index 0.84 with normal waveform (previously 0.68).    Posterior branch of the right hepatic artery demonstrates resistive index 0.76 with normal waveform (previously 0.79).    Right pleural effusion.    Impression  Elevated arterial resistive indices, which could represent edema or rejection in the early postoperative setting.    Elevated velocities at the IVC anastomosis.  Attention on follow-up.    Electronically signed by resident: Shelly Salazar  Date:    01/27/2023  Time:    13:11    Electronically signed by: Hunter Siu  Date:    01/27/2023  Time:    14:50    Debility/Functional status:  No debility noted.

## 2023-01-28 NOTE — PLAN OF CARE
Patient AAO x4, VSS, afebrile, on room air. Tele- NSR. BID Metoprolol cont; HR 70-90. PRN oxy given for incisional pain. Incision cont to drain SS fluid. Chevron incision is OPA w/ staples. 2 RLQ RYLEY output SS; total ~ 100 ml #1 and 150 ml #2. Continuing Ampicillin Q6 to ENRIQUE PICC. UO good; pt had x2 BM.Pt ambulatory and independent.  at bedside. Reminded to call for assistance. Call bell in reach.

## 2023-01-28 NOTE — PROGRESS NOTES
[facility] - Transplant Stepdown  Liver Transplant  Progress Note    Patient Name: Mickey Harris  MRN: [id number]  Admission Date: 1/17/2023  Hospital Length of Stay: 11 days  Code Status: Full Code  Primary Care Provider: Primary Doctor No  Post-Operative Day: 11    ORGAN:   RIGHT LIVER LOBE (SEGS 5,6,7,8) WITHOUT MIDDLE HEPATIC VEIN  Disease Etiology: Cirrhosis: Fatty Liver (DORAN)  Donor Type:   Living  CDC High Risk:     Donor CMV Status:   Donor CMV Status:   Donor HBcAB:     Donor HCV Status:     Donor HBV PALOMA:   Donor HCV PALOMA:   Whole or Partial: Split Liver  Biliary Anastomosis: Becky-en-Y  Arterial Anatomy:   Subjective:     History of Present Illness:  Ms. Mickey Harris is a 58 y/o F admitted for living liver transplant from daughter for DORAN cirrhosis.       Hospital Course:  She is now status post living related donor liver transplant on 1/17/22. She is progressing well post op. 2 drains in place. POD#1 Liver US showed minimally elevated intraparenchymal resistive indices, likely due to postoperative edema. Otherwise satisfactory Doppler evaluation of the liver. Transfer to TSU on POD#2. Pt w increased t bili and noted to have more bilious output in RYLEY drain.  T bili from drain fluid was 41. CT A/P 1/22 showed pneumoperitoneum, mild ascites and scattered regions of subcutaneous and rectus sheath emphysema. Pt taken back to OR POD#6 for exploratory lap where the CBD appeared to be under pressure suggesting obstruction. The anterior wall of the duct anastomosis was taken down, small stent was placed from 6 Fr ped feeding tube and secured. Anterior wall was closed. There was extensive bile staining but no obvious sign of active bile leak after repair. The 2 drains had increased bilious output and she was taken back to OR again on POD #7. Now s/p ExLap 1/24/23, biliary system was taken down and reconstructed. Had a few episodes of tachycardia, rhythm strips showed sinus tach, started lopressor but started  becoming hypotensive so dc'd.    Hospital Interval:    NAEON. Patient reports feeling better this morning after having a BM. Intake/appetite improving, cont GI cocktail and other reflux medications. Also c/o edema, cont lasix IV. RYLEY drains with SS output. Aerobic cx + for staph epi, susceptible to oxacillin, cont unasyn (EOT 1/30). T bili trending down, LFTs WNL, Cr stable. Routine US 1/27 stable. Patient is OOB, using IS. Electrolytes replaced. VSS. Monitor.      Scheduled Meds:   albumin human 25%  25 g Intravenous Q6H    aluminum-magnesium hydroxide-simethicone  30 mL Oral Once    And    LIDOcaine HCl 2%  15 mL Oral Once    ampicillin-sulbactim (UNASYN) IVPB  3 g Intravenous Q6H    aspirin  81 mg Oral Daily    bisacodyL  10 mg Oral Daily    dapsone  100 mg Oral Daily    docusate sodium  100 mg Oral BID    fluconazole  200 mg Oral Daily    furosemide (LASIX) injection  40 mg Intravenous Daily    heparin (porcine)  5,000 Units Subcutaneous Q8H    mycophenolate  500 mg Oral BID    pantoprazole  40 mg Oral Daily    polyethylene glycol  17 g Oral Daily    predniSONE  20 mg Oral Daily    Followed by    [START ON 1/30/2023] predniSONE  15 mg Oral Daily    Followed by    [START ON 2/6/2023] predniSONE  10 mg Oral Daily    Followed by    [START ON 2/13/2023] predniSONE  5 mg Oral Daily    simethicone  1 tablet Oral TID PC    sodium chloride 0.9%  10 mL Intravenous Q6H    tacrolimus  3 mg Oral BID    traZODone  50 mg Oral QHS    ursodioL  300 mg Oral TID    valGANciclovir  450 mg Oral Daily     Continuous Infusions:   sodium chloride 0.9% Stopped (01/19/23 3940)     PRN Meds:albuterol, artificial tears, bisacodyL, calcium carbonate, dextrose 10%, dextrose 10%, glucagon (human recombinant), glucose, glucose, hydrOXYzine pamoate, insulin aspart U-100, ondansetron, oxyCODONE, oxyCODONE, prochlorperazine, sodium chloride 0.9%, Flushing PICC Protocol **AND** sodium chloride 0.9% **AND** sodium chloride  0.9%    Review of Systems   Constitutional:  Positive for fatigue. Negative for activity change, appetite change and fever.   HENT: Negative.     Eyes:  Negative for visual disturbance.   Respiratory:  Negative for shortness of breath.    Cardiovascular:  Negative for chest pain, palpitations and leg swelling.   Gastrointestinal:  Positive for abdominal distention, abdominal pain and constipation. Negative for diarrhea, nausea and vomiting.   Genitourinary:  Negative for decreased urine volume, difficulty urinating and dysuria.   Musculoskeletal:  Negative for arthralgias, back pain and joint swelling.   Skin:  Positive for wound.   Allergic/Immunologic: Positive for immunocompromised state.   Neurological:  Positive for weakness. Negative for dizziness and facial asymmetry.   Hematological:  Negative for adenopathy. Bruises/bleeds easily.   Psychiatric/Behavioral:  Negative for agitation, behavioral problems and sleep disturbance.    All other systems reviewed and are negative.  Objective:     Vital Signs (Most Recent):  Temp: 98.1 °F (36.7 °C) (01/28/23 1130)  Pulse: 66 (01/28/23 1155)  Resp: 16 (01/28/23 1130)  BP: (!) 114/53 (01/28/23 1130)  SpO2: 97 % (01/28/23 1130)   Vital Signs (24h Range):  Temp:  [97.3 °F (36.3 °C)-98.1 °F (36.7 °C)] 98.1 °F (36.7 °C)  Pulse:  [66-90] 66  Resp:  [16-18] 16  SpO2:  [90 %-98 %] 97 %  BP: ()/(49-64) 114/53     Weight: 92 kg (202 lb 13.2 oz)  Body mass index is 33.75 kg/m².    Intake/Output - Last 3 Shifts         01/26 0700  01/27 0659 01/27 0700  01/28 0659 01/28 0700  01/29 0659    P.O. 840 0 360    I.V. (mL/kg) 100 (1.1)      Other 0      IV Piggyback 386.8 390.6     Total Intake(mL/kg) 1326.8 (14.4) 390.6 (4.2) 360 (3.9)    Urine (mL/kg/hr) 2750 (1.2) 350 (0.2) 400 (0.9)    Emesis/NG output 0      Drains 405 320     Other 0      Stool 0  0    Blood 0      Total Output 3155 670 400    Net -1828.2 -279.4 -40           Urine Occurrence 0 x      Stool Occurrence 0 x  1  x    Emesis Occurrence 0 x              Physical Exam  Vitals and nursing note reviewed.   Constitutional:       Appearance: She is well-developed.      Comments: No hand or temporal muscle wasting noted     HENT:      Head: Normocephalic.   Eyes:      General: Scleral icterus present.      Conjunctiva/sclera: Conjunctivae normal.   Cardiovascular:      Rate and Rhythm: Normal rate and regular rhythm.      Heart sounds: Normal heart sounds. No murmur heard.  Pulmonary:      Effort: Pulmonary effort is normal.      Breath sounds: Normal breath sounds.   Abdominal:      General: Bowel sounds are normal. There is no distension.      Palpations: Abdomen is soft.      Tenderness: There is abdominal tenderness (over incision as expected).          Comments: RYLEY x 2 in place   Musculoskeletal:         General: Normal range of motion.      Cervical back: Normal range of motion.      Right lower leg: Edema present.      Left lower leg: Edema present.   Skin:     General: Skin is warm and dry.      Capillary Refill: Capillary refill takes less than 2 seconds.      Coloration: Skin is jaundiced.   Neurological:      General: No focal deficit present.      Mental Status: She is alert and oriented to person, place, and time.   Psychiatric:         Mood and Affect: Mood normal.         Behavior: Behavior normal.         Thought Content: Thought content normal.         Judgment: Judgment normal.       Laboratory:  Immunosuppressants           Stop Route Frequency     mycophenolate capsule 500 mg         -- Oral 2 times daily     tacrolimus capsule 3 mg         -- Oral 2 times daily          CBC:   Recent Labs   Lab 01/28/23  0532   WBC 8.14   RBC 2.58*   HGB 8.3*   HCT 25.0*   PLT 91*   MCV 97   MCH 32.2*   MCHC 33.2     CMP:   Recent Labs   Lab 01/28/23  0532   *   CALCIUM 7.9*   ALBUMIN 2.0*   PROT 4.2*   *   K 4.3   CO2 24      BUN 20   CREATININE 0.8   ALKPHOS 133   ALT 91*   AST 38   BILITOT 7.2*     Labs  within the past 24 hours have been reviewed.    Diagnostic Results:  US Liver Transplant Post:  Results for orders placed during the hospital encounter of 01/17/23    US Doppler Liver Transplant Post (xpd)    Narrative  EXAMINATION:  US DOPPLER LIVER TRANSPLANT POST (XPD)    CLINICAL HISTORY:  routine post op exam;    TECHNIQUE:  Limited abdominal ultrasound of the transplant liver with Doppler evaluation.  Color and spectral Doppler were performed.    COMPARISON:  CT abdomen pelvis without contrast 01/22/2023 and Doppler liver transplant ultrasound 01/20/2023.    FINDINGS:  Patient is status post orthotopic liver transplant on 01/17/2023 (living donor right lobe).  The patient returned to the OR on 01/23/2023 and 01/24/2023 for bile duct reconstruction with Becky-en-Y hepaticojejunostomy.    The liver demonstrates homogeneous echotexture.  No focal hepatic lesions are seen.    No peritransplant fluid collections.    The common duct is not dilated, measuring 3 mm.  No dilated intrahepatic radicles are seen.    Color flow and spectral waveform analysis was performed.  Hepatopetal flow within the main and right portal veins.  Middle hepatic vein velocity measures 80 centimeter/second with proper directional flow (previously 88 centimeter/second).  Right hepatic vein velocity measures 61 centimeter/second with proper direction of flow (previously 57 centimeter/second).  Elevated velocities in the IVC, measuring 317 cm/sec at the anastomosis with turbulent flow compared to 269 cm/sec at the dome and 164 cm/sec inferiorly.    Anastomosis site of the main hepatic artery demonstrates a peak systolic velocity 100 cm/sec (previously 98 centimeter/second).    Main hepatic artery demonstrates resistive index 0.87 with normal waveform (previously 0.85).    Anterior branch of the right hepatic artery demonstrates resistive index 0.84 with normal waveform (previously 0.68).    Posterior branch of the right hepatic artery  demonstrates resistive index 0.76 with normal waveform (previously 0.79).    Right pleural effusion.    Impression  Elevated arterial resistive indices, which could represent edema or rejection in the early postoperative setting.    Elevated velocities at the IVC anastomosis.  Attention on follow-up.    Electronically signed by resident: Shelly Salazar  Date:    01/27/2023  Time:    13:11    Electronically signed by: Hunter Siu  Date:    01/27/2023  Time:    14:50    Debility/Functional status: No debility noted.    Assessment/Plan:     Paroxysmal tachycardia  - resolved spontaneously or with lopressor 5mg x 2   - lopressor BID dc'd for hypotension   - troponins WNL   - EKGs with sinus tachycardia   - cont to monitor closely   - keep tele connected     Hypervolemia  - edema seen in LEs, c/o SOB   - IV lasix prn       Common bile duct leak of transplanted liver  - see s/p liver transplant      Thrombocytopenia, unspecified  - cont to improve post transplant      Prophylactic immunotherapy  - continue prograf  - continue to monitor for toxic side effects and check daily levels. Will adjust for therapeutic dose      Long-term use of immunosuppressant medication  - see prophylactic immunotherapy      At risk for opportunistic infections  - Bactrim for PCP ppx.  - Valcyte for CMV ppx.      Acute blood loss anemia  - H/H stable  - no overt signs of bleed  - continue to monitor with daily CBC      Adrenal corticosteroid causing adverse effect in therapeutic use  - endo consulted and following      Hyperglycemia  - likely steroid related, Endocrine following         S/P liver transplant  -DORAN cirrhosis complicated by hepatic encephalopathy who is now status post living related donor liver transplant on 1/17/22. She is progressing well post op. 2 drains in place. POD#1 Liver US looked fine.   Minimally elevated intraparenchymal resistive indices, likely due to postoperative edema.    - Liver US 1/20 reviewed w CBD 3mm w no  duct dilation  - elevated t-bili started Ursodiol 1/21  - CT scan ABD 1/22- reviewed  - t bili up to 22, 21.2 today. Fluid sent for t bili on 1/23 was 40.9  - take back to OR 1/23/23 for ex/lap where CBD appeared to be under pressure suggesting obstruction. The anterior wall of the duct anastomosis was taken down, small stent was placed from 6 Fr ped feeding tube and secured. Anterior wall was closed. There was extensive bile staining but no obvious sign of active bile leak after repair  - TB 1/24 for increased bilious fluid from drains, anastomosis redone, t bili cont to decrease  - routine US 1/27 stable, reviewed by surgeon   - cont to trend LFTs  - unasyn x 10 days for IA prophylaxis/ + cx (EOT 1/30)        VTE Risk Mitigation (From admission, onward)         Ordered     Place sequential compression device  Until discontinued         01/17/23 2342     heparin (porcine) injection 5,000 Units  Every 8 hours         01/17/23 2148     IP VTE HIGH RISK PATIENT  Once         01/17/23 2148                The patients clinical status was discussed at multidisplinary rounds, involving transplant surgery, transplant medicine, pharmacy, nursing, nutrition, and social work    Discharge Planning:  Not yet stable for dc    Liana Padilla PA-C  Liver Transplant  [facility] - Transplant Stepdown

## 2023-01-28 NOTE — PLAN OF CARE
Pt AAOx4. VSS, afebrile, on room air. NSR on tele monitoring. Pain controlled w/ PRN Oxycodone. Pt on regular diet, 0 BM/shift. GI cocktail 1x ordered. UOP 1800 cc/shift. IV Lasix & Albumin administered. R RYLEY #1 (70 cc/shift SS output) & #2 (70 cc/shift SS output) intact, RYLEY insertion site leaking, dressing applied. Chevron incision JEROMY w/ staples, leaking on L side, dressing applied. Blue card & self-med box updated, pt taught self-meds, pt pulled AM meds for tomorrow w/ RN. Pt ambulating in room independently, non-skid socks on pt when OOB. Bed in lowest position, wheels locked, call light within pt reach. Spouse @ bedside, participating in care. Pt updated on plan of care.

## 2023-01-29 LAB
ALBUMIN SERPL BCP-MCNC: 2.5 G/DL (ref 3.5–5.2)
ALP SERPL-CCNC: 180 U/L (ref 55–135)
ALT SERPL W/O P-5'-P-CCNC: 86 U/L (ref 10–44)
ANION GAP SERPL CALC-SCNC: 8 MMOL/L (ref 8–16)
AST SERPL-CCNC: 37 U/L (ref 10–40)
BASOPHILS # BLD AUTO: 0.02 K/UL (ref 0–0.2)
BASOPHILS NFR BLD: 0.3 % (ref 0–1.9)
BILIRUB SERPL-MCNC: 5.9 MG/DL (ref 0.1–1)
BUN SERPL-MCNC: 23 MG/DL (ref 6–20)
CALCIUM SERPL-MCNC: 8.3 MG/DL (ref 8.7–10.5)
CHLORIDE SERPL-SCNC: 101 MMOL/L (ref 95–110)
CO2 SERPL-SCNC: 24 MMOL/L (ref 23–29)
CREAT SERPL-MCNC: 0.8 MG/DL (ref 0.5–1.4)
DIFFERENTIAL METHOD: ABNORMAL
EOSINOPHIL # BLD AUTO: 0.1 K/UL (ref 0–0.5)
EOSINOPHIL NFR BLD: 1.3 % (ref 0–8)
ERYTHROCYTE [DISTWIDTH] IN BLOOD BY AUTOMATED COUNT: 17 % (ref 11.5–14.5)
EST. GFR  (NO RACE VARIABLE): >60 ML/MIN/1.73 M^2
GLUCOSE SERPL-MCNC: 186 MG/DL (ref 70–110)
HCT VFR BLD AUTO: 24 % (ref 37–48.5)
HGB BLD-MCNC: 7.8 G/DL (ref 12–16)
IMM GRANULOCYTES # BLD AUTO: 0.24 K/UL (ref 0–0.04)
IMM GRANULOCYTES NFR BLD AUTO: 3.9 % (ref 0–0.5)
LYMPHOCYTES # BLD AUTO: 0.7 K/UL (ref 1–4.8)
LYMPHOCYTES NFR BLD: 11.3 % (ref 18–48)
MAGNESIUM SERPL-MCNC: 1.8 MG/DL (ref 1.6–2.6)
MCH RBC QN AUTO: 32.2 PG (ref 27–31)
MCHC RBC AUTO-ENTMCNC: 32.5 G/DL (ref 32–36)
MCV RBC AUTO: 99 FL (ref 82–98)
MONOCYTES # BLD AUTO: 0.5 K/UL (ref 0.3–1)
MONOCYTES NFR BLD: 8 % (ref 4–15)
NEUTROPHILS # BLD AUTO: 4.6 K/UL (ref 1.8–7.7)
NEUTROPHILS NFR BLD: 75.2 % (ref 38–73)
NRBC BLD-RTO: 1 /100 WBC
PLATELET # BLD AUTO: 82 K/UL (ref 150–450)
PMV BLD AUTO: 12.6 FL (ref 9.2–12.9)
POCT GLUCOSE: 159 MG/DL (ref 70–110)
POCT GLUCOSE: 258 MG/DL (ref 70–110)
POTASSIUM SERPL-SCNC: 4.2 MMOL/L (ref 3.5–5.1)
PROT SERPL-MCNC: 4.6 G/DL (ref 6–8.4)
RBC # BLD AUTO: 2.42 M/UL (ref 4–5.4)
SODIUM SERPL-SCNC: 133 MMOL/L (ref 136–145)
TACROLIMUS BLD-MCNC: 7.6 NG/ML (ref 5–15)
WBC # BLD AUTO: 6.11 K/UL (ref 3.9–12.7)

## 2023-01-29 PROCEDURE — 25000003 PHARM REV CODE 250: Performed by: STUDENT IN AN ORGANIZED HEALTH CARE EDUCATION/TRAINING PROGRAM

## 2023-01-29 PROCEDURE — 99233 PR SUBSEQUENT HOSPITAL CARE,LEVL III: ICD-10-PCS | Mod: 24,,,

## 2023-01-29 PROCEDURE — 20600001 HC STEP DOWN PRIVATE ROOM

## 2023-01-29 PROCEDURE — 99233 SBSQ HOSP IP/OBS HIGH 50: CPT | Mod: 24,,,

## 2023-01-29 PROCEDURE — 25000003 PHARM REV CODE 250

## 2023-01-29 PROCEDURE — 93005 ELECTROCARDIOGRAM TRACING: CPT

## 2023-01-29 PROCEDURE — 80197 ASSAY OF TACROLIMUS: CPT

## 2023-01-29 PROCEDURE — 83735 ASSAY OF MAGNESIUM: CPT

## 2023-01-29 PROCEDURE — 93010 EKG 12-LEAD: ICD-10-PCS | Mod: ,,, | Performed by: INTERNAL MEDICINE

## 2023-01-29 PROCEDURE — 25000003 PHARM REV CODE 250: Performed by: NURSE PRACTITIONER

## 2023-01-29 PROCEDURE — 25000003 PHARM REV CODE 250: Performed by: SURGERY

## 2023-01-29 PROCEDURE — 80053 COMPREHEN METABOLIC PANEL: CPT

## 2023-01-29 PROCEDURE — 93010 ELECTROCARDIOGRAM REPORT: CPT | Mod: ,,, | Performed by: INTERNAL MEDICINE

## 2023-01-29 PROCEDURE — 63600175 PHARM REV CODE 636 W HCPCS

## 2023-01-29 PROCEDURE — 85025 COMPLETE CBC W/AUTO DIFF WBC: CPT

## 2023-01-29 RX ORDER — HYDROXYZINE HYDROCHLORIDE 25 MG/1
25 TABLET, FILM COATED ORAL 3 TIMES DAILY PRN
Status: DISCONTINUED | OUTPATIENT
Start: 2023-01-29 | End: 2023-01-29

## 2023-01-29 RX ADMIN — URSODIOL 300 MG: 300 CAPSULE ORAL at 09:01

## 2023-01-29 RX ADMIN — PREDNISONE 20 MG: 20 TABLET ORAL at 09:01

## 2023-01-29 RX ADMIN — OXYCODONE HYDROCHLORIDE 10 MG: 10 TABLET ORAL at 01:01

## 2023-01-29 RX ADMIN — HEPARIN SODIUM 5000 UNITS: 5000 INJECTION INTRAVENOUS; SUBCUTANEOUS at 02:01

## 2023-01-29 RX ADMIN — URSODIOL 300 MG: 300 CAPSULE ORAL at 03:01

## 2023-01-29 RX ADMIN — INSULIN ASPART 1 UNITS: 100 INJECTION, SOLUTION INTRAVENOUS; SUBCUTANEOUS at 09:01

## 2023-01-29 RX ADMIN — SIMETHICONE 80 MG: 80 TABLET, CHEWABLE ORAL at 03:01

## 2023-01-29 RX ADMIN — HEPARIN SODIUM 5000 UNITS: 5000 INJECTION INTRAVENOUS; SUBCUTANEOUS at 05:01

## 2023-01-29 RX ADMIN — FUROSEMIDE 40 MG: 10 INJECTION, SOLUTION INTRAMUSCULAR; INTRAVENOUS at 09:01

## 2023-01-29 RX ADMIN — TACROLIMUS 2 MG: 1 CAPSULE ORAL at 06:01

## 2023-01-29 RX ADMIN — OXYCODONE HYDROCHLORIDE 10 MG: 10 TABLET ORAL at 09:01

## 2023-01-29 RX ADMIN — ASPIRIN 81 MG: 81 TABLET, COATED ORAL at 09:01

## 2023-01-29 RX ADMIN — DOCUSATE SODIUM 100 MG: 100 CAPSULE ORAL at 09:01

## 2023-01-29 RX ADMIN — DILTIAZEM HYDROCHLORIDE 30 MG: 30 TABLET, FILM COATED ORAL at 05:01

## 2023-01-29 RX ADMIN — SIMETHICONE 80 MG: 80 TABLET, CHEWABLE ORAL at 09:01

## 2023-01-29 RX ADMIN — TACROLIMUS 2 MG: 1 CAPSULE ORAL at 09:01

## 2023-01-29 RX ADMIN — MYCOPHENOLATE MOFETIL 500 MG: 250 CAPSULE ORAL at 09:01

## 2023-01-29 RX ADMIN — BISACODYL 10 MG: 5 TABLET, COATED ORAL at 09:01

## 2023-01-29 RX ADMIN — DAPSONE 100 MG: 100 TABLET ORAL at 09:01

## 2023-01-29 RX ADMIN — AMPICILLIN SODIUM AND SULBACTAM SODIUM 3 G: 2; 1 INJECTION, POWDER, FOR SOLUTION INTRAMUSCULAR; INTRAVENOUS at 03:01

## 2023-01-29 RX ADMIN — POLYETHYLENE GLYCOL 3350 17 G: 17 POWDER, FOR SOLUTION ORAL at 09:01

## 2023-01-29 RX ADMIN — DILTIAZEM HYDROCHLORIDE 30 MG: 30 TABLET, FILM COATED ORAL at 12:01

## 2023-01-29 RX ADMIN — AMPICILLIN SODIUM AND SULBACTAM SODIUM 3 G: 2; 1 INJECTION, POWDER, FOR SOLUTION INTRAMUSCULAR; INTRAVENOUS at 08:01

## 2023-01-29 RX ADMIN — HEPARIN SODIUM 5000 UNITS: 5000 INJECTION INTRAVENOUS; SUBCUTANEOUS at 09:01

## 2023-01-29 RX ADMIN — AMPICILLIN SODIUM AND SULBACTAM SODIUM 3 G: 2; 1 INJECTION, POWDER, FOR SOLUTION INTRAMUSCULAR; INTRAVENOUS at 09:01

## 2023-01-29 RX ADMIN — FLUCONAZOLE 200 MG: 200 TABLET ORAL at 09:01

## 2023-01-29 RX ADMIN — PANTOPRAZOLE SODIUM 40 MG: 40 TABLET, DELAYED RELEASE ORAL at 09:01

## 2023-01-29 RX ADMIN — DILTIAZEM HYDROCHLORIDE 30 MG: 30 TABLET, FILM COATED ORAL at 11:01

## 2023-01-29 RX ADMIN — VALGANCICLOVIR 450 MG: 450 TABLET, FILM COATED ORAL at 09:01

## 2023-01-29 RX ADMIN — DILTIAZEM HYDROCHLORIDE 30 MG: 30 TABLET, FILM COATED ORAL at 06:01

## 2023-01-29 NOTE — ASSESSMENT & PLAN NOTE
- cardiology consulted   - CHADVASC 2 (female gender and HTN), no need for AC   - rate controled with cardizem 30q6h, can switch to 120 mg daily extended release

## 2023-01-29 NOTE — PLAN OF CARE
Care update    Patient seen this morning with staff  No cardiac symptoms  Had periop afib with RVR, now in NSR in 80s  CHADVASC 2 (female gender and HTN), on ASA 81 daily with low platelets  For rate control, on cardizem 30q6h, can switch to 120 mg daily extended release  Follow with EP as outpatient     Cardiology to sign off

## 2023-01-29 NOTE — PROGRESS NOTES
[facility] Transplant Stepdown  Liver Transplant  Progress Note    Patient Name: Mickey Harris  MRN: [id number]  Admission Date: 1/17/2023  Hospital Length of Stay: 12 days  Code Status: Full Code  Primary Care Provider: Primary Doctor No  Post-Operative Day: 12    ORGAN:   RIGHT LIVER LOBE (SEGS 5,6,7,8) WITHOUT MIDDLE HEPATIC VEIN  Disease Etiology: Cirrhosis: Fatty Liver (DORAN)  Donor Type:   Living  CDC High Risk:     Donor CMV Status:   Donor CMV Status:   Donor HBcAB:     Donor HCV Status:     Donor HBV PAOLMA:   Donor HCV PALOMA:   Whole or Partial: Split Liver  Biliary Anastomosis: Becky-en-Y  Arterial Anatomy:   Subjective:     History of Present Illness:  Ms. Mickey Harris is a 58 y/o F admitted for living liver transplant from daughter for DORAN cirrhosis.       Hospital Course:  She is now status post living related donor liver transplant on 1/17/22. She is progressing well post op. 2 drains in place. POD#1 Liver US showed minimally elevated intraparenchymal resistive indices, likely due to postoperative edema. Otherwise satisfactory Doppler evaluation of the liver. Transfer to TSU on POD#2. Pt w increased t bili and noted to have more bilious output in RYLEY drain.  T bili from drain fluid was 41. CT A/P 1/22 showed pneumoperitoneum, mild ascites and scattered regions of subcutaneous and rectus sheath emphysema. Pt taken back to OR POD#6 for exploratory lap where the CBD appeared to be under pressure suggesting obstruction. The anterior wall of the duct anastomosis was taken down, small stent was placed from 6 Fr ped feeding tube and secured. Anterior wall was closed. There was extensive bile staining but no obvious sign of active bile leak after repair. The 2 drains had increased bilious output and she was taken back to OR again on POD #7. Now s/p ExLap 1/24/23, biliary system was taken down and reconstructed. Had a few episodes of tachycardia, rhythm strips showed sinus tach, started lopressor but started  becoming hypotensive so dc'd. Aerobic cx + for staph epi, susceptible to oxacillin, cont unasyn (EOT 1/30). Routine US 1/27 stable.    Hospital Interval:    NAEON. Patient reports feeling ok this morning. Intake/appetite improving, cont GI cocktail and other reflux medications. C/o decreased sleep, added atarax to regimen. Also c/o edema, cont lasix IV. RYLEY drains with SS output. T bili trending down, LFTs WNL, Cr stable. Patient is OOB, using IS. Electrolytes replaced. VSS. Monitor.      Scheduled Meds:   aluminum-magnesium hydroxide-simethicone  30 mL Oral Once    And    LIDOcaine HCl 2%  15 mL Oral Once    ampicillin-sulbactim (UNASYN) IVPB  3 g Intravenous Q6H    aspirin  81 mg Oral Daily    bisacodyL  10 mg Oral Daily    dapsone  100 mg Oral Daily    diltiaZEM  30 mg Oral Q6H    docusate sodium  100 mg Oral BID    fluconazole  200 mg Oral Daily    furosemide (LASIX) injection  40 mg Intravenous Daily    heparin (porcine)  5,000 Units Subcutaneous Q8H    mycophenolate  500 mg Oral BID    pantoprazole  40 mg Oral Daily    polyethylene glycol  17 g Oral Daily    [START ON 1/30/2023] predniSONE  15 mg Oral Daily    Followed by    [START ON 2/6/2023] predniSONE  10 mg Oral Daily    Followed by    [START ON 2/13/2023] predniSONE  5 mg Oral Daily    simethicone  1 tablet Oral TID PC    sodium chloride 0.9%  10 mL Intravenous Q6H    tacrolimus  2 mg Oral BID    traZODone  100 mg Oral QHS    ursodioL  300 mg Oral TID    valGANciclovir  450 mg Oral Daily     Continuous Infusions:   sodium chloride 0.9% Stopped (01/19/23 3119)     PRN Meds:albuterol, artificial tears, bisacodyL, calcium carbonate, dextrose 10%, dextrose 10%, glucagon (human recombinant), glucose, glucose, hydrOXYzine HCL, hydrOXYzine pamoate, insulin aspart U-100, metoprolol, ondansetron, oxyCODONE, oxyCODONE, prochlorperazine, sodium chloride 0.9%, Flushing PICC Protocol **AND** sodium chloride 0.9% **AND** sodium chloride  0.9%    Review of Systems   Constitutional:  Positive for fatigue. Negative for activity change, appetite change and fever.   HENT: Negative.     Eyes:  Negative for visual disturbance.   Respiratory:  Negative for shortness of breath.    Cardiovascular:  Negative for chest pain, palpitations and leg swelling.   Gastrointestinal:  Positive for abdominal distention and abdominal pain. Negative for constipation, diarrhea, nausea and vomiting.   Genitourinary:  Negative for decreased urine volume, difficulty urinating and dysuria.   Musculoskeletal:  Negative for arthralgias, back pain and joint swelling.   Skin:  Positive for wound.   Allergic/Immunologic: Positive for immunocompromised state.   Neurological:  Positive for weakness. Negative for dizziness and facial asymmetry.   Hematological:  Negative for adenopathy. Bruises/bleeds easily.   Psychiatric/Behavioral:  Negative for agitation, behavioral problems and sleep disturbance.    All other systems reviewed and are negative.  Objective:     Vital Signs (Most Recent):  Temp: 98.1 °F (36.7 °C) (01/29/23 1123)  Pulse: 84 (01/29/23 1449)  Resp: 16 (01/29/23 1123)  BP: (!) 102/55 (01/29/23 1123)  SpO2: 95 % (01/29/23 1123)   Vital Signs (24h Range):  Temp:  [97.6 °F (36.4 °C)-98.2 °F (36.8 °C)] 98.1 °F (36.7 °C)  Pulse:  [] 84  Resp:  [16-28] 16  SpO2:  [95 %-97 %] 95 %  BP: (100-110)/(49-83) 102/55     Weight: 92 kg (202 lb 13.2 oz)  Body mass index is 33.75 kg/m².    Intake/Output - Last 3 Shifts         01/27 0700  01/28 0659 01/28 0700  01/29 0659 01/29 0700  01/30 0659    P.O. 0 900 120    I.V. (mL/kg)  0 (0)     Other  0     IV Piggyback 390.6      Total Intake(mL/kg) 390.6 (4.2) 900 (9.8) 120 (1.3)    Urine (mL/kg/hr) 350 (0.2) 2700 (1.2) 200 (0.2)    Emesis/NG output  0     Drains 320 310 150    Other  0     Stool  0     Blood  0     Total Output 670 3010 350    Net -279.4 -2110 -230           Urine Occurrence  0 x     Stool Occurrence  2 x     Emesis  Occurrence  0 x             Physical Exam  Vitals and nursing note reviewed.   Constitutional:       Appearance: She is well-developed.      Comments: No hand or temporal muscle wasting noted     HENT:      Head: Normocephalic.   Eyes:      General: Scleral icterus present.      Conjunctiva/sclera: Conjunctivae normal.   Cardiovascular:      Rate and Rhythm: Normal rate and regular rhythm.      Heart sounds: Normal heart sounds. No murmur heard.  Pulmonary:      Effort: Pulmonary effort is normal.      Breath sounds: Normal breath sounds.   Abdominal:      General: Bowel sounds are normal. There is no distension.      Palpations: Abdomen is soft.      Tenderness: There is abdominal tenderness (over incision as expected).          Comments: RYLEY x 2 in place   Musculoskeletal:         General: Normal range of motion.      Cervical back: Normal range of motion.      Right lower leg: Edema present.      Left lower leg: Edema present.   Skin:     General: Skin is warm and dry.      Capillary Refill: Capillary refill takes less than 2 seconds.      Coloration: Skin is jaundiced.   Neurological:      General: No focal deficit present.      Mental Status: She is alert and oriented to person, place, and time.   Psychiatric:         Mood and Affect: Mood normal.         Behavior: Behavior normal.         Thought Content: Thought content normal.         Judgment: Judgment normal.       Laboratory:  Immunosuppressants           Stop Route Frequency     tacrolimus capsule 2 mg         -- Oral 2 times daily     mycophenolate capsule 500 mg         -- Oral 2 times daily          CBC:   Recent Labs   Lab 01/29/23  0538   WBC 6.11   RBC 2.42*   HGB 7.8*   HCT 24.0*   PLT 82*   MCV 99*   MCH 32.2*   MCHC 32.5     CMP:   Recent Labs   Lab 01/29/23  0538   *   CALCIUM 8.3*   ALBUMIN 2.5*   PROT 4.6*   *   K 4.2   CO2 24      BUN 23*   CREATININE 0.8   ALKPHOS 180*   ALT 86*   AST 37   BILITOT 5.9*     Labs within the  "past 24 hours have been reviewed.    Diagnostic Results:  I have personally reviewed all pertinent imaging studies.    Debility/Functional status: No debility noted.    Assessment/Plan:     Paroxysmal A-fib  - cardiology consulted   - CHADVASC 2 (female gender and HTN), no need for AC   - rate controled with cardizem 30q6h, can switch to 120 mg daily extended release      Paroxysmal tachycardia  - resolved spontaneously or with lopressor 5mg x 2   - lopressor BID dc'd for hypotension   - troponins WNL   - EKGs with sinus tachycardia   - another episode with AF seen on EKG, see "paroxamal AF"  - cont to monitor closely   - keep tele connected     Hypervolemia  - edema seen in LEs, c/o SOB   - IV lasix prn       Common bile duct leak of transplanted liver  - see s/p liver transplant      Thrombocytopenia, unspecified  - cont to improve post transplant      Prophylactic immunotherapy  - continue prograf  - continue to monitor for toxic side effects and check daily levels. Will adjust for therapeutic dose      Long-term use of immunosuppressant medication  - see prophylactic immunotherapy      At risk for opportunistic infections  - Bactrim for PCP ppx.  - Valcyte for CMV ppx.      Acute blood loss anemia  - H/H stable  - no overt signs of bleed  - continue to monitor with daily CBC      Adrenal corticosteroid causing adverse effect in therapeutic use  - endo consulted and following      Hyperglycemia  - likely steroid related, Endocrine following         S/P liver transplant  -DORAN cirrhosis complicated by hepatic encephalopathy who is now status post living related donor liver transplant on 1/17/22. She is progressing well post op. 2 drains in place. POD#1 Liver US looked fine.   Minimally elevated intraparenchymal resistive indices, likely due to postoperative edema.    - Liver US 1/20 reviewed w CBD 3mm w no duct dilation  - elevated t-bili started Ursodiol 1/21  - CT scan ABD 1/22- reviewed  - t bili up to 22, 21.2 " today. Fluid sent for t bili on 1/23 was 40.9  - take back to OR 1/23/23 for ex/lap where CBD appeared to be under pressure suggesting obstruction. The anterior wall of the duct anastomosis was taken down, small stent was placed from 6 Fr ped feeding tube and secured. Anterior wall was closed. There was extensive bile staining but no obvious sign of active bile leak after repair  - TB 1/24 for increased bilious fluid from drains, anastomosis redone, t bili cont to decrease  - routine US 1/27 stable, reviewed by surgeon   - cont to trend LFTs  - unasyn x 10 days for IA prophylaxis/ + cx (EOT 1/30)        VTE Risk Mitigation (From admission, onward)         Ordered     Place sequential compression device  Until discontinued         01/17/23 2342     heparin (porcine) injection 5,000 Units  Every 8 hours         01/17/23 2148     IP VTE HIGH RISK PATIENT  Once         01/17/23 2148                The patients clinical status was discussed at multidisplinary rounds, involving transplant surgery, transplant medicine, pharmacy, nursing, nutrition, and social work    Discharge Planning:  Not yet stable for dc     Liana Padilla PA-C  Liver Transplant  [facility] Transplant Step[facility]

## 2023-01-29 NOTE — PLAN OF CARE
Pt AAOx4. VSS, afebrile, on room air. NSR on tele monitoring. Pain controlled w/ PRN Oxycodone. Pt on regular diet, 0 BM/shift. Pt w/ little appetite, eating <25% of meals.  cc/shift, IVP Lasix continued. R RYLEY drain #1 intact w/ 125 cc/shift SS output, #2 intact w/ 125 cc/shift SS output. RYLEY drain sites leaking SS output, dressing changed to RLQ. Betadine applied to chevron incision, incision leaking SS output, dressings changed. Blue card updated, self-med box stocked. Pt ambulating in room independently, non-skid socks on pt when OOB. Bed in lowest position, wheels locked, call light within pt reach. Pt updated on plan of care. Family @ bedside.

## 2023-01-29 NOTE — CARE UPDATE
-Glucose Goal 140-180    -A1C:   Hemoglobin A1C   Date Value Ref Range Status   01/17/2023 4.5 4.0 - 5.6 % Final     Comment:     ADA Screening Guidelines:  5.7-6.4%  Consistent with prediabetes  >or=6.5%  Consistent with diabetes    High levels of fetal hemoglobin interfere with the HbA1C  assay. Heterozygous hemoglobin variants (HbS, HgC, etc)do  not significantly interfere with this assay.   However, presence of multiple variants may affect accuracy.           -HOME REGIMEN: none    -INPATIENT REGIMEN: correction scale     -GLUCOSE TREND FOR THE PAST 24HRS:   Recent Labs   Lab 01/26/23  1215 01/26/23  2038 01/27/23  0919 01/27/23  2212 01/28/23  1242 01/28/23 2212   POCTGLUCOSE 149* 171* 148* 175* 199* 228*           -NO HYPOGYCEMIAS NOTED     - Diet  Diet Adult Regular (IDDSI Level 7) <50%    -Steroids - prednisone 20 mg daily    Remains in TSU. NAEON.  BG at or slightly above goal; required correction scale x1. Still with minimal po intake per notes. 5 Days Post-Op.       Plan:   - continue low dose correction scale and   - POCT Glucose before meals and at bedtime  - Hypoglycemia protocol in place      ** Please notify Endocrine for any change and/or advance in diet**  ** Please call Endocrine for any BG related issues **     Discharge Planning:   TBD. Please notify endocrinology prior to discharge.

## 2023-01-29 NOTE — ASSESSMENT & PLAN NOTE
"- resolved spontaneously or with lopressor 5mg x 2   - lopressor BID dc'd for hypotension   - troponins WNL   - EKGs with sinus tachycardia   - another episode with AF seen on EKG, see "paroxamal AF"  - cont to monitor closely   - keep tele connected   "

## 2023-01-29 NOTE — SUBJECTIVE & OBJECTIVE
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     Mild tricuspid regurgitation     Unspecified cirrhosis of liver        Past Surgical History:   Procedure Laterality Date    cholecystectomy  2003    CHOLEDOCHOJEJUNOSTOMY  1/24/2023    Procedure: CHOLEDOCHOJEJUNOSTOMY;  Surgeon: Mac Ayala MD;  [facility];  Service: Transplant;;    COLONOSCOPY      6 polyps removed    COLONOSCOPY  07/07/2021    DIAGNOSTIC ULTRASOUND N/A 1/17/2023    Procedure: ULTRASOUND, DIAGNOSTIC;  Surgeon: Juan Pablo Kimbrough MD;  Location: [facility] OR [address] [place] [address];  Service: Transplant;  Laterality: N/A;  INTRAOPERATIVE ULTRASOUND    ESOPHAGOGASTRODUODENOSCOPY      2 coulums of grade 1 varices    ESOPHAGOGASTRODUODENOSCOPY  11/01/2019    trace varices    ESOPHAGOGASTRODUODENOSCOPY  07/13/2021    ESOPHAGOGASTRODUODENOSCOPY N/A 9/20/2022    Procedure: EGD (ESOPHAGOGASTRODUODENOSCOPY);  Surgeon: Bruce Dominguez MD;  Location: [facility] (4TH FLR);  Service: Endoscopy;  Laterality: N/A;  labs prior  liver transplant candidate  screen for varices  8/18 fully vaccinated; instructions to portal-LW  8/19 pt rescheduled; updated instructions to portal-st    gynecologic surgery       removal of scar tissues and adhesions    HYSTERECTOMY  1989    knee surgery  1992    LAPAROTOMY, EXPLORATORY N/A 1/23/2023    Procedure: LAPAROTOMY, EXPLORATORY;  Surgeon: Jordon Lamb MD;  [address];  Service: Transplant;  Laterality: N/A;    LAPAROTOMY, EXPLORATORY N/A 1/24/2023    Procedure: LAPAROTOMY, EXPLORATORY;  Surgeon: Mac Ayala MD;  Location: [facility] OR [address] [place] [address];  Service: Transplant;  Laterality: N/A;    LIVER TRANSPLANT N/A 1/17/2023    Procedure: TRANSPLANT, LIVER;  Surgeon: Juan Pablo Kimbrough MD;  Location: [facility] OR [address] [place] [address];  Service: Transplant;   Laterality: N/A;    OOPHORECTOMY Right     OPEN hysterectomy      removed uterus and bilateral fallopian tubes and LEFT ovary stayed in place    ROTATOR CUFF REPAIR Right     HOANG-EN-Y PROCEDURE  2023    Procedure: CREATION, ANASTOMOSIS, HOANG-EN-Y;  Surgeon: Juan Pablo Kimbrough MD;  Location: [facility] OR [address] [place] [address];  Service: Transplant;;    TONSILLECTOMY         Review of patient's allergies indicates:   Allergen Reactions    Levofloxacin Hives and Shortness Of Breath    Sulfa (sulfonamide antibiotics) Rash       No current facility-administered medications on file prior to encounter.     Current Outpatient Medications on File Prior to Encounter   Medication Sig    albuterol (PROVENTIL/VENTOLIN HFA) 90 mcg/actuation inhaler SMARTSI Puff(s) Via Inhaler Every 4 Hours PRN    biotin 1 mg Cap Take 1 mg by mouth once daily.    cyanocobalamin, vitamin B-12, 50 mcg tablet Take 1 tablet by mouth once daily.    furosemide (LASIX) 20 MG tablet Take 40 mg by mouth once daily.    hydrOXYzine HCL (ATARAX) 25 MG tablet Take 25 mg by mouth 3 (three) times daily.    lactulose (CHRONULAC) 10 gram/15 mL solution SMARTSIG:15 Milliliter(s) By Mouth 3 Times Daily    loratadine-pseudoephedrine  mg (CLARITIN-D 24-HOUR)  mg per 24 hr tablet Take 1 tablet by mouth once daily.    MG-PLUS-PROTEIN 133 mg tablet Take 1 tablet by mouth once daily.    spironolactone (ALDACTONE) 50 MG tablet Take 100 mg by mouth once daily.    vitamin E, dl,tocopheryl acet, (VITAMIN E, DL, ACETATE,) 45 mg (100 unit) Cap Take 1 tablet by mouth once daily.    ergocalciferol (ERGOCALCIFEROL) 50,000 unit Cap Take 50,000 Units by mouth every 7 days.    fluticasone propionate (FLOVENT HFA) 220 mcg/actuation inhaler Inhale 1 puff into the lungs daily as needed.    ondansetron (ZOFRAN) 4 MG tablet Take 4 mg by mouth daily as needed.     Family History       Problem Relation (Age of Onset)    Arrhythmia Brother    Depression Father    Heart disease  Father, Brother    Hyperlipidemia Father    Hypertension Father          Tobacco Use    Smoking status: Former     Packs/day: 1.00     Types: Cigarettes     Quit date:      Years since quittin.0    Smokeless tobacco: Never   Substance and Sexual Activity    Alcohol use: Not Currently    Drug use: Not Currently    Sexual activity: Yes     Partners: Male     Review of Systems   Constitutional: Negative for chills, diaphoresis and fever.   HENT:  Negative for hearing loss and sore throat.    Eyes:  Negative for double vision.   Cardiovascular:  Positive for palpitations. Negative for chest pain, dyspnea on exertion, irregular heartbeat, near-syncope and orthopnea.   Respiratory:  Negative for cough and shortness of breath.    Musculoskeletal:  Negative for back pain and joint swelling.   Gastrointestinal:  Negative for bloating, abdominal pain, nausea and vomiting.   Genitourinary:  Negative for dysuria and flank pain.   Neurological:  Negative for dizziness, focal weakness and headaches.   Psychiatric/Behavioral:  Negative for substance abuse. The patient is nervous/anxious.    Objective:     Vital Signs (Most Recent):  Temp: 98.2 °F (36.8 °C) (23)  Pulse: 100 (23)  Resp: 18 (23)  BP: (!) 106/50 (23)  SpO2: 97 % (23)   Vital Signs (24h Range):  Temp:  [97.3 °F (36.3 °C)-98.2 °F (36.8 °C)] 98.2 °F (36.8 °C)  Pulse:  [] 100  Resp:  [16-28] 18  SpO2:  [97 %-98 %] 97 %  BP: ()/(49-83) 106/50     Weight: 92 kg (202 lb 13.2 oz)  Body mass index is 33.75 kg/m².    SpO2: 97 %         Intake/Output Summary (Last 24 hours) at 2023 2153  Last data filed at 2023 1700  Gross per 24 hour   Intake 990.64 ml   Output 2340 ml   Net -1349.36 ml       Lines/Drains/Airways       Peripherally Inserted Central Catheter Line  Duration             PICC Double Lumen 23 0957 right basilic 4 days              Drain  Duration                   Closed/Suction Drain 01/17/23 2011 Right Abdomen Bulb 19 Fr. 11 days         Closed/Suction Drain 01/23/23 1649 Medial RUQ Bulb 19 Fr. 5 days                    Physical Exam  Vitals and nursing note reviewed.   Constitutional:       General: She is not in acute distress.     Appearance: She is obese. She is not ill-appearing.   HENT:      Head: Normocephalic and atraumatic.      Nose: No congestion.      Mouth/Throat:      Mouth: Mucous membranes are moist.      Pharynx: No oropharyngeal exudate.   Eyes:      Extraocular Movements: Extraocular movements intact.   Cardiovascular:      Rate and Rhythm: Normal rate and regular rhythm.      Pulses: Normal pulses.      Heart sounds: No murmur heard.  Pulmonary:      Effort: Pulmonary effort is normal. No respiratory distress.   Abdominal:      General: There is no distension.   Musculoskeletal:         General: Swelling present. Normal range of motion.      Cervical back: Normal range of motion. No rigidity or tenderness.   Skin:     General: Skin is warm and dry.      Capillary Refill: Capillary refill takes less than 2 seconds.   Neurological:      General: No focal deficit present.      Mental Status: She is alert and oriented to person, place, and time.   Psychiatric:         Mood and Affect: Mood normal.         Thought Content: Thought content normal.       Significant Labs:   Recent Lab Results         01/28/23  1242   01/28/23  0532   01/27/23  2212        Albumin   2.0         Alkaline Phosphatase   133         ALT   91         Anion Gap   7         AST   38         Baso #   0.02         Basophil %   0.2         BILIRUBIN TOTAL   7.2  Comment: For infants and newborns, interpretation of results should be based  on gestational age, weight and in agreement with clinical  observations.    Premature Infant recommended reference ranges:  Up to 24 hours.............<8.0 mg/dL  Up to 48 hours............<12.0 mg/dL  3-5 days..................<15.0 mg/dL  6-29  days.................<15.0 mg/dL           BUN   20         Calcium   7.9         Chloride   101         CO2   24         Creatinine   0.8         Differential Method   Automated         eGFR   >60.0         Eos #   0.1         Eosinophil %   1.7         Glucose   150         Gran # (ANC)   6.2         Gran %   75.8         Hematocrit   25.0         Hemoglobin   8.3         Immature Grans (Abs)   0.38  Comment: Mild elevation in immature granulocytes is non specific and   can be seen in a variety of conditions including stress response,   acute inflammation, trauma and pregnancy. Correlation with other   laboratory and clinical findings is essential.           Immature Granulocytes   4.7         Lymph #   0.8         Lymph %   10.0         Magnesium   1.9         MCH   32.2         MCHC   33.2         MCV   97         Mono #   0.6         Mono %   7.6         MPV   12.9         nRBC   1         Platelets   91         POCT Glucose 199     175       Potassium   4.3         PROTEIN TOTAL   4.2         RBC   2.58         RDW   16.6         Sodium   132         Tacrolimus Lvl   11.0  Comment: Testing performed by a chemiluminescent microparticle   immunoassay on the [employer] i System.    CAUTION: No firm therapeutic range exists for tacrolimus in whole   blood. The   complexity of the clinical state, individual differences in   sensitivity to   immunosuppressive and nephrotoxic effects of tacrolimus,   co-administration   of other immunosuppressants, type of transplant, time post-transplant   and a   number of other factors contribute to different requirements for   optimal   blood levels of tacrolimus. Therefore, individual tacrolimus values   cannot   be used as the sole indicator for making changes in treatment regimen   and   each patient should be thoroughly evaluated clinically before changes   in   treatment regimens are made. Each user must establish his or her own   ranges   based on clinical  experience.  Therapeutic ranges vary according to the commercial test used, and   therefore   should be established for each commercial test. Values obtained with   different assay methods cannot be used interchangeably due to   differences in   assay methods and cross-reactivity with metabolites, nor should   correction   factors be applied. Therefore, consistent use of one assay for   individual   patients is recommended.           WBC   8.14

## 2023-01-29 NOTE — CONSULTS
[facility] - Transplant Stepdown  Cardiology  Consult Note    Patient Name: Mickey Harris  MRN: [id number]  Admission Date: 1/17/2023  Hospital Length of Stay: 11 days  Code Status: Full Code   Attending Provider: Jordon Lamb MD   Consulting Provider: North Phillips MD  Primary Care Physician: Primary Doctor No  Principal Problem:Liver cirrhosis secondary to DORAN (nonalcoholic steatohepatitis)    Patient information was obtained from patient and ER records.     Inpatient consult to Cardiology  Consult performed by: North Phillips MD  Consult ordered by: Liana Paidlla PA-C        Subjective:     Chief Complaint:  Palpitations    HPI:   Cardiology consult: New onset AF     Mrs. Mickey Harris is a pleasant 58 y/o F who was admitted for living liver transplant from daughter for DORAN cirrhosis. Now s/p living related donor liver transplant on 1/17/22. The patient returned to the OR on 01/23/2023 and 01/24/2023 for bile duct reconstruction with Becky-en-Y hepaticojejunostomy. Afterwards has been progressing from a surgical standpoint.  Per primary team had some runs of paroxysmal tachycardia that responded to IV Lopressor pushes. Today she was found to be in AF w/ RVR for which we have been consulted.  After IV push x 2 of lopressor patient converted back to SR.     Mrs. Harris denies any previous hx of AF, stroke, HTN, syncope, palpitations. She did feel some fluttering on her chest whenever she was in AF but that has resolved and denied SOB. She has family history of CHF.     Electrolytes WNL.    Stress echo 07/2022    The patient reached the end of the protocol.   There were no arrhythmias during stress.   The ECG portion of this study is negative for myocardial ischemia.   The left ventricle is normal in size with normal systolic function.   The estimated ejection fraction is 65%.   Normal left ventricular diastolic function.   Mild left atrial enlargement.   Normal right  ventricular size with normal right ventricular systolic function.   Moderate right atrial enlargement.   Moderate tricuspid regurgitation.   The stress echo portion of this study is negative for myocardial ischemia.        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    Liver cirrhosis secondary to DORAN     Mild tricuspid regurgitation     Unspecified cirrhosis of liver        Past Surgical History:   Procedure Laterality Date    cholecystectomy  2003    CHOLEDOCHOJEJUNOSTOMY  1/24/2023    Procedure: CHOLEDOCHOJEJUNOSTOMY;  Surgeon: Mac Ayala MD;  Location: [facility] OR [address] [place] [address];  Service: Transplant;;    COLONOSCOPY      6 polyps removed    COLONOSCOPY  07/07/2021    DIAGNOSTIC ULTRASOUND N/A 1/17/2023    Procedure: ULTRASOUND, DIAGNOSTIC;  Surgeon: Juan Pablo Kimbrough MD;  Location: [facility] OR [address] [place] [address];  Service: Transplant;  Laterality: N/A;  INTRAOPERATIVE ULTRASOUND    ESOPHAGOGASTRODUODENOSCOPY      2 coulums of grade 1 varices    ESOPHAGOGASTRODUODENOSCOPY  11/01/2019    trace varices    ESOPHAGOGASTRODUODENOSCOPY  07/13/2021    ESOPHAGOGASTRODUODENOSCOPY N/A 9/20/2022    Procedure: EGD (ESOPHAGOGASTRODUODENOSCOPY);  Surgeon: Bruce Dominguez MD;  Location: [facility] (4TH FLR);  Service: Endoscopy;  Laterality: N/A;  labs prior  liver transplant candidate  screen for varices  8/18 fully vaccinated; instructions to portal-LW  8/19 pt rescheduled; updated instructions to portal-st    gynecologic surgery       removal of scar tissues and adhesions    HYSTERECTOMY  1989    knee surgery  1992    LAPAROTOMY, EXPLORATORY N/A 1/23/2023    Procedure: LAPAROTOMY, EXPLORATORY;  Surgeon: Jordon Lamb MD;  Location: [facility] OR [address] [place] [address];  Service: Transplant;  Laterality: N/A;    LAPAROTOMY, EXPLORATORY N/A 1/24/2023    Procedure: LAPAROTOMY, EXPLORATORY;  Surgeon:  Mac Ayala MD;  Location: [address];  Service: Transplant;  Laterality: N/A;    LIVER TRANSPLANT N/A 2023    Procedure: TRANSPLANT, LIVER;  Surgeon: Juan Pablo Kimbrough MD;  Location: [facility] OR [address] [place] [address];  Service: Transplant;  Laterality: N/A;    OOPHORECTOMY Right     OPEN hysterectomy      removed uterus and bilateral fallopian tubes and LEFT ovary stayed in place    ROTATOR CUFF REPAIR Right     HOANG-EN-Y PROCEDURE  2023    Procedure: CREATION, ANASTOMOSIS, HOANG-EN-Y;  Surgeon: Juan Pablo Kimbrough MD;  Location: [address];  Service: Transplant;;    TONSILLECTOMY         Review of patient's allergies indicates:   Allergen Reactions    Levofloxacin Hives and Shortness Of Breath    Sulfa (sulfonamide antibiotics) Rash       No current facility-administered medications on file prior to encounter.     Current Outpatient Medications on File Prior to Encounter   Medication Sig    albuterol (PROVENTIL/VENTOLIN HFA) 90 mcg/actuation inhaler SMARTSI Puff(s) Via Inhaler Every 4 Hours PRN    biotin 1 mg Cap Take 1 mg by mouth once daily.    cyanocobalamin, vitamin B-12, 50 mcg tablet Take 1 tablet by mouth once daily.    furosemide (LASIX) 20 MG tablet Take 40 mg by mouth once daily.    hydrOXYzine HCL (ATARAX) 25 MG tablet Take 25 mg by mouth 3 (three) times daily.    lactulose (CHRONULAC) 10 gram/15 mL solution SMARTSIG:15 Milliliter(s) By Mouth 3 Times Daily    loratadine-pseudoephedrine  mg (CLARITIN-D 24-HOUR)  mg per 24 hr tablet Take 1 tablet by mouth once daily.    MG-PLUS-PROTEIN 133 mg tablet Take 1 tablet by mouth once daily.    spironolactone (ALDACTONE) 50 MG tablet Take 100 mg by mouth once daily.    vitamin E, dl,tocopheryl acet, (VITAMIN E, DL, ACETATE,) 45 mg (100 unit) Cap Take 1 tablet by mouth once daily.    ergocalciferol (ERGOCALCIFEROL) 50,000 unit Cap Take 50,000 Units by mouth every 7 days.    fluticasone propionate (FLOVENT HFA)  220 mcg/actuation inhaler Inhale 1 puff into the lungs daily as needed.    ondansetron (ZOFRAN) 4 MG tablet Take 4 mg by mouth daily as needed.     Family History       Problem Relation (Age of Onset)    Arrhythmia Brother    Depression Father    Heart disease Father, Brother    Hyperlipidemia Father    Hypertension Father          Tobacco Use    Smoking status: Former     Packs/day: 1.00     Types: Cigarettes     Quit date:      Years since quittin.0    Smokeless tobacco: Never   Substance and Sexual Activity    Alcohol use: Not Currently    Drug use: Not Currently    Sexual activity: Yes     Partners: Male     Review of Systems   Constitutional: Negative for chills, diaphoresis and fever.   HENT:  Negative for hearing loss and sore throat.    Eyes:  Negative for double vision.   Cardiovascular:  Positive for palpitations. Negative for chest pain, dyspnea on exertion, irregular heartbeat, near-syncope and orthopnea.   Respiratory:  Negative for cough and shortness of breath.    Musculoskeletal:  Negative for back pain and joint swelling.   Gastrointestinal:  Negative for bloating, abdominal pain, nausea and vomiting.   Genitourinary:  Negative for dysuria and flank pain.   Neurological:  Negative for dizziness, focal weakness and headaches.   Psychiatric/Behavioral:  Negative for substance abuse. The patient is nervous/anxious.    Objective:     Vital Signs (Most Recent):  Temp: 98.2 °F (36.8 °C) (23)  Pulse: 100 (23)  Resp: 18 (23)  BP: (!) 106/50 (23)  SpO2: 97 % (23)   Vital Signs (24h Range):  Temp:  [97.3 °F (36.3 °C)-98.2 °F (36.8 °C)] 98.2 °F (36.8 °C)  Pulse:  [] 100  Resp:  [16-28] 18  SpO2:  [97 %-98 %] 97 %  BP: ()/(49-83) 106/50     Weight: 92 kg (202 lb 13.2 oz)  Body mass index is 33.75 kg/m².    SpO2: 97 %         Intake/Output Summary (Last 24 hours) at 2023 5617  Last data filed at 2023 1700  Gross per 24  hour   Intake 990.64 ml   Output 2340 ml   Net -1349.36 ml       Lines/Drains/Airways       Peripherally Inserted Central Catheter Line  Duration             PICC Double Lumen 01/24/23 0957 right basilic 4 days              Drain  Duration                  Closed/Suction Drain 01/17/23 2011 Right Abdomen Bulb 19 Fr. 11 days         Closed/Suction Drain 01/23/23 1649 Medial RUQ Bulb 19 Fr. 5 days                    Physical Exam  Vitals and nursing note reviewed.   Constitutional:       General: She is not in acute distress.     Appearance: She is obese. She is not ill-appearing.   HENT:      Head: Normocephalic and atraumatic.      Nose: No congestion.      Mouth/Throat:      Mouth: Mucous membranes are moist.      Pharynx: No oropharyngeal exudate.   Eyes:      Extraocular Movements: Extraocular movements intact.   Cardiovascular:      Rate and Rhythm: Normal rate and regular rhythm.      Pulses: Normal pulses.      Heart sounds: No murmur heard.  Pulmonary:      Effort: Pulmonary effort is normal. No respiratory distress.   Abdominal:      General: There is no distension.   Musculoskeletal:         General: Swelling present. Normal range of motion.      Cervical back: Normal range of motion. No rigidity or tenderness.   Skin:     General: Skin is warm and dry.      Capillary Refill: Capillary refill takes less than 2 seconds.   Neurological:      General: No focal deficit present.      Mental Status: She is alert and oriented to person, place, and time.   Psychiatric:         Mood and Affect: Mood normal.         Thought Content: Thought content normal.       Significant Labs:   Recent Lab Results         01/28/23  1242   01/28/23  0532   01/27/23  2212        Albumin   2.0         Alkaline Phosphatase   133         ALT   91         Anion Gap   7         AST   38         Baso #   0.02         Basophil %   0.2         BILIRUBIN TOTAL   7.2  Comment: For infants and newborns, interpretation of results should be  based  on gestational age, weight and in agreement with clinical  observations.    Premature Infant recommended reference ranges:  Up to 24 hours.............<8.0 mg/dL  Up to 48 hours............<12.0 mg/dL  3-5 days..................<15.0 mg/dL  6-29 days.................<15.0 mg/dL           BUN   20         Calcium   7.9         Chloride   101         CO2   24         Creatinine   0.8         Differential Method   Automated         eGFR   >60.0         Eos #   0.1         Eosinophil %   1.7         Glucose   150         Gran # (ANC)   6.2         Gran %   75.8         Hematocrit   25.0         Hemoglobin   8.3         Immature Grans (Abs)   0.38  Comment: Mild elevation in immature granulocytes is non specific and   can be seen in a variety of conditions including stress response,   acute inflammation, trauma and pregnancy. Correlation with other   laboratory and clinical findings is essential.           Immature Granulocytes   4.7         Lymph #   0.8         Lymph %   10.0         Magnesium   1.9         MCH   32.2         MCHC   33.2         MCV   97         Mono #   0.6         Mono %   7.6         MPV   12.9         nRBC   1         Platelets   91         POCT Glucose 199     175       Potassium   4.3         PROTEIN TOTAL   4.2         RBC   2.58         RDW   16.6         Sodium   132         Tacrolimus Lvl   11.0  Comment: Testing performed by a chemiluminescent microparticle   immunoassay on the [employer] System.    CAUTION: No firm therapeutic range exists for tacrolimus in whole   blood. The   complexity of the clinical state, individual differences in   sensitivity to   immunosuppressive and nephrotoxic effects of tacrolimus,   co-administration   of other immunosuppressants, type of transplant, time post-transplant   and a   number of other factors contribute to different requirements for   optimal   blood levels of tacrolimus. Therefore, individual tacrolimus values   cannot   be used as  the sole indicator for making changes in treatment regimen   and   each patient should be thoroughly evaluated clinically before changes   in   treatment regimens are made. Each user must establish his or her own   ranges   based on clinical experience.  Therapeutic ranges vary according to the commercial test used, and   therefore   should be established for each commercial test. Values obtained with   different assay methods cannot be used interchangeably due to   differences in   assay methods and cross-reactivity with metabolites, nor should   correction   factors be applied. Therefore, consistent use of one assay for   individual   patients is recommended.           WBC   8.14                     Assessment and Plan:     Paroxysmal A-fib  First reported event tonight on ECG. Converted to SR after 2 doses of IV lopressor 5mg. Did not tolerate PO BB due to hypotension.     Recs:   -Continue telemetry monitoring.   -Started on diltiazem 30mg q 6hr and then transition to XR tomorrow. Can increase dose if she goes back to AF w/ RVR (HR>120)  - Cont IV lopressor 5mg as needed as patient responds well and if not controlled by IV dilt.   - Replete electrolytes as needed: keep K>4 and Mag>2  - CHADSVASC: 2 only- ok to continue with ASA and no clear indication for anticoagulation plus she is thrombocytopenic.   - Pt lives in [facility] but would like to follow up with us as outpatient as she will be [place] until May or later.   - Will add follow up on discharge orders  - Check TSH/FT4  - Obtain routine echo- no valvular abnormalities seen on previous echo            VTE Risk Mitigation (From admission, onward)         Ordered     Place sequential compression device  Until discontinued         01/17/23 2342     heparin (porcine) injection 5,000 Units  Every 8 hours         01/17/23 2148     IP VTE HIGH RISK PATIENT  Once         01/17/23 2148                Thank you for your consult. We will sign off. Please contact us if you  have any additional questions.    North Phillips MD  Cardiology   [facility] - Transplant Stepdown

## 2023-01-29 NOTE — ASSESSMENT & PLAN NOTE
First reported event tonight on ECG. Converted to SR after 2 doses of IV lopressor 5mg. Did not tolerate PO BB due to hypotension.     Recs:   -Started on diltiazem 30mg q 6hr and then transition to XR tomorrow. Can increase dose if she goes back to AF w/ RVR (HR>120)  - Cont IV lopressor 5mg as needed as patient responds well and if not controlled by IV dilt.   - Replete electrolytes as needed: keep K>4 and Mag>2  - CHADSVASC: 2 only- ok to continue with ASA and no clear indication for anticoagulation plus she is thrombocytopenic.   - Pt lives in [facility] but would like to follow up with us as outpatient as she will be [place] until May or later.   - Will add follow up on discharge orders  - Check TSH/FT4  - Obtain routine echo- no valvular abnormalities seen on previous echo

## 2023-01-29 NOTE — HPI
Ms. Harris is a 56 y/o who was admitted for liver transplant for DORAN cirrhosis. Completed on 1/17/22, returned to OR for bile duct reconstruction and Becky-en-Y hepaticojejunostomy on 1/23 and 1/24. Was progressing well however developed runs of paroxysmal tachycardia and then AF w/ RVR for which cardiology was consulted on 1/28. She responded to IV Lopressor and converted back to SR, however was not able to tolerate oral metoprolol. She was started on Diltiazem 30mg q6h and transitioned to Diltiazem XL on 1/30. She went back into Afib with RVR around 0810 this morning. She received her morning Diltiazem around 0820 and 1x IV metoprolol push at 0900 after which she reverted to NSR. She is currently in NSR. Cardiology consulted for further management. [id number] score of 2, Has-bled 1, currently on ASA.     Stress echo 07/2022   The patient reached the end of the protocol.  There were no arrhythmias during stress.  The ECG portion of this study is negative for myocardial ischemia.  The left ventricle is normal in size with normal systolic function.  The estimated ejection fraction is 65%.  Normal left ventricular diastolic function.  Mild left atrial enlargement.  Normal right ventricular size with normal right ventricular systolic function.  Moderate right atrial enlargement.  Moderate tricuspid regurgitation.  The stress echo portion of this study is negative for myocardial ischemia.

## 2023-01-29 NOTE — PLAN OF CARE
Patient with tachycardia into 170s. All other VSS. AAOx3. New onset AF captured on EKG. Lopressor 5mg x 2 given with resulting HR in 90s. Cardiology consulted, recs diltiazem PO, more recs pending. Will cont to monitor closely

## 2023-01-29 NOTE — PLAN OF CARE
Patient AAO x4, VSS, afebrile, on room air. Pt went into Afib RVR at shift change. X2 dose 5 mg IVP Lopressor given. Started on 30 mg PO Diltiazem q6 hours. Tele now NSR 80-90. PRN oxy given for incisional pain. Incision cont to drain SS fluid. Chevron incision is OPA w/ staples. 2 RLQ RYLEY output SS; total ~ 80 ml #1 and 90 ml #2. Continuing Ampicillin Q6 to ENRIQUE PICC. UO good; pt had x2 BM.Pt ambulatory and independent.  at bedside. Reminded to call for assistance. Call bell in reach.

## 2023-01-29 NOTE — SUBJECTIVE & OBJECTIVE
Scheduled Meds:   aluminum-magnesium hydroxide-simethicone  30 mL Oral Once    And    LIDOcaine HCl 2%  15 mL Oral Once    ampicillin-sulbactim (UNASYN) IVPB  3 g Intravenous Q6H    aspirin  81 mg Oral Daily    bisacodyL  10 mg Oral Daily    dapsone  100 mg Oral Daily    diltiaZEM  30 mg Oral Q6H    docusate sodium  100 mg Oral BID    fluconazole  200 mg Oral Daily    furosemide (LASIX) injection  40 mg Intravenous Daily    heparin (porcine)  5,000 Units Subcutaneous Q8H    mycophenolate  500 mg Oral BID    pantoprazole  40 mg Oral Daily    polyethylene glycol  17 g Oral Daily    [START ON 1/30/2023] predniSONE  15 mg Oral Daily    Followed by    [START ON 2/6/2023] predniSONE  10 mg Oral Daily    Followed by    [START ON 2/13/2023] predniSONE  5 mg Oral Daily    simethicone  1 tablet Oral TID PC    sodium chloride 0.9%  10 mL Intravenous Q6H    tacrolimus  2 mg Oral BID    traZODone  100 mg Oral QHS    ursodioL  300 mg Oral TID    valGANciclovir  450 mg Oral Daily     Continuous Infusions:   sodium chloride 0.9% Stopped (01/19/23 2311)     PRN Meds:albuterol, artificial tears, bisacodyL, calcium carbonate, dextrose 10%, dextrose 10%, glucagon (human recombinant), glucose, glucose, hydrOXYzine HCL, hydrOXYzine pamoate, insulin aspart U-100, metoprolol, ondansetron, oxyCODONE, oxyCODONE, prochlorperazine, sodium chloride 0.9%, Flushing PICC Protocol **AND** sodium chloride 0.9% **AND** sodium chloride 0.9%    Review of Systems   Constitutional:  Positive for fatigue. Negative for activity change, appetite change and fever.   HENT: Negative.     Eyes:  Negative for visual disturbance.   Respiratory:  Negative for shortness of breath.    Cardiovascular:  Negative for chest pain, palpitations and leg swelling.   Gastrointestinal:  Positive for abdominal distention and abdominal pain. Negative for constipation, diarrhea, nausea and vomiting.   Genitourinary:  Negative for decreased urine volume, difficulty urinating and  dysuria.   Musculoskeletal:  Negative for arthralgias, back pain and joint swelling.   Skin:  Positive for wound.   Allergic/Immunologic: Positive for immunocompromised state.   Neurological:  Positive for weakness. Negative for dizziness and facial asymmetry.   Hematological:  Negative for adenopathy. Bruises/bleeds easily.   Psychiatric/Behavioral:  Negative for agitation, behavioral problems and sleep disturbance.    All other systems reviewed and are negative.  Objective:     Vital Signs (Most Recent):  Temp: 98.1 °F (36.7 °C) (01/29/23 1123)  Pulse: 84 (01/29/23 1449)  Resp: 16 (01/29/23 1123)  BP: (!) 102/55 (01/29/23 1123)  SpO2: 95 % (01/29/23 1123)   Vital Signs (24h Range):  Temp:  [97.6 °F (36.4 °C)-98.2 °F (36.8 °C)] 98.1 °F (36.7 °C)  Pulse:  [] 84  Resp:  [16-28] 16  SpO2:  [95 %-97 %] 95 %  BP: (100-110)/(49-83) 102/55     Weight: 92 kg (202 lb 13.2 oz)  Body mass index is 33.75 kg/m².    Intake/Output - Last 3 Shifts         01/27 0700  01/28 0659 01/28 0700  01/29 0659 01/29 0700  01/30 0659    P.O. 0 900 120    I.V. (mL/kg)  0 (0)     Other  0     IV Piggyback 390.6      Total Intake(mL/kg) 390.6 (4.2) 900 (9.8) 120 (1.3)    Urine (mL/kg/hr) 350 (0.2) 2700 (1.2) 200 (0.2)    Emesis/NG output  0     Drains 320 310 150    Other  0     Stool  0     Blood  0     Total Output 670 3010 350    Net -279.4 -2110 -230           Urine Occurrence  0 x     Stool Occurrence  2 x     Emesis Occurrence  0 x             Physical Exam  Vitals and nursing note reviewed.   Constitutional:       Appearance: She is well-developed.      Comments: No hand or temporal muscle wasting noted     HENT:      Head: Normocephalic.   Eyes:      General: Scleral icterus present.      Conjunctiva/sclera: Conjunctivae normal.   Cardiovascular:      Rate and Rhythm: Normal rate and regular rhythm.      Heart sounds: Normal heart sounds. No murmur heard.  Pulmonary:      Effort: Pulmonary effort is normal.      Breath sounds:  Normal breath sounds.   Abdominal:      General: Bowel sounds are normal. There is no distension.      Palpations: Abdomen is soft.      Tenderness: There is abdominal tenderness (over incision as expected).          Comments: RYLEY x 2 in place   Musculoskeletal:         General: Normal range of motion.      Cervical back: Normal range of motion.      Right lower leg: Edema present.      Left lower leg: Edema present.   Skin:     General: Skin is warm and dry.      Capillary Refill: Capillary refill takes less than 2 seconds.      Coloration: Skin is jaundiced.   Neurological:      General: No focal deficit present.      Mental Status: She is alert and oriented to person, place, and time.   Psychiatric:         Mood and Affect: Mood normal.         Behavior: Behavior normal.         Thought Content: Thought content normal.         Judgment: Judgment normal.       Laboratory:  Immunosuppressants           Stop Route Frequency     tacrolimus capsule 2 mg         -- Oral 2 times daily     mycophenolate capsule 500 mg         -- Oral 2 times daily          CBC:   Recent Labs   Lab 01/29/23  0538   WBC 6.11   RBC 2.42*   HGB 7.8*   HCT 24.0*   PLT 82*   MCV 99*   MCH 32.2*   MCHC 32.5     CMP:   Recent Labs   Lab 01/29/23  0538   *   CALCIUM 8.3*   ALBUMIN 2.5*   PROT 4.6*   *   K 4.2   CO2 24      BUN 23*   CREATININE 0.8   ALKPHOS 180*   ALT 86*   AST 37   BILITOT 5.9*     Labs within the past 24 hours have been reviewed.    Diagnostic Results:  I have personally reviewed all pertinent imaging studies.    Debility/Functional status: No debility noted.

## 2023-01-30 LAB
ALBUMIN SERPL BCP-MCNC: 2.5 G/DL (ref 3.5–5.2)
ALP SERPL-CCNC: 183 U/L (ref 55–135)
ALT SERPL W/O P-5'-P-CCNC: 86 U/L (ref 10–44)
ANION GAP SERPL CALC-SCNC: 9 MMOL/L (ref 8–16)
AST SERPL-CCNC: 37 U/L (ref 10–40)
BASOPHILS # BLD AUTO: 0.04 K/UL (ref 0–0.2)
BASOPHILS NFR BLD: 0.5 % (ref 0–1.9)
BILIRUB FLD-MCNC: 4.1 MG/DL
BILIRUB FLD-MCNC: 4.5 MG/DL
BILIRUB SERPL-MCNC: 5.2 MG/DL (ref 0.1–1)
BODY FLUID SOURCE, BILIRUBIN: NORMAL
BODY FLUID SOURCE, BILIRUBIN: NORMAL
BUN SERPL-MCNC: 22 MG/DL (ref 6–20)
CALCIUM SERPL-MCNC: 8.2 MG/DL (ref 8.7–10.5)
CHLORIDE SERPL-SCNC: 101 MMOL/L (ref 95–110)
CO2 SERPL-SCNC: 24 MMOL/L (ref 23–29)
CREAT SERPL-MCNC: 0.8 MG/DL (ref 0.5–1.4)
DIFFERENTIAL METHOD: ABNORMAL
EOSINOPHIL # BLD AUTO: 0.1 K/UL (ref 0–0.5)
EOSINOPHIL NFR BLD: 1 % (ref 0–8)
ERYTHROCYTE [DISTWIDTH] IN BLOOD BY AUTOMATED COUNT: 17.2 % (ref 11.5–14.5)
EST. GFR  (NO RACE VARIABLE): >60 ML/MIN/1.73 M^2
GLUCOSE SERPL-MCNC: 120 MG/DL (ref 70–110)
HCT VFR BLD AUTO: 22.2 % (ref 37–48.5)
HGB BLD-MCNC: 7.3 G/DL (ref 12–16)
IMM GRANULOCYTES # BLD AUTO: 0.29 K/UL (ref 0–0.04)
IMM GRANULOCYTES NFR BLD AUTO: 4 % (ref 0–0.5)
LYMPHOCYTES # BLD AUTO: 0.7 K/UL (ref 1–4.8)
LYMPHOCYTES NFR BLD: 10.1 % (ref 18–48)
MAGNESIUM SERPL-MCNC: 1.6 MG/DL (ref 1.6–2.6)
MCH RBC QN AUTO: 33 PG (ref 27–31)
MCHC RBC AUTO-ENTMCNC: 32.9 G/DL (ref 32–36)
MCV RBC AUTO: 101 FL (ref 82–98)
MONOCYTES # BLD AUTO: 0.6 K/UL (ref 0.3–1)
MONOCYTES NFR BLD: 8.1 % (ref 4–15)
NEUTROPHILS # BLD AUTO: 5.6 K/UL (ref 1.8–7.7)
NEUTROPHILS NFR BLD: 76.3 % (ref 38–73)
NRBC BLD-RTO: 1 /100 WBC
PLATELET # BLD AUTO: 83 K/UL (ref 150–450)
PMV BLD AUTO: 12.5 FL (ref 9.2–12.9)
POCT GLUCOSE: 179 MG/DL (ref 70–110)
POCT GLUCOSE: 257 MG/DL (ref 70–110)
POCT GLUCOSE: 258 MG/DL (ref 70–110)
POTASSIUM SERPL-SCNC: 4 MMOL/L (ref 3.5–5.1)
PROT SERPL-MCNC: 4.6 G/DL (ref 6–8.4)
RBC # BLD AUTO: 2.21 M/UL (ref 4–5.4)
SODIUM SERPL-SCNC: 134 MMOL/L (ref 136–145)
TACROLIMUS BLD-MCNC: 7.1 NG/ML (ref 5–15)
WBC # BLD AUTO: 7.3 K/UL (ref 3.9–12.7)

## 2023-01-30 PROCEDURE — 82247 BILIRUBIN TOTAL: CPT | Performed by: PHYSICIAN ASSISTANT

## 2023-01-30 PROCEDURE — 94664 DEMO&/EVAL PT USE INHALER: CPT

## 2023-01-30 PROCEDURE — 99232 PR SUBSEQUENT HOSPITAL CARE,LEVL II: ICD-10-PCS | Mod: ,,, | Performed by: NURSE PRACTITIONER

## 2023-01-30 PROCEDURE — 97530 THERAPEUTIC ACTIVITIES: CPT | Mod: CQ

## 2023-01-30 PROCEDURE — 85025 COMPLETE CBC W/AUTO DIFF WBC: CPT

## 2023-01-30 PROCEDURE — 25000003 PHARM REV CODE 250: Performed by: NURSE PRACTITIONER

## 2023-01-30 PROCEDURE — 25000003 PHARM REV CODE 250: Performed by: SURGERY

## 2023-01-30 PROCEDURE — 20600001 HC STEP DOWN PRIVATE ROOM

## 2023-01-30 PROCEDURE — 80197 ASSAY OF TACROLIMUS: CPT

## 2023-01-30 PROCEDURE — 83735 ASSAY OF MAGNESIUM: CPT

## 2023-01-30 PROCEDURE — 97116 GAIT TRAINING THERAPY: CPT | Mod: CQ

## 2023-01-30 PROCEDURE — 99232 SBSQ HOSP IP/OBS MODERATE 35: CPT | Mod: ,,, | Performed by: NURSE PRACTITIONER

## 2023-01-30 PROCEDURE — 25000003 PHARM REV CODE 250

## 2023-01-30 PROCEDURE — 80053 COMPREHEN METABOLIC PANEL: CPT

## 2023-01-30 PROCEDURE — 25000003 PHARM REV CODE 250: Performed by: STUDENT IN AN ORGANIZED HEALTH CARE EDUCATION/TRAINING PROGRAM

## 2023-01-30 PROCEDURE — 99233 SBSQ HOSP IP/OBS HIGH 50: CPT | Mod: 24,,, | Performed by: PHYSICIAN ASSISTANT

## 2023-01-30 PROCEDURE — 25000003 PHARM REV CODE 250: Performed by: PHYSICIAN ASSISTANT

## 2023-01-30 PROCEDURE — A4216 STERILE WATER/SALINE, 10 ML: HCPCS | Performed by: SURGERY

## 2023-01-30 PROCEDURE — 63600175 PHARM REV CODE 636 W HCPCS

## 2023-01-30 PROCEDURE — 99233 PR SUBSEQUENT HOSPITAL CARE,LEVL III: ICD-10-PCS | Mod: 24,,, | Performed by: PHYSICIAN ASSISTANT

## 2023-01-30 RX ORDER — ATOVAQUONE 750 MG/5ML
1500 SUSPENSION ORAL DAILY
Status: DISCONTINUED | OUTPATIENT
Start: 2023-01-31 | End: 2023-02-01 | Stop reason: HOSPADM

## 2023-01-30 RX ORDER — FUROSEMIDE 40 MG/1
40 TABLET ORAL DAILY
Status: DISCONTINUED | OUTPATIENT
Start: 2023-01-31 | End: 2023-02-01 | Stop reason: HOSPADM

## 2023-01-30 RX ORDER — DILTIAZEM HYDROCHLORIDE 120 MG/1
120 CAPSULE, COATED, EXTENDED RELEASE ORAL DAILY
Status: DISCONTINUED | OUTPATIENT
Start: 2023-01-30 | End: 2023-01-31

## 2023-01-30 RX ORDER — LIDOCAINE HYDROCHLORIDE 10 MG/ML
1 INJECTION INFILTRATION; PERINEURAL ONCE
Status: COMPLETED | OUTPATIENT
Start: 2023-01-30 | End: 2023-01-30

## 2023-01-30 RX ADMIN — SIMETHICONE 80 MG: 80 TABLET, CHEWABLE ORAL at 02:01

## 2023-01-30 RX ADMIN — DOCUSATE SODIUM 100 MG: 100 CAPSULE ORAL at 09:01

## 2023-01-30 RX ADMIN — POLYETHYLENE GLYCOL 3350 17 G: 17 POWDER, FOR SOLUTION ORAL at 09:01

## 2023-01-30 RX ADMIN — PREDNISONE 15 MG: 5 TABLET ORAL at 09:01

## 2023-01-30 RX ADMIN — VALGANCICLOVIR 450 MG: 450 TABLET, FILM COATED ORAL at 09:01

## 2023-01-30 RX ADMIN — OXYCODONE HYDROCHLORIDE 10 MG: 10 TABLET ORAL at 12:01

## 2023-01-30 RX ADMIN — AMPICILLIN SODIUM AND SULBACTAM SODIUM 3 G: 2; 1 INJECTION, POWDER, FOR SOLUTION INTRAMUSCULAR; INTRAVENOUS at 03:01

## 2023-01-30 RX ADMIN — HEPARIN SODIUM 5000 UNITS: 5000 INJECTION INTRAVENOUS; SUBCUTANEOUS at 02:01

## 2023-01-30 RX ADMIN — URSODIOL 300 MG: 300 CAPSULE ORAL at 09:01

## 2023-01-30 RX ADMIN — BISACODYL 10 MG: 5 TABLET, COATED ORAL at 09:01

## 2023-01-30 RX ADMIN — DAPSONE 100 MG: 100 TABLET ORAL at 09:01

## 2023-01-30 RX ADMIN — TACROLIMUS 2 MG: 1 CAPSULE ORAL at 09:01

## 2023-01-30 RX ADMIN — FLUCONAZOLE 200 MG: 200 TABLET ORAL at 09:01

## 2023-01-30 RX ADMIN — Medication 10 ML: at 12:01

## 2023-01-30 RX ADMIN — ASPIRIN 81 MG: 81 TABLET, COATED ORAL at 09:01

## 2023-01-30 RX ADMIN — MYCOPHENOLATE MOFETIL 500 MG: 250 CAPSULE ORAL at 09:01

## 2023-01-30 RX ADMIN — FUROSEMIDE 40 MG: 10 INJECTION, SOLUTION INTRAMUSCULAR; INTRAVENOUS at 09:01

## 2023-01-30 RX ADMIN — HEPARIN SODIUM 5000 UNITS: 5000 INJECTION INTRAVENOUS; SUBCUTANEOUS at 09:01

## 2023-01-30 RX ADMIN — URSODIOL 300 MG: 300 CAPSULE ORAL at 02:01

## 2023-01-30 RX ADMIN — Medication 10 ML: at 05:01

## 2023-01-30 RX ADMIN — OXYCODONE HYDROCHLORIDE 10 MG: 10 TABLET ORAL at 02:01

## 2023-01-30 RX ADMIN — OXYCODONE HYDROCHLORIDE 10 MG: 10 TABLET ORAL at 07:01

## 2023-01-30 RX ADMIN — DILTIAZEM HYDROCHLORIDE 120 MG: 120 CAPSULE, COATED, EXTENDED RELEASE ORAL at 11:01

## 2023-01-30 RX ADMIN — OXYCODONE HYDROCHLORIDE 5 MG: 5 TABLET ORAL at 11:01

## 2023-01-30 RX ADMIN — LIDOCAINE HYDROCHLORIDE 1 ML: 10 INJECTION, SOLUTION INFILTRATION; PERINEURAL at 04:01

## 2023-01-30 RX ADMIN — Medication 10 ML: at 11:01

## 2023-01-30 RX ADMIN — TACROLIMUS 2 MG: 1 CAPSULE ORAL at 05:01

## 2023-01-30 RX ADMIN — INSULIN ASPART 1 UNITS: 100 INJECTION, SOLUTION INTRAVENOUS; SUBCUTANEOUS at 10:01

## 2023-01-30 RX ADMIN — SIMETHICONE 80 MG: 80 TABLET, CHEWABLE ORAL at 05:01

## 2023-01-30 RX ADMIN — AMPICILLIN SODIUM AND SULBACTAM SODIUM 3 G: 2; 1 INJECTION, POWDER, FOR SOLUTION INTRAMUSCULAR; INTRAVENOUS at 10:01

## 2023-01-30 RX ADMIN — SIMETHICONE 80 MG: 80 TABLET, CHEWABLE ORAL at 09:01

## 2023-01-30 RX ADMIN — PANTOPRAZOLE SODIUM 40 MG: 40 TABLET, DELAYED RELEASE ORAL at 09:01

## 2023-01-30 RX ADMIN — TRAZODONE HYDROCHLORIDE 100 MG: 50 TABLET ORAL at 09:01

## 2023-01-30 NOTE — PROGRESS NOTES
"[facility]  Endocrinology  Progress Note    Admit Date: 1/17/2023     Reason for Consult: Management of Hyperglycemia     Surgical Procedure and Date: Liver Transplant 1/17/2023    Patient is not diabetic and does not currently take any oral/injectable antidiabetic/hypoglycemic medications.     Lab Results   Component Value Date    HGBA1C 4.5 01/17/2023       Of note patient previously on metformin.     HPI: Mickey Harris is a 57 y.o. female who presented on 1/17/22 for living related donor liver transplant in the setting of DORAN cirrhosis. She was diagnosed approximately 4 years ago and did well up until one year ago when she began to develop lower extremity edema and ascites managed with diuretics. She has never required paracentesis. She has also developed hepatic encephalopathy managed with lactulose. There is no history of GI bleeding. Ms. Harris is now s/p living donor liver transplantation on 1/17/23. She received a right liver lobe which was quite large (~1000g). Biliary reconstruction was Becky-en-y reconstruction to the donor right hepatic duct. The procedure was performed without apparent complication and she was transferred to the SICU for postoperative care and management. Endocrine consulted to manage hyperglycemia in the pressence of high-dose steroids. Of note, patient on 12-24 units/hr of insulin while on IIP.               Interval HPI:   Overnight events: Remains in TSU. NAEON. BG at or slightly above goal; required correction scale x1. 6 Days Post-Op. Prednisone 20 mg daily.   Eating:  Diet Adult Regular (IDDSI Level 7)   50%  Nausea: No  Hypoglycemia and intervention: No  Fever: No  TPN and/or TF: No    BP (!) 107/51 (BP Location: Left arm, Patient Position: Sitting)   Pulse 87   Temp 97.9 °F (36.6 °C) (Oral)   Resp 16   Ht 5' 5" (1.651 m)   Wt 92 kg (202 lb 13.2 oz)   SpO2 98%   Breastfeeding No   BMI 33.75 kg/m²     Labs Reviewed and Include    Recent Labs   Lab " 01/30/23  0528   *   CALCIUM 8.2*   ALBUMIN 2.5*   PROT 4.6*   *   K 4.0   CO2 24      BUN 22*   CREATININE 0.8   ALKPHOS 183*   ALT 86*   AST 37   BILITOT 5.2*     Lab Results   Component Value Date    WBC 7.30 01/30/2023    HGB 7.3 (L) 01/30/2023    HCT 22.2 (L) 01/30/2023     (H) 01/30/2023    PLT 83 (L) 01/30/2023     No results for input(s): TSH, FREET4 in the last 168 hours.  Lab Results   Component Value Date    HGBA1C 4.5 01/17/2023       Nutritional status:   Body mass index is 33.75 kg/m².  Lab Results   Component Value Date    ALBUMIN 2.5 (L) 01/30/2023    ALBUMIN 2.5 (L) 01/29/2023    ALBUMIN 2.0 (L) 01/28/2023     No results found for: PREALBUMIN    Estimated Creatinine Clearance: 85.9 mL/min (based on SCr of 0.8 mg/dL).    Accu-Checks  Recent Labs     01/27/23  2212 01/28/23  1242 01/28/23  2212 01/29/23  0852 01/29/23  2120 01/30/23  1256   POCTGLUCOSE 175* 199* 228* 159* 258* 179*       Current Medications and/or Treatments Impacting Glycemic Control  Immunotherapy:    Immunosuppressants           Stop Route Frequency     tacrolimus capsule 2 mg         -- Oral 2 times daily     mycophenolate capsule 500 mg         -- Oral 2 times daily          Steroids:   Hormones (From admission, onward)      Start     Stop Route Frequency Ordered    02/13/23 0900  predniSONE tablet 5 mg        See Hyperspace for full Linked Orders Report.    02/20 0859 Oral Daily 01/27/23 0954    02/06/23 0900  predniSONE tablet 10 mg        See Hyperspace for full Linked Orders Report.    02/13 0859 Oral Daily 01/27/23 0954    01/30/23 0900  predniSONE tablet 15 mg        See Hyperspace for full Linked Orders Report.    02/06 0859 Oral Daily 01/27/23 0954          Pressors:    Autonomic Drugs (From admission, onward)      None          Hyperglycemia/Diabetes Medications:   Antihyperglycemics (From admission, onward)      Start     Stop Route Frequency Ordered    01/26/23 1122  insulin aspart U-100 pen  0-5 Units         -- SubQ Before meals & nightly PRN 01/26/23 1022            ASSESSMENT and PLAN    * S/P liver transplant  Optimize BG control to improve wound healing        Hyperglycemia  Endocrinology consulted for BG management.   BG goal 140-180       - Novolog (aspart) insulin prn for BG excursions MDC SSI (150/25).  - BG checks AC/HS  - Hypoglycemia protocol in place    ** Please notify Endocrine for any change and/or advance in diet**  ** Please call Endocrine for any BG related issues **    Discharge Planning:   TBD. Please notify endocrinology prior to discharge. If patient with hyperglycemia today recommend insurance preferred [id number]; No history of pancreatitis or medullary thyroid CA.   Meter, test strips, lancets.         Prophylactic immunotherapy  May increase insulin resistance.         Adrenal corticosteroid causing adverse effect in therapeutic use  Glucocorticoids markedly increase glucose levels. Expect the steroid taper will help glucose control.             Shelly Gamez NP  Endocrinology  [facility] Trans[facility]

## 2023-01-30 NOTE — PLAN OF CARE
Patient AAO x4, VSS, afebrile, on room air. 30 mg PO Diltiazem q6 hours. Tele now NSR 80-90. PRN oxy given for incisional pain. Incision cont to drain SS fluid. Chevron incision is OPA w/ staples. 2 RLQ RYLEY output SS; total ~ 60 ml #1 and 90 ml #2. Continuing Ampicillin Q6 to ENRIQUE PICC. UO good. Pt ambulatory and independent.  at bedside. Reminded to call for assistance. Call bell in reach.

## 2023-01-30 NOTE — DISCHARGE SUMMARY
[facility]  Liver Transplant  Discharge Summary      Patient Name: Mickey Harris  MRN: [id number]  Admission Date: 1/17/2023  Hospital Length of Stay: 15 days  Discharge Date and Time:  02/01/2023 1:42 PM  Attending Physician: Bg Hardy MD   Discharging Provider: Claudia Adams PA-C  Primary Care Provider: Primary Doctor No  Post-Operative Day: 15     ORGAN:   RIGHT LIVER LOBE (SEGS 5,6,7,8) WITHOUT MIDDLE HEPATIC VEIN  Disease Etiology: Cirrhosis: Fatty Liver (DORAN)  Donor Type:   Living  CDC High Risk:     Donor CMV Status:   Donor CMV Status:   Donor HBcAB:     Donor HCV Status:     Whole or Partial: Split Liver  Biliary Anastomosis: Becky-en-Y  Arterial Anatomy:     HPI:   Ms. Mickey Harris is a 56 y/o F admitted for living liver transplant from daughter for DORAN cirrhosis.       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 Surgery went without complications. 2 drains were placed. POD#1 Liver US showed minimally elevated intraparenchymal resistive indices, likely due to postoperative edema. Pts t bili started to increased and RYLEY drain noted to have more bilious output. T bili from drain fluid was 41. CT A/P 1/22 showed pneumoperitoneum, mild ascites and scattered regions of subcutaneous and rectus sheath emphysema. Pt taken back to OR POD#6 for exploratory lap where the CBD appeared to be under pressure suggesting obstruction. The anterior wall of the duct anastomosis was taken down, small stent was placed from 6 Fr ped feeding tube and secured. There was extensive bile staining but no obvious sign of active bile leak after repair. After take back increased bilious output noted from lateral RYLEY and new RYLEY, fluid from both drains sent for bili, both elevated, 125 and 71. She was taken back to OR again on POD #7 for ExLap 1/24/23, biliary system was taken down and reconstructed.  Aerobic cx + for staph epi, treated with unasyn (EOT 1/30). Routine US 1/27 stable. T bili slowly trending down, LFTs WNL. Drain bili resent to assess for continued leakage and liver US ordered to assess any fluid collections. Drain negative for bile leak and US showed no collections. Pt also with hypervolemia, responding well to lasix. Will dc on lasix 40 mg PO daily. Of note, pt also with new onset AF w/ RVR, after IV push x 2 of lopressor patient converted back to SR. Cards consulted and now she has been started on diltiazem. Pt with additional episode of AF RVR on 1/31. Cards assessed again - recommend aspirin and increased diltiazem dose.Underwent echo 2/1 with EF 60%. She is tolerating increased dose of diltiazem with no additional episodes. She will follow up with outpatient EP.    Pt is stable and ready for discharge. She is feeling well on day of discharge. She has met with PharmD and coordinator and received education. She will follow up with labs and clinic appointment 2/2/23. Pt expressed understanding of discharge instructions and importance of follow up.        Goals of Care Treatment Preferences:  Code Status: Full Code      Final Active Diagnoses:    Diagnosis Date Noted POA    PRINCIPAL PROBLEM:  S/P liver transplant [Z94.4] 01/18/2023 Not Applicable    Debility [R53.81] 01/31/2023 No    Paroxysmal A-fib [I48.0] 01/28/2023 No    Hypervolemia [E87.70] 01/25/2023 No    Thrombocytopenia, unspecified [D69.6] 01/24/2023 Yes    Common bile duct leak of transplanted liver [T86.49, K83.8] 01/24/2023 No    Acute blood loss anemia [D62] 01/21/2023 No    At risk for opportunistic infections [Z91.89] 01/21/2023 Yes    Long-term use of immunosuppressant medication [Z79.60] 01/21/2023 Not Applicable    Prophylactic immunotherapy [Z29.8] 01/21/2023 Not Applicable    Hyperglycemia [R73.9] 01/18/2023 No    Adrenal corticosteroid causing adverse effect in therapeutic use [T38.0X5A] 01/18/2023 No      Problems  Resolved During this Admission:    Diagnosis Date Noted Date Resolved POA    Paroxysmal tachycardia [I47.9] 01/26/2023 01/31/2023 No    Liver cirrhosis secondary to DORAN (nonalcoholic steatohepatitis) [K75.81, K74.60] 11/30/2022 01/23/2023 Yes       Consults (From admission, onward)          Status Ordering Provider     Inpatient consult to Cardiology  Once        Provider:  (Not yet assigned)    Completed TICO QIU     Inpatient consult to Psychology  Once        Provider:  (Not yet assigned)    Completed HAMILTON BETH     Inpatient consult to Cardiology  Once        Provider:  (Not yet assigned)    Completed GREGORY ALMAGUER     Inpatient consult to PICC team ([facility])  Once        Provider:  (Not yet assigned)    Completed ENE HAMM     Inpatient consult to Endocrinology  Once        Provider:  (Not yet assigned)    Completed NATALIYA NUNN     Inpatient consult to Registered Dietitian/Nutritionist  Once        Provider:  (Not yet assigned)    Completed NATALIYA NUNN            Pending Diagnostic Studies:       Procedure Component Value Units Date/Time    Echo [[id number]]     Order Status: Sent Lab Status: No result     Freeze and Hold -BB HEP [[id number]] Collected: 01/17/23 2207    Order Status: Sent Lab Status: In process Updated: 01/17/23 2208    Specimen: Blood           Significant Diagnostic Studies: Labs: CMP   Recent Labs   Lab 01/31/23 0527 02/01/23  0445   * 136   K 4.1 4.4    101   CO2 27 27   * 118*   BUN 19 18   CREATININE 0.8 0.8   CALCIUM 8.5* 8.7   PROT 4.3* 4.9*   ALBUMIN 2.3* 2.5*   BILITOT 3.9* 4.0*   ALKPHOS 170* 173*   AST 34 34   ALT 82* 83*   ANIONGAP 6* 8   , CBC   Recent Labs   Lab 01/31/23 0527 02/01/23  0445   WBC 5.69 7.07   HGB 7.2* 8.3*   HCT 23.0* 25.9*   PLT 89* 127*   , and All labs within the past 24 hours have been reviewed    The patients clinical status was discussed at multidisplinary rounds, involving transplant surgery,  transplant medicine, pharmacy, nursing, nutrition, and social work    Discharged Condition: good    Disposition:     Follow Up:   Follow-up Information       [facility]  Cardiology - 3rd Fl. Schedule an appointment as soon as possible for a visit.    Specialty: Cardiology  Why: 1-2 weeks after discharge  Contact information:  [facility]  [facility] [id number] [phone]  Additional information:  Cardiology Services Clinics - 3rd floor             Derrick Swenson MD. Call in 1 week(s).    Specialties: Electrophysiology, Cardiovascular Disease  Contact information:  [facility]  [facility] [address]  [phone]                           Patient Instructions:      Ambulatory referral/consult to Home Health   Standing Status: Future   Referral Priority: Routine Referral Type: Home Health Care   Referral Reason: Specialty Services Required   Requested Specialty: Home Health Services   Number of Visits Requested: 1     Ambulatory referral/consult to Cardiac Electrophysiology   Standing Status: Future   Referral Priority: Routine Referral Type: Consultation   Referral Reason: Specialty Services Required   Requested Specialty: Cardiology   Number of Visits Requested: 1     Medications:  Reconciled Home Medications:      Medication List        START taking these medications      aspirin 81 MG EC tablet  Commonly known as: ECOTRIN  Take 1 tablet (81 mg total) by mouth once daily.     atovaquone 750 mg/5 mL Susp oral liquid  Commonly known as: MEPRON  Take 10 mLs (1,500 mg total) by mouth once daily. STOP 7/17/23     blood sugar diagnostic Strp  Test blood glucose 2 (two) times daily before meals.     blood-glucose meter Misc  Commonly known as: ONETOUCH VERIO FLEX METER  TEST BLOOD GLUCOSE AS DIRECTED     calcium carbonate-vitamin D3 600 mg-20 mcg (800 unit) Tab  Take 1 tablet by mouth once daily.     diltiaZEM 180 MG 24 hr capsule  Commonly known as: CARDIZEM CD  Take 1 capsule (180 mg total)  by mouth once daily.  Start taking on: February 2, 2023     fluconazole 200 MG Tab  Commonly known as: DIFLUCAN  Take 1 tablet (200 mg total) by mouth once daily. STOP 2/17/23     hydrOXYzine pamoate 25 MG Cap  Commonly known as: VISTARIL  Take 1 capsule (25 mg total) by mouth every 8 (eight) hours as needed (anxiety/sleep).     lancets 30 gauge Misc  Test blood glucose 2 (two) times daily before meals.     magnesium oxide 400 mg (241.3 mg magnesium) tablet  Commonly known as: MAG-OX  Take 2 tablets (800 mg total) by mouth 2 (two) times daily.     mycophenolate 250 mg Cap  Commonly known as: CELLCEPT  Take 4 capsules (1,000 mg total) by mouth 2 (two) times daily.     oxyCODONE 10 mg Tab immediate release tablet  Commonly known as: ROXICODONE  Take 0.5-1 tablets (5-10 mg total) by mouth every 4 (four) hours as needed for Pain.     pantoprazole 40 MG tablet  Commonly known as: PROTONIX  Take 1 tablet (40 mg total) by mouth once daily.     predniSONE 5 MG tablet  Commonly known as: DELTASONE  Take by mouth daily: 1/23 to 1/29 20 mg, 1/30 to 2/5 15 mg, 2/6 to 2/12 10 mg, 2/13 to 2/19 5 mg, then discontinue     SITagliptin phosphate 100 MG Tab  Commonly known as: JANUVIA  Take 1 tablet (100 mg total) by mouth once daily.  Start taking on: February 2, 2023     tacrolimus 1 MG Cap  Commonly known as: PROGRAF  Take 2 capsules (2 mg total) by mouth every 12 (twelve) hours.     ursodioL 250 mg Tab  Commonly known as: ACTIGALL  Take 1 tablet (250 mg total) by mouth 3 (three) times daily.     valGANciclovir 450 mg Tab  Commonly known as: VALCYTE  Take 1 tablet (450 mg total) by mouth once daily. Stop 4/18/2023            CHANGE how you take these medications      furosemide 40 MG tablet  Commonly known as: LASIX  Take 1 tablet (40 mg total) by mouth once daily.  What changed: medication strength            STOP taking these medications      albuterol 90 mcg/actuation inhaler  Commonly known as: PROVENTIL/VENTOLIN HFA     biotin  1 mg Cap     cyanocobalamin (vitamin B-12) 50 mcg tablet     ergocalciferol 50,000 unit Cap  Commonly known as: ERGOCALCIFEROL     FLOVENT  mcg/actuation inhaler  Generic drug: fluticasone propionate     hydrOXYzine HCL 25 MG tablet  Commonly known as: ATARAX     lactulose 10 gram/15 mL solution  Commonly known as: CHRONULAC     loratadine-pseudoephedrine  mg  mg per 24 hr tablet  Commonly known as: CLARITIN-D 24-hour     MG-PLUS-PROTEIN 133 mg Tab  Generic drug: magnesium oxide-Mg AA chelate     ondansetron 4 MG tablet  Commonly known as: ZOFRAN     spironolactone 50 MG tablet  Commonly known as: ALDACTONE     vitamin E (dl, acetate) 45 mg (100 unit) Cap            Time spent caring for patient (Greater than 1/2 spent in direct face-to-face contact): > 30 minutes    Claudia Adams PA-C  Liver Transplant  [facility] - Transplant Stepdown

## 2023-01-30 NOTE — PROGRESS NOTES
[facility] Step[facility]  Liver Transplant  Progress Note    Patient Name: Mickey Harris  MRN: [id number]  Admission Date: 1/17/2023  Hospital Length of Stay: 13 days  Code Status: Full Code  Primary Care Provider: Primary Doctor No  Post-Operative Day: 13    ORGAN:   RIGHT LIVER LOBE (SEGS 5,6,7,8) WITHOUT MIDDLE HEPATIC VEIN  Disease Etiology: Cirrhosis: Fatty Liver (DORAN)  Donor Type:   Living  CDC High Risk:     Donor CMV Status:   Donor CMV Status:   Donor HBcAB:     Donor HCV Status:     Donor HBV PALOMA:   Donor HCV PALOMA:   Whole or Partial: Split Liver  Biliary Anastomosis: Becky-en-Y  Arterial Anatomy:   Subjective:     History of Present Illness:  Ms. Mickey Harris is a 56 y/o F admitted for living liver transplant from daughter for DORAN cirrhosis.       Hospital Course:  She is now status post living related donor liver transplant on 1/17/22. She is progressing well post op. 2 drains in place. POD#1 Liver US showed minimally elevated intraparenchymal resistive indices, likely due to postoperative edema. Otherwise satisfactory Doppler evaluation of the liver. Transfer to TSU on POD#2. Pt w increased t bili and noted to have more bilious output in RYLEY drain.  T bili from drain fluid was 41. CT A/P 1/22 showed pneumoperitoneum, mild ascites and scattered regions of subcutaneous and rectus sheath emphysema. Pt taken back to OR POD#6 for exploratory lap where the CBD appeared to be under pressure suggesting obstruction. The anterior wall of the duct anastomosis was taken down, small stent was placed from 6 Fr ped feeding tube and secured. Anterior wall was closed. There was extensive bile staining but no obvious sign of active bile leak after repair. The 2 drains had increased bilious output and she was taken back to OR again on POD #7. Now s/p ExLap 1/24/23, biliary system was taken down and reconstructed. Had a few episodes of tachycardia, rhythm strips showed sinus tach, started lopressor but started  becoming hypotensive so dc'd. Aerobic cx + for staph epi, susceptible to oxacillin, cont unasyn (EOT 1/30). Routine US 1/27 stable.    Hospital Interval:  NAEON. Patient reports feeling ok this morning but does report some anxiety/difficulty coping. Psych consulted. T bili trending down, LFTs WNL. RYLEY drains with yellow serous output. Will check drains again for bili and Liver US ordered to assess for any fluid collections. Intake/appetite improving, cont GI cocktail and other reflux medications. C/o decreased sleep, added atarax to regimen. Also c/o edema, cont lasix. Cr stable. PT/OT following, recommend HH. VSS. Monitor.      Scheduled Meds:   aluminum-magnesium hydroxide-simethicone  30 mL Oral Once    And    LIDOcaine HCl 2%  15 mL Oral Once    aspirin  81 mg Oral Daily    [START ON 1/31/2023] atovaquone  1,500 mg Oral Daily    bisacodyL  10 mg Oral Daily    diltiaZEM  120 mg Oral Daily    docusate sodium  100 mg Oral BID    fluconazole  200 mg Oral Daily    [START ON 1/31/2023] furosemide  40 mg Oral Daily    heparin (porcine)  5,000 Units Subcutaneous Q8H    mycophenolate  500 mg Oral BID    pantoprazole  40 mg Oral Daily    polyethylene glycol  17 g Oral Daily    predniSONE  15 mg Oral Daily    Followed by    [START ON 2/6/2023] predniSONE  10 mg Oral Daily    Followed by    [START ON 2/13/2023] predniSONE  5 mg Oral Daily    simethicone  1 tablet Oral TID PC    sodium chloride 0.9%  10 mL Intravenous Q6H    tacrolimus  2 mg Oral BID    traZODone  100 mg Oral QHS    ursodioL  300 mg Oral TID    valGANciclovir  450 mg Oral Daily     Continuous Infusions:   sodium chloride 0.9% Stopped (01/19/23 4671)     PRN Meds:albuterol, artificial tears, bisacodyL, calcium carbonate, dextrose 10%, dextrose 10%, glucagon (human recombinant), glucose, glucose, hydrOXYzine pamoate, insulin aspart U-100, metoprolol, ondansetron, oxyCODONE, oxyCODONE, prochlorperazine, sodium chloride 0.9%, Flushing PICC  Protocol **AND** sodium chloride 0.9% **AND** sodium chloride 0.9%    Review of Systems   Constitutional:  Positive for fatigue. Negative for activity change, appetite change and fever.   HENT: Negative.     Eyes:  Negative for visual disturbance.   Respiratory:  Negative for shortness of breath.    Cardiovascular:  Negative for chest pain, palpitations and leg swelling.   Gastrointestinal:  Positive for abdominal distention and abdominal pain. Negative for constipation, diarrhea, nausea and vomiting.   Genitourinary:  Negative for decreased urine volume, difficulty urinating and dysuria.   Musculoskeletal:  Negative for arthralgias, back pain and joint swelling.   Skin:  Positive for wound.   Allergic/Immunologic: Positive for immunocompromised state.   Neurological:  Positive for weakness. Negative for dizziness and facial asymmetry.   Hematological:  Negative for adenopathy. Bruises/bleeds easily.   Psychiatric/Behavioral:  Negative for agitation, behavioral problems and sleep disturbance.    All other systems reviewed and are negative.  Objective:     Vital Signs (Most Recent):  Temp: 97.6 °F (36.4 °C) (01/30/23 0746)  Pulse: 80 (01/30/23 0746)  Resp: 16 (01/30/23 0755)  BP: (!) 107/54 (01/30/23 0746)  SpO2: (!) 94 % (01/30/23 0746)   Vital Signs (24h Range):  Temp:  [97.6 °F (36.4 °C)-98.3 °F (36.8 °C)] 97.6 °F (36.4 °C)  Pulse:  [78-90] 80  Resp:  [16-18] 16  SpO2:  [94 %-96 %] 94 %  BP: (102-115)/(54-61) 107/54     Weight: 92 kg (202 lb 13.2 oz)  Body mass index is 33.75 kg/m².    Intake/Output - Last 3 Shifts         01/28 0700 01/29 0659 01/29 0700 01/30 0659 01/30 0700 01/31 0659    P.O. 900 470     I.V. (mL/kg) 0 (0) 0 (0)     Other 0 0     IV Piggyback  788.1     Total Intake(mL/kg) 900 (9.8) 1258.1 (13.7)     Urine (mL/kg/hr) 2700 (1.2) 1250 (0.6) 550 (1.4)    Emesis/NG output 0 0     Drains 310 400 60    Other 0 0     Stool 0 0     Blood 0      Total Output 3010 1650 610    Net -2110 -391.9 -610            Urine Occurrence 0 x 0 x     Stool Occurrence 2 x 0 x     Emesis Occurrence 0 x 0 x             Physical Exam  Vitals and nursing note reviewed.   Constitutional:       Appearance: She is well-developed.      Comments: No hand or temporal muscle wasting noted     HENT:      Head: Normocephalic.   Eyes:      General: Scleral icterus present.      Conjunctiva/sclera: Conjunctivae normal.   Cardiovascular:      Rate and Rhythm: Normal rate and regular rhythm.      Heart sounds: Normal heart sounds. No murmur heard.  Pulmonary:      Effort: Pulmonary effort is normal.      Breath sounds: Normal breath sounds.   Abdominal:      General: Bowel sounds are normal. There is no distension.      Palpations: Abdomen is soft.      Tenderness: There is abdominal tenderness (over incision as expected).      Comments: Chevron incision CDI w/ staples   RYLEY x 2 in place   Musculoskeletal:         General: Normal range of motion.      Cervical back: Normal range of motion.      Right lower leg: Edema present.      Left lower leg: Edema present.   Skin:     General: Skin is warm and dry.      Capillary Refill: Capillary refill takes less than 2 seconds.      Coloration: Skin is jaundiced.   Neurological:      General: No focal deficit present.      Mental Status: She is alert and oriented to person, place, and time.   Psychiatric:         Mood and Affect: Mood normal.         Behavior: Behavior normal.         Thought Content: Thought content normal.         Judgment: Judgment normal.       Laboratory:  Immunosuppressants           Stop Route Frequency     tacrolimus capsule 2 mg         -- Oral 2 times daily     mycophenolate capsule 500 mg         -- Oral 2 times daily          CBC:   Recent Labs   Lab 01/30/23  0528   WBC 7.30   RBC 2.21*   HGB 7.3*   HCT 22.2*   PLT 83*   *   MCH 33.0*   MCHC 32.9     CMP:   Recent Labs   Lab 01/30/23  0528   *   CALCIUM 8.2*   ALBUMIN 2.5*   PROT 4.6*   *   K 4.0   CO2 24       BUN 22*   CREATININE 0.8   ALKPHOS 183*   ALT 86*   AST 37   BILITOT 5.2*     Labs within the past 24 hours have been reviewed.    Diagnostic Results:  US Liver Transplant Post:  Results for orders placed during the hospital encounter of 01/17/23    US Doppler Liver Transplant Post (xpd)    Narrative  EXAMINATION:  US DOPPLER LIVER TRANSPLANT POST (XPD)    CLINICAL HISTORY:  routine post op exam;    TECHNIQUE:  Limited abdominal ultrasound of the transplant liver with Doppler evaluation.  Color and spectral Doppler were performed.    COMPARISON:  CT abdomen pelvis without contrast 01/22/2023 and Doppler liver transplant ultrasound 01/20/2023.    FINDINGS:  Patient is status post orthotopic liver transplant on 01/17/2023 (living donor right lobe).  The patient returned to the OR on 01/23/2023 and 01/24/2023 for bile duct reconstruction with Becky-en-Y hepaticojejunostomy.    The liver demonstrates homogeneous echotexture.  No focal hepatic lesions are seen.    No peritransplant fluid collections.    The common duct is not dilated, measuring 3 mm.  No dilated intrahepatic radicles are seen.    Color flow and spectral waveform analysis was performed.  Hepatopetal flow within the main and right portal veins.  Middle hepatic vein velocity measures 80 centimeter/second with proper directional flow (previously 88 centimeter/second).  Right hepatic vein velocity measures 61 centimeter/second with proper direction of flow (previously 57 centimeter/second).  Elevated velocities in the IVC, measuring 317 cm/sec at the anastomosis with turbulent flow compared to 269 cm/sec at the dome and 164 cm/sec inferiorly.    Anastomosis site of the main hepatic artery demonstrates a peak systolic velocity 100 cm/sec (previously 98 centimeter/second).    Main hepatic artery demonstrates resistive index 0.87 with normal waveform (previously 0.85).    Anterior branch of the right hepatic artery demonstrates resistive index 0.84  with normal waveform (previously 0.68).    Posterior branch of the right hepatic artery demonstrates resistive index 0.76 with normal waveform (previously 0.79).    Right pleural effusion.    Impression  Elevated arterial resistive indices, which could represent edema or rejection in the early postoperative setting.    Elevated velocities at the IVC anastomosis.  Attention on follow-up.    Electronically signed by resident: Shelly Salazar  Date:    01/27/2023  Time:    13:11    Electronically signed by: Hunter Siu  Date:    01/27/2023  Time:    14:50    I have personally reviewed all pertinent imaging studies.    Debility/Functional status: Patient debilitated by evidence of Muscle wasting and atrophy and Weakness. Physical and occupational therapy ordered daily to evaluate and treat. Debility was: present on admission.    Assessment/Plan:     * S/P liver transplant  -DORAN cirrhosis complicated by hepatic encephalopathy who is now status post living related donor liver transplant on 1/17/22. She is progressing well post op. 2 drains in place. POD#1 Liver US looked fine.   Minimally elevated intraparenchymal resistive indices, likely due to postoperative edema.    - Liver US 1/20 reviewed w CBD 3mm w no duct dilation  - elevated t-bili started Ursodiol 1/21  - CT scan ABD 1/22- reviewed  - t bili up to 22, 21.2 today. Fluid sent for t bili on 1/23 was 40.9  - take back to OR 1/23/23 for ex/lap where CBD appeared to be under pressure suggesting obstruction. The anterior wall of the duct anastomosis was taken down, small stent was placed from 6 Fr ped feeding tube and secured. Anterior wall was closed. There was extensive bile staining but no obvious sign of active bile leak after repair  - TB 1/24 for increased bilious fluid from drains, anastomosis redone, t bili cont to decrease  - routine US 1/27 stable, reviewed by surgeon   - cont to trend LFTs  - unasyn x 10 days for IA prophylaxis/ + cx (EOT 1/30)      Paroxysmal  "A-fib  - cardiology consulted   - CHADVASC 2 (female gender and HTN), no need for AC   - rate controled with cardizem 120 mg daily extended release      Paroxysmal tachycardia  - resolved spontaneously or with lopressor 5mg x 2   - lopressor BID dc'd for hypotension   - troponins WNL   - EKGs with sinus tachycardia   - another episode with AF seen on EKG, see "paroxamal AF"  - cont to monitor closely   - Cont tele       Hypervolemia  - edema seen in LEs, c/o SOB   - Cont lasix      Common bile duct leak of transplanted liver  - see s/p liver transplant      Thrombocytopenia, unspecified  - cont to improve post transplant      Prophylactic immunotherapy  - continue prograf  - continue to monitor for toxic side effects and check daily levels. Will adjust for therapeutic dose      Long-term use of immunosuppressant medication  - see prophylactic immunotherapy      At risk for opportunistic infections  - Bactrim for PCP ppx.  - Valcyte for CMV ppx.      Acute blood loss anemia  - H/H stable  - no overt signs of bleed  - continue to monitor with daily CBC      Adrenal corticosteroid causing adverse effect in therapeutic use  - endo consulted and following      Hyperglycemia  - likely steroid related, Endocrine following       VTE Risk Mitigation (From admission, onward)         Ordered     Place sequential compression device  Until discontinued         01/17/23 2342     heparin (porcine) injection 5,000 Units  Every 8 hours         01/17/23 2148     IP VTE HIGH RISK PATIENT  Once         01/17/23 2148                The patients clinical status was discussed at multidisplinary rounds, involving transplant surgery, transplant medicine, pharmacy, nursing, nutrition, and social work    Discharge Planning: Not a candidate for d/c at this time.     Shelly Hardy PA-C  Liver Transplant  [facility]  "

## 2023-01-30 NOTE — PROGRESS NOTES
Met with patient and her  for  discharge teaching.  Reviewed My New Journey: Living Smart After My Liver Transplant.  Sections reviewed were: First Steps, Appointments and Prevention.  Medications reviewed by Pharm D.  Allowed time for questions and answers.  Asked patient to complete the review questions in the discharge book.

## 2023-01-30 NOTE — SUBJECTIVE & OBJECTIVE
Scheduled Meds:   aluminum-magnesium hydroxide-simethicone  30 mL Oral Once    And    LIDOcaine HCl 2%  15 mL Oral Once    aspirin  81 mg Oral Daily    [START ON 1/31/2023] atovaquone  1,500 mg Oral Daily    bisacodyL  10 mg Oral Daily    diltiaZEM  120 mg Oral Daily    docusate sodium  100 mg Oral BID    fluconazole  200 mg Oral Daily    [START ON 1/31/2023] furosemide  40 mg Oral Daily    heparin (porcine)  5,000 Units Subcutaneous Q8H    mycophenolate  500 mg Oral BID    pantoprazole  40 mg Oral Daily    polyethylene glycol  17 g Oral Daily    predniSONE  15 mg Oral Daily    Followed by    [START ON 2/6/2023] predniSONE  10 mg Oral Daily    Followed by    [START ON 2/13/2023] predniSONE  5 mg Oral Daily    simethicone  1 tablet Oral TID PC    sodium chloride 0.9%  10 mL Intravenous Q6H    tacrolimus  2 mg Oral BID    traZODone  100 mg Oral QHS    ursodioL  300 mg Oral TID    valGANciclovir  450 mg Oral Daily     Continuous Infusions:   sodium chloride 0.9% Stopped (01/19/23 4321)     PRN Meds:albuterol, artificial tears, bisacodyL, calcium carbonate, dextrose 10%, dextrose 10%, glucagon (human recombinant), glucose, glucose, hydrOXYzine pamoate, insulin aspart U-100, metoprolol, ondansetron, oxyCODONE, oxyCODONE, prochlorperazine, sodium chloride 0.9%, Flushing PICC Protocol **AND** sodium chloride 0.9% **AND** sodium chloride 0.9%    Review of Systems   Constitutional:  Positive for fatigue. Negative for activity change, appetite change and fever.   HENT: Negative.     Eyes:  Negative for visual disturbance.   Respiratory:  Negative for shortness of breath.    Cardiovascular:  Negative for chest pain, palpitations and leg swelling.   Gastrointestinal:  Positive for abdominal distention and abdominal pain. Negative for constipation, diarrhea, nausea and vomiting.   Genitourinary:  Negative for decreased urine volume, difficulty urinating and dysuria.   Musculoskeletal:  Negative for arthralgias, back pain and  joint swelling.   Skin:  Positive for wound.   Allergic/Immunologic: Positive for immunocompromised state.   Neurological:  Positive for weakness. Negative for dizziness and facial asymmetry.   Hematological:  Negative for adenopathy. Bruises/bleeds easily.   Psychiatric/Behavioral:  Negative for agitation, behavioral problems and sleep disturbance.    All other systems reviewed and are negative.  Objective:     Vital Signs (Most Recent):  Temp: 97.6 °F (36.4 °C) (01/30/23 0746)  Pulse: 80 (01/30/23 0746)  Resp: 16 (01/30/23 0755)  BP: (!) 107/54 (01/30/23 0746)  SpO2: (!) 94 % (01/30/23 0746)   Vital Signs (24h Range):  Temp:  [97.6 °F (36.4 °C)-98.3 °F (36.8 °C)] 97.6 °F (36.4 °C)  Pulse:  [78-90] 80  Resp:  [16-18] 16  SpO2:  [94 %-96 %] 94 %  BP: (102-115)/(54-61) 107/54     Weight: 92 kg (202 lb 13.2 oz)  Body mass index is 33.75 kg/m².    Intake/Output - Last 3 Shifts         01/28 0700  01/29 0659 01/29 0700 01/30 0659 01/30 0700 01/31 0659    P.O. 900 470     I.V. (mL/kg) 0 (0) 0 (0)     Other 0 0     IV Piggyback  788.1     Total Intake(mL/kg) 900 (9.8) 1258.1 (13.7)     Urine (mL/kg/hr) 2700 (1.2) 1250 (0.6) 550 (1.4)    Emesis/NG output 0 0     Drains 310 400 60    Other 0 0     Stool 0 0     Blood 0      Total Output 3010 1650 610    Net -2110 -391.9 -610           Urine Occurrence 0 x 0 x     Stool Occurrence 2 x 0 x     Emesis Occurrence 0 x 0 x             Physical Exam  Vitals and nursing note reviewed.   Constitutional:       Appearance: She is well-developed.      Comments: No hand or temporal muscle wasting noted     HENT:      Head: Normocephalic.   Eyes:      General: Scleral icterus present.      Conjunctiva/sclera: Conjunctivae normal.   Cardiovascular:      Rate and Rhythm: Normal rate and regular rhythm.      Heart sounds: Normal heart sounds. No murmur heard.  Pulmonary:      Effort: Pulmonary effort is normal.      Breath sounds: Normal breath sounds.   Abdominal:      General: Bowel  sounds are normal. There is no distension.      Palpations: Abdomen is soft.      Tenderness: There is abdominal tenderness (over incision as expected).      Comments: Chevron incision CDI w/ staples   RYLEY x 2 in place   Musculoskeletal:         General: Normal range of motion.      Cervical back: Normal range of motion.      Right lower leg: Edema present.      Left lower leg: Edema present.   Skin:     General: Skin is warm and dry.      Capillary Refill: Capillary refill takes less than 2 seconds.      Coloration: Skin is jaundiced.   Neurological:      General: No focal deficit present.      Mental Status: She is alert and oriented to person, place, and time.   Psychiatric:         Mood and Affect: Mood normal.         Behavior: Behavior normal.         Thought Content: Thought content normal.         Judgment: Judgment normal.       Laboratory:  Immunosuppressants           Stop Route Frequency     tacrolimus capsule 2 mg         -- Oral 2 times daily     mycophenolate capsule 500 mg         -- Oral 2 times daily          CBC:   Recent Labs   Lab 01/30/23  0528   WBC 7.30   RBC 2.21*   HGB 7.3*   HCT 22.2*   PLT 83*   *   MCH 33.0*   MCHC 32.9     CMP:   Recent Labs   Lab 01/30/23  0528   *   CALCIUM 8.2*   ALBUMIN 2.5*   PROT 4.6*   *   K 4.0   CO2 24      BUN 22*   CREATININE 0.8   ALKPHOS 183*   ALT 86*   AST 37   BILITOT 5.2*     Labs within the past 24 hours have been reviewed.    Diagnostic Results:  US Liver Transplant Post:  Results for orders placed during the hospital encounter of 01/17/23    US Doppler Liver Transplant Post (xpd)    Narrative  EXAMINATION:  US DOPPLER LIVER TRANSPLANT POST (XPD)    CLINICAL HISTORY:  routine post op exam;    TECHNIQUE:  Limited abdominal ultrasound of the transplant liver with Doppler evaluation.  Color and spectral Doppler were performed.    COMPARISON:  CT abdomen pelvis without contrast 01/22/2023 and Doppler liver transplant ultrasound  01/20/2023.    FINDINGS:  Patient is status post orthotopic liver transplant on 01/17/2023 (living donor right lobe).  The patient returned to the OR on 01/23/2023 and 01/24/2023 for bile duct reconstruction with Becky-en-Y hepaticojejunostomy.    The liver demonstrates homogeneous echotexture.  No focal hepatic lesions are seen.    No peritransplant fluid collections.    The common duct is not dilated, measuring 3 mm.  No dilated intrahepatic radicles are seen.    Color flow and spectral waveform analysis was performed.  Hepatopetal flow within the main and right portal veins.  Middle hepatic vein velocity measures 80 centimeter/second with proper directional flow (previously 88 centimeter/second).  Right hepatic vein velocity measures 61 centimeter/second with proper direction of flow (previously 57 centimeter/second).  Elevated velocities in the IVC, measuring 317 cm/sec at the anastomosis with turbulent flow compared to 269 cm/sec at the dome and 164 cm/sec inferiorly.    Anastomosis site of the main hepatic artery demonstrates a peak systolic velocity 100 cm/sec (previously 98 centimeter/second).    Main hepatic artery demonstrates resistive index 0.87 with normal waveform (previously 0.85).    Anterior branch of the right hepatic artery demonstrates resistive index 0.84 with normal waveform (previously 0.68).    Posterior branch of the right hepatic artery demonstrates resistive index 0.76 with normal waveform (previously 0.79).    Right pleural effusion.    Impression  Elevated arterial resistive indices, which could represent edema or rejection in the early postoperative setting.    Elevated velocities at the IVC anastomosis.  Attention on follow-up.    Electronically signed by resident: Shelly Salazar  Date:    01/27/2023  Time:    13:11    Electronically signed by: Hunter Siu  Date:    01/27/2023  Time:    14:50    I have personally reviewed all pertinent imaging studies.    Debility/Functional status:  Patient debilitated by evidence of Muscle wasting and atrophy and Weakness. Physical and occupational therapy ordered daily to evaluate and treat. Debility was: present on admission.

## 2023-01-30 NOTE — SUBJECTIVE & OBJECTIVE
"Interval HPI:   Overnight events: Remains in TSU. NAEON. BG at or slightly above goal; required correction scale x1. 6 Days Post-Op. Prednisone 20 mg daily.   Eating:  Diet Adult Regular (IDDSI Level 7)   50%  Nausea: No  Hypoglycemia and intervention: No  Fever: No  TPN and/or TF: No    BP (!) 107/51 (BP Location: Left arm, Patient Position: Sitting)   Pulse 87   Temp 97.9 °F (36.6 °C) (Oral)   Resp 16   Ht 5' 5" (1.651 m)   Wt 92 kg (202 lb 13.2 oz)   SpO2 98%   Breastfeeding No   BMI 33.75 kg/m²     Labs Reviewed and Include    Recent Labs   Lab 01/30/23  0528   *   CALCIUM 8.2*   ALBUMIN 2.5*   PROT 4.6*   *   K 4.0   CO2 24      BUN 22*   CREATININE 0.8   ALKPHOS 183*   ALT 86*   AST 37   BILITOT 5.2*     Lab Results   Component Value Date    WBC 7.30 01/30/2023    HGB 7.3 (L) 01/30/2023    HCT 22.2 (L) 01/30/2023     (H) 01/30/2023    PLT 83 (L) 01/30/2023     No results for input(s): TSH, FREET4 in the last 168 hours.  Lab Results   Component Value Date    HGBA1C 4.5 01/17/2023       Nutritional status:   Body mass index is 33.75 kg/m².  Lab Results   Component Value Date    ALBUMIN 2.5 (L) 01/30/2023    ALBUMIN 2.5 (L) 01/29/2023    ALBUMIN 2.0 (L) 01/28/2023     No results found for: PREALBUMIN    Estimated Creatinine Clearance: 85.9 mL/min (based on SCr of 0.8 mg/dL).    Accu-Checks  Recent Labs     01/27/23  2212 01/28/23  1242 01/28/23  2212 01/29/23  0852 01/29/23  2120 01/30/23  1256   POCTGLUCOSE 175* 199* 228* 159* 258* 179*       Current Medications and/or Treatments Impacting Glycemic Control  Immunotherapy:    Immunosuppressants           Stop Route Frequency     tacrolimus capsule 2 mg         -- Oral 2 times daily     mycophenolate capsule 500 mg         -- Oral 2 times daily          Steroids:   Hormones (From admission, onward)      Start     Stop Route Frequency Ordered    02/13/23 0900  predniSONE tablet 5 mg        See Hyperspace for full Linked Orders " Report.    02/20 0859 Oral Daily 01/27/23 0954    02/06/23 0900  predniSONE tablet 10 mg        See Hyperspace for full Linked Orders Report.    02/13 0859 Oral Daily 01/27/23 0954    01/30/23 0900  predniSONE tablet 15 mg        See Hyperspace for full Linked Orders Report.    02/06 0859 Oral Daily 01/27/23 0954          Pressors:    Autonomic Drugs (From admission, onward)      None          Hyperglycemia/Diabetes Medications:   Antihyperglycemics (From admission, onward)      Start     Stop Route Frequency Ordered    01/26/23 1122  insulin aspart U-100 pen 0-5 Units         -- SubQ Before meals & nightly PRN 01/26/23 1022

## 2023-01-30 NOTE — PT/OT/SLP PROGRESS
Physical Therapy Treatment    Patient Name:  Mickey Harris   MRN:  [id number]    Recommendations:     Discharge Recommendations: home health PT  Discharge Equipment Recommendations: none  Barriers to discharge: None    Assessment:     iMckey Harris is a 57 y.o. female admitted with a medical diagnosis of S/P liver transplant.  She presents with the following impairments/functional limitations: weakness, impaired endurance, impaired functional mobility, gait instability, impaired balance, impaired cardiopulmonary response to activity. Pt participated, and tolerated treatment well. Pt will continue to benefit from skilled PT services to improve all deficits noted above. Resume PT POC as indicated.     Rehab Prognosis: Good; patient would benefit from acute skilled PT services to address these deficits and reach maximum level of function.    Recent Surgery: Procedure(s) (LRB):  LAPAROTOMY, EXPLORATORY (N/A)  CHOLEDOCHOJEJUNOSTOMY 6 Days Post-Op    Plan:     During this hospitalization, patient to be seen 4 x/week to address the identified rehab impairments via gait training, therapeutic activities, therapeutic exercises and progress toward the following goals:    Plan of Care Expires:  02/19/23    Subjective     Chief Complaint: none stated  Patient/Family Comments/goals: none stated  Pain/Comfort:  Pain Rating 1: 0/10  Pain Rating Post-Intervention 1: 0/10      Objective:     Communicated with nursing prior to session.  Patient found up in chair with  (all lines intact and spouse present) upon PT entry to room.     General Precautions: Standard, fall  Orthopedic Precautions: N/A  Braces: N/A  Respiratory Status: Room air     Functional Mobility:  Transfers:     Sit to Stand:  stand by assistance with no AD  Toilet Transfer: stand by assistance with  no AD  using  Step Transfer  Gait: ~250ft no AD with close SBA for safety. No LOB noted.       AM-PAC 6 CLICK MOBILITY  Turning over in bed (including adjusting  bedclothes, sheets and blankets)?: 3  Sitting down on and standing up from a chair with arms (e.g., wheelchair, bedside commode, etc.): 3  Moving from lying on back to sitting on the side of the bed?: 3  Moving to and from a bed to a chair (including a wheelchair)?: 3  Need to walk in hospital room?: 3  Climbing 3-5 steps with a railing?: 2  Basic Mobility Total Score: 17       Treatment & Education:  -BLE therex x10 reps: AP,LAQ,HF,Hip abd/add  -All questions/concerns answered within PTA scope of practice.   -Donned mask and robe prior to ambulating in hallway.    Patient left up in chair with all lines intact, call button in reach, and nursing notified..    GOALS:   Multidisciplinary Problems       Physical Therapy Goals          Problem: Physical Therapy    Goal Priority Disciplines Outcome Goal Variances Interventions   Physical Therapy Goal     PT, PT/OT Ongoing, Progressing     Description: Goals to be met by: 2023    Patient will increase functional independence with mobility by performin. Sit to stand transfer with Modified [place]  2. Bed to chair transfer with Supervision using LRAD  3. Gait  x 250 feet with Supervision using LRAD.   4. Ascend/descend 6 stair with bilateral Handrails Stand-by Assistance using no AD.   5. Lower extremity exercise program x30 reps per handout, with independence                         Time Tracking:     PT Received On: 23  PT Start Time: 0939     PT Stop Time: 1002  PT Total Time (min): 23 min     Billable Minutes: Gait Training 10 and Therapeutic Activity 13    Treatment Type: Treatment  PT/PTA: PTA     PTA Visit Number: 1     2023

## 2023-01-30 NOTE — ASSESSMENT & PLAN NOTE
Endocrinology consulted for BG management.   BG goal 140-180       - Novolog (aspart) insulin prn for BG excursions [facility] SSI (150/25).  - BG checks AC/HS  - Hypoglycemia protocol in place    ** Please notify Endocrine for any change and/or advance in diet**  ** Please call Endocrine for any BG related issues **    Discharge Planning:   TBD. Please notify endocrinology prior to discharge. If patient with hyperglycemia today recommend insurance preferred [id number]; No history of pancreatitis or medullary thyroid CA.   Meter, test strips, lancets.

## 2023-01-30 NOTE — PLAN OF CARE
Pt aao x4. Call bell within reach.  at bedside and attentive to pt's needs. Pulling meds 100% correctly. Ambulating in room and hallway. Brent x2-negative for bile leak. Possibly d/c one today. Plan for liver ultrasound this evening. Possible discharge tomorrow. She voiced understanding of plan of care.

## 2023-01-30 NOTE — ASSESSMENT & PLAN NOTE
- cardiology consulted   - CHADVASC 2 (female gender and HTN), no need for AC   - rate controled with cardizem 120 mg daily extended release

## 2023-01-30 NOTE — ASSESSMENT & PLAN NOTE
"- resolved spontaneously or with lopressor 5mg x 2   - lopressor BID dc'd for hypotension   - troponins WNL   - EKGs with sinus tachycardia   - another episode with AF seen on EKG, see "paroxamal AF"  - cont to monitor closely   - Cont tele   "

## 2023-01-30 NOTE — PLAN OF CARE
Lateral RLQ Drain removed:  Site cleaned with alcohol, lidocaine 1% used to numb area to place prolene 3.0 suture to site.  Drain intact at time of removal.  Patient tolerated procedure well.  Will continue to monitor for any complications.

## 2023-01-30 NOTE — PROGRESS NOTES
Update     presented to the patient's room for follow up and continuity of care. Patient observed sitting up eating in bedside chair. Patient presents alert and oriented x4, pleasant and commutative. Patient presented with pts . Patient notes she feels better and hopeful to discharge soon to rest and spend time with family. Plan is for home health PT and patient is agreeable to [facility] home [facility].  Patient  denied any concerns or needs.    SW remains available and will continue to follow, providing psychosocial support, education and assistance as needed.

## 2023-01-31 PROBLEM — R53.81 DEBILITY: Status: ACTIVE | Noted: 2023-01-31

## 2023-01-31 PROBLEM — I47.9 PAROXYSMAL TACHYCARDIA: Status: RESOLVED | Noted: 2023-01-26 | Resolved: 2023-01-31

## 2023-01-31 LAB
ALBUMIN SERPL BCP-MCNC: 2.3 G/DL (ref 3.5–5.2)
ALP SERPL-CCNC: 170 U/L (ref 55–135)
ALT SERPL W/O P-5'-P-CCNC: 82 U/L (ref 10–44)
ANION GAP SERPL CALC-SCNC: 6 MMOL/L (ref 8–16)
AST SERPL-CCNC: 34 U/L (ref 10–40)
BASOPHILS # BLD AUTO: 0.03 K/UL (ref 0–0.2)
BASOPHILS NFR BLD: 0.5 % (ref 0–1.9)
BILIRUB SERPL-MCNC: 3.9 MG/DL (ref 0.1–1)
BUN SERPL-MCNC: 19 MG/DL (ref 6–20)
CALCIUM SERPL-MCNC: 8.5 MG/DL (ref 8.7–10.5)
CHLORIDE SERPL-SCNC: 102 MMOL/L (ref 95–110)
CO2 SERPL-SCNC: 27 MMOL/L (ref 23–29)
CREAT SERPL-MCNC: 0.8 MG/DL (ref 0.5–1.4)
DIFFERENTIAL METHOD: ABNORMAL
EOSINOPHIL # BLD AUTO: 0.1 K/UL (ref 0–0.5)
EOSINOPHIL NFR BLD: 1.4 % (ref 0–8)
ERYTHROCYTE [DISTWIDTH] IN BLOOD BY AUTOMATED COUNT: 17.4 % (ref 11.5–14.5)
EST. GFR  (NO RACE VARIABLE): >60 ML/MIN/1.73 M^2
FINAL PATHOLOGIC DIAGNOSIS: NORMAL
GLUCOSE SERPL-MCNC: 137 MG/DL (ref 70–110)
GROSS: NORMAL
HCT VFR BLD AUTO: 23 % (ref 37–48.5)
HGB BLD-MCNC: 7.2 G/DL (ref 12–16)
IMM GRANULOCYTES # BLD AUTO: 0.2 K/UL (ref 0–0.04)
IMM GRANULOCYTES NFR BLD AUTO: 3.5 % (ref 0–0.5)
LYMPHOCYTES # BLD AUTO: 0.7 K/UL (ref 1–4.8)
LYMPHOCYTES NFR BLD: 11.6 % (ref 18–48)
Lab: NORMAL
MAGNESIUM SERPL-MCNC: 1.6 MG/DL (ref 1.6–2.6)
MCH RBC QN AUTO: 31.9 PG (ref 27–31)
MCHC RBC AUTO-ENTMCNC: 31.3 G/DL (ref 32–36)
MCV RBC AUTO: 102 FL (ref 82–98)
MONOCYTES # BLD AUTO: 0.5 K/UL (ref 0.3–1)
MONOCYTES NFR BLD: 8.6 % (ref 4–15)
NEUTROPHILS # BLD AUTO: 4.2 K/UL (ref 1.8–7.7)
NEUTROPHILS NFR BLD: 74.4 % (ref 38–73)
NRBC BLD-RTO: 1 /100 WBC
PLATELET # BLD AUTO: 89 K/UL (ref 150–450)
PMV BLD AUTO: 13.3 FL (ref 9.2–12.9)
POCT GLUCOSE: 132 MG/DL (ref 70–110)
POCT GLUCOSE: 188 MG/DL (ref 70–110)
POCT GLUCOSE: 192 MG/DL (ref 70–110)
POCT GLUCOSE: 225 MG/DL (ref 70–110)
POTASSIUM SERPL-SCNC: 4.1 MMOL/L (ref 3.5–5.1)
PROT SERPL-MCNC: 4.3 G/DL (ref 6–8.4)
RBC # BLD AUTO: 2.26 M/UL (ref 4–5.4)
SODIUM SERPL-SCNC: 135 MMOL/L (ref 136–145)
SUPPLEMENTAL DIAGNOSIS: NORMAL
TACROLIMUS BLD-MCNC: 7.7 NG/ML (ref 5–15)
WBC # BLD AUTO: 5.69 K/UL (ref 3.9–12.7)

## 2023-01-31 PROCEDURE — 25000003 PHARM REV CODE 250

## 2023-01-31 PROCEDURE — 80197 ASSAY OF TACROLIMUS: CPT

## 2023-01-31 PROCEDURE — 80053 COMPREHEN METABOLIC PANEL: CPT

## 2023-01-31 PROCEDURE — 25000003 PHARM REV CODE 250: Performed by: NURSE PRACTITIONER

## 2023-01-31 PROCEDURE — 93010 ELECTROCARDIOGRAM REPORT: CPT | Mod: ,,, | Performed by: INTERNAL MEDICINE

## 2023-01-31 PROCEDURE — 25000003 PHARM REV CODE 250: Performed by: STUDENT IN AN ORGANIZED HEALTH CARE EDUCATION/TRAINING PROGRAM

## 2023-01-31 PROCEDURE — 63600175 PHARM REV CODE 636 W HCPCS: Performed by: NURSE PRACTITIONER

## 2023-01-31 PROCEDURE — 83735 ASSAY OF MAGNESIUM: CPT

## 2023-01-31 PROCEDURE — 25000003 PHARM REV CODE 250: Performed by: CLINICAL NURSE SPECIALIST

## 2023-01-31 PROCEDURE — 99232 SBSQ HOSP IP/OBS MODERATE 35: CPT | Mod: ,,, | Performed by: NURSE PRACTITIONER

## 2023-01-31 PROCEDURE — 97116 GAIT TRAINING THERAPY: CPT

## 2023-01-31 PROCEDURE — 93005 ELECTROCARDIOGRAM TRACING: CPT

## 2023-01-31 PROCEDURE — 99232 PR SUBSEQUENT HOSPITAL CARE,LEVL II: ICD-10-PCS | Mod: ,,, | Performed by: NURSE PRACTITIONER

## 2023-01-31 PROCEDURE — 99233 PR SUBSEQUENT HOSPITAL CARE,LEVL III: ICD-10-PCS | Mod: 24,,, | Performed by: CLINICAL NURSE SPECIALIST

## 2023-01-31 PROCEDURE — 20600001 HC STEP DOWN PRIVATE ROOM

## 2023-01-31 PROCEDURE — 90791 PSYCH DIAGNOSTIC EVALUATION: CPT | Mod: ,,, | Performed by: STUDENT IN AN ORGANIZED HEALTH CARE EDUCATION/TRAINING PROGRAM

## 2023-01-31 PROCEDURE — 63600175 PHARM REV CODE 636 W HCPCS

## 2023-01-31 PROCEDURE — 85025 COMPLETE CBC W/AUTO DIFF WBC: CPT

## 2023-01-31 PROCEDURE — 99233 SBSQ HOSP IP/OBS HIGH 50: CPT | Mod: 24,,, | Performed by: CLINICAL NURSE SPECIALIST

## 2023-01-31 PROCEDURE — 63600175 PHARM REV CODE 636 W HCPCS: Performed by: CLINICAL NURSE SPECIALIST

## 2023-01-31 PROCEDURE — 93010 EKG 12-LEAD: ICD-10-PCS | Mod: ,,, | Performed by: INTERNAL MEDICINE

## 2023-01-31 PROCEDURE — 25000003 PHARM REV CODE 250: Performed by: SURGERY

## 2023-01-31 PROCEDURE — A4216 STERILE WATER/SALINE, 10 ML: HCPCS | Performed by: SURGERY

## 2023-01-31 PROCEDURE — 25000003 PHARM REV CODE 250: Performed by: PHYSICIAN ASSISTANT

## 2023-01-31 PROCEDURE — 90791 PR PSYCHIATRIC DIAGNOSTIC EVALUATION: ICD-10-PCS | Mod: ,,, | Performed by: STUDENT IN AN ORGANIZED HEALTH CARE EDUCATION/TRAINING PROGRAM

## 2023-01-31 RX ORDER — URSODIOL 250 MG/1
250 TABLET, FILM COATED ORAL 3 TIMES DAILY
Qty: 90 TABLET | Refills: 2 | Status: SHIPPED | OUTPATIENT
Start: 2023-01-31 | End: 2023-02-14 | Stop reason: SDUPTHER

## 2023-01-31 RX ORDER — MYCOPHENOLATE MOFETIL 250 MG/1
1000 CAPSULE ORAL 2 TIMES DAILY
Status: DISCONTINUED | OUTPATIENT
Start: 2023-01-31 | End: 2023-02-01 | Stop reason: HOSPADM

## 2023-01-31 RX ORDER — LANOLIN ALCOHOL/MO/W.PET/CERES
400 CREAM (GRAM) TOPICAL 2 TIMES DAILY
Status: DISCONTINUED | OUTPATIENT
Start: 2023-01-31 | End: 2023-02-01

## 2023-01-31 RX ORDER — FLUCONAZOLE 200 MG/1
200 TABLET ORAL DAILY
Qty: 16 TABLET | Refills: 0 | Status: SHIPPED | OUTPATIENT
Start: 2023-02-01 | End: 2023-02-23

## 2023-01-31 RX ORDER — HYDROXYZINE PAMOATE 25 MG/1
25 CAPSULE ORAL EVERY 8 HOURS PRN
Qty: 30 CAPSULE | Refills: 1 | Status: SHIPPED | OUTPATIENT
Start: 2023-01-31 | End: 2023-02-23

## 2023-01-31 RX ORDER — MAGNESIUM SULFATE HEPTAHYDRATE 40 MG/ML
2 INJECTION, SOLUTION INTRAVENOUS ONCE
Status: COMPLETED | OUTPATIENT
Start: 2023-01-31 | End: 2023-01-31

## 2023-01-31 RX ORDER — INSULIN ASPART 100 [IU]/ML
0-5 INJECTION, SOLUTION INTRAVENOUS; SUBCUTANEOUS
Status: DISCONTINUED | OUTPATIENT
Start: 2023-01-31 | End: 2023-02-01 | Stop reason: HOSPADM

## 2023-01-31 RX ORDER — DILTIAZEM HYDROCHLORIDE 120 MG/1
120 CAPSULE, COATED, EXTENDED RELEASE ORAL DAILY
Qty: 30 CAPSULE | Refills: 11 | Status: SHIPPED | OUTPATIENT
Start: 2023-02-01 | End: 2023-02-01 | Stop reason: HOSPADM

## 2023-01-31 RX ORDER — SIMETHICONE 80 MG
1 TABLET,CHEWABLE ORAL 3 TIMES DAILY PRN
Status: DISCONTINUED | OUTPATIENT
Start: 2023-01-31 | End: 2023-02-01 | Stop reason: HOSPADM

## 2023-01-31 RX ORDER — INSULIN ASPART 100 [IU]/ML
1-10 INJECTION, SOLUTION INTRAVENOUS; SUBCUTANEOUS
Status: DISCONTINUED | OUTPATIENT
Start: 2023-01-31 | End: 2023-01-31

## 2023-01-31 RX ORDER — ATOVAQUONE 750 MG/5ML
1500 SUSPENSION ORAL DAILY
Qty: 300 ML | Refills: 5 | Status: SHIPPED | OUTPATIENT
Start: 2023-02-01 | End: 2023-02-15

## 2023-01-31 RX ORDER — PANTOPRAZOLE SODIUM 40 MG/1
40 TABLET, DELAYED RELEASE ORAL DAILY
Qty: 30 TABLET | Refills: 0 | Status: SHIPPED | OUTPATIENT
Start: 2023-02-01 | End: 2023-02-14 | Stop reason: SDUPTHER

## 2023-01-31 RX ORDER — TACROLIMUS 1 MG/1
2 CAPSULE ORAL EVERY 12 HOURS
Qty: 120 CAPSULE | Refills: 11 | Status: SHIPPED | OUTPATIENT
Start: 2023-01-31 | End: 2023-02-06 | Stop reason: SDUPTHER

## 2023-01-31 RX ORDER — DILTIAZEM HYDROCHLORIDE 30 MG/1
30 TABLET, FILM COATED ORAL ONCE
Status: COMPLETED | OUTPATIENT
Start: 2023-01-31 | End: 2023-01-31

## 2023-01-31 RX ORDER — ASPIRIN 81 MG/1
81 TABLET ORAL DAILY
Qty: 30 TABLET | Refills: 11 | Status: SHIPPED | OUTPATIENT
Start: 2023-02-01 | End: 2023-02-14 | Stop reason: SDUPTHER

## 2023-01-31 RX ORDER — DILTIAZEM HYDROCHLORIDE 180 MG/1
180 CAPSULE, COATED, EXTENDED RELEASE ORAL DAILY
Status: DISCONTINUED | OUTPATIENT
Start: 2023-02-01 | End: 2023-02-01 | Stop reason: HOSPADM

## 2023-01-31 RX ADMIN — FUROSEMIDE 40 MG: 40 TABLET ORAL at 08:01

## 2023-01-31 RX ADMIN — DOCUSATE SODIUM 100 MG: 100 CAPSULE ORAL at 08:01

## 2023-01-31 RX ADMIN — DILTIAZEM HYDROCHLORIDE 30 MG: 30 TABLET, FILM COATED ORAL at 05:01

## 2023-01-31 RX ADMIN — PANTOPRAZOLE SODIUM 40 MG: 40 TABLET, DELAYED RELEASE ORAL at 08:01

## 2023-01-31 RX ADMIN — OXYCODONE HYDROCHLORIDE 10 MG: 10 TABLET ORAL at 05:01

## 2023-01-31 RX ADMIN — URSODIOL 300 MG: 300 CAPSULE ORAL at 08:01

## 2023-01-31 RX ADMIN — Medication 10 ML: at 12:01

## 2023-01-31 RX ADMIN — PREDNISONE 15 MG: 5 TABLET ORAL at 08:01

## 2023-01-31 RX ADMIN — HEPARIN SODIUM 5000 UNITS: 5000 INJECTION INTRAVENOUS; SUBCUTANEOUS at 08:01

## 2023-01-31 RX ADMIN — SIMETHICONE 80 MG: 80 TABLET, CHEWABLE ORAL at 08:01

## 2023-01-31 RX ADMIN — INSULIN ASPART 2 UNITS: 100 INJECTION, SOLUTION INTRAVENOUS; SUBCUTANEOUS at 05:01

## 2023-01-31 RX ADMIN — BISACODYL 10 MG: 5 TABLET, COATED ORAL at 08:01

## 2023-01-31 RX ADMIN — Medication 400 MG: at 08:01

## 2023-01-31 RX ADMIN — MAGNESIUM SULFATE 2 G: 2 INJECTION INTRAVENOUS at 03:01

## 2023-01-31 RX ADMIN — METOROPROLOL TARTRATE 5 MG: 5 INJECTION, SOLUTION INTRAVENOUS at 08:01

## 2023-01-31 RX ADMIN — ASPIRIN 81 MG: 81 TABLET, COATED ORAL at 08:01

## 2023-01-31 RX ADMIN — MYCOPHENOLATE MOFETIL 500 MG: 250 CAPSULE ORAL at 08:01

## 2023-01-31 RX ADMIN — HEPARIN SODIUM 5000 UNITS: 5000 INJECTION INTRAVENOUS; SUBCUTANEOUS at 02:01

## 2023-01-31 RX ADMIN — POLYETHYLENE GLYCOL 3350 17 G: 17 POWDER, FOR SOLUTION ORAL at 08:01

## 2023-01-31 RX ADMIN — TACROLIMUS 2 MG: 1 CAPSULE ORAL at 05:01

## 2023-01-31 RX ADMIN — URSODIOL 300 MG: 300 CAPSULE ORAL at 02:01

## 2023-01-31 RX ADMIN — FLUCONAZOLE 200 MG: 200 TABLET ORAL at 08:01

## 2023-01-31 RX ADMIN — OXYCODONE HYDROCHLORIDE 10 MG: 10 TABLET ORAL at 08:01

## 2023-01-31 RX ADMIN — ATOVAQUONE 1500 MG: 750 SUSPENSION ORAL at 08:01

## 2023-01-31 RX ADMIN — SITAGLIPTIN 100 MG: 100 TABLET, FILM COATED ORAL at 12:01

## 2023-01-31 RX ADMIN — MYCOPHENOLATE MOFETIL 1000 MG: 250 CAPSULE ORAL at 08:01

## 2023-01-31 RX ADMIN — TACROLIMUS 2 MG: 1 CAPSULE ORAL at 08:01

## 2023-01-31 RX ADMIN — HEPARIN SODIUM 5000 UNITS: 5000 INJECTION INTRAVENOUS; SUBCUTANEOUS at 05:01

## 2023-01-31 RX ADMIN — VALGANCICLOVIR 450 MG: 450 TABLET, FILM COATED ORAL at 08:01

## 2023-01-31 RX ADMIN — DILTIAZEM HYDROCHLORIDE 120 MG: 120 CAPSULE, COATED, EXTENDED RELEASE ORAL at 08:01

## 2023-01-31 RX ADMIN — Medication 10 ML: at 11:01

## 2023-01-31 RX ADMIN — Medication 10 ML: at 06:01

## 2023-01-31 RX ADMIN — OXYCODONE HYDROCHLORIDE 10 MG: 10 TABLET ORAL at 12:01

## 2023-01-31 RX ADMIN — Medication 10 ML: at 05:01

## 2023-01-31 NOTE — SUBJECTIVE & OBJECTIVE
Scheduled Meds:   aspirin  81 mg Oral Daily    atovaquone  1,500 mg Oral Daily    bisacodyL  10 mg Oral Daily    diltiaZEM  120 mg Oral Daily    docusate sodium  100 mg Oral BID    fluconazole  200 mg Oral Daily    furosemide  40 mg Oral Daily    heparin (porcine)  5,000 Units Subcutaneous Q8H    mycophenolate  1,000 mg Oral BID    pantoprazole  40 mg Oral Daily    polyethylene glycol  17 g Oral Daily    predniSONE  15 mg Oral Daily    Followed by    [START ON 2/6/2023] predniSONE  10 mg Oral Daily    Followed by    [START ON 2/13/2023] predniSONE  5 mg Oral Daily    SITagliptin phosphate  100 mg Oral Daily    sodium chloride 0.9%  10 mL Intravenous Q6H    tacrolimus  2 mg Oral BID    ursodioL  300 mg Oral TID    valGANciclovir  450 mg Oral Daily     Continuous Infusions:   sodium chloride 0.9% Stopped (01/19/23 2311)     PRN Meds:albuterol, artificial tears, bisacodyL, calcium carbonate, dextrose 10%, dextrose 10%, glucagon (human recombinant), glucose, glucose, hydrOXYzine pamoate, insulin aspart U-100, metoprolol, ondansetron, oxyCODONE, oxyCODONE, prochlorperazine, simethicone, sodium chloride 0.9%, Flushing PICC Protocol **AND** sodium chloride 0.9% **AND** sodium chloride 0.9%    Review of Systems   Constitutional:  Negative for activity change, appetite change and fever.   HENT: Negative.     Eyes:  Negative for visual disturbance.   Respiratory:  Negative for shortness of breath.    Cardiovascular:  Positive for leg swelling. Negative for chest pain and palpitations.   Gastrointestinal:  Positive for abdominal distention. Negative for constipation, diarrhea, nausea and vomiting.   Genitourinary:  Negative for decreased urine volume, difficulty urinating and dysuria.   Musculoskeletal:  Negative for arthralgias, back pain and joint swelling.   Skin:  Positive for wound.   Allergic/Immunologic: Positive for immunocompromised state.   Neurological:  Positive for weakness. Negative for dizziness and facial  asymmetry.   Hematological:  Negative for adenopathy. Bruises/bleeds easily.   Psychiatric/Behavioral:  Negative for agitation, behavioral problems and sleep disturbance. The patient is nervous/anxious.    All other systems reviewed and are negative.  Objective:     Vital Signs (Most Recent):  Temp: 97.8 °F (36.6 °C) (01/31/23 1208)  Pulse: 90 (01/31/23 1208)  Resp: 14 (01/31/23 0719)  BP: (!) 109/56 (01/31/23 1208)  SpO2: 95 % (01/31/23 1208)   Vital Signs (24h Range):  Temp:  [97.7 °F (36.5 °C)-98.5 °F (36.9 °C)] 97.8 °F (36.6 °C)  Pulse:  [] 90  Resp:  [14-18] 14  SpO2:  [94 %-100 %] 95 %  BP: ()/(50-59) 109/56     Weight: 90.5 kg (199 lb 10 oz)  Body mass index is 33.22 kg/m².    Intake/Output - Last 3 Shifts         01/29 0700  01/30 0659 01/30 0700  01/31 0659 01/31 0700  02/01 0659    P.O. 470 1530     I.V. (mL/kg) 0 (0) 0 (0)     Other 0 0     IV Piggyback 788.1      Total Intake(mL/kg) 1258.1 (13.7) 1530 (16.9)     Urine (mL/kg/hr) 1250 (0.6) 3805 (1.8) 600 (1.1)    Emesis/NG output 0 0     Drains 400 170     Other 0 0     Stool 0 0     Blood  0     Total Output 1650 3975 600    Net -391.9 -2445 -600           Urine Occurrence 0 x 1 x     Stool Occurrence 0 x 0 x     Emesis Occurrence 0 x 0 x             Physical Exam  Vitals and nursing note reviewed.   Constitutional:       Appearance: She is well-developed.      Comments: No hand or temporal muscle wasting noted     HENT:      Head: Normocephalic.   Eyes:      General: Scleral icterus present.      Conjunctiva/sclera: Conjunctivae normal.   Cardiovascular:      Rate and Rhythm: Normal rate and regular rhythm.      Heart sounds: Normal heart sounds. No murmur heard.  Pulmonary:      Effort: Pulmonary effort is normal.      Breath sounds: Normal breath sounds.   Abdominal:      General: Bowel sounds are normal. There is distension.      Palpations: Abdomen is soft.      Tenderness: There is abdominal tenderness (over incision as expected).       Comments: -Chevron incision CDI w/ staples, mild drainage from incision  -RLQ RYLEY drain X 1 with serous output  -Previous RYLEY site with suture   Musculoskeletal:         General: Normal range of motion.      Cervical back: Normal range of motion.      Right lower le+ Edema present.      Left lower le+ Edema present.   Skin:     General: Skin is warm and dry.      Capillary Refill: Capillary refill takes less than 2 seconds.      Coloration: Skin is jaundiced.   Neurological:      General: No focal deficit present.      Mental Status: She is alert and oriented to person, place, and time.   Psychiatric:         Mood and Affect: Mood normal. Affect is labile.         Speech: Speech is rapid and pressured.         Behavior: Behavior normal.         Thought Content: Thought content normal.         Judgment: Judgment normal.       Laboratory:  Immunosuppressants           Stop Route Frequency     mycophenolate capsule 1,000 mg         -- Oral 2 times daily     tacrolimus capsule 2 mg         -- Oral 2 times daily          CBC:   Recent Labs   Lab 23   WBC 5.69   RBC 2.26*   HGB 7.2*   HCT 23.0*   PLT 89*   *   MCH 31.9*   MCHC 31.3*     CMP:   Recent Labs   Lab 23   *   CALCIUM 8.5*   ALBUMIN 2.3*   PROT 4.3*   *   K 4.1   CO2 27      BUN 19   CREATININE 0.8   ALKPHOS 170*   ALT 82*   AST 34   BILITOT 3.9*     Labs within the past 24 hours have been reviewed.    Diagnostic Results:  I have personally reviewed all pertinent imaging studies.    Debility/Functional status: Patient debilitated by evidence of Weakness, Limitation of activities due to disability, and Other reduced mobility. Physical and occupational therapy ordered daily to evaluate and treat. Debility was: not present on admission.

## 2023-01-31 NOTE — PROGRESS NOTES
EDUCATION NOTE:    Met with Mickey Harris and her caregivers to provide teaching re: immunosuppressant medications.  Reviewed medication section of the Liver Transplant Education book that was provided.  Emphasized the importance of compliance, role of the blue medication card, concerns for drug interactions, and process of obtaining refills.  Counseled regarding Prograf, Cellcept , prednisone, including directions for use, monitoring, how to handle missed doses, and side effects.  Ms Harris verbalized understanding and had the opportunity to ask questions.

## 2023-01-31 NOTE — CONSULTS
[facility]  Psychology  Consult Note      PSYCHOSOCIAL ASSESSMENT  Patient Name: Mickey Harris  MRN: [id number]  Date: 01/31/2023  Admission Date: 1/17/2023   Length of Stay: Hospital Day: 15   Attending Physician: Bg Hardy MD    Inpatient consult to Psychology  Consult performed by: Derrick Crenshaw  Consult ordered by: Shelly Hardy PA-C        HISTORY OF PRESENTING ILLNESS: Mickey Harris is a 57 y.o. female who presented on 1/17/22 for living related donor liver transplant in the setting of DORAN cirrhosis. She was diagnosed approximately 4 years ago and did well up until one year ago when she began to develop lower extremity edema and ascites managed with diuretics. Ms. Harris is now s/p living donor liver transplantation on 1/17/23. She received a right liver lobe which was quite large (~1000g). Biliary reconstruction was Becky-en-y reconstruction to the donor right hepatic duct. The procedure was performed without apparent complication and she was transferred to the SICU for postoperative care and management    CURRENT SYMPTOMS  Mood: Denied low mood lasting most of the day, most days of the week. Denied anhedonia.   Anxiety: Endorsed excessive worry, difficulty controlling worry, restlessness, feeling keyed up and on edge, mind going blank.   Cognitive Functioning: Reported difficulty with short term memory, word finding trouble, maintaining focus.     PSYCHIATRIC HISTORY  Denied previous history of anxiety or depression until August 2021 when the liver transplant process started. Denied previous history of with counseling or psychotherapy. Denied S/I, suicide and/or self-harm behaviors, or previous hospitalizations for mental health concerns. Evaluation from 08/21 indicates that patient began to feel intense anxiety around being on the liver transplant wait list.     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    Liver cirrhosis secondary to DORAN     Mild tricuspid regurgitation     Unspecified cirrhosis of liver       Past Surgical History:   Procedure Laterality Date    cholecystectomy  2003    CHOLEDOCHOJEJUNOSTOMY  1/24/2023    Procedure: CHOLEDOCHOJEJUNOSTOMY;  Surgeon: Mac Ayala MD;  [facility];  Service: Transplant;;    COLONOSCOPY      6 polyps removed    COLONOSCOPY  07/07/2021    DIAGNOSTIC ULTRASOUND N/A 1/17/2023    Procedure: ULTRASOUND, DIAGNOSTIC;  Surgeon: Juan Pablo Kimbrough MD;  [facility];  Service: Transplant;  Laterality: N/A;  INTRAOPERATIVE ULTRASOUND    ESOPHAGOGASTRODUODENOSCOPY      2 coulums of grade 1 varices    ESOPHAGOGASTRODUODENOSCOPY  11/01/2019    trace varices    ESOPHAGOGASTRODUODENOSCOPY  07/13/2021    ESOPHAGOGASTRODUODENOSCOPY N/A 9/20/2022    Procedure: EGD (ESOPHAGOGASTRODUODENOSCOPY);  Surgeon: Bruce Dominguez MD;  Location: [facility] (4TH FLR);  Service: Endoscopy;  Laterality: N/A;  labs prior  liver transplant candidate  screen for varices  8/18 fully vaccinated; instructions to portal-LW  8/19 pt rescheduled; updated instructions to portal-st    gynecologic surgery       removal of scar tissues and adhesions    HYSTERECTOMY  1989    knee surgery  1992    LAPAROTOMY, EXPLORATORY N/A 1/23/2023    Procedure: LAPAROTOMY, EXPLORATORY;  Surgeon: Jordon Lamb MD;  [address];  Service: Transplant;  Laterality: N/A;    LAPAROTOMY, EXPLORATORY N/A 1/24/2023    Procedure: LAPAROTOMY, EXPLORATORY;  Surgeon: Mac Ayala MD;  [facility];  Service: Transplant;  Laterality: N/A;    LIVER TRANSPLANT N/A 1/17/2023    Procedure: TRANSPLANT, LIVER;  Surgeon: Juan Pablo Kimbrough MD;  [facility];  Service: Transplant;  Laterality: N/A;     OOPHORECTOMY Right 1982    OPEN hysterectomy  1989    removed uterus and bilateral fallopian tubes and LEFT ovary stayed in place    ROTATOR CUFF REPAIR Right 2001    HOANG-EN-Y PROCEDURE  1/17/2023    Procedure: CREATION, ANASTOMOSIS, HOANG-EN-Y;  Surgeon: Juan Pablo Kimbrough MD;  Location: [facility] OR [address] [place] [address];  Service: Transplant;;    TONSILLECTOMY  1972        SOCIAL HISTORY: Patient was joined by her  Dawood to whom she has been  for 30+ years. Patient moved somewhat recently from [place] to [place]. Patient has two adult children and is very career motivated. She lives at home with her  and 11 month old puppy (Evere Gant and Dany mix).       FAMILY PSYCHIATRIC HISTORY  Patient reported that her father has suffered from intense depression following several heart surgeries later in life. Patient also reported that her step-mother suffered from bipolar disorder although was unclear if she was formally diagnosed.     STRENGTHS & LIABILITIES  Strengths: intelligent, motivated to change, and good social support   Liabilities:     INTERVENTION: Clinician provided psychoeducation on outpatient treatment options for anxiety. Clinician discussed strengths and differences between CBT for anxiety, supportive therapy, and ACT for anxiety. Clinician demonstrated explained how each approach conceptualized anxiety and why the patient might find beneficial about that approach.   Clinician also utilized motivational interviewing to increase patient's receptiveness to the recommendation of the ID team to avoid drinking well water at her [place] property. Clinician and patient discussed her motivations for staying healthy as well as highlighted her previous experiences using with very controlled and sensitive diets as well as using bottled or treated water to ensure her liver was functioning properly.     MENTAL STATUS EXAM & OBSERVATIONS  Appearance:  Good grooming and hygiene. Dressed in hospital gown.  Appeared stated age.      Orientation: Oriented x 4.   Speech: Normal rate, volume, and prosody.    Thought Processes: Logical and goal-oriented.      Thought Content: Normal. No suicidal or homicidal ideation. No indications of obsessions or delusions.   Mood: Euthymic  Anxious.   Affect: Full range, congruent with mood and session content..   Attention & Concentration: Intact. No deficits noted.   Insight: Good   Judgment: Good.   Behavioral Observations: Cooperative and engaged. Seated in chair. Good eye contact. No signs of psychomotor agitation or retardation.     DIAGNOSTIC IMPRESSION & PLAN  Patient Active Problem List   Diagnosis    History of recurrent UTI (urinary tract infection)    S/P liver transplant    Hyperglycemia    Adrenal corticosteroid causing adverse effect in therapeutic use    Acute blood loss anemia    At risk for opportunistic infections    Long-term use of immunosuppressant medication    Prophylactic immunotherapy    Thrombocytopenia, unspecified    Common bile duct leak of transplanted liver    Hypervolemia    Paroxysmal A-fib    Debility       Impression: Ms. Harris is now s/p living donor liver transplantation on 1/17/23. She received a right liver lobe which was quite large (~1000g). Biliary reconstruction was Becky-en-y reconstruction to the donor right hepatic duct. The procedure was performed without apparent complication and she was transferred to the SICU for postoperative care and management. Psychology was consulted to help patient manage anxiety. Patient meets the criteria for an adjustment disorder but also has strong coping skills, is very motivated for outpatient treatment to help manage anxiety, and has excellent social support.     Plan: Psychology is will continue to follow until patient is discharged.     Recommendations  - Patient really benefits from providers setting a brief verbal agenda for their visit.   - Given difficulties with memory and need for control,  patient may benefit from taking notes during consultations or recording conversations on her phone.       Thank you for the opportunity to participate in this patient's care.      Length of Service: 50 minutes    Derrick Crenshaw   Psychology Resident  Dept. of Psychiatry  [facility]

## 2023-01-31 NOTE — PLAN OF CARE
Inpatient consult to Cardiology  Consult performed by: Aishwarya Rivers DO  Consult ordered by: Claude Almanzar NP      Ms. Harris is a 56 y/o who was admitted for liver transplant for DORAN cirrhosis. Completed on 1/17/22, returned to OR for bile duct reconstruction and Becky-en-Y hepaticojejunostomy on 1/23 and 1/24. Was progressing well however developed runs of paroxysmal tachycardia and then AF w/ RVR for which cardiology was consulted on 1/28.    She was started on Diltiazem 30mg q6h and transitioned to Diltiazem XL on 1/30. She went back into Afib with RVR around 0810 this morning. She received her morning Diltiazem around 0820 and 1x IV metoprolol push at 0900 after which she reverted to NSR. Currently asymptomatic.     Recommendations:  - Keep Mag>2, K>4  - Increase Diltiazem XR to 180mg  - Obtain routine echo - look for valvular abnormalities, atrial dilation  - Referral placed for Electrophysiology follow-up as outpatient for definitive management   - Chadsvasc score of 2, continue ASA for anticoagulation

## 2023-01-31 NOTE — PROGRESS NOTES
"[facility] - Transplant Stepdown  Adult Nutrition  Progress Note    SUMMARY       Recommendations    Continue Regular diet     Continue Boost glucose control TID     RD to monitor and follow     Goals: To meet % or EEN/EPN by next RD f/u.  Nutrition Goal Status: progressing towards goal  Communication of RD Recs:  (POC)    Assessment and Plan    Nutrition Problem  Increased nutrient needs (protein, energy)     Related to (etiology):   Increased physiological demands     Signs and Symptoms (as evidenced by):   S/p Living Liver transplant (1/17)     Interventions/Recommendations (treatment strategy):  Collaboration with other providers  Nutrition education     Nutrition Diagnosis Status:   Continue    Reason for Assessment    Reason For Assessment: RD follow-up  Diagnosis:  (cirrhosis (DORAN); s/p liver translplant)  Relevant Medical History: HLD  Interdisciplinary Rounds: did not attend    General Information Comments:   1/31: Pt report fair appetite. Pt with concern regarding herbal tea and food that she is allowed to have post tx. Explain to pt the importance of avoid herbal supplement and rec'd keep a list of herbal tea/supplement that she plan to take. Discussed low Na seasoning option for cooking. No additional question at this time.     1/23: S/p Living Liver transplant (1/17). Pt is NPO during RD visits today. OR today for exploratory lap due to concern for bile leak. Pt reports good PO intake PTA, usually eat 3 meals per day.  lb, gained wt 2/2 fluid. Small BM last night per pt. Reiterated post tx nutrition education today, pt and family verbalized understanding. No additional question. NFPE completed today, pt appears nourished.      1/20: Pt seen today for RD f/u. Pt denies any n/v/d/c. Pt states that their appetite is "non-existant" but tries to eat. Stated to RD that they consumed % of their breakfast.  RD went over some of the education materials that RD provided family during the initial " "consult. Pt stated that they understood. As poer visual NFPE, pt appears to be well nourished.  was present in the room, had no questions at this time.    Nutrition Discharge Planning: Post transplant nutrition education provided. Food safety/drug interactions emphasized. General healthy/low salt diet recommended. Education material left.  No other needs identified. Caregiver present.    Nutrition Risk Screen    Nutrition Risk Screen: no indicators present    Nutrition/Diet History    Patient Reported Diet/Restrictions/Preferences: low salt  Food Preferences: No seafood  Spiritual, Cultural Beliefs, Faith Practices, Values that Affect Care: no  Food Allergies: NKFA    Anthropometrics    Temp: 97.8 °F (36.6 °C)  Height Method: Stated  Height: 5' 5" (165.1 cm)  Height (inches): 65 in  Weight Method: Standard Scale  Weight: 90.5 kg (199 lb 10 oz)  Weight (lb): 199.63 lb  Ideal Body Weight (IBW), Female: 125 lb  % Ideal Body Weight, Female (lb): 156.8 %  BMI (Calculated): 33.2  BMI Grade: 30 - 34.9- obesity - grade I     Lab/Procedures/Meds    Pertinent Labs Reviewed: reviewed  Pertinent Labs Comments: Na 135, glucose 137, Tbili 3.9, , ALT 82  Pertinent Medications Reviewed: reviewed  Pertinent Medications Comments: prednisone, pantoprazole, mycophenolate, furosemide, tacrolimus, pantoprazole, ursodiol, calcium carb-vit D3, heparin    Estimated/Assessed Needs    Weight Used For Calorie Calculations: 90.9 kg (200 lb 6.4 oz)  Energy Calorie Requirements (kcal): 1863 kcal (MSJx1.25)  Energy Need Method: Coleman-St Flaherty  Protein Requirements: 86- 97g (1.5-1.7g/kg)  Weight Used For Protein Calculations: 57 kg (125 lb 10.6 oz) (using IBW)  Fluid Requirements (mL): 1ml/1kcal or per MD  Estimated Fluid Requirement Method: RDA Method  RDA Method (mL): 1863    Nutrition Prescription Ordered    Current Diet Order: Regular  Oral Nutrition Supplement: Boost Glucose control TID    Evaluation of Received " Nutrient/Fluid Intake    Other Calories (kcal): 0  I/O: -8 L since admit  Protein Required: not meeting needs  Fluid Required: not meeting needs  Comments: LBM 1/27  Tolerance: tolerating    Nutrition Risk    Level of Risk/Frequency of Follow-up:  (1x/week)     Monitor and Evaluation    Food and Nutrient Intake: energy intake, food and beverage intake  Food and Nutrient Adminstration: diet order  Knowledge/Beliefs/Attitudes: food and nutrition knowledge/skill, beliefs and attitudes  Physical Activity and Function: factors affecting access to physical activity  Anthropometric Measurements: height/length, weight, weight change, body mass index  Biochemical Data, Medical Tests and Procedures: electrolyte and renal panel, gastrointestinal profile, glucose/endocrine profile, inflammatory profile, lipid profile  Nutrition-Focused Physical Findings: overall appearance, extremities, muscles and bones, skin     Nutrition Follow-Up    RD Follow-up?: Yes

## 2023-01-31 NOTE — ASSESSMENT & PLAN NOTE
- cardiology consulted   - CHADVASC 2 (female gender and HTN), 81 mg ASA started  - cardizem 120 mg daily extended release started per cards rec  - Pt had been rate controlled on cardizem, new episode of Afib with RVR on 1/31 AM. Cards reconsulted

## 2023-01-31 NOTE — PROGRESS NOTES
[facility] - Transplant Stepdown  Liver Transplant  Progress Note    Patient Name: Mickey Harris  MRN: [id number]  Admission Date: 1/17/2023  Hospital Length of Stay: 14 days  Code Status: Full Code  Primary Care Provider: Primary Doctor No  Post-Operative Day: 14    ORGAN:   RIGHT LIVER LOBE (SEGS 5,6,7,8) WITHOUT MIDDLE HEPATIC VEIN  Disease Etiology: Cirrhosis: Fatty Liver (DORAN)  Donor Type:   Living  CDC High Risk:     Donor CMV Status:   Donor CMV Status:   Donor HBcAB:     Donor HCV Status:     Donor HBV PALOMA:   Donor HCV PALOMA:   Whole or Partial: Split Liver  Biliary Anastomosis: Becky-en-Y  Arterial Anatomy:   Subjective:     History of Present Illness:  Ms. Mickey Harris is a 58 y/o F admitted for living liver transplant from daughter for DORAN cirrhosis.       Hospital Course:  She is now status post living related donor liver transplant on 1/17/22. 2 drains placed intra-op. POD#1 Liver US showed minimally elevated intraparenchymal resistive indices, likely due to postoperative edema. Otherwise satisfactory Doppler evaluation of the liver. Transfer to TSU on POD#2. Pt w increased t bili and noted to have more bilious output in RYLEY drain. T bili from drain fluid was 41. CT A/P 1/22 showed pneumoperitoneum, mild ascites and scattered regions of subcutaneous and rectus sheath emphysema. Pt taken back to OR POD#6 for exploratory lap where the CBD appeared to be under pressure suggesting obstruction. The anterior wall of the duct anastomosis was taken down, small stent was placed from 6 Fr ped feeding tube and secured. Anterior wall was closed. There was extensive bile staining but no obvious sign of active bile leak after repair. After takeback, both RYLEY drains again had increased bilious output and she was taken back to OR again on POD #7 for continued bile leak. Biliary system was taken down and reconstructed. Bile leak has since resolved since reconstruction performed. RYLEY drainage negative for bile leak on 1/30.  Tbili now trending down. Aerobic cx from peritoneal fluid during TB on 1/23 + for staph epi, susceptible to oxacillin, cont unasyn (completed 1/30). Pt had several episodes of paroxsymal Afib with RVR post-transplant, no known history of Afib. Cardiology consulted, cardizem started, now on 120 mg XR PO daily. CHADsVASC 2, aspirin 81mg PO daily started per cardiology recs.     Hospital Interval:  NAEON. Liver US yesterday reviewed with surgeon, no collections seen. Tbili continues to trend down. Lateral drain removed yesterday. Medial drain remains with serous output, keep drain for now to be able to monitor for delayed bile leak. Pt NSR past several days but had another episode Afib w/ RVR in 160s this morning, converted with Lopressor IV X 1. Reconsulted Cardiology, to see pt today. Psych consulted for anxiety, seeing pt today. Pt reports feeling well. Diuresing well with PO Lasix. Cr stable. PT/OT following, recommend HH. VSS. Monitor.      Scheduled Meds:   aspirin  81 mg Oral Daily    atovaquone  1,500 mg Oral Daily    bisacodyL  10 mg Oral Daily    diltiaZEM  120 mg Oral Daily    docusate sodium  100 mg Oral BID    fluconazole  200 mg Oral Daily    furosemide  40 mg Oral Daily    heparin (porcine)  5,000 Units Subcutaneous Q8H    mycophenolate  1,000 mg Oral BID    pantoprazole  40 mg Oral Daily    polyethylene glycol  17 g Oral Daily    predniSONE  15 mg Oral Daily    Followed by    [START ON 2/6/2023] predniSONE  10 mg Oral Daily    Followed by    [START ON 2/13/2023] predniSONE  5 mg Oral Daily    SITagliptin phosphate  100 mg Oral Daily    sodium chloride 0.9%  10 mL Intravenous Q6H    tacrolimus  2 mg Oral BID    ursodioL  300 mg Oral TID    valGANciclovir  450 mg Oral Daily     Continuous Infusions:   sodium chloride 0.9% Stopped (01/19/23 2311)     PRN Meds:albuterol, artificial tears, bisacodyL, calcium carbonate, dextrose 10%, dextrose 10%, glucagon (human recombinant), glucose,  glucose, hydrOXYzine pamoate, insulin aspart U-100, metoprolol, ondansetron, oxyCODONE, oxyCODONE, prochlorperazine, simethicone, sodium chloride 0.9%, Flushing PICC Protocol **AND** sodium chloride 0.9% **AND** sodium chloride 0.9%    Review of Systems   Constitutional:  Negative for activity change, appetite change and fever.   HENT: Negative.     Eyes:  Negative for visual disturbance.   Respiratory:  Negative for shortness of breath.    Cardiovascular:  Positive for leg swelling. Negative for chest pain and palpitations.   Gastrointestinal:  Positive for abdominal distention. Negative for constipation, diarrhea, nausea and vomiting.   Genitourinary:  Negative for decreased urine volume, difficulty urinating and dysuria.   Musculoskeletal:  Negative for arthralgias, back pain and joint swelling.   Skin:  Positive for wound.   Allergic/Immunologic: Positive for immunocompromised state.   Neurological:  Positive for weakness. Negative for dizziness and facial asymmetry.   Hematological:  Negative for adenopathy. Bruises/bleeds easily.   Psychiatric/Behavioral:  Negative for agitation, behavioral problems and sleep disturbance. The patient is nervous/anxious.    All other systems reviewed and are negative.  Objective:     Vital Signs (Most Recent):  Temp: 97.8 °F (36.6 °C) (01/31/23 1208)  Pulse: 90 (01/31/23 1208)  Resp: 14 (01/31/23 0719)  BP: (!) 109/56 (01/31/23 1208)  SpO2: 95 % (01/31/23 1208)   Vital Signs (24h Range):  Temp:  [97.7 °F (36.5 °C)-98.5 °F (36.9 °C)] 97.8 °F (36.6 °C)  Pulse:  [] 90  Resp:  [14-18] 14  SpO2:  [94 %-100 %] 95 %  BP: ()/(50-59) 109/56     Weight: 90.5 kg (199 lb 10 oz)  Body mass index is 33.22 kg/m².    Intake/Output - Last 3 Shifts         01/29 0700 01/30 0659 01/30 0700 01/31 0659 01/31 0700 02/01 0659    P.O. 470 1530     I.V. (mL/kg) 0 (0) 0 (0)     Other 0 0     IV Piggyback 788.1      Total Intake(mL/kg) 1258.1 (13.7) 1530 (16.9)     Urine (mL/kg/hr) 1250  (0.6) 3805 (1.8) 600 (1.1)    Emesis/NG output 0 0     Drains 400 170     Other 0 0     Stool 0 0     Blood  0     Total Output 1650 3975 600    Net -391.9 -5190 -600           Urine Occurrence 0 x 1 x     Stool Occurrence 0 x 0 x     Emesis Occurrence 0 x 0 x             Physical Exam  Vitals and nursing note reviewed.   Constitutional:       Appearance: She is well-developed.      Comments: No hand or temporal muscle wasting noted     HENT:      Head: Normocephalic.   Eyes:      General: Scleral icterus present.      Conjunctiva/sclera: Conjunctivae normal.   Cardiovascular:      Rate and Rhythm: Normal rate and regular rhythm.      Heart sounds: Normal heart sounds. No murmur heard.  Pulmonary:      Effort: Pulmonary effort is normal.      Breath sounds: Normal breath sounds.   Abdominal:      General: Bowel sounds are normal. There is distension.      Palpations: Abdomen is soft.      Tenderness: There is abdominal tenderness (over incision as expected).      Comments: -Chevron incision CDI w/ staples, mild drainage from incision  -RLQ RYLEY drain X 1 with serous output  -Previous RYLEY site with suture   Musculoskeletal:         General: Normal range of motion.      Cervical back: Normal range of motion.      Right lower le+ Edema present.      Left lower le+ Edema present.   Skin:     General: Skin is warm and dry.      Capillary Refill: Capillary refill takes less than 2 seconds.      Coloration: Skin is jaundiced.   Neurological:      General: No focal deficit present.      Mental Status: She is alert and oriented to person, place, and time.   Psychiatric:         Mood and Affect: Mood normal. Affect is labile.         Speech: Speech is rapid and pressured.         Behavior: Behavior normal.         Thought Content: Thought content normal.         Judgment: Judgment normal.       Laboratory:  Immunosuppressants           Stop Route Frequency     mycophenolate capsule 1,000 mg         -- Oral 2 times daily      tacrolimus capsule 2 mg         -- Oral 2 times daily          CBC:   Recent Labs   Lab 01/31/23  0527   WBC 5.69   RBC 2.26*   HGB 7.2*   HCT 23.0*   PLT 89*   *   MCH 31.9*   MCHC 31.3*     CMP:   Recent Labs   Lab 01/31/23  0527   *   CALCIUM 8.5*   ALBUMIN 2.3*   PROT 4.3*   *   K 4.1   CO2 27      BUN 19   CREATININE 0.8   ALKPHOS 170*   ALT 82*   AST 34   BILITOT 3.9*     Labs within the past 24 hours have been reviewed.    Diagnostic Results:  I have personally reviewed all pertinent imaging studies.    Debility/Functional status: Patient debilitated by evidence of Weakness, Limitation of activities due to disability, and Other reduced mobility. Physical and occupational therapy ordered daily to evaluate and treat. Debility was: not present on admission.    Assessment/Plan:     * S/P liver transplant  -DORAN cirrhosis complicated by hepatic encephalopathy who is now status post living related donor liver transplant on 1/17/22. She is progressing well post op. 2 drains in place. POD#1 Liver US looked fine.   Minimally elevated intraparenchymal resistive indices, likely due to postoperative edema.    - Liver US 1/20 reviewed w CBD 3mm w no duct dilation  - elevated t-bili started Ursodiol 1/21  - CT scan ABD 1/22- reviewed  - t bili up to 22, 21.2 today. Fluid sent for t bili on 1/23 was 40.9  - take back to OR 1/23/23 for ex/lap where CBD appeared to be under pressure suggesting obstruction. The anterior wall of the duct anastomosis was taken down, small stent was placed from 6 Fr ped feeding tube and secured. Anterior wall was closed. There was extensive bile staining but no obvious sign of active bile leak after repair  - TB 1/24 for increased bilious fluid from drains, anastomosis redone, t bili cont to decrease  - routine US 1/27 stable, reviewed by surgeon   - cont to trend LFTs  - unasyn x 10 days for IA prophylaxis/ + cx (EOT 1/30)    Debility  - post-op debility  - PT/OT  following, recommending HH therapy      Paroxysmal A-fib  - cardiology consulted   - CHADVASC 2 (female gender and HTN), 81 mg ASA started  - cardizem 120 mg daily extended release started per cards rec  - Pt had been rate controlled on cardizem, new episode of Afib with RVR on 1/31 AM. Cards reconsulted      Hypervolemia  - Cont lasix    Common bile duct leak of transplanted liver  - see s/p liver transplant  - TB to OR X 2 for bile leak  - RYLEY drainage sent 1/30, negative for bile leak      Thrombocytopenia, unspecified  - cont to improve post transplant      Prophylactic immunotherapy  - continue prograf  - continue to monitor for toxic side effects and check daily levels. Will adjust for therapeutic dose      Long-term use of immunosuppressant medication  - see prophylactic immunotherapy      At risk for opportunistic infections  - Bactrim for PCP ppx.  - Valcyte for CMV ppx.      Acute blood loss anemia  - H/H stable  - no overt signs of bleed  - continue to monitor with daily CBC      Adrenal corticosteroid causing adverse effect in therapeutic use  - endo consulted and following      Hyperglycemia  - likely steroid related, Endocrine following             VTE Risk Mitigation (From admission, onward)         Ordered     Place sequential compression device  Until discontinued         01/17/23 2342     heparin (porcine) injection 5,000 Units  Every 8 hours         01/17/23 2148     IP VTE HIGH RISK PATIENT  Once         01/17/23 2148                The patients clinical status was discussed at multidisplinary rounds, involving transplant surgery, transplant medicine, pharmacy, nursing, nutrition, and social work    Discharge Planning:  Not suitable for discharge at this time    Claude Almanzar, NP  Liver Transplant  [facility] - Transplant Stepdown

## 2023-01-31 NOTE — ASSESSMENT & PLAN NOTE
- see s/p liver transplant  - TB to OR X 2 for bile leak  - RYLEY drainage sent 1/30, negative for bile leak

## 2023-01-31 NOTE — NURSING
Pt noted to be in afib with hr 120-160s. She is sitting down in chair on phone. Pt asymptomatic. Carmelo RODAS NP notified. Ekg ordered and will given 5mg of iv lopressor.

## 2023-01-31 NOTE — NURSING
Pt noted to be back in afib with -150s. Pt just came out of bathroom. She was asymptomatic. She converted back to NSR without any intervention after about 5 minutes. Carmelo RODAS NP was notified.

## 2023-01-31 NOTE — PT/OT/SLP DISCHARGE
"Physical Therapy Treatment and Discharge    Patient Name:  Mickey Harris   MRN:  [id number]  Admitting Diagnosis:  S/P liver transplant   Recent Surgery: Procedure(s) (LRB):  LAPAROTOMY, EXPLORATORY (N/A)  CHOLEDOCHOJEJUNOSTOMY 7 Days Post-Op  Admit Date: 1/17/2023  Length of Stay: 14 days    Recommendations:     Discharge Recommendations:  home health PT   Discharge Equipment Recommendations: none   Barriers to discharge: None    Assessment:     Mickey Harris is a 57 y.o. female admitted with a medical diagnosis of S/P liver transplant.  She presents with the following impairments/functional limitations:  impaired functional mobility, gait instability, impaired self care skills, impaired endurance, pain, decreased safety awareness.     Pt agreeable to therapy, ambulatory in room before arrival, reports ambulating in hallways with . SUP for all mobility including ambulation without A/D, only mild decrease in endurance. Pt appropriate for discharge at this time as she is functionally independent and walking halls with her . Home health will follow up to ensure safety after discharge.    Rehab Prognosis: Good; patient would benefit from acute skilled PT services to address these deficits and reach maximum level of function.    Recent Surgery: Procedure(s) (LRB):  LAPAROTOMY, EXPLORATORY (N/A)  CHOLEDOCHOJEJUNOSTOMY 7 Days Post-Op    Treatment Tolerated: Excellent    Highest level of mobility achieved this visit: ambulates 335ft w/ SUP and no A/D    Activity with RN/PCT: ambulate with nursing or     Patient appropriate for care in another setting    Plan:     Patient Discharged to: Home with Home Health Service.    Subjective     RN Tavia notified prior to session. Pt's  present upon PT entrance into room.    Chief Complaint: pain  Patient/Family Comments/goals: "We used to travel all over in a motor home"  Pain/Comfort:  Pain Rating 1: 7/10  Location - Orientation 1: generalized  Location " "1: abdomen  Pain Addressed 1: Distraction      Objective:       Reasons for Discontinuation of Therapy Services  Therapist determines that the patient will no longer benefit from therapy services.      Additional staff present: none    Patient found ambulatory in room/[place] with:  (No active lines)   Cognition:   Alert and Cooperative  Command following: Follows multistep verbal commands  Fluency: clear/fluent  General Precautions: Standard, Cardiac fall   Orthopedic Precautions:N/A   Braces: N/A   Body mass index is 33.22 kg/m².  Oxygen Device: Room Air      Vitals: BP (!) 109/56 (BP Location: Left arm, Patient Position: Sitting)   Pulse 87   Temp 97.8 °F (36.6 °C) (Oral)   Resp 16   Ht 5' 5" (1.651 m)   Wt 90.5 kg (199 lb 10 oz)   SpO2 95%   Breastfeeding No   BMI 33.22 kg/m²     Outcome Measures:  AM-PAC 6 CLICK MOBILITY  Turning over in bed (including adjusting bedclothes, sheets and blankets)?: 4  Sitting down on and standing up from a chair with arms (e.g., wheelchair, bedside commode, etc.): 4  Moving from lying on back to sitting on the side of the bed?: 4  Moving to and from a bed to a chair (including a wheelchair)?: 4  Need to walk in hospital room?: 3  Climbing 3-5 steps with a railing?: 3  Basic Mobility Total Score: 22     Functional Mobility:    Bed Mobility:   Pt found/returned to bedside chair    Transfers:   Sit <> Stand Transfer: independence with no assistive device   Stand <> Sit Transfer: independence with no assistive device   [id number] from bedside chair    Standing Balance:  Assistance Level Required: [place]  Patient used: no assistive device   Time: 15 min  Postural deviations noted: no deviations noted      Gait:  Patient ambulated: 335ft   Patient required: supervision  Patient used:  no assistive device   Gait Pattern observed: swing-through gait  Gait Deviation(s): decreased mika  Impairments due to: decreased endurance  Mask donned for out of room ambulation  Comments: 1 " standing rest break    Education:  Time provided for education, counseling and discussion of health disposition in regards to patient's current status  All questions answered within PT scope of practice and to patient's satisfaction  PT role in POC to address current functional deficits    Patient left up in chair with call button in reach and  present.    GOALS:   Multidisciplinary Problems       Physical Therapy Goals          Problem: Physical Therapy    Goal Priority Disciplines Outcome Goal Variances Interventions   Physical Therapy Goal     PT, PT/OT Ongoing, Progressing     Description: Goals to be met by: 2023    Patient will increase functional independence with mobility by performin. Sit to stand transfer with Modified [place]  2. Bed to chair transfer with Supervision using LRAD  3. Gait  x 250 feet with Supervision using LRAD.   4. Ascend/descend 6 stair with bilateral Handrails Stand-by Assistance using no AD.   5. Lower extremity exercise program x30 reps per handout, with independence                       Time Tracking:     PT Received On: 23  PT Start Time: 1215     PT Stop Time: 1239  PT Total Time (min): 24 min     Billable Minutes:   Gait Training 24 min    Treatment Type: Treatment  PT/PTA: PT       Dakotah Sparks, PT, DPT  2023

## 2023-01-31 NOTE — PROGRESS NOTES
[facility] - Transplant Stepdown  Endocrinology  Progress Note    Admit Date: 2023     Reason for Consult: Management of Hyperglycemia     Surgical Procedure and Date: Liver Transplant 2023    Patient is not diabetic and does not currently take any oral/injectable antidiabetic/hypoglycemic medications.     Lab Results   Component Value Date    HGBA1C 4.5 2023           HPI: Mickey Harris is a 57 y.o. female who presented on 22 for living related donor liver transplant in the setting of DORAN cirrhosis. She was diagnosed approximately 4 years ago and did well up until one year ago when she began to develop lower extremity edema and ascites managed with diuretics. She has never required paracentesis. She has also developed hepatic encephalopathy managed with lactulose. There is no history of GI bleeding. Ms. Harris is now s/p living donor liver transplantation on 23. She received a right liver lobe which was quite large (~1000g). Biliary reconstruction was Becky-en-y reconstruction to the donor right hepatic duct. The procedure was performed without apparent complication and she was transferred to the SICU for postoperative care and management. Endocrine consulted to manage hyperglycemia in the pressence of high-dose steroids. Of note, patient on 12-24 units/hr of insulin while on IIP.               Interval HPI:   Overnight events: No acute events overnight. Patient in room 58553/15260 A. Blood glucose stable. BG at and above goal on current insulin regimen (SSI ). Steroid use- Prednisone 15 mg. 7 Days Post-Op  Renal function- Normal   Vasopressors-  None       Endocrine will continue to follow and manage insulin orders inpatient.         Diet Adult Regular (IDDSI Level 7)     Eatin%  Nausea: No  Hypoglycemia and intervention: No  Fever: No  TPN and/or TF: No      BP (!) 103/57 (BP Location: Left arm, Patient Position: Sitting)   Pulse 89   Temp 97.7 °F (36.5 °C) (Oral)   Resp 14   " Ht 5' 5" (1.651 m)   Wt 90.5 kg (199 lb 10 oz)   SpO2 100%   Breastfeeding No   BMI 33.22 kg/m²     Labs Reviewed and Include    Recent Labs   Lab 01/31/23  0527   *   CALCIUM 8.5*   ALBUMIN 2.3*   PROT 4.3*   *   K 4.1   CO2 27      BUN 19   CREATININE 0.8   ALKPHOS 170*   ALT 82*   AST 34   BILITOT 3.9*     Lab Results   Component Value Date    WBC 5.69 01/31/2023    HGB 7.2 (L) 01/31/2023    HCT 23.0 (L) 01/31/2023     (H) 01/31/2023    PLT 89 (L) 01/31/2023     No results for input(s): TSH, FREET4 in the last 168 hours.  Lab Results   Component Value Date    HGBA1C 4.5 01/17/2023       Nutritional status:   Body mass index is 33.22 kg/m².  Lab Results   Component Value Date    ALBUMIN 2.3 (L) 01/31/2023    ALBUMIN 2.5 (L) 01/30/2023    ALBUMIN 2.5 (L) 01/29/2023     No results found for: PREALBUMIN    Estimated Creatinine Clearance: 85.2 mL/min (based on SCr of 0.8 mg/dL).    Accu-Checks  Recent Labs     01/28/23  1242 01/28/23  2212 01/29/23  0852 01/29/23  2120 01/30/23  1256 01/30/23  2256 01/30/23  2258 01/31/23  0722   POCTGLUCOSE 199* 228* 159* 258* 179* 258* 257* 132*       Current Medications and/or Treatments Impacting Glycemic Control  Immunotherapy:    Immunosuppressants           Stop Route Frequency     mycophenolate capsule 1,000 mg         -- Oral 2 times daily     tacrolimus capsule 2 mg         -- Oral 2 times daily          Steroids:   Hormones (From admission, onward)      Start     Stop Route Frequency Ordered    02/13/23 0900  predniSONE tablet 5 mg        See Hyperspace for full Linked Orders Report.    02/20 0859 Oral Daily 01/27/23 0954    02/06/23 0900  predniSONE tablet 10 mg        See Hyperspace for full Linked Orders Report.    02/13 0859 Oral Daily 01/27/23 0954    01/30/23 0900  predniSONE tablet 15 mg        See Hyperspace for full Linked Orders Report.    02/06 0859 Oral Daily 01/27/23 0954          Pressors:    Autonomic Drugs (From admission, " onward)      None          Hyperglycemia/Diabetes Medications:   Antihyperglycemics (From admission, onward)      Start     Stop Route Frequency Ordered    01/31/23 1124  insulin aspart U-100 pen 1-10 Units         -- SubQ Before meals & nightly PRN 01/31/23 1024            ASSESSMENT and PLAN    * S/P liver transplant  Optimize BG control to improve wound healing        Hyperglycemia  Endocrinology consulted for BG management.   BG goal 140-180     - Start Januvia 100 mg QD  - Novolog (aspart) insulin prn for BG excursions LDC SSI (150/50).  - BG checks AC/HS  - Hypoglycemia protocol in place    ** Please notify Endocrine for any change and/or advance in diet**  ** Please call Endocrine for any BG related issues **    Discharge Planning:   TBD. Please notify endocrinology prior to discharge. If patient with hyperglycemia today recommend insurance preferred dpp4i; No history of pancreatitis or medullary thyroid CA.   Meter, test strips, lancets.         Adrenal corticosteroid causing adverse effect in therapeutic use  Glucocorticoids markedly increase glucose levels. Expect the steroid taper will help glucose control.         Prophylactic immunotherapy  May increase insulin resistance.              Jaison Laboy, DNP, FNP  Endocrinology  [facility] - Transplant Stepdown

## 2023-01-31 NOTE — PLAN OF CARE
AAOx3 but appears anxious, afebrile, c/o pain. PRN pain medication given. Bg monitored achs and tx per orders. Endocrine following. Telemetry monitor in place (NSR). Cardizem ordered daily. Rt deborah with serous output. Chevron with staples- painted with betadine by pt. Pt is leaking ss drainage from chevron incision- exudry dressing applied. Self meds 100%. Pt passing gas but no BM since 1/27. Pt stated she may want prn suppository in am. PT ordered. Pt able to position self independently.  Pt in lowest position, side rails up x2, non-skid foot wear in place, call light within reach, pt verbalized understanding to call RN when needed.  Hand hygiene practiced per protocol.  Will continue to monitor.

## 2023-01-31 NOTE — SUBJECTIVE & OBJECTIVE
"Interval HPI:   Overnight events: No acute events overnight. Patient in room 23170/90615 A. Blood glucose stable. BG at and above goal on current insulin regimen (SSI ). Steroid use- Prednisone 15 mg. 7 Days Post-Op  Renal function- Normal   Vasopressors-  None       Endocrine will continue to follow and manage insulin orders inpatient.         Diet Adult Regular (IDDSI Level 7)     Eatin%  Nausea: No  Hypoglycemia and intervention: No  Fever: No  TPN and/or TF: No      BP (!) 103/57 (BP Location: Left arm, Patient Position: Sitting)   Pulse 89   Temp 97.7 °F (36.5 °C) (Oral)   Resp 14   Ht 5' 5" (1.651 m)   Wt 90.5 kg (199 lb 10 oz)   SpO2 100%   Breastfeeding No   BMI 33.22 kg/m²     Labs Reviewed and Include    Recent Labs   Lab 23  05   *   CALCIUM 8.5*   ALBUMIN 2.3*   PROT 4.3*   *   K 4.1   CO2 27      BUN 19   CREATININE 0.8   ALKPHOS 170*   ALT 82*   AST 34   BILITOT 3.9*     Lab Results   Component Value Date    WBC 5.69 2023    HGB 7.2 (L) 2023    HCT 23.0 (L) 2023     (H) 2023    PLT 89 (L) 2023     No results for input(s): TSH, FREET4 in the last 168 hours.  Lab Results   Component Value Date    HGBA1C 4.5 2023       Nutritional status:   Body mass index is 33.22 kg/m².  Lab Results   Component Value Date    ALBUMIN 2.3 (L) 2023    ALBUMIN 2.5 (L) 2023    ALBUMIN 2.5 (L) 2023     No results found for: PREALBUMIN    Estimated Creatinine Clearance: 85.2 mL/min (based on SCr of 0.8 mg/dL).    Accu-Checks  Recent Labs     23  1242 23  2212 23  0852 23  2120 23  1256 23  2256 23  2258 23  0722   POCTGLUCOSE 199* 228* 159* 258* 179* 258* 257* 132*       Current Medications and/or Treatments Impacting Glycemic Control  Immunotherapy:    Immunosuppressants           Stop Route Frequency     mycophenolate capsule 1,000 mg         -- Oral 2 times daily     " tacrolimus capsule 2 mg         -- Oral 2 times daily          Steroids:   Hormones (From admission, onward)      Start     Stop Route Frequency Ordered    02/13/23 0900  predniSONE tablet 5 mg        See Hyperspace for full Linked Orders Report.    02/20 0859 Oral Daily 01/27/23 0954    02/06/23 0900  predniSONE tablet 10 mg        See Hyperspace for full Linked Orders Report.    02/13 0859 Oral Daily 01/27/23 0954    01/30/23 0900  predniSONE tablet 15 mg        See Hyperspace for full Linked Orders Report.    02/06 0859 Oral Daily 01/27/23 0954          Pressors:    Autonomic Drugs (From admission, onward)      None          Hyperglycemia/Diabetes Medications:   Antihyperglycemics (From admission, onward)      Start     Stop Route Frequency Ordered    01/31/23 1124  insulin aspart U-100 pen 1-10 Units         -- SubQ Before meals & nightly PRN 01/31/23 1024

## 2023-01-31 NOTE — ASSESSMENT & PLAN NOTE
First reported event tonight on ECG. Converted to SR after 2 doses of IV lopressor 5mg. Did not tolerate PO BB due to hypotension. Was started on diltiazem 30mg q6 which pt tolerated well. She was transitioned to Dilt 120 XR qd 1/30. Went back into Afib w/ RVR on 1/31.     Recs:  - Mag 1.6, replete. Goal is to keep K>4 and Mag>2  - Increase dillz xr dose to 150mg.   - prn IV metoprolol 5mg as patient responds well  - CHADSVASC 2, HASBLED 1. Can continue on ASA   -     Recs:   -Started on diltiazem 30mg q 6hr and then transition to XR tomorrow. Can increase dose if she goes back to AF w/ RVR (HR>120)  - Cont IV lopressor 5mg as needed as patient responds well and if not controlled by IV dilt.   - Replete electrolytes as needed: keep K>4 and Mag>2  - CHADSVASC: 2 only- ok to continue with ASA and no clear indication for anticoagulation plus she is thrombocytopenic.   - EP follow-up, patient is from [place] but will be in [facility] until at least May this year  - Will add follow up on discharge orders  - Check TSH/FT4  - Obtain routine echo- no valvular abnormalities seen on previous echo

## 2023-01-31 NOTE — ASSESSMENT & PLAN NOTE
Endocrinology consulted for BG management.   BG goal 140-180     - Start Januvia 100 mg QD  - Novolog (aspart) insulin prn for BG excursions LDC SSI (150/50).  - BG checks AC/HS  - Hypoglycemia protocol in place    ** Please notify Endocrine for any change and/or advance in diet**  ** Please call Endocrine for any BG related issues **    Discharge Planning:   TBD. Please notify endocrinology prior to discharge. If patient with hyperglycemia today recommend insurance preferred dpp4i; No history of pancreatitis or medullary thyroid CA.   Meter, test strips, lancets.

## 2023-01-31 NOTE — PLAN OF CARE
Pt aao x4. Call bell within reach.  at bedside and attentive to pt's needs. Pulling meds 100% correctly. Ambulating in room and hallway. Pt went into afib with RVR this am and required 5mg iv lopressor to convert back to NSR. Cardiology consulted. Possible discharge tomorrow.  She voiced understanding of plan of care.

## 2023-02-01 ENCOUNTER — PATIENT MESSAGE (OUTPATIENT)
Dept: ENDOCRINOLOGY | Facility: HOSPITAL | Age: 58
End: 2023-02-01
Payer: COMMERCIAL

## 2023-02-01 VITALS
SYSTOLIC BLOOD PRESSURE: 123 MMHG | TEMPERATURE: 98 F | HEIGHT: 65 IN | RESPIRATION RATE: 16 BRPM | OXYGEN SATURATION: 95 % | DIASTOLIC BLOOD PRESSURE: 66 MMHG | BODY MASS INDEX: 32.69 KG/M2 | HEART RATE: 79 BPM | WEIGHT: 196.19 LBS

## 2023-02-01 DIAGNOSIS — Z94.4 LIVER REPLACED BY TRANSPLANT: Primary | ICD-10-CM

## 2023-02-01 PROBLEM — R73.9 HYPERGLYCEMIA: Status: RESOLVED | Noted: 2023-01-18 | Resolved: 2023-02-01

## 2023-02-01 PROBLEM — D62 ACUTE BLOOD LOSS ANEMIA: Status: RESOLVED | Noted: 2023-01-21 | Resolved: 2023-02-01

## 2023-02-01 PROBLEM — T38.0X5A ADRENAL CORTICOSTEROID CAUSING ADVERSE EFFECT IN THERAPEUTIC USE: Status: RESOLVED | Noted: 2023-01-18 | Resolved: 2023-02-01

## 2023-02-01 PROBLEM — R53.81 DEBILITY: Status: RESOLVED | Noted: 2023-01-31 | Resolved: 2023-02-01

## 2023-02-01 PROBLEM — E87.70 HYPERVOLEMIA: Status: RESOLVED | Noted: 2023-01-25 | Resolved: 2023-02-01

## 2023-02-01 LAB
ALBUMIN SERPL BCP-MCNC: 2.5 G/DL (ref 3.5–5.2)
ALP SERPL-CCNC: 173 U/L (ref 55–135)
ALT SERPL W/O P-5'-P-CCNC: 83 U/L (ref 10–44)
ANION GAP SERPL CALC-SCNC: 8 MMOL/L (ref 8–16)
ASCENDING AORTA: 3.25 CM
AST SERPL-CCNC: 34 U/L (ref 10–40)
AV INDEX (PROSTH): 0.91
AV MEAN GRADIENT: 2 MMHG
AV PEAK GRADIENT: 4 MMHG
AV VALVE AREA: 3.55 CM2
AV VELOCITY RATIO: 0.82
BASOPHILS # BLD AUTO: 0.08 K/UL (ref 0–0.2)
BASOPHILS NFR BLD: 1.1 % (ref 0–1.9)
BILIRUB SERPL-MCNC: 4 MG/DL (ref 0.1–1)
BSA FOR ECHO PROCEDURE: 2.02 M2
BUN SERPL-MCNC: 18 MG/DL (ref 6–20)
CALCIUM SERPL-MCNC: 8.7 MG/DL (ref 8.7–10.5)
CHLORIDE SERPL-SCNC: 101 MMOL/L (ref 95–110)
CO2 SERPL-SCNC: 27 MMOL/L (ref 23–29)
CREAT SERPL-MCNC: 0.8 MG/DL (ref 0.5–1.4)
CV ECHO LV RWT: 0.4 CM
DIFFERENTIAL METHOD: ABNORMAL
DOP CALC AO PEAK VEL: 1.04 M/S
DOP CALC AO VTI: 21.16 CM
DOP CALC LVOT AREA: 3.9 CM2
DOP CALC LVOT DIAMETER: 2.23 CM
DOP CALC LVOT PEAK VEL: 0.85 M/S
DOP CALC LVOT STROKE VOLUME: 75.15 CM3
DOP CALCLVOT PEAK VEL VTI: 19.25 CM
E WAVE DECELERATION TIME: 199.89 MSEC
E/A RATIO: 0.88
E/E' RATIO: 6.4 M/S
ECHO LV POSTERIOR WALL: 0.89 CM (ref 0.6–1.1)
EJECTION FRACTION: 60 %
EOSINOPHIL # BLD AUTO: 0.1 K/UL (ref 0–0.5)
EOSINOPHIL NFR BLD: 1.3 % (ref 0–8)
ERYTHROCYTE [DISTWIDTH] IN BLOOD BY AUTOMATED COUNT: 18 % (ref 11.5–14.5)
EST. GFR  (NO RACE VARIABLE): >60 ML/MIN/1.73 M^2
FRACTIONAL SHORTENING: 25 % (ref 28–44)
GLUCOSE SERPL-MCNC: 118 MG/DL (ref 70–110)
HCT VFR BLD AUTO: 25.9 % (ref 37–48.5)
HGB BLD-MCNC: 8.3 G/DL (ref 12–16)
IMM GRANULOCYTES # BLD AUTO: 0.22 K/UL (ref 0–0.04)
IMM GRANULOCYTES NFR BLD AUTO: 3.1 % (ref 0–0.5)
INTERVENTRICULAR SEPTUM: 0.63 CM (ref 0.6–1.1)
LA MAJOR: 4.74 CM
LA WIDTH: 4.08 CM
LEFT ATRIUM SIZE: 4.3 CM
LEFT ATRIUM VOLUME INDEX MOD: 23.8 ML/M2
LEFT ATRIUM VOLUME MOD: 46.61 CM3
LEFT INTERNAL DIMENSION IN SYSTOLE: 3.36 CM (ref 2.1–4)
LEFT VENTRICLE DIASTOLIC VOLUME INDEX: 46.95 ML/M2
LEFT VENTRICLE DIASTOLIC VOLUME: 92.03 ML
LEFT VENTRICLE MASS INDEX: 54 G/M2
LEFT VENTRICLE SYSTOLIC VOLUME INDEX: 23.6 ML/M2
LEFT VENTRICLE SYSTOLIC VOLUME: 46.21 ML
LEFT VENTRICULAR INTERNAL DIMENSION IN DIASTOLE: 4.49 CM (ref 3.5–6)
LEFT VENTRICULAR MASS: 105.9 G
LV LATERAL E/E' RATIO: 5.33 M/S
LV SEPTAL E/E' RATIO: 8 M/S
LYMPHOCYTES # BLD AUTO: 0.9 K/UL (ref 1–4.8)
LYMPHOCYTES NFR BLD: 13.3 % (ref 18–48)
MAGNESIUM SERPL-MCNC: 1.8 MG/DL (ref 1.6–2.6)
MCH RBC QN AUTO: 32.7 PG (ref 27–31)
MCHC RBC AUTO-ENTMCNC: 32 G/DL (ref 32–36)
MCV RBC AUTO: 102 FL (ref 82–98)
MONOCYTES # BLD AUTO: 0.6 K/UL (ref 0.3–1)
MONOCYTES NFR BLD: 8.2 % (ref 4–15)
MV PEAK A VEL: 0.91 M/S
MV PEAK E VEL: 0.8 M/S
NEUTROPHILS # BLD AUTO: 5.2 K/UL (ref 1.8–7.7)
NEUTROPHILS NFR BLD: 73 % (ref 38–73)
NRBC BLD-RTO: 1 /100 WBC
PISA TR MAX VEL: 2.5 M/S
PLATELET # BLD AUTO: 127 K/UL (ref 150–450)
PMV BLD AUTO: 12.5 FL (ref 9.2–12.9)
POCT GLUCOSE: 142 MG/DL (ref 70–110)
POCT GLUCOSE: 162 MG/DL (ref 70–110)
POTASSIUM SERPL-SCNC: 4.4 MMOL/L (ref 3.5–5.1)
PROT SERPL-MCNC: 4.9 G/DL (ref 6–8.4)
RA MAJOR: 4.4 CM
RA WIDTH: 3.5 CM
RBC # BLD AUTO: 2.54 M/UL (ref 4–5.4)
RIGHT VENTRICULAR END-DIASTOLIC DIMENSION: 3.31 CM
RV TISSUE DOPPLER FREE WALL SYSTOLIC VELOCITY 1 (APICAL 4 CHAMBER VIEW): 18 CM/S
SINUS: 3.48 CM
SODIUM SERPL-SCNC: 136 MMOL/L (ref 136–145)
STJ: 3.25 CM
TACROLIMUS BLD-MCNC: 8.1 NG/ML (ref 5–15)
TDI LATERAL: 0.15 M/S
TDI SEPTAL: 0.1 M/S
TDI: 0.13 M/S
TR MAX PG: 25 MMHG
TRICUSPID ANNULAR PLANE SYSTOLIC EXCURSION: 2.8 CM
WBC # BLD AUTO: 7.07 K/UL (ref 3.9–12.7)

## 2023-02-01 PROCEDURE — 85025 COMPLETE CBC W/AUTO DIFF WBC: CPT

## 2023-02-01 PROCEDURE — 25000003 PHARM REV CODE 250: Performed by: PHYSICIAN ASSISTANT

## 2023-02-01 PROCEDURE — 25000003 PHARM REV CODE 250: Performed by: NURSE PRACTITIONER

## 2023-02-01 PROCEDURE — 63600175 PHARM REV CODE 636 W HCPCS

## 2023-02-01 PROCEDURE — 99239 HOSP IP/OBS DSCHRG MGMT >30: CPT | Mod: 24,,, | Performed by: PHYSICIAN ASSISTANT

## 2023-02-01 PROCEDURE — 63600175 PHARM REV CODE 636 W HCPCS: Performed by: CLINICAL NURSE SPECIALIST

## 2023-02-01 PROCEDURE — 25000003 PHARM REV CODE 250: Performed by: STUDENT IN AN ORGANIZED HEALTH CARE EDUCATION/TRAINING PROGRAM

## 2023-02-01 PROCEDURE — 80197 ASSAY OF TACROLIMUS: CPT

## 2023-02-01 PROCEDURE — 99239 PR HOSPITAL DISCHARGE DAY,>30 MIN: ICD-10-PCS | Mod: 24,,, | Performed by: PHYSICIAN ASSISTANT

## 2023-02-01 PROCEDURE — 25000003 PHARM REV CODE 250: Performed by: SURGERY

## 2023-02-01 PROCEDURE — 99232 SBSQ HOSP IP/OBS MODERATE 35: CPT | Mod: ,,, | Performed by: NURSE PRACTITIONER

## 2023-02-01 PROCEDURE — 99232 PR SUBSEQUENT HOSPITAL CARE,LEVL II: ICD-10-PCS | Mod: ,,, | Performed by: NURSE PRACTITIONER

## 2023-02-01 PROCEDURE — A4216 STERILE WATER/SALINE, 10 ML: HCPCS | Performed by: SURGERY

## 2023-02-01 PROCEDURE — 25000003 PHARM REV CODE 250

## 2023-02-01 PROCEDURE — 25000003 PHARM REV CODE 250: Performed by: CLINICAL NURSE SPECIALIST

## 2023-02-01 PROCEDURE — 80053 COMPREHEN METABOLIC PANEL: CPT

## 2023-02-01 PROCEDURE — 83735 ASSAY OF MAGNESIUM: CPT

## 2023-02-01 RX ORDER — BLOOD-GLUCOSE CONTROL, NORMAL
EACH MISCELLANEOUS
Qty: 100 EACH | Refills: 0 | Status: SHIPPED | OUTPATIENT
Start: 2023-02-01 | End: 2023-02-24 | Stop reason: SDUPTHER

## 2023-02-01 RX ORDER — LANOLIN ALCOHOL/MO/W.PET/CERES
800 CREAM (GRAM) TOPICAL 2 TIMES DAILY
Status: DISCONTINUED | OUTPATIENT
Start: 2023-02-01 | End: 2023-02-01 | Stop reason: HOSPADM

## 2023-02-01 RX ORDER — FUROSEMIDE 40 MG/1
40 TABLET ORAL DAILY
Qty: 30 TABLET | Refills: 0 | Status: SHIPPED | OUTPATIENT
Start: 2023-02-01 | End: 2023-02-09 | Stop reason: ALTCHOICE

## 2023-02-01 RX ORDER — DEXTROSE 4 G
TABLET,CHEWABLE ORAL
Qty: 1 EACH | Refills: 0 | Status: SHIPPED | OUTPATIENT
Start: 2023-02-01 | End: 2023-02-28

## 2023-02-01 RX ORDER — DILTIAZEM HYDROCHLORIDE 180 MG/1
180 CAPSULE, COATED, EXTENDED RELEASE ORAL DAILY
Qty: 30 CAPSULE | Refills: 11 | Status: SHIPPED | OUTPATIENT
Start: 2023-02-02 | End: 2023-02-15

## 2023-02-01 RX ORDER — OXYCODONE HYDROCHLORIDE 10 MG/1
5-10 TABLET ORAL EVERY 4 HOURS PRN
Qty: 40 TABLET | Refills: 0 | Status: SHIPPED | OUTPATIENT
Start: 2023-02-01 | End: 2023-02-23 | Stop reason: SDUPTHER

## 2023-02-01 RX ORDER — LANOLIN ALCOHOL/MO/W.PET/CERES
800 CREAM (GRAM) TOPICAL 2 TIMES DAILY
Qty: 120 TABLET | Refills: 11 | Status: SHIPPED | OUTPATIENT
Start: 2023-02-01 | End: 2023-02-14 | Stop reason: SDUPTHER

## 2023-02-01 RX ADMIN — MYCOPHENOLATE MOFETIL 1000 MG: 250 CAPSULE ORAL at 08:02

## 2023-02-01 RX ADMIN — BISACODYL 10 MG: 5 TABLET, COATED ORAL at 08:02

## 2023-02-01 RX ADMIN — VALGANCICLOVIR 450 MG: 450 TABLET, FILM COATED ORAL at 08:02

## 2023-02-01 RX ADMIN — ASPIRIN 81 MG: 81 TABLET, COATED ORAL at 08:02

## 2023-02-01 RX ADMIN — DILTIAZEM HYDROCHLORIDE 180 MG: 180 CAPSULE, COATED, EXTENDED RELEASE ORAL at 08:02

## 2023-02-01 RX ADMIN — ATOVAQUONE 1500 MG: 750 SUSPENSION ORAL at 08:02

## 2023-02-01 RX ADMIN — URSODIOL 300 MG: 300 CAPSULE ORAL at 08:02

## 2023-02-01 RX ADMIN — HEPARIN SODIUM 5000 UNITS: 5000 INJECTION INTRAVENOUS; SUBCUTANEOUS at 05:02

## 2023-02-01 RX ADMIN — FLUCONAZOLE 200 MG: 200 TABLET ORAL at 08:02

## 2023-02-01 RX ADMIN — FUROSEMIDE 40 MG: 40 TABLET ORAL at 08:02

## 2023-02-01 RX ADMIN — OXYCODONE HYDROCHLORIDE 10 MG: 10 TABLET ORAL at 04:02

## 2023-02-01 RX ADMIN — TACROLIMUS 2 MG: 1 CAPSULE ORAL at 08:02

## 2023-02-01 RX ADMIN — PREDNISONE 15 MG: 5 TABLET ORAL at 08:02

## 2023-02-01 RX ADMIN — OXYCODONE HYDROCHLORIDE 10 MG: 10 TABLET ORAL at 12:02

## 2023-02-01 RX ADMIN — DOCUSATE SODIUM 100 MG: 100 CAPSULE ORAL at 08:02

## 2023-02-01 RX ADMIN — POLYETHYLENE GLYCOL 3350 17 G: 17 POWDER, FOR SOLUTION ORAL at 08:02

## 2023-02-01 RX ADMIN — Medication 400 MG: at 08:02

## 2023-02-01 RX ADMIN — Medication 10 ML: at 05:02

## 2023-02-01 RX ADMIN — PANTOPRAZOLE SODIUM 40 MG: 40 TABLET, DELAYED RELEASE ORAL at 08:02

## 2023-02-01 RX ADMIN — Medication 10 ML: at 12:02

## 2023-02-01 RX ADMIN — SITAGLIPTIN 100 MG: 100 TABLET, FILM COATED ORAL at 08:02

## 2023-02-01 NOTE — PROGRESS NOTES
Discharge Note     presented to patient bedside to discuss discharge plans, as well as assess any concerns or needs. Patient was alert and oriented x4 and communicative. Patient is coping well with  support from family. Patient presented with pts , Kg Harris (ph: [phone]). Patient will discharge today to  [place] (local lodging) with [facility] for PT. SW notified Rae, nurse coordinator with [facility] of patient discharge and temporary local lodging address. Patient's  will transport patient. Patient reports agreeing with the discharge plan and no psychosocial concerns. Patient and caregiver verbalize understanding and are involved in treatment planning and discharge process. Patient nor caregiver had any further concerns or questions at this time.    SW remains available and will continue to follow, providing psychosocial support, education and assistance as needed.

## 2023-02-01 NOTE — NURSING
Discharge instructions reviewed with the patient and her . Both verbalized their understanding. All questions answered to their satisfaction. Right UA PICC removed. Explained how to empty and record the RYLEY drain. Patient reports that she has experience with emptying RYLEY drains in the past. Patient preparing for discharge. Patient's  is going to transport the patient off the unit.

## 2023-02-01 NOTE — ASSESSMENT & PLAN NOTE
Endocrinology consulted for BG management.   BG goal 140-180     - Januvia 100 mg QD  - Novolog (aspart) insulin prn for BG excursions LDC SSI (150/50).  - BG checks AC/HS  - Hypoglycemia protocol in place    ** Please notify Endocrine for any change and/or advance in diet**  ** Please call Endocrine for any BG related issues **    Discharge Planning:   TBD. Please notify endocrinology prior to discharge. If patient with hyperglycemia today recommend insurance preferred dpp4i; No history of pancreatitis or medullary thyroid CA.   Meter, test strips, lancets.

## 2023-02-01 NOTE — SUBJECTIVE & OBJECTIVE
"Interval HPI:   Overnight events: No acute events overnight. Patient in room 71470/16610 A. Blood glucose stable. BG at goal on current insulin regimen (SSI + Januvia 100 mg). Steroid use- Prednisone 15 mg. 8 Days Post-Op  Renal function- Normal   Vasopressors-  None       Endocrine will continue to follow and manage insulin orders inpatient.         Diet Adult Regular (IDDSI Level 7)     Eatin%-100%  Nausea: No  Hypoglycemia and intervention: No  Fever: No  TPN and/or TF: No      /66 (BP Location: Left arm, Patient Position: Sitting)   Pulse 93   Temp 98.1 °F (36.7 °C) (Oral)   Resp 17   Ht 5' 5" (1.651 m)   Wt 89 kg (196 lb 3.4 oz)   SpO2 95%   Breastfeeding No   BMI 32.65 kg/m²     Labs Reviewed and Include    Recent Labs   Lab 23  0445   *   CALCIUM 8.7   ALBUMIN 2.5*   PROT 4.9*      K 4.4   CO2 27      BUN 18   CREATININE 0.8   ALKPHOS 173*   ALT 83*   AST 34   BILITOT 4.0*     Lab Results   Component Value Date    WBC 7.07 2023    HGB 8.3 (L) 2023    HCT 25.9 (L) 2023     (H) 2023     (L) 2023     No results for input(s): TSH, FREET4 in the last 168 hours.  Lab Results   Component Value Date    HGBA1C 4.5 2023       Nutritional status:   Body mass index is 32.65 kg/m².  Lab Results   Component Value Date    ALBUMIN 2.5 (L) 2023    ALBUMIN 2.3 (L) 2023    ALBUMIN 2.5 (L) 2023     No results found for: PREALBUMIN    Estimated Creatinine Clearance: 84.5 mL/min (based on SCr of 0.8 mg/dL).    Accu-Checks  Recent Labs     23  2120 23  1256 23  2256 23  2258 23  0722 23  1301 23  1713 23  2358 23  0830   POCTGLUCOSE 258* 179* 258* 257* 132* 192* 225* 188* 142*       Current Medications and/or Treatments Impacting Glycemic Control  Immunotherapy:    Immunosuppressants           Stop Route Frequency     mycophenolate capsule 1,000 mg         -- Oral 2 " times daily     tacrolimus capsule 2 mg         -- Oral 2 times daily          Steroids:   Hormones (From admission, onward)      Start     Stop Route Frequency Ordered    02/13/23 0900  predniSONE tablet 5 mg        See Hyperspace for full Linked Orders Report.    02/20 0859 Oral Daily 01/27/23 0954    02/06/23 0900  predniSONE tablet 10 mg        See Hyperspace for full Linked Orders Report.    02/13 0859 Oral Daily 01/27/23 0954    01/30/23 0900  predniSONE tablet 15 mg        See Hyperspace for full Linked Orders Report.    02/06 0859 Oral Daily 01/27/23 0954          Pressors:    Autonomic Drugs (From admission, onward)      None          Hyperglycemia/Diabetes Medications:   Antihyperglycemics (From admission, onward)      Start     Stop Route Frequency Ordered    01/31/23 1130  SITagliptin phosphate tablet 100 mg         -- Oral Daily 01/31/23 1028    01/31/23 1127  insulin aspart U-100 pen 0-5 Units         -- SubQ Before meals & nightly PRN 01/31/23 1028

## 2023-02-01 NOTE — PROGRESS NOTES
Met with patient and her  in preparation for discharge today.  Reviewed their answers to the questions in My [facility].  All questions were answered correctly.  These questions cover: post op care, medication management, infection prevention and emergency contacts.  Outpatient coordinator assigned. Outpatient appointments reviewed.  Allowed time for questions and answers.

## 2023-02-01 NOTE — PLAN OF CARE
AAOx3, afebrile, c/o pain. PRN pain medication given. Telemetry monitor in place (NSR). Cardizem 180 mg will start this am for previous episodes of afib. Scheduled PO mg given. ECHO ordered. Bg monitored achs- no coverage needed. Endocrine following. Chevron with staples- leaking sanguinous output. ABD pad applied. Rt deboarh drain with serous/yellow output. Self meds not 100%. Reinforced medication with pt. +2 edema to BLE. PO Lasix continued. Plan for possible dc today. Pt able to position self independently.  Pt in lowest position, side rails up x2, non-skid foot wear in place, call light within reach, pt verbalized understanding to call RN when needed.  Hand hygiene practiced per protocol.  Will continue to monitor.

## 2023-02-01 NOTE — PROGRESS NOTES
DISCHARGE NOTE:    Mickey Harris is a 57 y.o. 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 female s/p    Living transplant (angela-en-y) on 1/17/2023 (Liver) for Cirrhosis: Fatty Liver (DORAN).  Post-op course c/b bile leak and exp lap x 2.  Additionally, had two episodes of rapid afib-- on Diltiazem 180 mg daily and aspirin.    Allergies:   Review of patient's allergies indicates:   Allergen Reactions    Levofloxacin Hives and Shortness Of Breath    Sulfa (sulfonamide antibiotics) Rash       Patient Pharmacy: [facility]    Discharge Medications:     Medication List        START taking these medications      aspirin 81 MG EC tablet  Commonly known as: ECOTRIN  Take 1 tablet (81 mg total) by mouth once daily.     atovaquone 750 mg/5 mL Susp oral liquid  Commonly known as: MEPRON  Take 10 mLs (1,500 mg total) by mouth once daily. STOP 7/17/23     calcium carbonate-vitamin D3 600 mg-20 mcg (800 unit) Tab  Take 1 tablet by mouth once daily.     diltiaZEM 120 MG Cp24  Commonly known as: CARDIZEM CD  Take 1 capsule (120 mg total) by mouth once daily.     fluconazole 200 MG Tab  Commonly known as: DIFLUCAN  Take 1 tablet (200 mg total) by mouth once daily. STOP 2/17/23     hydrOXYzine pamoate 25 MG Cap  Commonly known as: VISTARIL  Take 1 capsule (25 mg total) by mouth every 8 (eight) hours as needed (anxiety/sleep).     mycophenolate 250 mg Cap  Commonly known as: CELLCEPT  Take 4 capsules (1,000 mg total) by mouth 2 (two) times daily.     pantoprazole 40 MG  tablet  Commonly known as: PROTONIX  Take 1 tablet (40 mg total) by mouth once daily.     predniSONE 5 MG tablet  Commonly known as: DELTASONE  Take by mouth daily: 1/23 to 1/29 20 mg, 1/30 to 2/5 15 mg, 2/6 to 2/12 10 mg, 2/13 to 2/19 5 mg, then discontinue     tacrolimus 1 MG Cap  Commonly known as: PROGRAF  Take 2 capsules (2 mg total) by mouth every 12 (twelve) hours.     ursodioL 250 mg Tab  Commonly known as: ACTIGALL  Take 1 tablet (250 mg total) by mouth 3 (three) times daily.     valGANciclovir 450 mg Tab  Commonly known as: VALCYTE  Take 1 tablet (450 mg total) by mouth once daily. Stop 4/18/2023            STOP taking these medications      albuterol 90 mcg/actuation inhaler  Commonly known as: PROVENTIL/VENTOLIN HFA     biotin 1 mg Cap     cyanocobalamin (vitamin B-12) 50 mcg tablet     ergocalciferol 50,000 unit Cap  Commonly known as: ERGOCALCIFEROL     FLOVENT  mcg/actuation inhaler  Generic drug: fluticasone propionate     hydrOXYzine HCL 25 MG tablet  Commonly known as: ATARAX     lactulose 10 gram/15 mL solution  Commonly known as: CHRONULAC     loratadine-pseudoephedrine  mg  mg per 24 hr tablet  Commonly known as: CLARITIN-D 24-hour     MG-PLUS-PROTEIN 133 mg Tab  Generic drug: magnesium oxide-Mg AA chelate     ondansetron 4 MG tablet  Commonly known as: ZOFRAN     spironolactone 50 MG tablet  Commonly known as: ALDACTONE     vitamin E (dl, acetate) 45 mg (100 unit) Cap            ASK your doctor about these medications      furosemide 20 MG tablet  Commonly known as: LASIX               Where to Get Your Medications        These medications were [facility] [place] [address] [facility] [place] [address]      Hours: Mon-Fri 7a-7p, Sat-Sun 10a-4p Phone: [phone]   aspirin 81 MG EC tablet  atovaquone 750 mg/5 mL Susp oral liquid  calcium carbonate-vitamin D3 600 mg-20 mcg (800 unit) Tab  diltiaZEM 120 MG Cp24  fluconazole 200 MG Tab  hydrOXYzine pamoate 25  MG Cap  mycophenolate 250 mg Cap  pantoprazole 40 MG tablet  predniSONE 5 MG tablet  tacrolimus 1 MG Cap  ursodioL 250 mg Tab  valGANciclovir 450 mg Tab          Pharmacy Interventions/Recommendations:  1) Transplant Immunosuppression: Induction methylprednisolone, and maintenance tac/MMF/prednisone taper per protocol    2) Opportunistic Infection prophylaxis: PJP-- Mepron x 6 months (Bactrim allergy, anemia); Valcyte x 3 months, fluconazole until 2/17 (bile leak)    3) Osteoporosis Prevention measures (liver txp): No Dexa, Oscal D     4) Patient Counseling/Education: Demonstrated the use of the BP cuff, thermometer.    5) Follow-Up/Discharge Needs:      needs tac and MMF sent to [facility] (try to send 90 day supply) for March and April supply; AFTER THAT, will utilize [facility] and [employer].    Mepron is approx  $200 per month, would transition to dapsone if anemia resolves and G6PD is normal    6) Patient Assistance Information: none    7) The following medications have been placed on HOLD and should be restarted in the outpatient setting (when appropriate): none    Mickey and her caregiver verbalized their understanding and had the opportunity to ask questions.

## 2023-02-02 ENCOUNTER — TELEPHONE (OUTPATIENT)
Dept: TRANSPLANT | Facility: CLINIC | Age: 58
End: 2023-02-02

## 2023-02-02 ENCOUNTER — CLINICAL SUPPORT (OUTPATIENT)
Dept: TRANSPLANT | Facility: CLINIC | Age: 58
End: 2023-02-02
Payer: COMMERCIAL

## 2023-02-02 ENCOUNTER — CONFERENCE (OUTPATIENT)
Dept: TRANSPLANT | Facility: CLINIC | Age: 58
End: 2023-02-02
Payer: COMMERCIAL

## 2023-02-02 ENCOUNTER — LAB VISIT (OUTPATIENT)
Dept: LAB | Facility: HOSPITAL | Age: 58
End: 2023-02-02
Attending: SURGERY
Payer: COMMERCIAL

## 2023-02-02 VITALS
TEMPERATURE: 97 F | RESPIRATION RATE: 16 BRPM | WEIGHT: 195.31 LBS | HEIGHT: 65 IN | HEIGHT: 65 IN | BODY MASS INDEX: 32.54 KG/M2 | DIASTOLIC BLOOD PRESSURE: 51 MMHG | SYSTOLIC BLOOD PRESSURE: 109 MMHG | RESPIRATION RATE: 16 BRPM | DIASTOLIC BLOOD PRESSURE: 51 MMHG | SYSTOLIC BLOOD PRESSURE: 109 MMHG | TEMPERATURE: 97 F | HEART RATE: 83 BPM | OXYGEN SATURATION: 97 % | BODY MASS INDEX: 32.54 KG/M2 | OXYGEN SATURATION: 97 % | HEART RATE: 83 BPM | WEIGHT: 195.31 LBS

## 2023-02-02 DIAGNOSIS — Z94.4 LIVER TRANSPLANTED: Primary | ICD-10-CM

## 2023-02-02 DIAGNOSIS — Z94.4 LIVER REPLACED BY TRANSPLANT: ICD-10-CM

## 2023-02-02 DIAGNOSIS — F41.9 ANXIETY: ICD-10-CM

## 2023-02-02 LAB
ALBUMIN SERPL BCP-MCNC: 2.8 G/DL (ref 3.5–5.2)
ALP SERPL-CCNC: 152 U/L (ref 55–135)
ALT SERPL W/O P-5'-P-CCNC: 85 U/L (ref 10–44)
ANION GAP SERPL CALC-SCNC: 8 MMOL/L (ref 8–16)
AST SERPL-CCNC: 40 U/L (ref 10–40)
BASOPHILS # BLD AUTO: 0.09 K/UL (ref 0–0.2)
BASOPHILS NFR BLD: 1.4 % (ref 0–1.9)
BILIRUB DIRECT SERPL-MCNC: 2.9 MG/DL (ref 0.1–0.3)
BILIRUB SERPL-MCNC: 4.2 MG/DL (ref 0.1–1)
BUN SERPL-MCNC: 18 MG/DL (ref 6–20)
CALCIUM SERPL-MCNC: 9.1 MG/DL (ref 8.7–10.5)
CHLORIDE SERPL-SCNC: 100 MMOL/L (ref 95–110)
CO2 SERPL-SCNC: 28 MMOL/L (ref 23–29)
CREAT SERPL-MCNC: 0.9 MG/DL (ref 0.5–1.4)
DIFFERENTIAL METHOD: ABNORMAL
EOSINOPHIL # BLD AUTO: 0.1 K/UL (ref 0–0.5)
EOSINOPHIL NFR BLD: 1.6 % (ref 0–8)
ERYTHROCYTE [DISTWIDTH] IN BLOOD BY AUTOMATED COUNT: 18 % (ref 11.5–14.5)
EST. GFR  (NO RACE VARIABLE): >60 ML/MIN/1.73 M^2
GLUCOSE SERPL-MCNC: 116 MG/DL (ref 70–110)
HCT VFR BLD AUTO: 27.9 % (ref 37–48.5)
HGB BLD-MCNC: 8.7 G/DL (ref 12–16)
IMM GRANULOCYTES # BLD AUTO: 0.25 K/UL (ref 0–0.04)
IMM GRANULOCYTES NFR BLD AUTO: 4 % (ref 0–0.5)
LYMPHOCYTES # BLD AUTO: 0.8 K/UL (ref 1–4.8)
LYMPHOCYTES NFR BLD: 13 % (ref 18–48)
MCH RBC QN AUTO: 32.7 PG (ref 27–31)
MCHC RBC AUTO-ENTMCNC: 31.2 G/DL (ref 32–36)
MCV RBC AUTO: 105 FL (ref 82–98)
MONOCYTES # BLD AUTO: 0.7 K/UL (ref 0.3–1)
MONOCYTES NFR BLD: 10.9 % (ref 4–15)
NEUTROPHILS # BLD AUTO: 4.3 K/UL (ref 1.8–7.7)
NEUTROPHILS NFR BLD: 69.1 % (ref 38–73)
NRBC BLD-RTO: 1 /100 WBC
PLATELET # BLD AUTO: 148 K/UL (ref 150–450)
PMV BLD AUTO: 12.4 FL (ref 9.2–12.9)
POTASSIUM SERPL-SCNC: 4.9 MMOL/L (ref 3.5–5.1)
PROT SERPL-MCNC: 5.5 G/DL (ref 6–8.4)
RBC # BLD AUTO: 2.66 M/UL (ref 4–5.4)
SODIUM SERPL-SCNC: 136 MMOL/L (ref 136–145)
TACROLIMUS BLD-MCNC: 8 NG/ML (ref 5–15)
WBC # BLD AUTO: 6.24 K/UL (ref 3.9–12.7)

## 2023-02-02 PROCEDURE — 99999 PR PBB SHADOW E&M-EST. PATIENT-LVL III: CPT | Mod: PBBFAC,,,

## 2023-02-02 PROCEDURE — 99999 PR PBB SHADOW E&M-EST. PATIENT-LVL III: ICD-10-PCS | Mod: PBBFAC,,,

## 2023-02-02 PROCEDURE — 82248 BILIRUBIN DIRECT: CPT | Performed by: SURGERY

## 2023-02-02 PROCEDURE — 80053 COMPREHEN METABOLIC PANEL: CPT | Performed by: SURGERY

## 2023-02-02 PROCEDURE — 36415 COLL VENOUS BLD VENIPUNCTURE: CPT | Performed by: SURGERY

## 2023-02-02 PROCEDURE — 85025 COMPLETE CBC W/AUTO DIFF WBC: CPT | Performed by: SURGERY

## 2023-02-02 PROCEDURE — 80197 ASSAY OF TACROLIMUS: CPT | Performed by: SURGERY

## 2023-02-02 RX ORDER — TACROLIMUS 1 MG/1
1 CAPSULE ORAL EVERY 12 HOURS
Qty: 180 CAPSULE | Refills: 3 | Status: SHIPPED | OUTPATIENT
Start: 2023-03-01 | End: 2023-02-06 | Stop reason: DRUGHIGH

## 2023-02-02 RX ORDER — MYCOPHENOLATE MOFETIL 250 MG/1
1000 CAPSULE ORAL 2 TIMES DAILY
Qty: 720 CAPSULE | Refills: 3 | Status: SHIPPED | OUTPATIENT
Start: 2023-03-01 | End: 2023-02-09 | Stop reason: SDUPTHER

## 2023-02-02 RX ORDER — ALPRAZOLAM 0.5 MG/1
0.5 TABLET ORAL 2 TIMES DAILY
Qty: 28 TABLET | Refills: 0 | Status: SHIPPED | OUTPATIENT
Start: 2023-02-02 | End: 2023-02-23

## 2023-02-02 NOTE — PROGRESS NOTES
Clinic Note: First Return to Clinic Post-  Liver Transplant    Mickey Harris  is a 57 y.o. female  S/p   RIGHT LIVER LOBE (SEGS 5,6,7,8) WITHOUT MIDDLE HEPATIC VEIN transplant on 1/17/2023 (Liver) for Cirrhosis: Fatty Liver (DORAN).        Objective:   There were no vitals filed for this visit.    Met with patient and her caregiver in the clinic to review current medication list.     Current Outpatient Medications   Medication Sig Dispense Refill    aspirin (ECOTRIN) 81 MG EC tablet Take 1 tablet (81 mg total) by mouth once daily. 30 tablet 11    atovaquone (MEPRON) 750 mg/5 mL Susp oral liquid Take 10 mLs (1,500 mg total) by mouth once daily. STOP 7/17/23 300 mL 5    blood sugar diagnostic Strp Test blood glucose 2 (two) times daily before meals. 100 strip 0    blood-glucose meter (ONETOUCH VERIO FLEX METER) Misc TEST BLOOD GLUCOSE AS DIRECTED 1 each 0    calcium carbonate-vitamin D3 600 mg-20 mcg (800 unit) Tab Take 1 tablet by mouth once daily. 30 tablet 5    diltiaZEM (CARDIZEM CD) 180 MG 24 hr capsule Take 1 capsule (180 mg total) by mouth once daily. 30 capsule 11    fluconazole (DIFLUCAN) 200 MG Tab Take 1 tablet (200 mg total) by mouth once daily. STOP 2/17/23 16 tablet 0    furosemide (LASIX) 40 MG tablet Take 1 tablet (40 mg total) by mouth once daily. 30 tablet 0    hydrOXYzine pamoate (VISTARIL) 25 MG Cap Take 1 capsule (25 mg total) by mouth every 8 (eight) hours as needed (anxiety/sleep). 30 capsule 1    lancets 30 gauge Misc Test blood glucose 2 (two) times daily before meals. 100 each 0    magnesium oxide (MAG-OX) 400 mg (241.3 mg magnesium) tablet Take 2 tablets (800 mg total) by mouth 2 (two) times daily. 120 tablet 11    mycophenolate (CELLCEPT) 250 mg Cap Take 4 capsules (1,000 mg total) by mouth 2 (two) times daily. 240 capsule 2    oxyCODONE (ROXICODONE) 10 mg Tab immediate release tablet Take 0.5-1 tablets (5-10 mg total) by mouth every 4 (four) hours as needed for Pain. 40 tablet 0     pantoprazole (PROTONIX) 40 MG tablet Take 1 tablet (40 mg total) by mouth once daily. 30 tablet 0    predniSONE (DELTASONE) 5 MG tablet Take by mouth daily: 1/23 to 1/29 20 mg, 1/30 to 2/5 15 mg, 2/6 to 2/12 10 mg, 2/13 to 2/19 5 mg, then discontinue 70 tablet 0    SITagliptin phosphate (JANUVIA) 100 MG Tab Take 1 tablet (100 mg total) by mouth once daily. 30 tablet 0    tacrolimus (PROGRAF) 1 MG Cap Take 2 capsules (2 mg total) by mouth every 12 (twelve) hours. 120 capsule 11    ursodioL (ACTIGALL) 250 mg Tab Take 1 tablet (250 mg total) by mouth 3 (three) times daily. 90 tablet 2    valGANciclovir (VALCYTE) 450 mg Tab Take 1 tablet (450 mg total) by mouth once daily. Stop 4/18/2023 30 tablet 2     No current facility-administered medications for this visit.       Pharmacy Interventions/Recommendations:     1) Graft Function & Immunosuppression Issues:     2) Opportunistic Infection prophylaxis:   PCP ppx: Mepron x 6 months (Bactrim allergy, anemia)  CMV ppx: Valganciclivir x 3 months  Fungal ppx: Fluconazole until 2/17 (bile leak)    3) Donor Serologies & Monitoring:     Donor CMV Status: positive  Donor HCV Status: non-reactive  Donor HBcAb: < 3.00  Donor HBV PALOMA: No results  Donor HCV PALOMA: No results      4) Pain Management & Bowel Regimen:   - Patient stated taking oxycodone 10 mg every 6 hrs with a plan to adjust frequency to every 7 hrs.  She complained of her legs and feet itching.  I mentioned that her bilirubin is trending down, and she on therapy to help control the symptom.  I also explained the oxycodone is likely exacerbating the pruritis.    5) Blood Pressure Management: none.         Afib (two episodes) - diltiazem +aspirin.  Will f/u with EP as outpatient.     6) Blood Sugar Management & Follow-up:   - .  Improving with pred taper.  Currently pred 15 mg daily until 2/5 and sitagliptan 100 daily.      7) Electrolyte Management: wnl    8) Osteoporosis Prevention Strategy (Liver Transplant): no  Dexa, calcarb - vitamin D    8) OTHER medication follow-up (patient assistance, held medications, etc):   - Tacrolimus and MMF Rx's sent to [facility] pharmacy for 90 days supply starting in March. Will utilize [facility] and [employer] AFTER    - Hb/Hct trending up.  Mepron is approx  $200 per month, would transition to dapsone if anemia resolves and G6PD is normal    9) Reinforced medication education conducted in the hospital, including medication indications, dosing, administration, side effects, monitoring-- including timing of immunosuppressant levels.     Patient received their FIRST fill of medications from [facility].  Discussed the process for obtaining refills of medications, including verifying the dose and mailing address to have medications delivered.     Mickey and her caregivers verbalized understanding and had the opportunity to ask questions.

## 2023-02-02 NOTE — PHYSICIAN QUERY
PT Name: Mickey Harris  MR #: [id number]    DOCUMENTATION CLARIFICATION     CDS/: Lorenza Ovalle RN              Contact information: [email or web address]    This form is a permanent document in the medical record.     Query Date: February 2, 2023    Dear Provider,  By submitting this query, we are merely seeking further clarification of documentation. Please utilize your independent clinical judgment when addressing the question(s) below.    The medical record contains the following:  Supporting Clinical Findings Location in Medical Record   Allograft liver for transplantation    Bile leak, apparently occluded duct, liver cholestatic in appearance    The abdomen was entered by re-opening the liver transplant incision.  Cultures were obtained of any subcutaneous and peritoneal fluid or clot as appropriate.  Significant peritoneal fluid was present, and it was bilious in nature.    The bile duct was assessed, and there was visible evidence of a bile leak. The hepatic artery was palpated, and the thrill was good. The liver itself was soft to palpation, and had a cholestatic appearance.  Additional intraabdominal findings included diffuse bile staining, but no signs of active bile leak from cut surface..     After the above exploration, all identifiable bleeding sites were addressed using electrocautery, argon beam coagulation, suture ligatures, and topical hemostatic agents as appropriate.  A needle biopsy of the liver was obtained.      The majority of bile collection and staining was around the hilum and duct. The anterior wall of the duct anastomosis was taken down. The bile appeared to be under pressure suggesting obstruction at the level of the anastomosis. A small stent was placed fashioned from 6 Fr ped feeding tube and secured with 6-0 PDS. The anterior wall was closed over the stent with interrupted 6-0 PDS. There was extensive bile staining, but no obvious signs of active bile leak  after repair.   Op note 1-17    Op note 1-23       Please clarify if bile leak (as it relates to (Allograft liver for transplantation) is:      [ X ] Complication of the procedure   [  ] Present, but not a complication of the procedure   [  ] Other (please specify): __________________   Please document in your progress notes daily for the duration of treatment until resolved and include in your discharge summary.

## 2023-02-02 NOTE — TELEPHONE ENCOUNTER
Called pt. No answer.   Called pt's spouse, Kg. Reviewed lab results and that no tacrolimus changes needed. Discussed that Dr. Kimbrough wants pt to start on xanax 0.5mg twice a day for her anxiety, mood concerns and manic speech while she is on prednisone taper. He agreed with plan. He will have pt call me if she has any questions. Pt to repeat labs Monday.    ----- Message from Juan Pablo Kimbrough MD sent at 2/2/2023  2:20 PM CST -----  Results ok. No action needed

## 2023-02-02 NOTE — TELEPHONE ENCOUNTER
Returned call. She does not want to try the Xanax just yet but she will think about it. She will call me with any other needs.  ----- Message from Amanda Marvin sent at 2/2/2023  4:08 PM CST -----  Regarding: Speak to Nurse  The patient called requesting to speak to Nurse Danya  Please cayy at your earliest opportunity  States will be waiting by the phone for the call back    No further information provided      Patient can be contacted @# [phone] (home)

## 2023-02-02 NOTE — TELEPHONE ENCOUNTER
Pathology Conference 2/2/23    Biopsy from 1/21/23 reviewed:  Cholestasis zone 3  Minimal fat  Sinusoidal dilatation  Reperfusion injury  No rejection

## 2023-02-02 NOTE — PROGRESS NOTES
"1ST NURSING VISIT POST DISCHARGE NOTE    1st RN appointment with Mickey Harris post discharge 2/1/23 s/p liver transplant 1/17/23.  Patient's spouse accompanied her.      Upon assessment, patient complains of incisional drainage, swelling, sacral pain, and metallic taste in mouth.  Incision has drainage.  Incision is intact with staples. No skin redness or swelling noted. Drainage is scant, serous sanguinous. Minimal old blood noted. RLQ drain site dressing CDI. Drainage appears serous. Patient states drain output approx 30ml last night and 30ml in the morning. She will continue to empty drain [id number]. Patient on lasix for swelling. Will continue to assess. Assessed sacrum. Skin is intact but red. Skin does nir. Advised patient that she needs to use barrier cream on site and will request wound care assessment via home health. Discussed with patient that it is common to have a metallic taste or for food and beverages to taste "funny" because of transplant medications. Discussed that this will improve but will take time. Discussed strategies to cope with this until it resolves.    Patient is able to explain daily incision care and showering instructions.  Medication list and rationale were reviewed with patient and spouse by Mik HORN pharmD.  Patient did bring blue medication card and medication bottles for review.  Time allowed for questions.  Patient expressed understanding of daily care including BID VS and medications.  Patient aware that nurse will review today's labs with a transplant physician and call with any does changes indicated.  Next labs TBD and first post-operative transplant team appointment 2/7/23.   "

## 2023-02-04 PROCEDURE — G0180 PR HOME HEALTH MD CERTIFICATION: ICD-10-PCS | Mod: ,,, | Performed by: TRANSPLANT SURGERY

## 2023-02-04 PROCEDURE — G0180 MD CERTIFICATION HHA PATIENT: HCPCS | Mod: ,,, | Performed by: TRANSPLANT SURGERY

## 2023-02-06 ENCOUNTER — LAB VISIT (OUTPATIENT)
Dept: LAB | Facility: HOSPITAL | Age: 58
End: 2023-02-06
Attending: SURGERY
Payer: COMMERCIAL

## 2023-02-06 DIAGNOSIS — Z94.4 LIVER TRANSPLANTED: ICD-10-CM

## 2023-02-06 LAB
ALBUMIN SERPL BCP-MCNC: 3.2 G/DL (ref 3.5–5.2)
ALP SERPL-CCNC: 106 U/L (ref 55–135)
ALT SERPL W/O P-5'-P-CCNC: 85 U/L (ref 10–44)
ANION GAP SERPL CALC-SCNC: 12 MMOL/L (ref 8–16)
AST SERPL-CCNC: 40 U/L (ref 10–40)
BASOPHILS # BLD AUTO: 0.05 K/UL (ref 0–0.2)
BASOPHILS NFR BLD: 0.8 % (ref 0–1.9)
BILIRUB DIRECT SERPL-MCNC: 2.2 MG/DL (ref 0.1–0.3)
BILIRUB SERPL-MCNC: 3.2 MG/DL (ref 0.1–1)
BUN SERPL-MCNC: 27 MG/DL (ref 6–20)
CALCIUM SERPL-MCNC: 9.7 MG/DL (ref 8.7–10.5)
CHLORIDE SERPL-SCNC: 96 MMOL/L (ref 95–110)
CO2 SERPL-SCNC: 25 MMOL/L (ref 23–29)
CREAT SERPL-MCNC: 1.3 MG/DL (ref 0.5–1.4)
DIFFERENTIAL METHOD: ABNORMAL
EOSINOPHIL # BLD AUTO: 0.1 K/UL (ref 0–0.5)
EOSINOPHIL NFR BLD: 1.5 % (ref 0–8)
ERYTHROCYTE [DISTWIDTH] IN BLOOD BY AUTOMATED COUNT: 18.1 % (ref 11.5–14.5)
EST. GFR  (NO RACE VARIABLE): 47.7 ML/MIN/1.73 M^2
GLUCOSE SERPL-MCNC: 133 MG/DL (ref 70–110)
HCT VFR BLD AUTO: 29.7 % (ref 37–48.5)
HGB BLD-MCNC: 9.8 G/DL (ref 12–16)
IMM GRANULOCYTES # BLD AUTO: 0.11 K/UL (ref 0–0.04)
IMM GRANULOCYTES NFR BLD AUTO: 1.8 % (ref 0–0.5)
LYMPHOCYTES # BLD AUTO: 0.9 K/UL (ref 1–4.8)
LYMPHOCYTES NFR BLD: 14.3 % (ref 18–48)
MAGNESIUM SERPL-MCNC: 1.8 MG/DL (ref 1.6–2.6)
MCH RBC QN AUTO: 33.6 PG (ref 27–31)
MCHC RBC AUTO-ENTMCNC: 33 G/DL (ref 32–36)
MCV RBC AUTO: 102 FL (ref 82–98)
MONOCYTES # BLD AUTO: 0.5 K/UL (ref 0.3–1)
MONOCYTES NFR BLD: 9 % (ref 4–15)
NEUTROPHILS # BLD AUTO: 4.4 K/UL (ref 1.8–7.7)
NEUTROPHILS NFR BLD: 72.6 % (ref 38–73)
NRBC BLD-RTO: 0 /100 WBC
PLATELET # BLD AUTO: 209 K/UL (ref 150–450)
PMV BLD AUTO: 11.8 FL (ref 9.2–12.9)
POTASSIUM SERPL-SCNC: 4.5 MMOL/L (ref 3.5–5.1)
PROT SERPL-MCNC: 5.9 G/DL (ref 6–8.4)
RBC # BLD AUTO: 2.92 M/UL (ref 4–5.4)
SODIUM SERPL-SCNC: 133 MMOL/L (ref 136–145)
TACROLIMUS BLD-MCNC: 12.1 NG/ML (ref 5–15)
WBC # BLD AUTO: 6 K/UL (ref 3.9–12.7)

## 2023-02-06 PROCEDURE — 85025 COMPLETE CBC W/AUTO DIFF WBC: CPT | Performed by: SURGERY

## 2023-02-06 PROCEDURE — 36415 COLL VENOUS BLD VENIPUNCTURE: CPT | Performed by: SURGERY

## 2023-02-06 PROCEDURE — 82248 BILIRUBIN DIRECT: CPT | Performed by: SURGERY

## 2023-02-06 PROCEDURE — 80197 ASSAY OF TACROLIMUS: CPT | Performed by: SURGERY

## 2023-02-06 PROCEDURE — 80053 COMPREHEN METABOLIC PANEL: CPT | Performed by: SURGERY

## 2023-02-06 PROCEDURE — 83735 ASSAY OF MAGNESIUM: CPT | Performed by: SURGERY

## 2023-02-06 NOTE — TELEPHONE ENCOUNTER
Spoke with pt. Reviewed lab results and need to decrease tac to 1/1. She changed blue card while on phone. She agreed to repeat labs Thursday.    ----- Message from Juan Pablo Kimbrough MD sent at 2/6/2023  2:19 PM CST -----  Fk 1 bid

## 2023-02-07 ENCOUNTER — OFFICE VISIT (OUTPATIENT)
Dept: TRANSPLANT | Facility: CLINIC | Age: 58
End: 2023-02-07
Payer: COMMERCIAL

## 2023-02-07 ENCOUNTER — PATIENT MESSAGE (OUTPATIENT)
Dept: TRANSPLANT | Facility: CLINIC | Age: 58
End: 2023-02-07

## 2023-02-07 VITALS
BODY MASS INDEX: 29.97 KG/M2 | DIASTOLIC BLOOD PRESSURE: 58 MMHG | OXYGEN SATURATION: 97 % | SYSTOLIC BLOOD PRESSURE: 117 MMHG | HEART RATE: 77 BPM | WEIGHT: 179.88 LBS | TEMPERATURE: 97 F | HEIGHT: 65 IN | RESPIRATION RATE: 16 BRPM

## 2023-02-07 DIAGNOSIS — Z94.4 S/P LIVER TRANSPLANT: Primary | ICD-10-CM

## 2023-02-07 DIAGNOSIS — Z51.81 ENCOUNTER FOR THERAPEUTIC DRUG MONITORING: ICD-10-CM

## 2023-02-07 DIAGNOSIS — Z79.60 LONG-TERM USE OF IMMUNOSUPPRESSANT MEDICATION: ICD-10-CM

## 2023-02-07 PROCEDURE — 99999 PR PBB SHADOW E&M-EST. PATIENT-LVL III: ICD-10-PCS | Mod: PBBFAC,,,

## 2023-02-07 PROCEDURE — 99215 OFFICE O/P EST HI 40 MIN: CPT | Mod: 24,S$GLB,, | Performed by: TRANSPLANT SURGERY

## 2023-02-07 PROCEDURE — 99999 PR PBB SHADOW E&M-EST. PATIENT-LVL III: CPT | Mod: PBBFAC,,,

## 2023-02-07 PROCEDURE — 99215 PR OFFICE/OUTPT VISIT, EST, LEVL V, 40-54 MIN: ICD-10-PCS | Mod: 24,S$GLB,, | Performed by: TRANSPLANT SURGERY

## 2023-02-07 RX ORDER — TACROLIMUS 1 MG/1
1 CAPSULE ORAL EVERY 12 HOURS
Qty: 60 CAPSULE | Refills: 11 | Status: SHIPPED | OUTPATIENT
Start: 2023-03-01 | End: 2023-02-14 | Stop reason: SDUPTHER

## 2023-02-07 RX ORDER — TRAMADOL HYDROCHLORIDE 50 MG/1
50 TABLET ORAL EVERY 6 HOURS PRN
Qty: 28 TABLET | Refills: 0 | Status: SHIPPED | OUTPATIENT
Start: 2023-02-07 | End: 2023-02-23

## 2023-02-07 NOTE — PROGRESS NOTES
Transplant Surgery  Liver Transplant Recipient Follow-up    Original Referring Physician: Rosario Self  Current Corresponding Physician:     Chief Complaint: Mickey is here for follow up of her liver transplant performed 1/17/2023 for the primary diagnosis (UNOS) of Cirrhosis: Fatty Liver (DORAN)    ORGAN: RIGHT LIVER LOBE (SEGS 5,6,7,8) WITHOUT MIDDLE HEPATIC VEIN  Whole or Partial: split liver  Donor Type: living  PHS Increased Risk:   Donor CMV Status:   Donor HCV Status:   Donor HBcAb:   Donor HBV PALOMA:   Donor HCV PALOMA:     Biliary Anastomosis: angela-en-y  Arterial Anatomy:   IVC reconstruction: hepatic vein confluence piggyback  Portal vein status: patent    Subjective:     History of Present Illness: She has had the following complications since transplant: bile leak.  The noted complications are well controlled.    Interval History: Currently, she is doing well.  Current complaints include abdominal pain and wound redness; poor sleep .  Mickey is here for management of her immunosuppression medication.    External provider notes reviewed: Yes    Review of Systems  Objective:     Physical Exam  Constitutional:       Appearance: She is well-developed.   HENT:      Head: Normocephalic and atraumatic.   Eyes:      Pupils: Pupils are equal, round, and reactive to light.   Cardiovascular:      Rate and Rhythm: Normal rate and regular rhythm.   Pulmonary:      Effort: Pulmonary effort is normal.   Abdominal:      General: There is no distension.      Palpations: Abdomen is soft.      Tenderness: There is no abdominal tenderness. There is no guarding.      Comments: Abdomen w edema;   Incision c/d/I, minimal seepage;  small erythema at staple sites   Musculoskeletal:         General: Normal range of motion.      Right lower leg: No edema.      Left lower leg: No edema.   Skin:     General: Skin is warm and dry.   Neurological:      Mental Status: She is alert and oriented to person, place, and time.   Psychiatric:          Behavior: Behavior normal.   Lab Results   Component Value Date    BILITOT 3.2 (H) 02/06/2023    AST 40 02/06/2023    ALT 85 (H) 02/06/2023    ALKPHOS 106 02/06/2023    CREATININE 1.3 02/06/2023    ALBUMIN 3.2 (L) 02/06/2023     Lab Results   Component Value Date    WBC 6.00 02/06/2023    HGB 9.8 (L) 02/06/2023    HCT 29.7 (L) 02/06/2023    HCT 34 (L) 01/17/2023     02/06/2023     Lab Results   Component Value Date    TACROLIMUS 12.1 02/06/2023       Diagnostics:  The following labs have been reviewed: CBC  CMP  INR  TACROLIMUS LEVEL  The following radiology images have been independently reviewed and interpreted: Liver US    Assessment/Plan:          S/P liver transplant.  CXR ordered due to mild dyspnea  Tramadol for pain prn  Reassurance for sleep issues  Keep RYLEY drain in place, minimal output but serum bili still 2.2  Chronic immunosuppressive medications for rejection prophylaxis supratherapeutic.  Plan:  dose reduced yesterday .  Continue monitoring symptoms, labs and drug levels for drug-related toxicity and side effects.  Incision: staples in place; wound clean, dry, and intact  Femoral arterial line site: no complications evident    Additional testing to be completed according to Written Order Guidelines for Post-Liver and Combined Liver/Kidney Transplant Follow-up (LI-09)    Interpretation of tests and discussion of patient management involves all members of the multidisciplinary transplant team  Patient advised that it is recommended that all transplanted patients, and their close contacts and household members receive Covid vaccination.  Tj Zazueta Jr, MD       [facility] Patient Status  Functional Status: 80% - Normal activity with effort: some symptoms of disease  Physical Capacity: No Limitations

## 2023-02-08 ENCOUNTER — PATIENT MESSAGE (OUTPATIENT)
Dept: TRANSPLANT | Facility: CLINIC | Age: 58
End: 2023-02-08
Payer: COMMERCIAL

## 2023-02-08 ENCOUNTER — TELEPHONE (OUTPATIENT)
Dept: TRANSPLANT | Facility: CLINIC | Age: 58
End: 2023-02-08
Payer: COMMERCIAL

## 2023-02-08 ENCOUNTER — HOSPITAL ENCOUNTER (OUTPATIENT)
Dept: RADIOLOGY | Facility: HOSPITAL | Age: 58
Discharge: HOME | End: 2023-02-08
Attending: TRANSPLANT SURGERY
Payer: COMMERCIAL

## 2023-02-08 DIAGNOSIS — Z94.4 S/P LIVER TRANSPLANT: ICD-10-CM

## 2023-02-08 PROCEDURE — 71046 XR CHEST PA AND LATERAL: ICD-10-PCS | Mod: 26,,, | Performed by: RADIOLOGY

## 2023-02-08 PROCEDURE — 71046 X-RAY EXAM CHEST 2 VIEWS: CPT | Mod: 26,,, | Performed by: RADIOLOGY

## 2023-02-08 PROCEDURE — 71046 X-RAY EXAM CHEST 2 VIEWS: CPT | Mod: TC

## 2023-02-09 ENCOUNTER — LAB VISIT (OUTPATIENT)
Dept: LAB | Facility: HOSPITAL | Age: 58
End: 2023-02-09
Attending: SURGERY
Payer: COMMERCIAL

## 2023-02-09 ENCOUNTER — PATIENT MESSAGE (OUTPATIENT)
Dept: TRANSPLANT | Facility: CLINIC | Age: 58
End: 2023-02-09
Payer: COMMERCIAL

## 2023-02-09 ENCOUNTER — TELEPHONE (OUTPATIENT)
Dept: TRANSPLANT | Facility: CLINIC | Age: 58
End: 2023-02-09
Payer: COMMERCIAL

## 2023-02-09 DIAGNOSIS — Z94.4 LIVER TRANSPLANTED: ICD-10-CM

## 2023-02-09 LAB
ALBUMIN SERPL BCP-MCNC: 3.2 G/DL (ref 3.5–5.2)
ALP SERPL-CCNC: 94 U/L (ref 55–135)
ALT SERPL W/O P-5'-P-CCNC: 60 U/L (ref 10–44)
ANION GAP SERPL CALC-SCNC: 11 MMOL/L (ref 8–16)
AST SERPL-CCNC: 27 U/L (ref 10–40)
BASOPHILS # BLD AUTO: 0.03 K/UL (ref 0–0.2)
BASOPHILS NFR BLD: 0.3 % (ref 0–1.9)
BILIRUB DIRECT SERPL-MCNC: 2.1 MG/DL (ref 0.1–0.3)
BILIRUB SERPL-MCNC: 3.2 MG/DL (ref 0.1–1)
BUN SERPL-MCNC: 26 MG/DL (ref 6–20)
CALCIUM SERPL-MCNC: 9.7 MG/DL (ref 8.7–10.5)
CHLORIDE SERPL-SCNC: 95 MMOL/L (ref 95–110)
CO2 SERPL-SCNC: 24 MMOL/L (ref 23–29)
CREAT SERPL-MCNC: 1.3 MG/DL (ref 0.5–1.4)
DIFFERENTIAL METHOD: ABNORMAL
EOSINOPHIL # BLD AUTO: 0.1 K/UL (ref 0–0.5)
EOSINOPHIL NFR BLD: 1.1 % (ref 0–8)
ERYTHROCYTE [DISTWIDTH] IN BLOOD BY AUTOMATED COUNT: 17.1 % (ref 11.5–14.5)
EST. GFR  (NO RACE VARIABLE): 47.7 ML/MIN/1.73 M^2
GLUCOSE SERPL-MCNC: 161 MG/DL (ref 70–110)
HCT VFR BLD AUTO: 29.6 % (ref 37–48.5)
HGB BLD-MCNC: 9.9 G/DL (ref 12–16)
IMM GRANULOCYTES # BLD AUTO: 0.09 K/UL (ref 0–0.04)
IMM GRANULOCYTES NFR BLD AUTO: 1 % (ref 0–0.5)
LYMPHOCYTES # BLD AUTO: 0.6 K/UL (ref 1–4.8)
LYMPHOCYTES NFR BLD: 6.6 % (ref 18–48)
MAGNESIUM SERPL-MCNC: 1.5 MG/DL (ref 1.6–2.6)
MCH RBC QN AUTO: 33.4 PG (ref 27–31)
MCHC RBC AUTO-ENTMCNC: 33.4 G/DL (ref 32–36)
MCV RBC AUTO: 100 FL (ref 82–98)
MONOCYTES # BLD AUTO: 0.6 K/UL (ref 0.3–1)
MONOCYTES NFR BLD: 7 % (ref 4–15)
NEUTROPHILS # BLD AUTO: 7.5 K/UL (ref 1.8–7.7)
NEUTROPHILS NFR BLD: 84 % (ref 38–73)
NRBC BLD-RTO: 0 /100 WBC
PLATELET # BLD AUTO: 173 K/UL (ref 150–450)
PMV BLD AUTO: 11.9 FL (ref 9.2–12.9)
POTASSIUM SERPL-SCNC: 4.5 MMOL/L (ref 3.5–5.1)
PROT SERPL-MCNC: 6.1 G/DL (ref 6–8.4)
RBC # BLD AUTO: 2.96 M/UL (ref 4–5.4)
SODIUM SERPL-SCNC: 130 MMOL/L (ref 136–145)
TACROLIMUS BLD-MCNC: 9.8 NG/ML (ref 5–15)
WBC # BLD AUTO: 8.94 K/UL (ref 3.9–12.7)

## 2023-02-09 PROCEDURE — 82248 BILIRUBIN DIRECT: CPT | Performed by: SURGERY

## 2023-02-09 PROCEDURE — 83735 ASSAY OF MAGNESIUM: CPT | Performed by: SURGERY

## 2023-02-09 PROCEDURE — 80197 ASSAY OF TACROLIMUS: CPT | Performed by: SURGERY

## 2023-02-09 PROCEDURE — 36415 COLL VENOUS BLD VENIPUNCTURE: CPT | Performed by: SURGERY

## 2023-02-09 PROCEDURE — 85025 COMPLETE CBC W/AUTO DIFF WBC: CPT | Performed by: SURGERY

## 2023-02-09 PROCEDURE — 80053 COMPREHEN METABOLIC PANEL: CPT | Performed by: SURGERY

## 2023-02-09 NOTE — TELEPHONE ENCOUNTER
Reviewed labs with Dr. Zazueta. Per MD, pt to stop lasix. All other labs stable. Repeat labs Monday 2/13/23.    Pt advised via portal. Repeat labs due 2/13/23.

## 2023-02-13 ENCOUNTER — LAB VISIT (OUTPATIENT)
Dept: LAB | Facility: HOSPITAL | Age: 58
End: 2023-02-13
Attending: SURGERY
Payer: COMMERCIAL

## 2023-02-13 ENCOUNTER — TELEPHONE (OUTPATIENT)
Dept: TRANSPLANT | Facility: CLINIC | Age: 58
End: 2023-02-13
Payer: COMMERCIAL

## 2023-02-13 DIAGNOSIS — Z94.4 S/P LIVER TRANSPLANT: Primary | ICD-10-CM

## 2023-02-13 DIAGNOSIS — I49.8 OTHER CARDIAC ARRHYTHMIA: Primary | ICD-10-CM

## 2023-02-13 DIAGNOSIS — Z94.4 LIVER TRANSPLANTED: ICD-10-CM

## 2023-02-13 LAB
ALBUMIN SERPL BCP-MCNC: 3.5 G/DL (ref 3.5–5.2)
ALP SERPL-CCNC: 80 U/L (ref 55–135)
ALT SERPL W/O P-5'-P-CCNC: 38 U/L (ref 10–44)
ANION GAP SERPL CALC-SCNC: 8 MMOL/L (ref 8–16)
AST SERPL-CCNC: 20 U/L (ref 10–40)
BASOPHILS # BLD AUTO: 0.05 K/UL (ref 0–0.2)
BASOPHILS NFR BLD: 1.1 % (ref 0–1.9)
BILIRUB DIRECT SERPL-MCNC: 1.5 MG/DL (ref 0.1–0.3)
BILIRUB SERPL-MCNC: 2.2 MG/DL (ref 0.1–1)
BUN SERPL-MCNC: 17 MG/DL (ref 6–20)
CALCIUM SERPL-MCNC: 10 MG/DL (ref 8.7–10.5)
CHLORIDE SERPL-SCNC: 101 MMOL/L (ref 95–110)
CO2 SERPL-SCNC: 25 MMOL/L (ref 23–29)
CREAT SERPL-MCNC: 1.1 MG/DL (ref 0.5–1.4)
DIFFERENTIAL METHOD: ABNORMAL
EOSINOPHIL # BLD AUTO: 0.1 K/UL (ref 0–0.5)
EOSINOPHIL NFR BLD: 2.7 % (ref 0–8)
ERYTHROCYTE [DISTWIDTH] IN BLOOD BY AUTOMATED COUNT: 15.9 % (ref 11.5–14.5)
EST. GFR  (NO RACE VARIABLE): 58.2 ML/MIN/1.73 M^2
GLUCOSE SERPL-MCNC: 129 MG/DL (ref 70–110)
HCT VFR BLD AUTO: 31 % (ref 37–48.5)
HGB BLD-MCNC: 10.1 G/DL (ref 12–16)
IMM GRANULOCYTES # BLD AUTO: 0.06 K/UL (ref 0–0.04)
IMM GRANULOCYTES NFR BLD AUTO: 1.3 % (ref 0–0.5)
LYMPHOCYTES # BLD AUTO: 0.7 K/UL (ref 1–4.8)
LYMPHOCYTES NFR BLD: 15.1 % (ref 18–48)
MAGNESIUM SERPL-MCNC: 1.8 MG/DL (ref 1.6–2.6)
MCH RBC QN AUTO: 32.4 PG (ref 27–31)
MCHC RBC AUTO-ENTMCNC: 32.6 G/DL (ref 32–36)
MCV RBC AUTO: 99 FL (ref 82–98)
MONOCYTES # BLD AUTO: 0.3 K/UL (ref 0.3–1)
MONOCYTES NFR BLD: 6.5 % (ref 4–15)
NEUTROPHILS # BLD AUTO: 3.5 K/UL (ref 1.8–7.7)
NEUTROPHILS NFR BLD: 73.3 % (ref 38–73)
NRBC BLD-RTO: 0 /100 WBC
PLATELET # BLD AUTO: 156 K/UL (ref 150–450)
PMV BLD AUTO: 12.1 FL (ref 9.2–12.9)
POTASSIUM SERPL-SCNC: 5 MMOL/L (ref 3.5–5.1)
PROT SERPL-MCNC: 6.5 G/DL (ref 6–8.4)
RBC # BLD AUTO: 3.12 M/UL (ref 4–5.4)
SODIUM SERPL-SCNC: 134 MMOL/L (ref 136–145)
TACROLIMUS BLD-MCNC: 6.3 NG/ML (ref 5–15)
WBC # BLD AUTO: 4.76 K/UL (ref 3.9–12.7)

## 2023-02-13 PROCEDURE — 85025 COMPLETE CBC W/AUTO DIFF WBC: CPT | Performed by: SURGERY

## 2023-02-13 PROCEDURE — 36415 COLL VENOUS BLD VENIPUNCTURE: CPT | Performed by: SURGERY

## 2023-02-13 PROCEDURE — 83735 ASSAY OF MAGNESIUM: CPT | Performed by: SURGERY

## 2023-02-13 PROCEDURE — 82248 BILIRUBIN DIRECT: CPT | Performed by: SURGERY

## 2023-02-13 PROCEDURE — 80197 ASSAY OF TACROLIMUS: CPT | Performed by: SURGERY

## 2023-02-13 PROCEDURE — 80053 COMPREHEN METABOLIC PANEL: CPT | Performed by: SURGERY

## 2023-02-13 NOTE — TELEPHONE ENCOUNTER
Pt notified via portal of stable labs and that no medication changes are needed. Repeat labs due 2/20/23 with ultrasound per protocol.     ----- Message from Juan Pablo Kimbrough MD sent at 2/13/2023  2:29 PM CST -----  Results ok. No action needed

## 2023-02-14 ENCOUNTER — OFFICE VISIT (OUTPATIENT)
Dept: TRANSPLANT | Facility: CLINIC | Age: 58
End: 2023-02-14
Payer: COMMERCIAL

## 2023-02-14 ENCOUNTER — PATIENT MESSAGE (OUTPATIENT)
Dept: ENDOCRINOLOGY | Facility: HOSPITAL | Age: 58
End: 2023-02-14
Payer: COMMERCIAL

## 2023-02-14 VITALS
RESPIRATION RATE: 18 BRPM | TEMPERATURE: 97 F | DIASTOLIC BLOOD PRESSURE: 63 MMHG | HEART RATE: 78 BPM | BODY MASS INDEX: 29.38 KG/M2 | HEIGHT: 65 IN | OXYGEN SATURATION: 97 % | SYSTOLIC BLOOD PRESSURE: 135 MMHG | WEIGHT: 176.38 LBS

## 2023-02-14 DIAGNOSIS — Z91.89 AT RISK FOR OPPORTUNISTIC INFECTIONS: Primary | ICD-10-CM

## 2023-02-14 DIAGNOSIS — Z94.4 LIVER TRANSPLANTED: ICD-10-CM

## 2023-02-14 DIAGNOSIS — Z51.81 ENCOUNTER FOR THERAPEUTIC DRUG MONITORING: ICD-10-CM

## 2023-02-14 DIAGNOSIS — Z29.89 PROPHYLACTIC IMMUNOTHERAPY: ICD-10-CM

## 2023-02-14 DIAGNOSIS — Z94.4 LIVER REPLACED BY TRANSPLANT: ICD-10-CM

## 2023-02-14 DIAGNOSIS — Z94.4 S/P LIVER TRANSPLANT: ICD-10-CM

## 2023-02-14 DIAGNOSIS — I48.91 ATRIAL FIBRILLATION: ICD-10-CM

## 2023-02-14 DIAGNOSIS — Z79.60 LONG-TERM USE OF IMMUNOSUPPRESSANT MEDICATION: ICD-10-CM

## 2023-02-14 DIAGNOSIS — Z79.899 ENCOUNTER FOR LONG-TERM (CURRENT) USE OF OTHER MEDICATIONS: ICD-10-CM

## 2023-02-14 PROCEDURE — 99999 PR PBB SHADOW E&M-EST. PATIENT-LVL III: ICD-10-PCS | Mod: PBBFAC,,,

## 2023-02-14 PROCEDURE — 99999 PR PBB SHADOW E&M-EST. PATIENT-LVL III: CPT | Mod: PBBFAC,,,

## 2023-02-14 PROCEDURE — 99215 PR OFFICE/OUTPT VISIT, EST, LEVL V, 40-54 MIN: ICD-10-PCS | Mod: 24,S$GLB,, | Performed by: TRANSPLANT SURGERY

## 2023-02-14 PROCEDURE — 99215 OFFICE O/P EST HI 40 MIN: CPT | Mod: 24,S$GLB,, | Performed by: TRANSPLANT SURGERY

## 2023-02-14 NOTE — PROGRESS NOTES
Subjective:     HPI    I had the pleasure of seeing Mickey Harris in consultation at your request for the evaluation of AF. He is a 57M with cirrhosis/DORAN who underwent OLT on 1/17/2023. Post-op course notable for bile leak requiring two exploratory laparotomies (POD#6 and POD#7). Based on ECGs in the EMR, pt had post-op AF on 1/28/2023 and 1/31/2023, both events reportedly resolved with IV beta blockers. He was discharged from [facility] on 2/1/2023 on cardizem cd 180 mg daily and aspirin 81 mg daily.    Echo in 2/2023 showed an EF of 65%.    Ms. Harris has been checking her vitals bid, with no AF noted on either that or pulse ox.    My interpretation of today's ECG Is sinus rhythm at 72 bpm.    Review of Systems   Constitutional: Negative for decreased appetite, malaise/fatigue, weight gain and weight loss.   HENT:  Negative for sore throat.    Eyes:  Negative for blurred vision.   Cardiovascular:  Positive for dyspnea on exertion. Negative for chest pain, irregular heartbeat, leg swelling, near-syncope, orthopnea, palpitations, paroxysmal nocturnal dyspnea and syncope.   Respiratory:  Negative for shortness of breath.    Skin:  Negative for rash.   Musculoskeletal:  Negative for arthritis.   Gastrointestinal:  Negative for abdominal pain.   Neurological:  Negative for focal weakness.   Psychiatric/Behavioral:  Negative for altered mental status.       Objective:    Physical Exam  Vitals and nursing note reviewed.   Constitutional:       General: She is not in acute distress.     Appearance: She is well-developed.   HENT:      Head: Normocephalic and atraumatic.   Eyes:      General: No scleral icterus.     Conjunctiva/sclera: Conjunctivae normal.      Pupils: Pupils are equal, round, and reactive to light.   Neck:      Thyroid: No thyromegaly.      Vascular: No JVD.   Cardiovascular:      Rate and Rhythm: Normal rate and regular rhythm.      Pulses: Intact distal pulses.      Heart sounds: Normal heart sounds. No  murmur heard.    No friction rub. No gallop.   Pulmonary:      Effort: Pulmonary effort is normal. No respiratory distress.      Breath sounds: Normal breath sounds.   Abdominal:      General: Bowel sounds are normal. There is no distension.      Palpations: Abdomen is soft.   Musculoskeletal:      Cervical back: Normal range of motion and neck supple.   Skin:     General: Skin is warm and dry.   Neurological:      Mental Status: She is alert and oriented to person, place, and time.   Psychiatric:         Behavior: Behavior normal.         Assessment:       1. Paroxysmal A-fib    2. S/P liver transplant    3. Prophylactic immunotherapy         Plan:       In summary, Mickey Harris is a 57M with cirrhosis who underwent OLT on 1/17/2023, complicated by bile leak requiring two ex laps on POD #6 and POD #7. Post-op AF (paroxysmal) seen. No AF since discharged. Reassurance provided.     Plan is for Ms. Harris to down-titrate cardizem to off over the next couple weeks. Follow-up PRN.    Thank you for allowing me to participate in the care of this patient. Please do not hesitate to call me with any questions or concerns.

## 2023-02-14 NOTE — PROGRESS NOTES
STAPLE REMOVAL NOTE    Staples removed from liver transplant incision, steri-strips applied with Benzoin per Dr. Felix's order.  Patient tolerated procedure well.  Skin dry and intact.  Patient instructed to shower with back to water spray and to pat dry incision and to let the steri-strips wear off on their own.  Patient instructed to report any redness, warmth, or drainage from incision to transplant coordinators.  All questions answered.

## 2023-02-14 NOTE — PROGRESS NOTES
Transplant Surgery  Liver Transplant Recipient Follow-up    Original Referring Physician: Reganreferral Self  Current Corresponding Physician:     Chief Complaint: Mickey is here for follow up of her liver transplant performed 1/17/2023 for the primary diagnosis (UNOS) of Cirrhosis: Fatty Liver (DORAN)    ORGAN: RIGHT LIVER LOBE (SEGS 5,6,7,8) WITHOUT MIDDLE HEPATIC VEIN  Whole or Partial: split liver  Donor Type: living  PHS Increased Risk:    Donor CMV Status:    Donor HCV Status:    Donor HBcAb:    Donor HBV PALOMA:   Donor HCV PALOMA:     Biliary Anastomosis: angela-en-y  Arterial Anatomy:    IVC reconstruction: hepatic vein confluence piggyback  Portal vein status: patent    Subjective:     History of Present Illness: She has had the following complications since transplant: bile leak.  The noted complications are well controlled.    Interval History: Currently, she is doing well.  Current complaints include none.  She did report that her drain got pulled and now the black dot is well outside the skin.  Mickey is here for management of her immunosuppression medication.    External provider notes reviewed: Yes    Review of Systems   Constitutional:  Negative for activity change, appetite change, chills, fatigue and fever.   HENT:  Negative for sore throat and trouble swallowing.    Eyes:  Negative for visual disturbance.   Respiratory:  Negative for cough, chest tightness and shortness of breath.    Cardiovascular:  Negative for chest pain, palpitations and leg swelling.   Gastrointestinal:  Negative for abdominal distention, abdominal pain, blood in stool, constipation, diarrhea, nausea and vomiting.   Endocrine: Negative for polyuria.   Genitourinary:  Negative for decreased urine volume, difficulty urinating, dysuria, flank pain, frequency and hematuria.   Musculoskeletal:  Negative for gait problem, myalgias and neck stiffness.   Skin:  Negative for rash and wound.   Neurological:  Negative for dizziness, tremors,  seizures, weakness, light-headedness and headaches.   Hematological:  Negative for adenopathy.   Psychiatric/Behavioral:  Negative for agitation, confusion and sleep disturbance.    Objective:     Physical Exam  Vitals reviewed.   Constitutional:       General: She is not in acute distress.     Appearance: She is well-developed.   HENT:      Head: Normocephalic.   Eyes:      General: No scleral icterus.     Conjunctiva/sclera: Conjunctivae normal.      Pupils: Pupils are equal, round, and reactive to light.   Neck:      Thyroid: No thyromegaly.      Trachea: No tracheal deviation.   Cardiovascular:      Rate and Rhythm: Normal rate and regular rhythm.      Heart sounds: Normal heart sounds. No murmur heard.  Pulmonary:      Effort: Pulmonary effort is normal. No respiratory distress.      Breath sounds: Normal breath sounds.   Abdominal:      General: Bowel sounds are normal. There is no distension.      Palpations: Abdomen is soft. There is no mass.      Tenderness: There is no abdominal tenderness. There is no guarding or rebound.      Comments: No inguinal adenopathy   Musculoskeletal:         General: Normal range of motion.      Cervical back: Normal range of motion and neck supple.      Comments: No clubbing or cyanosis   Lymphadenopathy:      Cervical: No cervical adenopathy.   Skin:     General: Skin is warm and dry.      Findings: No erythema or rash.   Neurological:      Mental Status: She is alert and oriented to person, place, and time.   Psychiatric:         Behavior: Behavior normal.   Lab Results   Component Value Date    BILITOT 2.2 (H) 02/13/2023    AST 20 02/13/2023    ALT 38 02/13/2023    ALKPHOS 80 02/13/2023    CREATININE 1.1 02/13/2023    ALBUMIN 3.5 02/13/2023     Lab Results   Component Value Date    WBC 4.76 02/13/2023    HGB 10.1 (L) 02/13/2023    HCT 31.0 (L) 02/13/2023    HCT 34 (L) 01/17/2023     02/13/2023     Lab Results   Component Value Date    TACROLIMUS 6.3 02/13/2023        Diagnostics:  The following labs have been reviewed: CBC  CMP  TACROLIMUS LEVEL  The following radiology images have been independently reviewed and interpreted: Liver US    Assessment/Plan:          S/P liver transplant.  Chronic immunosuppressive medications for rejection prophylaxis at target.  Plan: no adjustment needed.  Continue monitoring symptoms, labs and drug levels for drug-related toxicity and side effects.  Incision: staples in place; wound clean, dry, and intact - remove today  Drain removed in clinic as only 2cm of the tubing was deep to the skin in the subcutaneous tissue.  Femoral arterial line site: no complications evident    Additional testing to be completed according to Written Order Guidelines for Post-Liver and Combined Liver/Kidney Transplant Follow-up (LI-09)    Interpretation of tests and discussion of patient management involves all members of the multidisciplinary transplant team  Patient advised that it is recommended that all transplanted patients, and their close contacts and household members receive Covid vaccination.  Shelly Felix MD       [facility] Patient Status  Functional Status: 60% - Requires occasional assistance but is able to care for needs  Physical Capacity: No Limitations

## 2023-02-15 ENCOUNTER — PATIENT MESSAGE (OUTPATIENT)
Dept: TRANSPLANT | Facility: CLINIC | Age: 58
End: 2023-02-15
Payer: COMMERCIAL

## 2023-02-15 DIAGNOSIS — Z94.4 S/P LIVER TRANSPLANT: ICD-10-CM

## 2023-02-15 DIAGNOSIS — R73.9 HYPERGLYCEMIA: ICD-10-CM

## 2023-02-15 DIAGNOSIS — I48.91 ATRIAL FIBRILLATION: ICD-10-CM

## 2023-02-15 DIAGNOSIS — Z94.4 LIVER TRANSPLANTED: ICD-10-CM

## 2023-02-15 RX ORDER — PANTOPRAZOLE SODIUM 40 MG/1
40 TABLET, DELAYED RELEASE ORAL DAILY
Qty: 90 TABLET | Refills: 1 | Status: SHIPPED | OUTPATIENT
Start: 2023-02-15 | End: 2023-02-23

## 2023-02-15 RX ORDER — VALGANCICLOVIR 450 MG/1
450 TABLET, FILM COATED ORAL DAILY
Qty: 90 TABLET | Refills: 0 | Status: SHIPPED | OUTPATIENT
Start: 2023-02-15 | End: 2023-02-15

## 2023-02-15 RX ORDER — MYCOPHENOLATE MOFETIL 250 MG/1
1000 CAPSULE ORAL 2 TIMES DAILY
Qty: 720 CAPSULE | Refills: 3 | Status: SHIPPED | OUTPATIENT
Start: 2023-02-15 | End: 2023-02-15

## 2023-02-15 RX ORDER — URSODIOL 250 MG/1
250 TABLET, FILM COATED ORAL 3 TIMES DAILY
Qty: 270 TABLET | Refills: 3 | Status: SHIPPED | OUTPATIENT
Start: 2023-02-15 | End: 2023-06-14 | Stop reason: SDUPTHER

## 2023-02-15 RX ORDER — ACETAMINOPHEN 500 MG
1 TABLET ORAL DAILY
Qty: 90 TABLET | Refills: 3 | Status: SHIPPED | OUTPATIENT
Start: 2023-02-15 | End: 2023-04-26 | Stop reason: SDUPTHER

## 2023-02-15 RX ORDER — TACROLIMUS 1 MG/1
1 CAPSULE ORAL EVERY 12 HOURS
Qty: 180 CAPSULE | Refills: 3 | Status: SHIPPED | OUTPATIENT
Start: 2023-03-01 | End: 2023-02-15

## 2023-02-15 RX ORDER — URSODIOL 250 MG/1
250 TABLET, FILM COATED ORAL 3 TIMES DAILY
Qty: 270 TABLET | Refills: 3 | Status: SHIPPED | OUTPATIENT
Start: 2023-02-15 | End: 2023-02-15

## 2023-02-15 RX ORDER — TACROLIMUS 1 MG/1
1 CAPSULE ORAL EVERY 12 HOURS
Qty: 180 CAPSULE | Refills: 3 | Status: SHIPPED | OUTPATIENT
Start: 2023-03-01 | End: 2023-02-20

## 2023-02-15 RX ORDER — LANOLIN ALCOHOL/MO/W.PET/CERES
800 CREAM (GRAM) TOPICAL 2 TIMES DAILY
Qty: 360 TABLET | Refills: 3 | Status: SHIPPED | OUTPATIENT
Start: 2023-02-15 | End: 2023-02-15

## 2023-02-15 RX ORDER — ASPIRIN 81 MG/1
81 TABLET ORAL DAILY
Qty: 90 TABLET | Refills: 3 | Status: SHIPPED | OUTPATIENT
Start: 2023-02-15 | End: 2023-06-14 | Stop reason: SDUPTHER

## 2023-02-15 RX ORDER — DILTIAZEM HYDROCHLORIDE 180 MG/1
180 CAPSULE, COATED, EXTENDED RELEASE ORAL DAILY
Qty: 90 CAPSULE | Refills: 3 | Status: ON HOLD | OUTPATIENT
Start: 2023-02-15 | End: 2023-03-10 | Stop reason: HOSPADM

## 2023-02-15 RX ORDER — ASPIRIN 81 MG/1
81 TABLET ORAL DAILY
Qty: 90 TABLET | Refills: 3 | Status: SHIPPED | OUTPATIENT
Start: 2023-02-15 | End: 2023-02-15

## 2023-02-15 RX ORDER — MYCOPHENOLATE MOFETIL 250 MG/1
1000 CAPSULE ORAL 2 TIMES DAILY
Qty: 720 CAPSULE | Refills: 3 | Status: ON HOLD | OUTPATIENT
Start: 2023-02-15 | End: 2023-03-10 | Stop reason: HOSPADM

## 2023-02-15 RX ORDER — ACETAMINOPHEN 500 MG
1 TABLET ORAL DAILY
Qty: 90 TABLET | Refills: 3 | Status: SHIPPED | OUTPATIENT
Start: 2023-02-15 | End: 2023-02-15

## 2023-02-15 RX ORDER — PANTOPRAZOLE SODIUM 40 MG/1
40 TABLET, DELAYED RELEASE ORAL DAILY
Qty: 30 TABLET | Refills: 0 | Status: SHIPPED | OUTPATIENT
Start: 2023-02-15 | End: 2023-02-15

## 2023-02-15 RX ORDER — VALGANCICLOVIR 450 MG/1
450 TABLET, FILM COATED ORAL DAILY
Qty: 62 TABLET | Refills: 0 | Status: SHIPPED | OUTPATIENT
Start: 2023-02-15 | End: 2023-04-20 | Stop reason: ALTCHOICE

## 2023-02-15 RX ORDER — LANOLIN ALCOHOL/MO/W.PET/CERES
800 CREAM (GRAM) TOPICAL 2 TIMES DAILY
Qty: 360 TABLET | Refills: 3 | Status: SHIPPED | OUTPATIENT
Start: 2023-02-15 | End: 2024-02-15

## 2023-02-16 RX ORDER — DAPSONE 100 MG/1
100 TABLET ORAL DAILY
Qty: 30 TABLET | Refills: 5 | Status: SHIPPED | OUTPATIENT
Start: 2023-02-16 | End: 2023-02-20 | Stop reason: SDUPTHER

## 2023-02-20 ENCOUNTER — OFFICE VISIT (OUTPATIENT)
Dept: ELECTROPHYSIOLOGY | Facility: CLINIC | Age: 58
End: 2023-02-20
Payer: COMMERCIAL

## 2023-02-20 ENCOUNTER — HOSPITAL ENCOUNTER (OUTPATIENT)
Dept: CARDIOLOGY | Facility: CLINIC | Age: 58
Discharge: HOME | End: 2023-02-20
Payer: COMMERCIAL

## 2023-02-20 ENCOUNTER — HOSPITAL ENCOUNTER (OUTPATIENT)
Dept: RADIOLOGY | Facility: HOSPITAL | Age: 58
Discharge: HOME | End: 2023-02-20
Attending: SURGERY
Payer: COMMERCIAL

## 2023-02-20 VITALS
BODY MASS INDEX: 29.02 KG/M2 | DIASTOLIC BLOOD PRESSURE: 60 MMHG | HEART RATE: 72 BPM | HEIGHT: 65 IN | SYSTOLIC BLOOD PRESSURE: 100 MMHG | WEIGHT: 174.19 LBS

## 2023-02-20 DIAGNOSIS — Z94.4 S/P LIVER TRANSPLANT: ICD-10-CM

## 2023-02-20 DIAGNOSIS — I49.8 OTHER CARDIAC ARRHYTHMIA: ICD-10-CM

## 2023-02-20 DIAGNOSIS — I48.0 PAROXYSMAL A-FIB: Primary | ICD-10-CM

## 2023-02-20 DIAGNOSIS — Z94.4 LIVER TRANSPLANTED: ICD-10-CM

## 2023-02-20 DIAGNOSIS — Z29.89 PROPHYLACTIC IMMUNOTHERAPY: ICD-10-CM

## 2023-02-20 PROCEDURE — 93010 RHYTHM STRIP: ICD-10-PCS | Mod: S$GLB,,, | Performed by: INTERNAL MEDICINE

## 2023-02-20 PROCEDURE — 99204 PR OFFICE/OUTPT VISIT, NEW, LEVL IV, 45-59 MIN: ICD-10-PCS | Mod: S$GLB,,, | Performed by: INTERNAL MEDICINE

## 2023-02-20 PROCEDURE — 76705 ECHO EXAM OF ABDOMEN: CPT | Mod: 26,XS,, | Performed by: STUDENT IN AN ORGANIZED HEALTH CARE EDUCATION/TRAINING PROGRAM

## 2023-02-20 PROCEDURE — 99999 PR PBB SHADOW E&M-EST. PATIENT-LVL IV: CPT | Mod: PBBFAC,,, | Performed by: INTERNAL MEDICINE

## 2023-02-20 PROCEDURE — 76705 US DOPPLER LIVER TRANSPLANT POST (XPD): ICD-10-PCS | Mod: 26,XS,, | Performed by: STUDENT IN AN ORGANIZED HEALTH CARE EDUCATION/TRAINING PROGRAM

## 2023-02-20 PROCEDURE — 93010 ELECTROCARDIOGRAM REPORT: CPT | Mod: S$GLB,,, | Performed by: INTERNAL MEDICINE

## 2023-02-20 PROCEDURE — 93976 VASCULAR STUDY: CPT | Mod: TC

## 2023-02-20 PROCEDURE — 99204 OFFICE O/P NEW MOD 45 MIN: CPT | Mod: S$GLB,,, | Performed by: INTERNAL MEDICINE

## 2023-02-20 PROCEDURE — 93005 RHYTHM STRIP: ICD-10-PCS | Mod: S$GLB,,, | Performed by: INTERNAL MEDICINE

## 2023-02-20 PROCEDURE — 93976 US DOPPLER LIVER TRANSPLANT POST (XPD): ICD-10-PCS | Mod: 26,,, | Performed by: STUDENT IN AN ORGANIZED HEALTH CARE EDUCATION/TRAINING PROGRAM

## 2023-02-20 PROCEDURE — 99999 PR PBB SHADOW E&M-EST. PATIENT-LVL IV: ICD-10-PCS | Mod: PBBFAC,,, | Performed by: INTERNAL MEDICINE

## 2023-02-20 PROCEDURE — 93976 VASCULAR STUDY: CPT | Mod: 26,,, | Performed by: STUDENT IN AN ORGANIZED HEALTH CARE EDUCATION/TRAINING PROGRAM

## 2023-02-20 PROCEDURE — 93005 ELECTROCARDIOGRAM TRACING: CPT | Mod: S$GLB,,, | Performed by: INTERNAL MEDICINE

## 2023-02-20 RX ORDER — DAPSONE 100 MG/1
100 TABLET ORAL DAILY
Qty: 30 TABLET | Refills: 5 | Status: SHIPPED | OUTPATIENT
Start: 2023-02-20 | End: 2023-05-24 | Stop reason: SDUPTHER

## 2023-02-20 RX ORDER — TACROLIMUS 1 MG/1
2 CAPSULE ORAL EVERY 12 HOURS
Qty: 360 CAPSULE | Refills: 3 | Status: SHIPPED | OUTPATIENT
Start: 2023-03-01 | End: 2023-04-18 | Stop reason: DRUGHIGH

## 2023-02-20 NOTE — TELEPHONE ENCOUNTER
Patient made aware of tacro dose increase from 1/1 to 2/2. Patient requests her dapsone be sent to [facility] as she states it was mailed by her mail order pharmacy to her old address in [place].

## 2023-02-22 LAB — FUNGUS SPEC CULT: NORMAL

## 2023-02-23 ENCOUNTER — LAB VISIT (OUTPATIENT)
Dept: LAB | Facility: HOSPITAL | Age: 58
End: 2023-02-23
Attending: SURGERY
Payer: COMMERCIAL

## 2023-02-23 DIAGNOSIS — Z94.4 LIVER TRANSPLANTED: ICD-10-CM

## 2023-02-23 DIAGNOSIS — Z94.4 S/P LIVER TRANSPLANT: Primary | ICD-10-CM

## 2023-02-23 LAB
ALBUMIN SERPL BCP-MCNC: 3.5 G/DL (ref 3.5–5.2)
ALP SERPL-CCNC: 68 U/L (ref 55–135)
ALT SERPL W/O P-5'-P-CCNC: 24 U/L (ref 10–44)
ANION GAP SERPL CALC-SCNC: 9 MMOL/L (ref 8–16)
AST SERPL-CCNC: 17 U/L (ref 10–40)
BASOPHILS # BLD AUTO: 0.04 K/UL (ref 0–0.2)
BASOPHILS NFR BLD: 1.1 % (ref 0–1.9)
BILIRUB DIRECT SERPL-MCNC: 0.9 MG/DL (ref 0.1–0.3)
BILIRUB SERPL-MCNC: 1.4 MG/DL (ref 0.1–1)
BUN SERPL-MCNC: 18 MG/DL (ref 6–20)
CALCIUM SERPL-MCNC: 9.4 MG/DL (ref 8.7–10.5)
CHLORIDE SERPL-SCNC: 106 MMOL/L (ref 95–110)
CO2 SERPL-SCNC: 24 MMOL/L (ref 23–29)
CREAT SERPL-MCNC: 1 MG/DL (ref 0.5–1.4)
DIFFERENTIAL METHOD: ABNORMAL
EOSINOPHIL # BLD AUTO: 0.1 K/UL (ref 0–0.5)
EOSINOPHIL NFR BLD: 1.9 % (ref 0–8)
ERYTHROCYTE [DISTWIDTH] IN BLOOD BY AUTOMATED COUNT: 14 % (ref 11.5–14.5)
EST. GFR  (NO RACE VARIABLE): >60 ML/MIN/1.73 M^2
GLUCOSE SERPL-MCNC: 127 MG/DL (ref 70–110)
HCT VFR BLD AUTO: 33.5 % (ref 37–48.5)
HGB BLD-MCNC: 10.8 G/DL (ref 12–16)
IMM GRANULOCYTES # BLD AUTO: 0.01 K/UL (ref 0–0.04)
IMM GRANULOCYTES NFR BLD AUTO: 0.3 % (ref 0–0.5)
LYMPHOCYTES # BLD AUTO: 0.5 K/UL (ref 1–4.8)
LYMPHOCYTES NFR BLD: 14.5 % (ref 18–48)
MAGNESIUM SERPL-MCNC: 1.8 MG/DL (ref 1.6–2.6)
MCH RBC QN AUTO: 31.1 PG (ref 27–31)
MCHC RBC AUTO-ENTMCNC: 32.2 G/DL (ref 32–36)
MCV RBC AUTO: 97 FL (ref 82–98)
MONOCYTES # BLD AUTO: 0.3 K/UL (ref 0.3–1)
MONOCYTES NFR BLD: 8.2 % (ref 4–15)
NEUTROPHILS # BLD AUTO: 2.7 K/UL (ref 1.8–7.7)
NEUTROPHILS NFR BLD: 74 % (ref 38–73)
NRBC BLD-RTO: 0 /100 WBC
PLATELET # BLD AUTO: 118 K/UL (ref 150–450)
PMV BLD AUTO: 12.2 FL (ref 9.2–12.9)
POTASSIUM SERPL-SCNC: 4.9 MMOL/L (ref 3.5–5.1)
PROT SERPL-MCNC: 6.1 G/DL (ref 6–8.4)
RBC # BLD AUTO: 3.47 M/UL (ref 4–5.4)
SODIUM SERPL-SCNC: 139 MMOL/L (ref 136–145)
TACROLIMUS BLD-MCNC: 8.8 NG/ML (ref 5–15)
WBC # BLD AUTO: 3.66 K/UL (ref 3.9–12.7)

## 2023-02-23 PROCEDURE — 80197 ASSAY OF TACROLIMUS: CPT | Performed by: SURGERY

## 2023-02-23 PROCEDURE — 36415 COLL VENOUS BLD VENIPUNCTURE: CPT | Performed by: SURGERY

## 2023-02-23 PROCEDURE — 80053 COMPREHEN METABOLIC PANEL: CPT | Performed by: SURGERY

## 2023-02-23 PROCEDURE — 85025 COMPLETE CBC W/AUTO DIFF WBC: CPT | Performed by: SURGERY

## 2023-02-23 PROCEDURE — 83735 ASSAY OF MAGNESIUM: CPT | Performed by: SURGERY

## 2023-02-23 PROCEDURE — 82248 BILIRUBIN DIRECT: CPT | Performed by: SURGERY

## 2023-02-23 RX ORDER — OXYCODONE HYDROCHLORIDE 5 MG/1
5 TABLET ORAL EVERY 8 HOURS PRN
Qty: 28 TABLET | Refills: 0 | Status: SHIPPED | OUTPATIENT
Start: 2023-02-23 | End: 2023-05-24

## 2023-02-24 ENCOUNTER — PATIENT MESSAGE (OUTPATIENT)
Dept: TRANSPLANT | Facility: CLINIC | Age: 58
End: 2023-02-24
Payer: COMMERCIAL

## 2023-02-24 ENCOUNTER — TELEPHONE (OUTPATIENT)
Dept: TRANSPLANT | Facility: CLINIC | Age: 58
End: 2023-02-24
Payer: COMMERCIAL

## 2023-02-24 DIAGNOSIS — R73.9 HYPERGLYCEMIA: ICD-10-CM

## 2023-02-24 RX ORDER — BLOOD-GLUCOSE CONTROL, NORMAL
EACH MISCELLANEOUS
Qty: 100 EACH | Refills: 4 | Status: SHIPPED | OUTPATIENT
Start: 2023-02-24 | End: 2023-02-28

## 2023-02-24 NOTE — TELEPHONE ENCOUNTER
----- Message from Gita Swenson MA sent at 2/23/2023  5:51 PM CST -----    ----- Message -----  From: Danya Cason RN  Sent: 2/23/2023  11:41 AM CST  To: [facility] Staff    She is requesting lancet and test strip refill be sent to [facility] on [facility].    Also, can someone reach out to her to review her blood sugar logs?    Danya

## 2023-02-24 NOTE — TELEPHONE ENCOUNTER
----- Message from Gita Swenson MA sent at 2/23/2023  5:51 PM CST -----    ----- Message -----  From: Danya Cason RN  Sent: 2/23/2023  11:41 AM CST  To: [facility] Staff    She is requesting lancet and test strip refill be sent to [facility] on [facility].    Also, can someone reach out to her to review her blood sugar logs?    Danya

## 2023-02-24 NOTE — TELEPHONE ENCOUNTER
Spoke with patient she states she only needs refill for test strips and lancets, informed her request was sent to pharmacy

## 2023-02-24 NOTE — TELEPHONE ENCOUNTER
Pt notified via portal of stable labs and that no medication changes are needed. Repeat labs due 2/27/23 per protocol.     ----- Message from Shelly Felix MD sent at 2/23/2023  2:07 PM CST -----  Results ok. No action needed.

## 2023-02-27 ENCOUNTER — PATIENT MESSAGE (OUTPATIENT)
Dept: TRANSPLANT | Facility: CLINIC | Age: 58
End: 2023-02-27
Payer: COMMERCIAL

## 2023-02-27 ENCOUNTER — PATIENT MESSAGE (OUTPATIENT)
Dept: ENDOCRINOLOGY | Facility: HOSPITAL | Age: 58
End: 2023-02-27
Payer: COMMERCIAL

## 2023-02-27 ENCOUNTER — LAB VISIT (OUTPATIENT)
Dept: LAB | Facility: HOSPITAL | Age: 58
End: 2023-02-27
Attending: SURGERY
Payer: COMMERCIAL

## 2023-02-27 ENCOUNTER — TELEPHONE (OUTPATIENT)
Dept: TRANSPLANT | Facility: CLINIC | Age: 58
End: 2023-02-27
Payer: COMMERCIAL

## 2023-02-27 DIAGNOSIS — Z94.4 LIVER TRANSPLANTED: ICD-10-CM

## 2023-02-27 DIAGNOSIS — T38.0X5A STEROID-INDUCED HYPERGLYCEMIA: Primary | ICD-10-CM

## 2023-02-27 DIAGNOSIS — R73.9 STEROID-INDUCED HYPERGLYCEMIA: Primary | ICD-10-CM

## 2023-02-27 LAB
ALBUMIN SERPL BCP-MCNC: 3.6 G/DL (ref 3.5–5.2)
ALP SERPL-CCNC: 69 U/L (ref 55–135)
ALT SERPL W/O P-5'-P-CCNC: 26 U/L (ref 10–44)
ANION GAP SERPL CALC-SCNC: 8 MMOL/L (ref 8–16)
AST SERPL-CCNC: 19 U/L (ref 10–40)
BASOPHILS # BLD AUTO: 0.03 K/UL (ref 0–0.2)
BASOPHILS NFR BLD: 0.7 % (ref 0–1.9)
BILIRUB DIRECT SERPL-MCNC: 0.8 MG/DL (ref 0.1–0.3)
BILIRUB SERPL-MCNC: 1.3 MG/DL (ref 0.1–1)
BUN SERPL-MCNC: 17 MG/DL (ref 6–20)
CALCIUM SERPL-MCNC: 9.7 MG/DL (ref 8.7–10.5)
CHLORIDE SERPL-SCNC: 106 MMOL/L (ref 95–110)
CO2 SERPL-SCNC: 25 MMOL/L (ref 23–29)
CREAT SERPL-MCNC: 1 MG/DL (ref 0.5–1.4)
DIFFERENTIAL METHOD: ABNORMAL
EOSINOPHIL # BLD AUTO: 0.1 K/UL (ref 0–0.5)
EOSINOPHIL NFR BLD: 1.5 % (ref 0–8)
ERYTHROCYTE [DISTWIDTH] IN BLOOD BY AUTOMATED COUNT: 13.2 % (ref 11.5–14.5)
EST. GFR  (NO RACE VARIABLE): >60 ML/MIN/1.73 M^2
GLUCOSE SERPL-MCNC: 132 MG/DL (ref 70–110)
HCT VFR BLD AUTO: 37.3 % (ref 37–48.5)
HGB BLD-MCNC: 11.7 G/DL (ref 12–16)
IMM GRANULOCYTES # BLD AUTO: 0.01 K/UL (ref 0–0.04)
IMM GRANULOCYTES NFR BLD AUTO: 0.2 % (ref 0–0.5)
LYMPHOCYTES # BLD AUTO: 0.5 K/UL (ref 1–4.8)
LYMPHOCYTES NFR BLD: 13.4 % (ref 18–48)
MAGNESIUM SERPL-MCNC: 1.7 MG/DL (ref 1.6–2.6)
MCH RBC QN AUTO: 29.9 PG (ref 27–31)
MCHC RBC AUTO-ENTMCNC: 31.4 G/DL (ref 32–36)
MCV RBC AUTO: 95 FL (ref 82–98)
MONOCYTES # BLD AUTO: 0.3 K/UL (ref 0.3–1)
MONOCYTES NFR BLD: 7.7 % (ref 4–15)
NEUTROPHILS # BLD AUTO: 3.1 K/UL (ref 1.8–7.7)
NEUTROPHILS NFR BLD: 76.5 % (ref 38–73)
NRBC BLD-RTO: 0 /100 WBC
PLATELET # BLD AUTO: 111 K/UL (ref 150–450)
PMV BLD AUTO: 12 FL (ref 9.2–12.9)
POTASSIUM SERPL-SCNC: 5 MMOL/L (ref 3.5–5.1)
PROT SERPL-MCNC: 6.3 G/DL (ref 6–8.4)
RBC # BLD AUTO: 3.91 M/UL (ref 4–5.4)
SODIUM SERPL-SCNC: 139 MMOL/L (ref 136–145)
TACROLIMUS BLD-MCNC: 9.5 NG/ML (ref 5–15)
WBC # BLD AUTO: 4.04 K/UL (ref 3.9–12.7)

## 2023-02-27 PROCEDURE — 36415 COLL VENOUS BLD VENIPUNCTURE: CPT | Performed by: SURGERY

## 2023-02-27 PROCEDURE — 80197 ASSAY OF TACROLIMUS: CPT | Performed by: SURGERY

## 2023-02-27 PROCEDURE — 85025 COMPLETE CBC W/AUTO DIFF WBC: CPT | Performed by: SURGERY

## 2023-02-27 PROCEDURE — 80053 COMPREHEN METABOLIC PANEL: CPT | Performed by: SURGERY

## 2023-02-27 PROCEDURE — 82248 BILIRUBIN DIRECT: CPT | Performed by: SURGERY

## 2023-02-27 PROCEDURE — 83735 ASSAY OF MAGNESIUM: CPT | Performed by: SURGERY

## 2023-02-27 NOTE — TELEPHONE ENCOUNTER
Pt advised  ----- Message from Juan Pablo Kimbrough MD sent at 2/27/2023  2:00 PM CST -----  Results ok. No action needed

## 2023-02-27 NOTE — TELEPHONE ENCOUNTER
Pt advised  ----- Message from Juan Pablo Kimbrough MD sent at 2/27/2023  2:01 PM CST -----  Results ok. No action needed

## 2023-02-28 ENCOUNTER — OFFICE VISIT (OUTPATIENT)
Dept: TRANSPLANT | Facility: CLINIC | Age: 58
End: 2023-02-28
Payer: COMMERCIAL

## 2023-02-28 ENCOUNTER — PATIENT MESSAGE (OUTPATIENT)
Dept: ENDOCRINOLOGY | Facility: HOSPITAL | Age: 58
End: 2023-02-28
Payer: COMMERCIAL

## 2023-02-28 VITALS
OXYGEN SATURATION: 97 % | RESPIRATION RATE: 18 BRPM | HEART RATE: 81 BPM | DIASTOLIC BLOOD PRESSURE: 65 MMHG | TEMPERATURE: 97 F | BODY MASS INDEX: 28.91 KG/M2 | WEIGHT: 173.5 LBS | HEIGHT: 65 IN | SYSTOLIC BLOOD PRESSURE: 116 MMHG

## 2023-02-28 DIAGNOSIS — Z94.4 LIVER REPLACED BY TRANSPLANT: ICD-10-CM

## 2023-02-28 DIAGNOSIS — Z29.89 PROPHYLACTIC IMMUNOTHERAPY: Primary | ICD-10-CM

## 2023-02-28 DIAGNOSIS — Z79.899 ENCOUNTER FOR LONG-TERM (CURRENT) USE OF OTHER MEDICATIONS: ICD-10-CM

## 2023-02-28 DIAGNOSIS — Z51.81 ENCOUNTER FOR THERAPEUTIC DRUG MONITORING: ICD-10-CM

## 2023-02-28 DIAGNOSIS — Z79.60 LONG-TERM USE OF IMMUNOSUPPRESSANT MEDICATION: ICD-10-CM

## 2023-02-28 DIAGNOSIS — Z94.4 S/P LIVER TRANSPLANT: ICD-10-CM

## 2023-02-28 PROCEDURE — 99999 PR PBB SHADOW E&M-EST. PATIENT-LVL III: CPT | Mod: PBBFAC,,, | Performed by: TRANSPLANT SURGERY

## 2023-02-28 PROCEDURE — 99215 PR OFFICE/OUTPT VISIT, EST, LEVL V, 40-54 MIN: ICD-10-PCS | Mod: 24,S$GLB,, | Performed by: TRANSPLANT SURGERY

## 2023-02-28 PROCEDURE — 99215 OFFICE O/P EST HI 40 MIN: CPT | Mod: 24,S$GLB,, | Performed by: TRANSPLANT SURGERY

## 2023-02-28 PROCEDURE — 99999 PR PBB SHADOW E&M-EST. PATIENT-LVL III: ICD-10-PCS | Mod: PBBFAC,,, | Performed by: TRANSPLANT SURGERY

## 2023-02-28 RX ORDER — LANCETS
EACH MISCELLANEOUS
Qty: 100 EACH | Refills: 3 | Status: SHIPPED | OUTPATIENT
Start: 2023-02-28

## 2023-02-28 RX ORDER — INSULIN PUMP SYRINGE, 3 ML
EACH MISCELLANEOUS
Qty: 1 EACH | Refills: 0 | Status: SHIPPED | OUTPATIENT
Start: 2023-02-28 | End: 2024-02-28

## 2023-02-28 NOTE — TELEPHONE ENCOUNTER
Patient requested a different insurance preferred glucometer with diabetes testing supplies, due to difficulty using the current meter.       Jaison Laboy DNP, FNP-C  Department of Endocrinology  Inpatient Glycemic Management

## 2023-02-28 NOTE — Clinical Note
February 28, 2023                     [facility]  [address]  [place] [address]  Phone: [phone]   Patient: Mickey Harris   MR Number: [id number]   YOB: 1965   Date of Visit: 2/28/2023       Dear      Thank you for referring Mickey Harris to me for evaluation. Attached you will find relevant portions of my assessment and plan of care.    If you have questions, please do not hesitate to call me. I look forward to following Mickey Harris along with you.    Sincerely,    Shelly Felix MD    Enclosure    If you would like to receive this communication electronically, please contact [email or web address] or [phone] to request [email or web address] Link access.    [email or web address] Link is a tool which provides read-only access to select patient information with whom you have a relationship. Its easy to use and provides real time access to review your patients record including encounter summaries, notes, results, and demographic information.    If you feel you have received this communication in error or would no longer like to receive these types of communications, please e-mail [email or web address]

## 2023-02-28 NOTE — PROGRESS NOTES
Transplant Surgery  Liver Transplant Recipient Follow-up    Original Referring Physician: Reganreferral Self  Current Corresponding Physician:     Chief Complaint: Mickey is here for follow up of her liver transplant performed 1/17/2023 for the primary diagnosis (UNOS) of Cirrhosis: Fatty Liver (DORAN)    ORGAN: RIGHT LIVER LOBE (SEGS 5,6,7,8) WITHOUT MIDDLE HEPATIC VEIN  Whole or Partial: split liver  Donor Type: living  PHS Increased Risk:    Donor CMV Status:    Donor HCV Status:    Donor HBcAb:    Donor HBV PALOMA:   Donor HCV PALOMA:     Biliary Anastomosis: angela-en-y  Arterial Anatomy:    IVC reconstruction: hepatic vein confluence piggyback  Portal vein status: patent    Subjective:     History of Present Illness: She has had the following complications since transplant: bile leak.  The noted complications are well controlled.    Interval History: Currently, she is doing well.  Current complaints include nausea and neuropathy .  Mickey is here for management of her immunosuppression medication.    External provider notes reviewed: Yes    Review of Systems   Constitutional:  Negative for activity change, appetite change, chills, fatigue and fever.   HENT:  Negative for sore throat and trouble swallowing.    Eyes:  Negative for visual disturbance.   Respiratory:  Negative for cough, chest tightness and shortness of breath.    Cardiovascular:  Negative for chest pain, palpitations and leg swelling.   Gastrointestinal:  Negative for abdominal distention, abdominal pain, blood in stool, constipation, diarrhea, nausea and vomiting.   Endocrine: Negative for polyuria.   Genitourinary:  Negative for decreased urine volume, difficulty urinating, dysuria, flank pain, frequency and hematuria.   Musculoskeletal:  Negative for gait problem, myalgias and neck stiffness.   Skin:  Negative for rash and wound.   Neurological:  Negative for dizziness, tremors, seizures, weakness, light-headedness and headaches.   Hematological:   Negative for adenopathy.   Psychiatric/Behavioral:  Negative for agitation, confusion and sleep disturbance.    Objective:     Physical Exam  Vitals reviewed.   Constitutional:       General: She is not in acute distress.     Appearance: She is well-developed.   HENT:      Head: Normocephalic.   Eyes:      General: No scleral icterus.     Conjunctiva/sclera: Conjunctivae normal.      Pupils: Pupils are equal, round, and reactive to light.   Neck:      Thyroid: No thyromegaly.      Trachea: No tracheal deviation.   Cardiovascular:      Rate and Rhythm: Normal rate and regular rhythm.      Heart sounds: Normal heart sounds. No murmur heard.  Pulmonary:      Effort: Pulmonary effort is normal. No respiratory distress.      Breath sounds: Normal breath sounds.   Abdominal:      General: Bowel sounds are normal. There is no distension.      Palpations: Abdomen is soft. There is no mass.      Tenderness: There is no abdominal tenderness. There is no guarding or rebound.          Comments: No inguinal adenopathy   Musculoskeletal:         General: Normal range of motion.      Cervical back: Normal range of motion and neck supple.      Comments: No clubbing or cyanosis   Lymphadenopathy:      Cervical: No cervical adenopathy.   Skin:     General: Skin is warm and dry.      Findings: No erythema or rash.   Neurological:      Mental Status: She is alert and oriented to person, place, and time.   Psychiatric:         Behavior: Behavior normal.     Lab Results   Component Value Date    BILITOT 1.3 (H) 02/27/2023    AST 19 02/27/2023    ALT 26 02/27/2023    ALKPHOS 69 02/27/2023    CREATININE 1.0 02/27/2023    ALBUMIN 3.6 02/27/2023     Lab Results   Component Value Date    WBC 4.04 02/27/2023    HGB 11.7 (L) 02/27/2023    HCT 37.3 02/27/2023    HCT 34 (L) 01/17/2023     (L) 02/27/2023     Lab Results   Component Value Date    TACROLIMUS 9.5 02/27/2023       Diagnostics:  The following labs have been reviewed:  CBC  CMP  TACROLIMUS LEVEL  The following radiology images have been independently reviewed and interpreted: Liver US    Assessment/Plan:          S/P liver transplant.  Chronic immunosuppressive medications for rejection prophylaxis supratherapeutic.  Plan: decrease tacro to 1 mg bid due to neuropathy .  Continue monitoring symptoms, labs and drug levels for drug-related toxicity and side effects.  Incision: staples out, wound well-healed, no evidence of hernia  Femoral arterial line site: no complications evident  OK to use antacid for morning/night time nausea    Additional testing to be completed according to Written Order Guidelines for Post-Liver and Combined Liver/Kidney Transplant Follow-up (LI-09)    Interpretation of tests and discussion of patient management involves all members of the multidisciplinary transplant team  Patient advised that it is recommended that all transplanted patients, and their close contacts and household members receive Covid vaccination.  Shelly Felix MD       [facility] Patient Status  Functional Status: 60% - Requires occasional assistance but is able to care for needs  Physical Capacity: No Limitations

## 2023-03-06 ENCOUNTER — TELEPHONE (OUTPATIENT)
Dept: TRANSPLANT | Facility: CLINIC | Age: 58
End: 2023-03-06
Payer: COMMERCIAL

## 2023-03-06 NOTE — TELEPHONE ENCOUNTER
Pt advised. Clinic apt scheduled mathieu at 1400.  ----- Message from Juan Pablo Kimbrough MD sent at 3/6/2023  2:02 PM CST -----  Review fk in clinic

## 2023-03-07 ENCOUNTER — HOSPITAL ENCOUNTER (OUTPATIENT)
Dept: RADIOLOGY | Facility: HOSPITAL | Age: 58
Discharge: HOME | DRG: 392 | End: 2023-03-07
Attending: TRANSPLANT SURGERY
Payer: COMMERCIAL

## 2023-03-07 ENCOUNTER — OFFICE VISIT (OUTPATIENT)
Dept: TRANSPLANT | Facility: CLINIC | Age: 58
DRG: 392 | End: 2023-03-07
Payer: COMMERCIAL

## 2023-03-07 VITALS
HEIGHT: 65 IN | DIASTOLIC BLOOD PRESSURE: 72 MMHG | BODY MASS INDEX: 28.65 KG/M2 | HEART RATE: 83 BPM | SYSTOLIC BLOOD PRESSURE: 128 MMHG | TEMPERATURE: 97 F | RESPIRATION RATE: 16 BRPM | WEIGHT: 171.94 LBS | OXYGEN SATURATION: 96 %

## 2023-03-07 DIAGNOSIS — Z94.4 S/P LIVER TRANSPLANT: Primary | ICD-10-CM

## 2023-03-07 DIAGNOSIS — K59.00 CONSTIPATION, UNSPECIFIED CONSTIPATION TYPE: Primary | ICD-10-CM

## 2023-03-07 DIAGNOSIS — R10.9 ABDOMINAL PAIN, UNSPECIFIED ABDOMINAL LOCATION: ICD-10-CM

## 2023-03-07 DIAGNOSIS — Z94.4 S/P LIVER TRANSPLANT: ICD-10-CM

## 2023-03-07 DIAGNOSIS — Z94.4 LIVER REPLACED BY TRANSPLANT: ICD-10-CM

## 2023-03-07 DIAGNOSIS — Z51.81 ENCOUNTER FOR THERAPEUTIC DRUG MONITORING: ICD-10-CM

## 2023-03-07 DIAGNOSIS — Z79.899 ENCOUNTER FOR LONG-TERM (CURRENT) USE OF OTHER MEDICATIONS: ICD-10-CM

## 2023-03-07 PROCEDURE — 99215 OFFICE O/P EST HI 40 MIN: CPT | Mod: 24,S$GLB,, | Performed by: TRANSPLANT SURGERY

## 2023-03-07 PROCEDURE — 74150 CT ABDOMEN W/O CONTRAST: CPT | Mod: 26,,, | Performed by: STUDENT IN AN ORGANIZED HEALTH CARE EDUCATION/TRAINING PROGRAM

## 2023-03-07 PROCEDURE — 74150 CT ABDOMEN W/O CONTRAST: CPT | Mod: TC

## 2023-03-07 PROCEDURE — 99999 PR PBB SHADOW E&M-EST. PATIENT-LVL III: CPT | Mod: PBBFAC,,,

## 2023-03-07 PROCEDURE — 25500020 PHARM REV CODE 255: Performed by: TRANSPLANT SURGERY

## 2023-03-07 PROCEDURE — 74150 CT ABDOMEN WITHOUT CONTRAST: ICD-10-PCS | Mod: 26,,, | Performed by: STUDENT IN AN ORGANIZED HEALTH CARE EDUCATION/TRAINING PROGRAM

## 2023-03-07 PROCEDURE — 99999 PR PBB SHADOW E&M-EST. PATIENT-LVL III: ICD-10-PCS | Mod: PBBFAC,,,

## 2023-03-07 PROCEDURE — 99215 PR OFFICE/OUTPT VISIT, EST, LEVL V, 40-54 MIN: ICD-10-PCS | Mod: 24,S$GLB,, | Performed by: TRANSPLANT SURGERY

## 2023-03-07 RX ORDER — PREGABALIN 25 MG/1
25 CAPSULE ORAL 2 TIMES DAILY
Qty: 60 CAPSULE | Refills: 6 | Status: SHIPPED | OUTPATIENT
Start: 2023-03-07 | End: 2023-03-13

## 2023-03-07 RX ORDER — PREGABALIN 25 MG/1
25 CAPSULE ORAL 2 TIMES DAILY
Qty: 60 CAPSULE | Refills: 1 | Status: CANCELLED | OUTPATIENT
Start: 2023-03-07 | End: 2023-09-05

## 2023-03-07 RX ORDER — POLYETHYLENE GLYCOL 3350 17 G/17G
17 POWDER, FOR SOLUTION ORAL DAILY
Qty: 30 EACH | Refills: 1 | Status: SHIPPED | OUTPATIENT
Start: 2023-03-07 | End: 2023-05-24

## 2023-03-07 RX ADMIN — DIATRIZOATE MEGLUMINE AND DIATRIZOATE SODIUM 15 ML: 660; 100 LIQUID ORAL; RECTAL at 07:03

## 2023-03-07 NOTE — PROGRESS NOTES
Transplant Surgery  Liver Transplant Recipient Follow-up    Original Referring Physician: Rosario Self  Current Corresponding Physician:     Chief Complaint: Mickey is here for follow up of her liver transplant performed 1/17/2023 for the primary diagnosis (UNOS) of Cirrhosis: Fatty Liver (DORAN)    ORGAN: RIGHT LIVER LOBE (SEGS 5,6,7,8) WITHOUT MIDDLE HEPATIC VEIN  Whole or Partial: split liver  Donor Type: living  PHS Increased Risk: No   Donor CMV Status: No  Donor HCV Status: No  Donor HBcAb: No  Donor HBV PALOMA:   Donor HCV PALOMA:     Biliary Anastomosis: angela-en-y  Arterial Anatomy: Standard  IVC reconstruction: hepatic vein confluence piggyback  Portal vein status: patent    Subjective:     History of Present Illness: She has had the following complications since transplant: bile leak.  The noted complications are well controlled.    Interval History: Currently, she is doing with difficulty.  Current complaints include abdominal pain.  Mickey is here for management of her immunosuppression medication.    External provider notes reviewed: Yes    Review of Systems   Constitutional:  Negative for activity change and chills.   HENT:  Negative for congestion and sore throat.    Eyes:  Negative for discharge and redness.   Respiratory:  Negative for cough and shortness of breath.    Cardiovascular:  Negative for chest pain and palpitations.   Gastrointestinal:  Negative for nausea and vomiting.   Endocrine: Negative for polydipsia and polyuria.   Genitourinary:  Negative for dysuria and frequency.   Musculoskeletal:  Negative for arthralgias and gait problem.   Skin:  Negative for pallor and rash.   Neurological:  Negative for dizziness and light-headedness.   Hematological:  Negative for adenopathy. Does not bruise/bleed easily.   Psychiatric/Behavioral:  Negative for agitation and suicidal ideas.    Objective:     Physical Exam  Constitutional:       General: She is not in acute distress.     Appearance: She is  not diaphoretic.   Neck:      Vascular: No JVD.   Cardiovascular:      Rate and Rhythm: Normal rate and regular rhythm.   Pulmonary:      Effort: Pulmonary effort is normal. No respiratory distress.      Breath sounds: No wheezing.   Abdominal:      Palpations: Abdomen is soft.          Comments: Mild incisional tenderness   Skin:     General: Skin is warm and dry.   Neurological:      Mental Status: She is alert and oriented to person, place, and time.     Lab Results   Component Value Date    BILITOT 1.2 (H) 03/06/2023    AST 17 03/06/2023    ALT 22 03/06/2023    ALKPHOS 65 03/06/2023    CREATININE 0.9 03/06/2023    ALBUMIN 3.6 03/06/2023     Lab Results   Component Value Date    WBC 4.20 03/06/2023    HGB 11.6 (L) 03/06/2023    HCT 36.7 (L) 03/06/2023    HCT 34 (L) 01/17/2023     (L) 03/06/2023     Lab Results   Component Value Date    TACROLIMUS 3.9 (L) 03/06/2023       Diagnostics:  The following labs have been reviewed: CBC  CMP  TACROLIMUS LEVEL  The following radiology images have been independently reviewed and interpreted:     Assessment/Plan:          S/P liver transplant.  Chronic immunosuppressive medications for rejection prophylaxis subtherapeutic.  Plan:  increased yesterday .  Continue monitoring symptoms, labs and drug levels for drug-related toxicity and side effects.  Incision: staples out, wound well-healed, no evidence of hernia  Femoral arterial line site: no complications evident  Abdominal pain: Pattern consistent with constipation, initial bowel regimen with Miralax and psyllium. Unable to appreciate hernia on account of body habitus. Non-contrast CT scan to assess for hernia or partial bowel obstruction  Neuropathy: Retrial gabapentin, if hallucination again stop    Additional testing to be completed according to Written Order Guidelines for Post-Liver and Combined Liver/Kidney Transplant Follow-up (LI-09)    Interpretation of tests and discussion of patient management involves all  members of the multidisciplinary transplant team  Patient advised that it is recommended that all transplanted patients, and their close contacts and household members receive Covid vaccination.  Jordon Lamb MD       [facility] Patient Status  Functional Status: 70% - Cares for self: unable to carry on normal activity or active work  Physical Capacity: No Limitations

## 2023-03-08 ENCOUNTER — TELEPHONE (OUTPATIENT)
Dept: TRANSPLANT | Facility: CLINIC | Age: 58
End: 2023-03-08
Payer: COMMERCIAL

## 2023-03-08 ENCOUNTER — HOSPITAL ENCOUNTER (INPATIENT)
Facility: HOSPITAL | Age: 58
LOS: 2 days | Discharge: HOME | DRG: 392 | End: 2023-03-10
Attending: TRANSPLANT SURGERY | Admitting: TRANSPLANT SURGERY
Payer: COMMERCIAL

## 2023-03-08 ENCOUNTER — PATIENT MESSAGE (OUTPATIENT)
Dept: TRANSPLANT | Facility: CLINIC | Age: 58
End: 2023-03-08
Payer: COMMERCIAL

## 2023-03-08 DIAGNOSIS — R10.9 ABDOMINAL PAIN: ICD-10-CM

## 2023-03-08 DIAGNOSIS — Z91.89 AT RISK FOR OPPORTUNISTIC INFECTIONS: ICD-10-CM

## 2023-03-08 DIAGNOSIS — Z79.60 LONG-TERM USE OF IMMUNOSUPPRESSANT MEDICATION: ICD-10-CM

## 2023-03-08 DIAGNOSIS — Z94.4 S/P LIVER TRANSPLANT: ICD-10-CM

## 2023-03-08 DIAGNOSIS — R10.32 LEFT LOWER QUADRANT ABDOMINAL PAIN: Primary | ICD-10-CM

## 2023-03-08 DIAGNOSIS — I48.0 PAROXYSMAL A-FIB: ICD-10-CM

## 2023-03-08 DIAGNOSIS — Z29.89 PROPHYLACTIC IMMUNOTHERAPY: ICD-10-CM

## 2023-03-08 LAB
ABO + RH BLD: NORMAL
ALBUMIN SERPL BCP-MCNC: 3.8 G/DL (ref 3.5–5.2)
ALP SERPL-CCNC: 65 U/L (ref 55–135)
ALT SERPL W/O P-5'-P-CCNC: 26 U/L (ref 10–44)
ANION GAP SERPL CALC-SCNC: 8 MMOL/L (ref 8–16)
AST SERPL-CCNC: 19 U/L (ref 10–40)
BASOPHILS # BLD AUTO: 0.03 K/UL (ref 0–0.2)
BASOPHILS NFR BLD: 0.9 % (ref 0–1.9)
BILIRUB SERPL-MCNC: 1.2 MG/DL (ref 0.1–1)
BLD GP AB SCN CELLS X3 SERPL QL: NORMAL
BUN SERPL-MCNC: 10 MG/DL (ref 6–20)
CALCIUM SERPL-MCNC: 10 MG/DL (ref 8.7–10.5)
CHLORIDE SERPL-SCNC: 106 MMOL/L (ref 95–110)
CO2 SERPL-SCNC: 26 MMOL/L (ref 23–29)
CREAT SERPL-MCNC: 0.9 MG/DL (ref 0.5–1.4)
DIFFERENTIAL METHOD: ABNORMAL
EOSINOPHIL # BLD AUTO: 0.1 K/UL (ref 0–0.5)
EOSINOPHIL NFR BLD: 2.6 % (ref 0–8)
ERYTHROCYTE [DISTWIDTH] IN BLOOD BY AUTOMATED COUNT: 12.9 % (ref 11.5–14.5)
EST. GFR  (NO RACE VARIABLE): >60 ML/MIN/1.73 M^2
GLUCOSE SERPL-MCNC: 89 MG/DL (ref 70–110)
HCT VFR BLD AUTO: 39.8 % (ref 37–48.5)
HGB BLD-MCNC: 12.5 G/DL (ref 12–16)
IMM GRANULOCYTES # BLD AUTO: 0.02 K/UL (ref 0–0.04)
IMM GRANULOCYTES NFR BLD AUTO: 0.6 % (ref 0–0.5)
LYMPHOCYTES # BLD AUTO: 0.6 K/UL (ref 1–4.8)
LYMPHOCYTES NFR BLD: 16.9 % (ref 18–48)
MAGNESIUM SERPL-MCNC: 1.9 MG/DL (ref 1.6–2.6)
MCH RBC QN AUTO: 28.5 PG (ref 27–31)
MCHC RBC AUTO-ENTMCNC: 31.4 G/DL (ref 32–36)
MCV RBC AUTO: 91 FL (ref 82–98)
MONOCYTES # BLD AUTO: 0.3 K/UL (ref 0.3–1)
MONOCYTES NFR BLD: 7.6 % (ref 4–15)
NEUTROPHILS # BLD AUTO: 2.5 K/UL (ref 1.8–7.7)
NEUTROPHILS NFR BLD: 71.4 % (ref 38–73)
NRBC BLD-RTO: 0 /100 WBC
PLATELET # BLD AUTO: 133 K/UL (ref 150–450)
PMV BLD AUTO: 12.2 FL (ref 9.2–12.9)
POCT GLUCOSE: 103 MG/DL (ref 70–110)
POTASSIUM SERPL-SCNC: 4 MMOL/L (ref 3.5–5.1)
PROT SERPL-MCNC: 6.8 G/DL (ref 6–8.4)
RBC # BLD AUTO: 4.38 M/UL (ref 4–5.4)
SARS-COV-2 RDRP RESP QL NAA+PROBE: NEGATIVE
SODIUM SERPL-SCNC: 140 MMOL/L (ref 136–145)
WBC # BLD AUTO: 3.43 K/UL (ref 3.9–12.7)

## 2023-03-08 PROCEDURE — 99223 PR INITIAL HOSPITAL CARE,LEVL III: ICD-10-PCS | Mod: 24,AI,, | Performed by: PHYSICIAN ASSISTANT

## 2023-03-08 PROCEDURE — 99223 1ST HOSP IP/OBS HIGH 75: CPT | Mod: 24,AI,, | Performed by: PHYSICIAN ASSISTANT

## 2023-03-08 PROCEDURE — 25000003 PHARM REV CODE 250: Performed by: PHYSICIAN ASSISTANT

## 2023-03-08 PROCEDURE — 83735 ASSAY OF MAGNESIUM: CPT | Performed by: PHYSICIAN ASSISTANT

## 2023-03-08 PROCEDURE — 20600001 HC STEP DOWN PRIVATE ROOM

## 2023-03-08 PROCEDURE — 86900 BLOOD TYPING SEROLOGIC ABO: CPT | Performed by: PHYSICIAN ASSISTANT

## 2023-03-08 PROCEDURE — 80053 COMPREHEN METABOLIC PANEL: CPT | Performed by: PHYSICIAN ASSISTANT

## 2023-03-08 PROCEDURE — 85025 COMPLETE CBC W/AUTO DIFF WBC: CPT | Performed by: PHYSICIAN ASSISTANT

## 2023-03-08 PROCEDURE — U0002 COVID-19 LAB TEST NON-CDC: HCPCS | Performed by: PHYSICIAN ASSISTANT

## 2023-03-08 PROCEDURE — 63600175 PHARM REV CODE 636 W HCPCS: Performed by: PHYSICIAN ASSISTANT

## 2023-03-08 RX ORDER — SODIUM CHLORIDE 0.9 % (FLUSH) 0.9 %
10 SYRINGE (ML) INJECTION
Status: DISCONTINUED | OUTPATIENT
Start: 2023-03-08 | End: 2023-03-10 | Stop reason: HOSPADM

## 2023-03-08 RX ORDER — LANOLIN ALCOHOL/MO/W.PET/CERES
800 CREAM (GRAM) TOPICAL 2 TIMES DAILY
Status: DISCONTINUED | OUTPATIENT
Start: 2023-03-08 | End: 2023-03-10 | Stop reason: HOSPADM

## 2023-03-08 RX ORDER — ASPIRIN 81 MG/1
81 TABLET ORAL DAILY
Status: DISCONTINUED | OUTPATIENT
Start: 2023-03-09 | End: 2023-03-10 | Stop reason: HOSPADM

## 2023-03-08 RX ORDER — IBUPROFEN 200 MG
16 TABLET ORAL
Status: DISCONTINUED | OUTPATIENT
Start: 2023-03-08 | End: 2023-03-08

## 2023-03-08 RX ORDER — TALC
6 POWDER (GRAM) TOPICAL NIGHTLY PRN
Status: DISCONTINUED | OUTPATIENT
Start: 2023-03-08 | End: 2023-03-10 | Stop reason: HOSPADM

## 2023-03-08 RX ORDER — PREGABALIN 25 MG/1
25 CAPSULE ORAL 2 TIMES DAILY
Status: DISCONTINUED | OUTPATIENT
Start: 2023-03-08 | End: 2023-03-10 | Stop reason: HOSPADM

## 2023-03-08 RX ORDER — SODIUM CHLORIDE 9 MG/ML
INJECTION, SOLUTION INTRAVENOUS CONTINUOUS
Status: DISCONTINUED | OUTPATIENT
Start: 2023-03-08 | End: 2023-03-10

## 2023-03-08 RX ORDER — ONDANSETRON 8 MG/1
8 TABLET, ORALLY DISINTEGRATING ORAL EVERY 8 HOURS PRN
Status: DISCONTINUED | OUTPATIENT
Start: 2023-03-08 | End: 2023-03-10 | Stop reason: HOSPADM

## 2023-03-08 RX ORDER — TACROLIMUS 1 MG/1
2 CAPSULE ORAL 2 TIMES DAILY
Status: DISCONTINUED | OUTPATIENT
Start: 2023-03-08 | End: 2023-03-08

## 2023-03-08 RX ORDER — DEXTROSE 40 %
30 GEL (GRAM) ORAL
Status: DISCONTINUED | OUTPATIENT
Start: 2023-03-08 | End: 2023-03-10 | Stop reason: HOSPADM

## 2023-03-08 RX ORDER — GLUCAGON 1 MG
1 KIT INJECTION
Status: DISCONTINUED | OUTPATIENT
Start: 2023-03-08 | End: 2023-03-10 | Stop reason: HOSPADM

## 2023-03-08 RX ORDER — INSULIN ASPART 100 [IU]/ML
0-5 INJECTION, SOLUTION INTRAVENOUS; SUBCUTANEOUS
Status: DISCONTINUED | OUTPATIENT
Start: 2023-03-08 | End: 2023-03-10 | Stop reason: HOSPADM

## 2023-03-08 RX ORDER — IBUPROFEN 200 MG
24 TABLET ORAL
Status: DISCONTINUED | OUTPATIENT
Start: 2023-03-08 | End: 2023-03-08

## 2023-03-08 RX ORDER — DAPSONE 100 MG/1
100 TABLET ORAL DAILY
Status: DISCONTINUED | OUTPATIENT
Start: 2023-03-09 | End: 2023-03-10 | Stop reason: HOSPADM

## 2023-03-08 RX ORDER — OXYCODONE HYDROCHLORIDE 5 MG/1
5 TABLET ORAL EVERY 8 HOURS PRN
Status: DISCONTINUED | OUTPATIENT
Start: 2023-03-08 | End: 2023-03-10 | Stop reason: HOSPADM

## 2023-03-08 RX ORDER — DEXTROSE 40 %
15 GEL (GRAM) ORAL
Status: DISCONTINUED | OUTPATIENT
Start: 2023-03-08 | End: 2023-03-10 | Stop reason: HOSPADM

## 2023-03-08 RX ORDER — MYCOPHENOLATE MOFETIL 250 MG/1
1000 CAPSULE ORAL 2 TIMES DAILY
Status: DISCONTINUED | OUTPATIENT
Start: 2023-03-08 | End: 2023-03-10

## 2023-03-08 RX ORDER — TACROLIMUS 1 MG/1
1 CAPSULE ORAL 2 TIMES DAILY
Status: DISCONTINUED | OUTPATIENT
Start: 2023-03-08 | End: 2023-03-09

## 2023-03-08 RX ORDER — SIMETHICONE 80 MG
1 TABLET,CHEWABLE ORAL 3 TIMES DAILY PRN
Status: DISCONTINUED | OUTPATIENT
Start: 2023-03-08 | End: 2023-03-10 | Stop reason: HOSPADM

## 2023-03-08 RX ORDER — ACETAMINOPHEN 325 MG/1
650 TABLET ORAL EVERY 8 HOURS PRN
Status: DISCONTINUED | OUTPATIENT
Start: 2023-03-08 | End: 2023-03-10 | Stop reason: HOSPADM

## 2023-03-08 RX ORDER — VALGANCICLOVIR 450 MG/1
450 TABLET, FILM COATED ORAL DAILY
Status: DISCONTINUED | OUTPATIENT
Start: 2023-03-09 | End: 2023-03-10 | Stop reason: HOSPADM

## 2023-03-08 RX ORDER — URSODIOL 250 MG/1
250 TABLET, FILM COATED ORAL 3 TIMES DAILY
Status: DISCONTINUED | OUTPATIENT
Start: 2023-03-08 | End: 2023-03-10 | Stop reason: HOSPADM

## 2023-03-08 RX ADMIN — CEFEPIME 1 G: 1 INJECTION, POWDER, FOR SOLUTION INTRAMUSCULAR; INTRAVENOUS at 06:03

## 2023-03-08 RX ADMIN — Medication 800 MG: at 09:03

## 2023-03-08 RX ADMIN — SODIUM CHLORIDE: 9 INJECTION, SOLUTION INTRAVENOUS at 06:03

## 2023-03-08 RX ADMIN — ACETAMINOPHEN 650 MG: 325 TABLET ORAL at 09:03

## 2023-03-08 RX ADMIN — TACROLIMUS 1 MG: 1 CAPSULE ORAL at 06:03

## 2023-03-08 RX ADMIN — MYCOPHENOLATE MOFETIL 1000 MG: 250 CAPSULE ORAL at 09:03

## 2023-03-08 RX ADMIN — PREGABALIN 25 MG: 25 CAPSULE ORAL at 09:03

## 2023-03-08 RX ADMIN — URSODIOL 250 MG: 250 TABLET ORAL at 09:03

## 2023-03-08 NOTE — ASSESSMENT & PLAN NOTE
- s/p living related liver txp for DORAN  - post-op with bile leak s/p MI take back x 2  - LFTs stable  - a/w abdominal pains with suspicion for acute diverticulitis

## 2023-03-08 NOTE — H&P
[facility] Transplant Stepdown  Liver Transplant  History & Physical    Patient Name: Mickey Harris  MRN: [id number]  Admission Date: 3/8/2023  Code Status: Full Code  Primary Care Provider: Primary Doctor No  Post-Operative Day: 50     ORGAN:   RIGHT LIVER LOBE (SEGS 5,6,7,8) WITHOUT MIDDLE HEPATIC VEIN  Disease Etiology: Cirrhosis: Fatty Liver (DORAN)  Donor Type:   Living  CDC High Risk:     Donor CMV Status:   Donor CMV Status:   Donor HBcAB:     Donor HCV Status:     Donor HBV PALOMA:   Donor HCV PALOMA:   Whole or Partial: Split Liver  Biliary Anastomosis: Becky-en-Y  Arterial Anatomy:     Subjective:     History of Present Illness:  Mickey Harris is a 58 yr female 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 Surgery went without complications. Post-op course notable for recurrent bile leak s/p takeback to OR x 2 (POD 6 and 7); Cultures +staph epi s/p abx course with Usasyn. Also with new onset a-fib with RVR- treatment with ASA and diltiazem (Cards has since weaned pt off Diltiazem ~3/6). She was seen in txp on clinic 3/7 with complaint of generalized abdominal pains surrounding her surgical incision and LLQ pains. Denies vomiting. Reports intermittent nausea that is not new. Stool soft, but denies watery diarrhea. Noncon CT a/p obtained, suspicious for acute diverticulitis; need for contrasted imaging to further assess. Plan to admit to LTS for further work-up. Plan for IVF, abx, and obtain CT a/p with PO and IV contrast. Discussed plan with Dr. Felix.          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    Liver cirrhosis secondary to DORAN     Mild tricuspid regurgitation     Unspecified cirrhosis of liver        Past Surgical History:   Procedure Laterality Date    cholecystectomy  2003    CHOLEDOCHOJEJUNOSTOMY  1/24/2023    Procedure:  CHOLEDOCHOJEJUNOSTOMY;  Surgeon: Mac Ayala MD;  [facility];  Service: Transplant;;    COLONOSCOPY      6 polyps removed    COLONOSCOPY  07/07/2021    DIAGNOSTIC ULTRASOUND N/A 1/17/2023    Procedure: ULTRASOUND, DIAGNOSTIC;  Surgeon: Juan Pablo Kimbrough MD;  [facility];  Service: Transplant;  Laterality: N/A;  INTRAOPERATIVE ULTRASOUND    ESOPHAGOGASTRODUODENOSCOPY      2 coulums of grade 1 varices    ESOPHAGOGASTRODUODENOSCOPY  11/01/2019    trace varices    ESOPHAGOGASTRODUODENOSCOPY  07/13/2021    ESOPHAGOGASTRODUODENOSCOPY N/A 9/20/2022    Procedure: EGD (ESOPHAGOGASTRODUODENOSCOPY);  Surgeon: Bruce Dominguez MD;  Location: [facility] (4TH FLR);  Service: Endoscopy;  Laterality: N/A;  labs prior  liver transplant candidate  screen for varices  8/18 fully vaccinated; instructions to portal-LW  8/19 pt rescheduled; updated instructions to portal-st    gynecologic surgery       removal of scar tissues and adhesions    HYSTERECTOMY  1989    knee surgery  1992    LAPAROTOMY, EXPLORATORY N/A 1/23/2023    Procedure: LAPAROTOMY, EXPLORATORY;  Surgeon: Jordon Lamb MD;  [address]  Service: Transplant;  Laterality: N/A;    LAPAROTOMY, EXPLORATORY N/A 1/24/2023    Procedure: LAPAROTOMY, EXPLORATORY;  Surgeon: Mac Ayala MD;  Location[address];  Service: Transplant;  Laterality: N/A;    LIVER TRANSPLANT N/A 1/17/2023    Procedure: TRANSPLANT, LIVER;  Surgeon: Juan Pablo Kimbrough MD;  [address];  Service: Transplant;  Laterality: N/A;    OOPHORECTOMY Right 1982    OPEN hysterectomy  1989    removed uterus and bilateral fallopian tubes and LEFT ovary stayed in place    ROTATOR CUFF REPAIR Right 2001    HOANG-EN-Y PROCEDURE  1/17/2023    Procedure: CREATION, ANASTOMOSIS, HOANG-EN-Y;  Surgeon: Juan Pablo Kimbrough MD;  [address]  Service: Transplant;;    TONSILLECTOMY  1972       Review of patient's allergies  indicates:   Allergen Reactions    Levofloxacin Hives and Shortness Of Breath    Sulfa (sulfonamide antibiotics) Rash       Family History       Problem Relation (Age of Onset)    Arrhythmia Brother    Depression Father    Heart disease Father, Brother    Hyperlipidemia Father    Hypertension Father          Tobacco Use    Smoking status: Former     Packs/day: 1.00     Types: Cigarettes     Quit date:      Years since quittin.2    Smokeless tobacco: Never   Substance and Sexual Activity    Alcohol use: Not Currently    Drug use: Not Currently    Sexual activity: Yes     Partners: Male       PTA Medications   Medication Sig    aspirin (ECOTRIN) 81 MG EC tablet Take 1 tablet (81 mg total) by mouth once daily.    blood sugar diagnostic Strp To check BG 4 times daily and as needed, to use with insurance preferred meter    blood-glucose meter kit To check BG 4 times daily and as needed , to use with insurance preferred meter    calcium carbonate-vitamin D3 600 mg-20 mcg (800 unit) Tab Take 1 tablet by mouth once daily.    dapsone 100 MG Tab Take 1 tablet (100 mg total) by mouth once daily. STOP 23    diltiaZEM (CARDIZEM CD) 180 MG 24 hr capsule Take 1 capsule (180 mg total) by mouth once daily.    lancets Misc To check BG 4 times daily and as needed, to use with insurance preferred meter    magnesium oxide (MAG-OX) 400 mg (241.3 mg magnesium) tablet Take 2 tablets (800 mg total) by mouth 2 (two) times daily.    mycophenolate (CELLCEPT) 250 mg Cap Take 4 capsules (1,000 mg total) by mouth 2 (two) times daily.    oxyCODONE (ROXICODONE) 5 MG immediate release tablet Take 1 tablet (5 mg total) by mouth every 8 (eight) hours as needed for Pain.    polyethylene glycol (MIRALAX) 17 gram PwPk Take 17 g (1 packet) and dissolve in full glass of water to take by mouth once daily.    pregabalin (LYRICA) 25 MG capsule Take 1 capsule (25 mg total) by mouth 2 (two) times daily.    SITagliptin phosphate  (JANUVIA) 100 MG Tab Take 1 tablet (100 mg total) by mouth once daily.    tacrolimus (PROGRAF) 1 MG Cap Take 2 capsules (2 mg total) by mouth every 12 (twelve) hours.    ursodioL (ACTIGALL) 250 mg Tab Take 1 tablet (250 mg total) by mouth 3 (three) times daily.    valGANciclovir (VALCYTE) 450 mg Tab Take 1 tablet (450 mg total) by mouth once daily. Stop 4/18/2023       Review of Systems   Constitutional:  Positive for fatigue. Negative for chills and fever.   Respiratory:  Negative for cough and shortness of breath.    Cardiovascular:  Negative for chest pain and leg swelling.   Gastrointestinal:  Positive for abdominal pain (LLQ) and nausea. Negative for abdominal distention, constipation, diarrhea and vomiting.   Genitourinary:  Negative for decreased urine volume, difficulty urinating and dysuria.   Allergic/Immunologic: Positive for immunocompromised state.   Neurological:  Negative for weakness.   Psychiatric/Behavioral:  Negative for behavioral problems and confusion.    Objective:     Vital Signs (Most Recent):  Temp: 98 °F (36.7 °C) (03/08/23 1645)  Pulse: 108 (03/08/23 1645)  Resp: 17 (03/08/23 1645)  BP: 106/60 (03/08/23 1645)  SpO2: 98 % (03/08/23 1645) Vital Signs (24h Range):  Temp:  [98 °F (36.7 °C)] 98 °F (36.7 °C)  Pulse:  [108] 108  Resp:  [17] 17  SpO2:  [98 %] 98 %  BP: (106)/(60) 106/60        There is no height or weight on file to calculate BMI.    No intake or output data in the 24 hours ending 03/08/23 1721    Physical Exam  Vitals and nursing note reviewed.   Constitutional:       Appearance: She is well-developed.   Eyes:      Pupils: Pupils are equal, round, and reactive to light.   Cardiovascular:      Rate and Rhythm: Normal rate and regular rhythm.      Heart sounds: No murmur heard.  Pulmonary:      Effort: Pulmonary effort is normal. No respiratory distress.      Breath sounds: Normal breath sounds. No wheezing.   Abdominal:      General: Bowel sounds are normal. There is no  "distension.      Palpations: Abdomen is soft.      Tenderness: There is abdominal tenderness (LLQ). There is no guarding.      Comments: Chevron incision healing   Musculoskeletal:         General: Normal range of motion.      Cervical back: Normal range of motion.   Skin:     General: Skin is warm and dry.   Neurological:      Mental Status: She is alert and oriented to person, place, and time.   Psychiatric:         Mood and Affect: Mood normal.         Behavior: Behavior normal.         Thought Content: Thought content normal.         Judgment: Judgment normal.       Laboratory:  CBC:   Recent Labs   Lab 03/06/23  0954   WBC 4.20   RBC 4.01   HGB 11.6*   HCT 36.7*   *   MCV 92   MCH 28.9   MCHC 31.6*     CMP:   Recent Labs   Lab 03/06/23  0954      CALCIUM 9.4   ALBUMIN 3.6   PROT 6.3      K 4.5   CO2 23      BUN 14   CREATININE 0.9   ALKPHOS 65   ALT 22   AST 17   BILITOT 1.2*     Labs within the past 24 hours have been reviewed.    Diagnostic Results:  I have personally reviewed all pertinent imaging studies.    Assessment/Plan:     * Abdominal pain  - generalized abd pains with suspicion for acute diverticulitis noted on non-CT scan.  - plan for obtain contrasted CT a/p with PO and IV  - IVF for hydration  - started on Cefepime       Paroxysmal A-fib  - new onset a-fib post op. Per cards recs, pt on ASA and diltiazem  - last seen by EP on 2/20 with plan to down-titrate cardizem to off over the next couple weeks. Supposed stop date ~3/6. However, pt reports taking 1/2 pill for an additional few days to assist with weaning. Will not restart at this time  - cont tele     Prophylactic immunotherapy  - Continue Cellcept and Prograf. Will monitor for signs of toxic side effects, check daily tacrolimus troughs, and change meds accordingly.      Long-term use of immunosuppressant medication  - see "prophylactic immuno"      At risk for opportunistic infections  - Continue Valcyte for CMV " prophylaxis  - Continue Dapsone for PCP prophylaxis       S/P liver transplant  - s/p living related liver txp for DORAN  - post-op with bile leak s/p DE take back x 2  - LFTs stable  - a/w abdominal pains with suspicion for acute diverticulitis             Discharge Planning: not current candidate   PharmD Review:  Please add vitD level to next lab, increase tacrolimus from 2+2 to 3+3 for level on 3/6 [XH 03/07/23]      Alicia Chiu PA-C  Liver Transplant  [facility] - Transplant Stepdown

## 2023-03-08 NOTE — ASSESSMENT & PLAN NOTE
- generalized abd pains with suspicion for acute diverticulitis noted on non-CT scan.  - plan for obtain contrasted CT a/p with PO and IV  - IVF for hydration  - started on Cefepime

## 2023-03-08 NOTE — ASSESSMENT & PLAN NOTE
- new onset a-fib post op. Per cards recs, pt on ASA and diltiazem  - last seen by EP on 2/20 with plan to down-titrate cardizem to off over the next couple weeks. Supposed stop date ~3/6. However, pt reports taking 1/2 pill for an additional few days to assist with weaning. Will not restart at this time  - cont tele

## 2023-03-08 NOTE — NURSING
Pt admitted to [facility] [id number]. VSS. ABDIRAHMAN Chiu PA-C, notified. Orders released. Pt oriented to room & staff. Pt in chair, chair wheels locked, call light within pt reach.   not examined

## 2023-03-08 NOTE — SUBJECTIVE & OBJECTIVE
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;  Service: Transplant;;    COLONOSCOPY      6 polyps removed    COLONOSCOPY  07/07/2021    DIAGNOSTIC ULTRASOUND N/A 1/17/2023    Procedure: ULTRASOUND, DIAGNOSTIC;  Surgeon: Juan Pablo Kimbrough MD;  Location: [facility] OR [address] [place] [address];  Service: Transplant;  Laterality: N/A;  INTRAOPERATIVE ULTRASOUND    ESOPHAGOGASTRODUODENOSCOPY      2 coulums of grade 1 varices    ESOPHAGOGASTRODUODENOSCOPY  11/01/2019    trace varices    ESOPHAGOGASTRODUODENOSCOPY  07/13/2021    ESOPHAGOGASTRODUODENOSCOPY N/A 9/20/2022    Procedure: EGD (ESOPHAGOGASTRODUODENOSCOPY);  Surgeon: Bruce Dominguez MD;  Location: [facility] (4TH FLR);  Service: Endoscopy;  Laterality: N/A;  labs prior  liver transplant candidate  screen for varices  8/18 fully vaccinated; instructions to portal-LW  8/19 pt rescheduled; updated instructions to portal-st    gynecologic surgery       removal of scar tissues and adhesions    HYSTERECTOMY  1989    knee surgery  1992    LAPAROTOMY, EXPLORATORY N/A 1/23/2023    Procedure: LAPAROTOMY, EXPLORATORY;  Surgeon: Jordon Lamb MD;  [address];  Service: Transplant;  Laterality: N/A;    LAPAROTOMY, EXPLORATORY N/A 1/24/2023    Procedure: LAPAROTOMY, EXPLORATORY;  Surgeon: Mac Ayala MD;  Location: [facility] OR [address] [place] [address];  Service: Transplant;  Laterality: N/A;    LIVER TRANSPLANT N/A 1/17/2023    Procedure: TRANSPLANT, LIVER;  Surgeon: Juan Pablo Kimbrough MD;  Location: [facility] OR [address] [place] [address];  Service: Transplant;   Laterality: N/A;    OOPHORECTOMY Right     OPEN hysterectomy      removed uterus and bilateral fallopian tubes and LEFT ovary stayed in place    ROTATOR CUFF REPAIR Right     HOANG-EN-Y PROCEDURE  2023    Procedure: CREATION, ANASTOMOSIS, HOANG-EN-Y;  Surgeon: Juan Pablo Kimbrough MD;  Location: [facility] OR [address] [place] [address];  Service: Transplant;;    TONSILLECTOMY         Review of patient's allergies indicates:   Allergen Reactions    Levofloxacin Hives and Shortness Of Breath    Sulfa (sulfonamide antibiotics) Rash       Family History       Problem Relation (Age of Onset)    Arrhythmia Brother    Depression Father    Heart disease Father, Brother    Hyperlipidemia Father    Hypertension Father          Tobacco Use    Smoking status: Former     Packs/day: 1.00     Types: Cigarettes     Quit date:      Years since quittin.2    Smokeless tobacco: Never   Substance and Sexual Activity    Alcohol use: Not Currently    Drug use: Not Currently    Sexual activity: Yes     Partners: Male       PTA Medications   Medication Sig    aspirin (ECOTRIN) 81 MG EC tablet Take 1 tablet (81 mg total) by mouth once daily.    blood sugar diagnostic Strp To check BG 4 times daily and as needed, to use with insurance preferred meter    blood-glucose meter kit To check BG 4 times daily and as needed , to use with insurance preferred meter    calcium carbonate-vitamin D3 600 mg-20 mcg (800 unit) Tab Take 1 tablet by mouth once daily.    dapsone 100 MG Tab Take 1 tablet (100 mg total) by mouth once daily. STOP 23    diltiaZEM (CARDIZEM CD) 180 MG 24 hr capsule Take 1 capsule (180 mg total) by mouth once daily.    lancets Misc To check BG 4 times daily and as needed, to use with insurance preferred meter    magnesium oxide (MAG-OX) 400 mg (241.3 mg magnesium) tablet Take 2 tablets (800 mg total) by mouth 2 (two) times daily.    mycophenolate (CELLCEPT) 250 mg Cap Take 4 capsules (1,000 mg total) by mouth 2 (two) times daily.     oxyCODONE (ROXICODONE) 5 MG immediate release tablet Take 1 tablet (5 mg total) by mouth every 8 (eight) hours as needed for Pain.    polyethylene glycol (MIRALAX) 17 gram PwPk Take 17 g (1 packet) and dissolve in full glass of water to take by mouth once daily.    pregabalin (LYRICA) 25 MG capsule Take 1 capsule (25 mg total) by mouth 2 (two) times daily.    SITagliptin phosphate (JANUVIA) 100 MG Tab Take 1 tablet (100 mg total) by mouth once daily.    tacrolimus (PROGRAF) 1 MG Cap Take 2 capsules (2 mg total) by mouth every 12 (twelve) hours.    ursodioL (ACTIGALL) 250 mg Tab Take 1 tablet (250 mg total) by mouth 3 (three) times daily.    valGANciclovir (VALCYTE) 450 mg Tab Take 1 tablet (450 mg total) by mouth once daily. Stop 4/18/2023       Review of Systems   Constitutional:  Positive for fatigue. Negative for chills and fever.   Respiratory:  Negative for cough and shortness of breath.    Cardiovascular:  Negative for chest pain and leg swelling.   Gastrointestinal:  Positive for abdominal pain (LLQ) and nausea. Negative for abdominal distention, constipation, diarrhea and vomiting.   Genitourinary:  Negative for decreased urine volume, difficulty urinating and dysuria.   Allergic/Immunologic: Positive for immunocompromised state.   Neurological:  Negative for weakness.   Psychiatric/Behavioral:  Negative for behavioral problems and confusion.    Objective:     Vital Signs (Most Recent):  Temp: 98 °F (36.7 °C) (03/08/23 1645)  Pulse: 108 (03/08/23 1645)  Resp: 17 (03/08/23 1645)  BP: 106/60 (03/08/23 1645)  SpO2: 98 % (03/08/23 1645) Vital Signs (24h Range):  Temp:  [98 °F (36.7 °C)] 98 °F (36.7 °C)  Pulse:  [108] 108  Resp:  [17] 17  SpO2:  [98 %] 98 %  BP: (106)/(60) 106/60        There is no height or weight on file to calculate BMI.    No intake or output data in the 24 hours ending 03/08/23 1721    Physical Exam  Vitals and nursing note reviewed.   Constitutional:       Appearance: She is well-developed.    Eyes:      Pupils: Pupils are equal, round, and reactive to light.   Cardiovascular:      Rate and Rhythm: Normal rate and regular rhythm.      Heart sounds: No murmur heard.  Pulmonary:      Effort: Pulmonary effort is normal. No respiratory distress.      Breath sounds: Normal breath sounds. No wheezing.   Abdominal:      General: Bowel sounds are normal. There is no distension.      Palpations: Abdomen is soft.      Tenderness: There is abdominal tenderness (LLQ). There is no guarding.      Comments: Chevron incision healing   Musculoskeletal:         General: Normal range of motion.      Cervical back: Normal range of motion.   Skin:     General: Skin is warm and dry.   Neurological:      Mental Status: She is alert and oriented to person, place, and time.   Psychiatric:         Mood and Affect: Mood normal.         Behavior: Behavior normal.         Thought Content: Thought content normal.         Judgment: Judgment normal.       Laboratory:  CBC:   Recent Labs   Lab 03/06/23  0954   WBC 4.20   RBC 4.01   HGB 11.6*   HCT 36.7*   *   MCV 92   MCH 28.9   MCHC 31.6*     CMP:   Recent Labs   Lab 03/06/23  0954      CALCIUM 9.4   ALBUMIN 3.6   PROT 6.3      K 4.5   CO2 23      BUN 14   CREATININE 0.9   ALKPHOS 65   ALT 22   AST 17   BILITOT 1.2*     Labs within the past 24 hours have been reviewed.    Diagnostic Results:  I have personally reviewed all pertinent imaging studies.

## 2023-03-08 NOTE — HPI
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 Surgery went without complications. Post-op course notable for recurrent bile leak s/p takeback to OR x 2 (POD 6 and 7); Cultures +staph epi s/p abx course with Usasyn. Also with new onset a-fib with RVR- treatment with ASA and diltiazem (Cards has since weaned pt off Diltiazem ~3/6). She was seen in txp on clinic 3/7 with complaint of generalized abdominal pains surrounding her surgical incision and LLQ pains. Denies vomiting. Reports intermittent nausea that is not new. Stool soft, but denies watery diarrhea. Noncon CT a/p obtained, suspicious for acute diverticulitis; need for contrasted imaging to further assess. Plan to admit to LTS for further work-up. Plan for IVF, abx, and obtain CT a/p with PO and IV contrast. Discussed plan with Dr. Felix.

## 2023-03-08 NOTE — TELEPHONE ENCOUNTER
Reviewed CT with Dr. Lamb. Discussed that pt also reported a low grade temp under 100 last night. Pt is afebrile today but has been taking Tylenol throughout the day. Per MD, pt to be admitted for further evaluation of abdominal pain and abnormal CT imaging. Dr. Lamb spoke with inpatient surgeon, Dr. Felix, regarding plan for treatment.    Spoke with pt. Reviewed above. She was disappointed in admission but stated she understood. She agreed to come to the admit office now.    Resv placed with Holly her.  Spoke with HOLLY Montes who will place admit orders and answer CMS questions.  Spoke with charge RNElena. Bed available on TSU.

## 2023-03-09 LAB
ALBUMIN SERPL BCP-MCNC: 3.2 G/DL (ref 3.5–5.2)
ALP SERPL-CCNC: 56 U/L (ref 55–135)
ALT SERPL W/O P-5'-P-CCNC: 21 U/L (ref 10–44)
ANION GAP SERPL CALC-SCNC: 9 MMOL/L (ref 8–16)
AST SERPL-CCNC: 16 U/L (ref 10–40)
BASOPHILS # BLD AUTO: 0.02 K/UL (ref 0–0.2)
BASOPHILS NFR BLD: 0.9 % (ref 0–1.9)
BILIRUB SERPL-MCNC: 0.8 MG/DL (ref 0.1–1)
BUN SERPL-MCNC: 11 MG/DL (ref 6–20)
CALCIUM SERPL-MCNC: 9 MG/DL (ref 8.7–10.5)
CHLORIDE SERPL-SCNC: 112 MMOL/L (ref 95–110)
CO2 SERPL-SCNC: 20 MMOL/L (ref 23–29)
CREAT SERPL-MCNC: 0.8 MG/DL (ref 0.5–1.4)
DIFFERENTIAL METHOD: ABNORMAL
EOSINOPHIL # BLD AUTO: 0.1 K/UL (ref 0–0.5)
EOSINOPHIL NFR BLD: 3.1 % (ref 0–8)
ERYTHROCYTE [DISTWIDTH] IN BLOOD BY AUTOMATED COUNT: 12.7 % (ref 11.5–14.5)
EST. GFR  (NO RACE VARIABLE): >60 ML/MIN/1.73 M^2
GLUCOSE SERPL-MCNC: 102 MG/DL (ref 70–110)
HCT VFR BLD AUTO: 31.5 % (ref 37–48.5)
HGB BLD-MCNC: 9.8 G/DL (ref 12–16)
IMM GRANULOCYTES # BLD AUTO: 0.01 K/UL (ref 0–0.04)
IMM GRANULOCYTES NFR BLD AUTO: 0.4 % (ref 0–0.5)
LYMPHOCYTES # BLD AUTO: 0.5 K/UL (ref 1–4.8)
LYMPHOCYTES NFR BLD: 21.1 % (ref 18–48)
MAGNESIUM SERPL-MCNC: 1.8 MG/DL (ref 1.6–2.6)
MCH RBC QN AUTO: 28.2 PG (ref 27–31)
MCHC RBC AUTO-ENTMCNC: 31.1 G/DL (ref 32–36)
MCV RBC AUTO: 91 FL (ref 82–98)
MONOCYTES # BLD AUTO: 0.2 K/UL (ref 0.3–1)
MONOCYTES NFR BLD: 8.8 % (ref 4–15)
NEUTROPHILS # BLD AUTO: 1.5 K/UL (ref 1.8–7.7)
NEUTROPHILS NFR BLD: 65.7 % (ref 38–73)
NRBC BLD-RTO: 0 /100 WBC
PLATELET # BLD AUTO: 119 K/UL (ref 150–450)
PMV BLD AUTO: 12.9 FL (ref 9.2–12.9)
POCT GLUCOSE: 148 MG/DL (ref 70–110)
POTASSIUM SERPL-SCNC: 4 MMOL/L (ref 3.5–5.1)
PROT SERPL-MCNC: 5.8 G/DL (ref 6–8.4)
RBC # BLD AUTO: 3.47 M/UL (ref 4–5.4)
SODIUM SERPL-SCNC: 141 MMOL/L (ref 136–145)
TACROLIMUS BLD-MCNC: 4.9 NG/ML (ref 5–15)
WBC # BLD AUTO: 2.28 K/UL (ref 3.9–12.7)

## 2023-03-09 PROCEDURE — 25000003 PHARM REV CODE 250: Performed by: PHYSICIAN ASSISTANT

## 2023-03-09 PROCEDURE — 94761 N-INVAS EAR/PLS OXIMETRY MLT: CPT

## 2023-03-09 PROCEDURE — 63600175 PHARM REV CODE 636 W HCPCS: Performed by: PHYSICIAN ASSISTANT

## 2023-03-09 PROCEDURE — 63600175 PHARM REV CODE 636 W HCPCS: Performed by: TRANSPLANT SURGERY

## 2023-03-09 PROCEDURE — 80197 ASSAY OF TACROLIMUS: CPT | Performed by: PHYSICIAN ASSISTANT

## 2023-03-09 PROCEDURE — 20600001 HC STEP DOWN PRIVATE ROOM

## 2023-03-09 PROCEDURE — 99233 PR SUBSEQUENT HOSPITAL CARE,LEVL III: ICD-10-PCS | Mod: 24,,, | Performed by: PHYSICIAN ASSISTANT

## 2023-03-09 PROCEDURE — 25500020 PHARM REV CODE 255: Performed by: TRANSPLANT SURGERY

## 2023-03-09 PROCEDURE — 85025 COMPLETE CBC W/AUTO DIFF WBC: CPT | Performed by: PHYSICIAN ASSISTANT

## 2023-03-09 PROCEDURE — 99233 SBSQ HOSP IP/OBS HIGH 50: CPT | Mod: 24,,, | Performed by: PHYSICIAN ASSISTANT

## 2023-03-09 PROCEDURE — 83735 ASSAY OF MAGNESIUM: CPT | Performed by: PHYSICIAN ASSISTANT

## 2023-03-09 PROCEDURE — 36415 COLL VENOUS BLD VENIPUNCTURE: CPT | Performed by: PHYSICIAN ASSISTANT

## 2023-03-09 PROCEDURE — 80053 COMPREHEN METABOLIC PANEL: CPT | Performed by: PHYSICIAN ASSISTANT

## 2023-03-09 RX ORDER — METRONIDAZOLE 500 MG/1
500 TABLET ORAL EVERY 8 HOURS
Status: DISCONTINUED | OUTPATIENT
Start: 2023-03-09 | End: 2023-03-10 | Stop reason: HOSPADM

## 2023-03-09 RX ORDER — TACROLIMUS 1 MG/1
1 CAPSULE ORAL ONCE
Status: COMPLETED | OUTPATIENT
Start: 2023-03-09 | End: 2023-03-09

## 2023-03-09 RX ORDER — TACROLIMUS 1 MG/1
2 CAPSULE ORAL 2 TIMES DAILY
Status: DISCONTINUED | OUTPATIENT
Start: 2023-03-09 | End: 2023-03-10 | Stop reason: HOSPADM

## 2023-03-09 RX ADMIN — METRONIDAZOLE 500 MG: 500 TABLET ORAL at 09:03

## 2023-03-09 RX ADMIN — DAPSONE 100 MG: 100 TABLET ORAL at 12:03

## 2023-03-09 RX ADMIN — URSODIOL 250 MG: 250 TABLET ORAL at 12:03

## 2023-03-09 RX ADMIN — CEFEPIME 1 G: 1 INJECTION, POWDER, FOR SOLUTION INTRAMUSCULAR; INTRAVENOUS at 05:03

## 2023-03-09 RX ADMIN — PREGABALIN 25 MG: 25 CAPSULE ORAL at 09:03

## 2023-03-09 RX ADMIN — VALGANCICLOVIR 450 MG: 450 TABLET, FILM COATED ORAL at 12:03

## 2023-03-09 RX ADMIN — CEFEPIME 1 G: 1 INJECTION, POWDER, FOR SOLUTION INTRAMUSCULAR; INTRAVENOUS at 08:03

## 2023-03-09 RX ADMIN — Medication 800 MG: at 09:03

## 2023-03-09 RX ADMIN — IOHEXOL 75 ML: 350 INJECTION, SOLUTION INTRAVENOUS at 11:03

## 2023-03-09 RX ADMIN — MYCOPHENOLATE MOFETIL 1000 MG: 250 CAPSULE ORAL at 09:03

## 2023-03-09 RX ADMIN — URSODIOL 250 MG: 250 TABLET ORAL at 03:03

## 2023-03-09 RX ADMIN — TACROLIMUS 2 MG: 1 CAPSULE ORAL at 05:03

## 2023-03-09 RX ADMIN — Medication 800 MG: at 12:03

## 2023-03-09 RX ADMIN — TACROLIMUS 1 MG: 1 CAPSULE ORAL at 12:03

## 2023-03-09 RX ADMIN — MYCOPHENOLATE MOFETIL 1000 MG: 250 CAPSULE ORAL at 12:03

## 2023-03-09 RX ADMIN — PREGABALIN 25 MG: 25 CAPSULE ORAL at 12:03

## 2023-03-09 RX ADMIN — ACETAMINOPHEN 650 MG: 325 TABLET ORAL at 04:03

## 2023-03-09 RX ADMIN — SITAGLIPTIN 100 MG: 100 TABLET, FILM COATED ORAL at 12:03

## 2023-03-09 RX ADMIN — ASPIRIN 81 MG: 81 TABLET, COATED ORAL at 12:03

## 2023-03-09 RX ADMIN — ACETAMINOPHEN 650 MG: 325 TABLET ORAL at 03:03

## 2023-03-09 RX ADMIN — METRONIDAZOLE 500 MG: 500 TABLET ORAL at 03:03

## 2023-03-09 RX ADMIN — URSODIOL 250 MG: 250 TABLET ORAL at 09:03

## 2023-03-09 RX ADMIN — TACROLIMUS 1 MG: 1 CAPSULE ORAL at 09:03

## 2023-03-09 RX ADMIN — CEFEPIME 1 G: 1 INJECTION, POWDER, FOR SOLUTION INTRAMUSCULAR; INTRAVENOUS at 01:03

## 2023-03-09 NOTE — PLAN OF CARE
Pt AAOx4. VSS, afebrile, on room air. Pain controlled w/ PRN Tylenol. Pt on regular diet. Blood glucose monitoring ACHS. 0 BM/shift. IVF d/c. IV Cefepime continued. CT A/P w/ IV contrast obtained this AM. Pt started on Flagyl. Pt ambulating in room independently, non-skid socks on pt when OOB. Bed in lowest position, wheels locked, call light within pt reach. Pt updated on plan of care. Spouse @ bedside.

## 2023-03-09 NOTE — HOSPITAL COURSE
Patient admitted with acute LLQ pain. Started on IVF and Cefepime due to suspicion for acute diverticulitis noted on prior imaging. CT scan with IV contrast pending. Flagyl added. Pt overall reports pain being slightly improved from days prior. Overall stable. Cont to monitor

## 2023-03-09 NOTE — SUBJECTIVE & OBJECTIVE
Scheduled Meds:   aspirin  81 mg Oral Daily    ceFEPime (MAXIPIME) IVPB  1 g Intravenous Q8H    dapsone  100 mg Oral Daily    magnesium oxide  800 mg Oral BID    metroNIDAZOLE  500 mg Oral Q8H    mycophenolate  1,000 mg Oral BID    pregabalin  25 mg Oral BID    SITagliptin phosphate  100 mg Oral Daily    tacrolimus  2 mg Oral BID    ursodioL  250 mg Oral TID    valGANciclovir  450 mg Oral Daily     Continuous Infusions:   sodium chloride 0.9% 100 mL/hr at 03/08/23 1811     PRN Meds:acetaminophen, dextrose 10%, dextrose 10%, dextrose, dextrose, glucagon (human recombinant), glucagon (human recombinant), insulin aspart U-100, melatonin, ondansetron, oxyCODONE, simethicone, sodium chloride 0.9%    Review of Systems   Constitutional:  Positive for fatigue. Negative for chills and fever.   Respiratory:  Negative for cough and shortness of breath.    Cardiovascular:  Negative for chest pain and leg swelling.   Gastrointestinal:  Positive for abdominal pain (LLQ) and nausea. Negative for abdominal distention, constipation, diarrhea and vomiting.   Genitourinary:  Negative for decreased urine volume, difficulty urinating and dysuria.   Allergic/Immunologic: Positive for immunocompromised state.   Neurological:  Negative for weakness.   Psychiatric/Behavioral:  Negative for behavioral problems and confusion.    Objective:     Vital Signs (Most Recent):  Temp: 97.6 °F (36.4 °C) (03/09/23 1151)  Pulse: 73 (03/09/23 1151)  Resp: 18 (03/09/23 1151)  BP: 125/67 (03/09/23 1151)  SpO2: 98 % (03/09/23 1151) Vital Signs (24h Range):  Temp:  [97 °F (36.1 °C)-98.4 °F (36.9 °C)] 97.6 °F (36.4 °C)  Pulse:  [] 73  Resp:  [16-18] 18  SpO2:  [93 %-98 %] 98 %  BP: (106-126)/(57-71) 125/67     Weight: 77 kg (169 lb 13.8 oz)  Body mass index is 28.27 kg/m².    Intake/Output - Last 3 Shifts         03/07 0700  03/08 0659 03/08 0700 03/09 0659 03/09 0700  03/10 0659    P.O.  0     Total Intake(mL/kg)  0 (0)     Urine (mL/kg/hr)  850      Total Output  850     Net  -850                    Physical Exam  Vitals and nursing note reviewed.   Constitutional:       Appearance: She is well-developed.   Eyes:      Pupils: Pupils are equal, round, and reactive to light.   Cardiovascular:      Rate and Rhythm: Normal rate and regular rhythm.      Heart sounds: No murmur heard.  Pulmonary:      Effort: Pulmonary effort is normal. No respiratory distress.      Breath sounds: Normal breath sounds. No wheezing.   Abdominal:      General: Bowel sounds are normal. There is no distension.      Palpations: Abdomen is soft.      Tenderness: There is abdominal tenderness (LLQ). There is no guarding.      Comments: Chevron incision healing   Musculoskeletal:         General: Normal range of motion.      Cervical back: Normal range of motion.   Skin:     General: Skin is warm and dry.   Neurological:      Mental Status: She is alert and oriented to person, place, and time.   Psychiatric:         Mood and Affect: Mood normal.         Behavior: Behavior normal.         Thought Content: Thought content normal.         Judgment: Judgment normal.       Laboratory:  Immunosuppressants           Stop Route Frequency     tacrolimus capsule 2 mg         -- Oral 2 times daily     mycophenolate capsule 1,000 mg         -- Oral 2 times daily          CBC:   Recent Labs   Lab 03/09/23  0640   WBC 2.28*   RBC 3.47*   HGB 9.8*   HCT 31.5*   *   MCV 91   MCH 28.2   MCHC 31.1*     CMP:   Recent Labs   Lab 03/09/23  0640      CALCIUM 9.0   ALBUMIN 3.2*   PROT 5.8*      K 4.0   CO2 20*   *   BUN 11   CREATININE 0.8   ALKPHOS 56   ALT 21   AST 16   BILITOT 0.8     Labs within the past 24 hours have been reviewed.    Diagnostic Results:  CT scan pending    Debility/Functional status: No debility noted.

## 2023-03-09 NOTE — DISCHARGE SUMMARY
[facility] - Transplant Stepdown  Liver Transplant  Discharge Summary      Patient Name: Mickey Harris  MRN: [id number]  Admission Date: 3/8/2023  Hospital Length of Stay: 2 days  Discharge Date and Time:  03/10/2023 11:51 AM  Attending Physician: Shelly Felix MD   Discharging Provider: Claudia Jordan NP  Primary Care Provider: Primary Doctor No  Post-Operative Day: 51     ORGAN:   RIGHT LIVER LOBE (SEGS 5,6,7,8) WITHOUT MIDDLE HEPATIC VEIN  Disease Etiology: Cirrhosis: Fatty Liver (DORAN)  Donor Type:   Living  CDC High Risk:     Donor CMV Status:   Donor CMV Status:   Donor HBcAB:     Donor HCV Status:     Whole or Partial: Split Liver  Biliary Anastomosis: Becky-en-Y  Arterial Anatomy:     HPI:   Mickey Harris is a 58 yr female 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 Surgery went without complications. Post-op course notable for recurrent bile leak s/p takeback to OR x 2 (POD 6 and 7); Cultures +staph epi s/p abx course with Usasyn. Also with new onset a-fib with RVR- treatment with ASA and diltiazem (Cards has since weaned pt off Diltiazem ~3/6). She was seen in txp on clinic 3/7 with complaint of generalized abdominal pains surrounding her surgical incision and LLQ pains. Denies vomiting. Reports intermittent nausea that is not new. Stool soft, but denies watery diarrhea. Noncon CT a/p obtained, suspicious for acute diverticulitis; need for contrasted imaging to further assess. Plan to admit to LTS for further work-up. Plan for IVF, abx, and obtain CT a/p with PO and IV contrast. Discussed plan with Dr. Felix.          * No surgery found *     Hospital Course:    Patient admitted with acute LLQ pain. Started on IVF and Cefepime due to suspicion for acute diverticulitis noted on prior imaging. CT scan with IV contrast notable for small segment of sigmoid diverticulitis. Flagyl added. Pt overall reports pain being improved from days prior. Denies nausea/vomiting/diarrhea. Afebrile.  Transitioned to PO abx (flagly and cefpodoxime) at discharge.    Pt is stable and ready for discharge. She has no complaints. She has met with PharmD and received education. Follow-up to include labs on Monday, 3/13 and transplant clinic appointment on Tuesday, 3/14. Pt expressed understanding of discharge instructions and importance of follow-up.     Goals of Care Treatment Preferences:  Code Status: Full Code      Final Active Diagnoses:    Diagnosis Date Noted POA    PRINCIPAL PROBLEM:  Abdominal pain [R10.9] 03/08/2023 Yes    Paroxysmal A-fib [I48.0] 01/28/2023 Yes    At risk for opportunistic infections [Z91.89] 01/21/2023 Yes    Long-term use of immunosuppressant medication [Z79.60] 01/21/2023 Not Applicable    Prophylactic immunotherapy [Z29.8] 01/21/2023 Not Applicable    S/P liver transplant [Z94.4] 01/18/2023 Not Applicable      Problems Resolved During this Admission:           Pending Diagnostic Studies:       None          Significant Diagnostic Studies: Labs:   CMP   Recent Labs   Lab 03/08/23 1808 03/09/23  0640 03/10/23  0651    141 140   K 4.0 4.0 3.9    112* 111*   CO2 26 20* 22*   GLU 89 102 100   BUN 10 11 8   CREATININE 0.9 0.8 0.7   CALCIUM 10.0 9.0 9.1   PROT 6.8 5.8* 5.5*   ALBUMIN 3.8 3.2* 3.2*   BILITOT 1.2* 0.8 0.9   ALKPHOS 65 56 52*   AST 19 16 12   ALT 26 21 18   ANIONGAP 8 9 7*   , CBC   Recent Labs   Lab 03/08/23 1808 03/09/23  0640 03/10/23  0651   WBC 3.43* 2.28* 2.15*   HGB 12.5 9.8* 10.2*   HCT 39.8 31.5* 31.1*   * 119* 123*    and All labs within the past 24 hours have been reviewed    The patients clinical status was discussed at multidisplinary rounds, involving transplant surgery, transplant medicine, pharmacy, nursing, nutrition, and social work    Discharged Condition: good    Disposition: home    Follow Up:  labs on Monday, 3/13 and transplant clinic appointment on Tuesday, 3/14    Patient Instructions:   No discharge procedures on  file.  Medications:  Reconciled Home Medications:      Medication List        START taking these medications      cefpodoxime 200 MG tablet  Commonly known as: VANTIN  Take 1 tablet (200 mg total) by mouth every 12 (twelve) hours. for 8 days     metroNIDAZOLE 500 MG tablet  Commonly known as: FLAGYL  Take 1 tablet (500 mg total) by mouth every 8 (eight) hours. for 8 days            CONTINUE taking these medications      aspirin 81 MG EC tablet  Commonly known as: ECOTRIN  Take 1 tablet (81 mg total) by mouth once daily.     blood sugar diagnostic Strp  To check BG 4 times daily and as needed, to use with insurance preferred meter     blood-glucose meter kit  To check BG 4 times daily and as needed , to use with insurance preferred meter     calcium carbonate-vitamin D3 600 mg-20 mcg (800 unit) Tab  Take 1 tablet by mouth once daily.     dapsone 100 MG Tab  Take 1 tablet (100 mg total) by mouth once daily. STOP 7/17/23     lancets Misc  To check BG 4 times daily and as needed, to use with insurance preferred meter     magnesium oxide 400 mg (241.3 mg magnesium) tablet  Commonly known as: MAG-OX  Take 2 tablets (800 mg total) by mouth 2 (two) times daily.     oxyCODONE 5 MG immediate release tablet  Commonly known as: ROXICODONE  Take 1 tablet (5 mg total) by mouth every 8 (eight) hours as needed for Pain.     polyethylene glycol 17 gram Pwpk  Commonly known as: MIRALAX  Take 17 g (1 packet) and dissolve in full glass of water to take by mouth once daily.     pregabalin 25 MG capsule  Commonly known as: LYRICA  Take 1 capsule (25 mg total) by mouth 2 (two) times daily.     SITagliptin phosphate 100 MG Tab  Commonly known as: JANUVIA  Take 1 tablet (100 mg total) by mouth once daily.     tacrolimus 1 MG Cap  Commonly known as: PROGRAF  Take 2 capsules (2 mg total) by mouth every 12 (twelve) hours.     ursodioL 250 mg Tab  Commonly known as: ACTIGALL  Take 1 tablet (250 mg total) by mouth 3 (three) times daily.      valGANciclovir 450 mg Tab  Commonly known as: VALCYTE  Take 1 tablet (450 mg total) by mouth once daily. Stop 4/18/2023            STOP taking these medications      diltiaZEM 180 MG 24 hr capsule  Commonly known as: CARDIZEM CD     mycophenolate 250 mg Cap  Commonly known as: CELLCEPT            Time spent caring for patient (Greater than 1/2 spent in direct face-to-face contact): > 30 minutes    Claudia Jordan NP  Liver Transplant  [facility] - Transplant Stepdown

## 2023-03-09 NOTE — ASSESSMENT & PLAN NOTE
- s/p living related liver txp for DORAN  - post-op with bile leak s/p IA take back x 2  - LFTs stable  - a/w abdominal pains with suspicion for acute diverticulitis

## 2023-03-09 NOTE — PLAN OF CARE
Plan of care reviewed with the patient at the beginning of the shift. Pt admitted with abdominal pain. Routine CT abdomen pending, not done overnight. PT complained of L sided abdominal pain, managed with tylenol. IVF infusing. Fall precautions maintained. She is up independently without difficulty. Pt remained free from falls and injury this shift. Bed locked in lowest position, side rails up x2, call light within reach. Instructed pt to call for assistance as needed. Pt verbalized understanding. Vitals stable. Pt afebrile overnight. No acute issues overnight. Will continue to monitor.

## 2023-03-09 NOTE — ASSESSMENT & PLAN NOTE
- generalized abd pains with suspicion for acute diverticulitis noted on non-CT scan.  - plan for obtain contrasted CT a/p -- results pending. Will review with surgeon  - IVF for hydration  - started on Cefepime; flagyl started 3/9

## 2023-03-09 NOTE — PROGRESS NOTES
[facility]  Liver Transplant  Progress Note    Patient Name: Mickey Harris  MRN: [id number]  Admission Date: 3/8/2023  Hospital Length of Stay: 1 days  Code Status: Full Code  Primary Care Provider: Primary Doctor No  Post-Operative Day: 51    ORGAN:   RIGHT LIVER LOBE (SEGS 5,6,7,8) WITHOUT MIDDLE HEPATIC VEIN  Disease Etiology: Cirrhosis: Fatty Liver (DORAN)  Donor Type:   Living  CDC High Risk:     Donor CMV Status:   Donor CMV Status:   Donor HBcAB:     Donor HCV Status:     Donor HBV PALOMA:   Donor HCV PALOMA:   Whole or Partial: Split Liver  Biliary Anastomosis: Becky-en-Y  Arterial Anatomy:   Subjective:     History of Present Illness:  Mickey Harris is a 58 yr female 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 Surgery went without complications. Post-op course notable for recurrent bile leak s/p takeback to OR x 2 (POD 6 and 7); Cultures +staph epi s/p abx course with Usasyn. Also with new onset a-fib with RVR- treatment with ASA and diltiazem (Cards has since weaned pt off Diltiazem ~3/6). She was seen in txp on clinic 3/7 with complaint of generalized abdominal pains surrounding her surgical incision and LLQ pains. Denies vomiting. Reports intermittent nausea that is not new. Stool soft, but denies watery diarrhea. Noncon CT a/p obtained, suspicious for acute diverticulitis; need for contrasted imaging to further assess. Plan to admit to LTS for further work-up. Plan for IVF, abx, and obtain CT a/p with PO and IV contrast. Discussed plan with Dr. Felix.          Hospital Course:  Patient admitted with acute LLQ pain. Started on IVF and Cefepime due to suspicion for acute diverticulitis noted on prior imaging. CT scan with IV contrast pending. Flagyl added. Pt overall reports pain being slightly improved from days prior. Overall stable. Cont to monitor      Scheduled Meds:   aspirin  81 mg Oral Daily    ceFEPime (MAXIPIME) IVPB  1 g Intravenous Q8H    dapsone  100 mg Oral  Daily    magnesium oxide  800 mg Oral BID    metroNIDAZOLE  500 mg Oral Q8H    mycophenolate  1,000 mg Oral BID    pregabalin  25 mg Oral BID    SITagliptin phosphate  100 mg Oral Daily    tacrolimus  2 mg Oral BID    ursodioL  250 mg Oral TID    valGANciclovir  450 mg Oral Daily     Continuous Infusions:   sodium chloride 0.9% 100 mL/hr at 03/08/23 1811     PRN Meds:acetaminophen, dextrose 10%, dextrose 10%, dextrose, dextrose, glucagon (human recombinant), glucagon (human recombinant), insulin aspart U-100, melatonin, ondansetron, oxyCODONE, simethicone, sodium chloride 0.9%    Review of Systems   Constitutional:  Positive for fatigue. Negative for chills and fever.   Respiratory:  Negative for cough and shortness of breath.    Cardiovascular:  Negative for chest pain and leg swelling.   Gastrointestinal:  Positive for abdominal pain (LLQ) and nausea. Negative for abdominal distention, constipation, diarrhea and vomiting.   Genitourinary:  Negative for decreased urine volume, difficulty urinating and dysuria.   Allergic/Immunologic: Positive for immunocompromised state.   Neurological:  Negative for weakness.   Psychiatric/Behavioral:  Negative for behavioral problems and confusion.    Objective:     Vital Signs (Most Recent):  Temp: 97.6 °F (36.4 °C) (03/09/23 1151)  Pulse: 73 (03/09/23 1151)  Resp: 18 (03/09/23 1151)  BP: 125/67 (03/09/23 1151)  SpO2: 98 % (03/09/23 1151) Vital Signs (24h Range):  Temp:  [97 °F (36.1 °C)-98.4 °F (36.9 °C)] 97.6 °F (36.4 °C)  Pulse:  [] 73  Resp:  [16-18] 18  SpO2:  [93 %-98 %] 98 %  BP: (106-126)/(57-71) 125/67     Weight: 77 kg (169 lb 13.8 oz)  Body mass index is 28.27 kg/m².    Intake/Output - Last 3 Shifts         03/07 0700  03/08 0659 03/08 0700  03/09 0659 03/09 0700  03/10 0659    P.O.  0     Total Intake(mL/kg)  0 (0)     Urine (mL/kg/hr)  850     Total Output  850     Net  -850                    Physical Exam  Vitals and nursing note reviewed.    Constitutional:       Appearance: She is well-developed.   Eyes:      Pupils: Pupils are equal, round, and reactive to light.   Cardiovascular:      Rate and Rhythm: Normal rate and regular rhythm.      Heart sounds: No murmur heard.  Pulmonary:      Effort: Pulmonary effort is normal. No respiratory distress.      Breath sounds: Normal breath sounds. No wheezing.   Abdominal:      General: Bowel sounds are normal. There is no distension.      Palpations: Abdomen is soft.      Tenderness: There is abdominal tenderness (LLQ). There is no guarding.      Comments: Chevron incision healing   Musculoskeletal:         General: Normal range of motion.      Cervical back: Normal range of motion.   Skin:     General: Skin is warm and dry.   Neurological:      Mental Status: She is alert and oriented to person, place, and time.   Psychiatric:         Mood and Affect: Mood normal.         Behavior: Behavior normal.         Thought Content: Thought content normal.         Judgment: Judgment normal.       Laboratory:  Immunosuppressants           Stop Route Frequency     tacrolimus capsule 2 mg         -- Oral 2 times daily     mycophenolate capsule 1,000 mg         -- Oral 2 times daily          CBC:   Recent Labs   Lab 03/09/23  0640   WBC 2.28*   RBC 3.47*   HGB 9.8*   HCT 31.5*   *   MCV 91   MCH 28.2   MCHC 31.1*     CMP:   Recent Labs   Lab 03/09/23  0640      CALCIUM 9.0   ALBUMIN 3.2*   PROT 5.8*      K 4.0   CO2 20*   *   BUN 11   CREATININE 0.8   ALKPHOS 56   ALT 21   AST 16   BILITOT 0.8     Labs within the past 24 hours have been reviewed.    Diagnostic Results:  CT scan pending    Debility/Functional status: No debility noted.    Assessment/Plan:     * Abdominal pain  - generalized abd pains with suspicion for acute diverticulitis noted on non-CT scan.  - plan for obtain contrasted CT a/p -- results pending. Will review with surgeon  - IVF for hydration  - started on Cefepime; flagyl  "started 3/9      Paroxysmal A-fib  - new onset a-fib post op. Per cards recs, pt on ASA and diltiazem  - last seen by EP on 2/20 with plan to down-titrate cardizem to off over the next couple weeks. Supposed stop date ~3/6. However, pt reports taking 1/2 pill for an additional few days to assist with weaning. Will not restart at this time  - cont tele     Prophylactic immunotherapy  - Continue Cellcept and Prograf. Will monitor for signs of toxic side effects, check daily tacrolimus troughs, and change meds accordingly.      Long-term use of immunosuppressant medication  - see "prophylactic immuno"      At risk for opportunistic infections  - Continue Valcyte for CMV prophylaxis  - Continue Dapsone for PCP prophylaxis       S/P liver transplant  - s/p living related liver txp for DORAN  - post-op with bile leak s/p ND take back x 2  - LFTs stable  - a/w abdominal pains with suspicion for acute diverticulitis           VTE Risk Mitigation (From admission, onward)         Ordered     IP VTE LOW RISK PATIENT  Once         03/08/23 1657     Place DARLING hose  Until discontinued         03/08/23 1657                The patients clinical status was discussed at multidisplinary rounds, involving transplant surgery, transplant medicine, pharmacy, nursing, nutrition, and social work    Discharge Planning:  PharmD Review:  Please add vitD level to next lab, increase tacrolimus from 2+2 to 3+3 for level on 3/6 [XH 03/07/23]      Alicia Chiu PA-C  Liver Transplant  [facility]  "

## 2023-03-09 NOTE — PROGRESS NOTES
Admit Note     Met with patient to assess needs. Patient is a 58 y.o.  female, admitted for  abdominal pain .      Patient admitted from home on 3/8/2023 .  At this time, patient presents as alert and oriented x 4, pleasant, calm, communicative, cooperative, and asking and answering questions appropriately.  At this time, patient presents no caregiver. Patient notes caregiver/pts  at  home.    Household/Family Systems     Patient resides with patient's , at [address] [address].  Support system includes  , step mother and adult son.   , step mother and adult son.  Patient does not have dependents that are need of being cared for.     Patients primary caregiver is Kg Harris, patients , phone number [phone]..  Confirmed patients contact information is [phone] (home);   Telephone Information:   Mobile [phone]   .    During admission, patient's caregiver plans to stay  at the house the family has rented close to the hospital.  Confirmed patient and patients caregivers do have access to reliable transportation.    Cognitive Status/Learning     Patient reports reading ability as college and states patient does have difficulty with seeing.  Patient reports patient learns best by hands on learning..   Needed: No.   Highest education level: Post-College Graduate Degree    Vocation/Disability   .  Working for Income: No  If no, reason not working: Patient Choice - Retired  Patient is retired from working as a .    Adherence     Patient reports a high level of adherence to patients health care regimen.  Adherence counseling and education provided. Patient verbalizes understanding.    Substance Use    Patient reports the following substance usage.    Tobacco: none, patient denies any use.  Alcohol: none, patient denies any use.  Illicit Drugs/Non-prescribed Medications: none, patient denies any use.  Patient states  clear understanding of the potential impact of substance use.  Substance abstinence/cessation counseling, education and resources provided and reviewed.     Services Utilizing/ADLS    Infusion Service: Prior to admission, patient utilizing? no  Home Health: Prior to admission, patient utilizing? yes [facility] for PT after last admission. Patient notes she was discharged after first visit.  DME: Prior to admission, no  Pulmonary/Cardiac Rehab: Prior to admission, no  Dialysis:  Prior to admission, no  Transplant Specialty Pharmacy:  Prior to admission, no.    Prior to admission, patient reports patient was independent with ADLS and was not driving.  Patient reports patient is not able to care for self at this time due to compromised medical condition (as documented in medical record) and physical weakness..  Patient indicates a willingness to care for self once medically cleared to do so.    Insurance/Medications    Insured by   Payer/Plan Subscr  Sex Relation Sub. Ins. ID Effective Group Num   1. AETNA - AETNA* MARYAN HOOD 1965 Female Self [id number] 1/1/15 [id number]                                   [address]   2. AETNA - AETNA* MARYAN HOOD 1965 Female Self [id number] 1/1/15 [id number]                                   [address]      Primary Insurance (for UNOS reporting): Private Insurance  Secondary Insurance (for UNOS reporting): Private Insurance    Patient reports patient is able to obtain and afford medications at this time and at time of discharge.    Living Will/Healthcare Power of     Patient states patient does not have a LW and/or HCPA.   provided education regarding LW and HCPA and the completion of forms.    Coping/Mental Health    Patient is coping well with the aid of  family members. Patient notes symptoms of anxiety. Patient reports she is awaiting a call back from Mando with [facility] department for an appointment. STEF  offered to assist patient by calling [facility]. STEF notified Mando  is out the office today and will return on 3/10. SW informed Mando will follow-up with patient.    Discharge Planning    At time of discharge, patient plans to return to house the family has rented close to the hospital.The [place], [place] [address]  under the care of, pts  Kg Harris.   Patients  will transport patient.  Per rounds today, expected discharge date has not been medically determined at this time. Patient and patients caregiver  verbalize understanding and are involved in treatment planning and discharge process.    Additional Concerns    Patient is being followed for needs, education, resources, information, emotional support, supportive counseling, and for supportive and skilled discharge plan of care.  providing ongoing psychosocial support, education, resources and d/c planning as needed.  SW remains available. Patient denies additional needs and/or concerns at this time.

## 2023-03-10 ENCOUNTER — TELEPHONE (OUTPATIENT)
Dept: TRANSPLANT | Facility: CLINIC | Age: 58
End: 2023-03-10
Payer: COMMERCIAL

## 2023-03-10 VITALS
BODY MASS INDEX: 28.3 KG/M2 | SYSTOLIC BLOOD PRESSURE: 132 MMHG | TEMPERATURE: 97 F | RESPIRATION RATE: 18 BRPM | HEIGHT: 65 IN | WEIGHT: 169.88 LBS | HEART RATE: 78 BPM | DIASTOLIC BLOOD PRESSURE: 74 MMHG | OXYGEN SATURATION: 97 %

## 2023-03-10 LAB
ALBUMIN SERPL BCP-MCNC: 3.2 G/DL (ref 3.5–5.2)
ALP SERPL-CCNC: 52 U/L (ref 55–135)
ALT SERPL W/O P-5'-P-CCNC: 18 U/L (ref 10–44)
ANION GAP SERPL CALC-SCNC: 7 MMOL/L (ref 8–16)
AST SERPL-CCNC: 12 U/L (ref 10–40)
BASOPHILS # BLD AUTO: 0.03 K/UL (ref 0–0.2)
BASOPHILS NFR BLD: 1.4 % (ref 0–1.9)
BILIRUB SERPL-MCNC: 0.9 MG/DL (ref 0.1–1)
BUN SERPL-MCNC: 8 MG/DL (ref 6–20)
CALCIUM SERPL-MCNC: 9.1 MG/DL (ref 8.7–10.5)
CHLORIDE SERPL-SCNC: 111 MMOL/L (ref 95–110)
CO2 SERPL-SCNC: 22 MMOL/L (ref 23–29)
CREAT SERPL-MCNC: 0.7 MG/DL (ref 0.5–1.4)
DIFFERENTIAL METHOD: ABNORMAL
EOSINOPHIL # BLD AUTO: 0.1 K/UL (ref 0–0.5)
EOSINOPHIL NFR BLD: 4.2 % (ref 0–8)
ERYTHROCYTE [DISTWIDTH] IN BLOOD BY AUTOMATED COUNT: 12.7 % (ref 11.5–14.5)
EST. GFR  (NO RACE VARIABLE): >60 ML/MIN/1.73 M^2
GLUCOSE SERPL-MCNC: 100 MG/DL (ref 70–110)
HCT VFR BLD AUTO: 31.1 % (ref 37–48.5)
HGB BLD-MCNC: 10.2 G/DL (ref 12–16)
IMM GRANULOCYTES # BLD AUTO: 0.01 K/UL (ref 0–0.04)
IMM GRANULOCYTES NFR BLD AUTO: 0.5 % (ref 0–0.5)
LYMPHOCYTES # BLD AUTO: 0.5 K/UL (ref 1–4.8)
LYMPHOCYTES NFR BLD: 20.9 % (ref 18–48)
MAGNESIUM SERPL-MCNC: 1.8 MG/DL (ref 1.6–2.6)
MCH RBC QN AUTO: 28.7 PG (ref 27–31)
MCHC RBC AUTO-ENTMCNC: 32.8 G/DL (ref 32–36)
MCV RBC AUTO: 87 FL (ref 82–98)
MONOCYTES # BLD AUTO: 0.2 K/UL (ref 0.3–1)
MONOCYTES NFR BLD: 8.4 % (ref 4–15)
NEUTROPHILS # BLD AUTO: 1.4 K/UL (ref 1.8–7.7)
NEUTROPHILS NFR BLD: 64.6 % (ref 38–73)
NRBC BLD-RTO: 0 /100 WBC
PLATELET # BLD AUTO: 123 K/UL (ref 150–450)
PMV BLD AUTO: 12.6 FL (ref 9.2–12.9)
POCT GLUCOSE: 103 MG/DL (ref 70–110)
POCT GLUCOSE: 119 MG/DL (ref 70–110)
POTASSIUM SERPL-SCNC: 3.9 MMOL/L (ref 3.5–5.1)
PROT SERPL-MCNC: 5.5 G/DL (ref 6–8.4)
RBC # BLD AUTO: 3.56 M/UL (ref 4–5.4)
SODIUM SERPL-SCNC: 140 MMOL/L (ref 136–145)
TACROLIMUS BLD-MCNC: 6.4 NG/ML (ref 5–15)
WBC # BLD AUTO: 2.15 K/UL (ref 3.9–12.7)

## 2023-03-10 PROCEDURE — 36415 COLL VENOUS BLD VENIPUNCTURE: CPT | Performed by: PHYSICIAN ASSISTANT

## 2023-03-10 PROCEDURE — 83735 ASSAY OF MAGNESIUM: CPT | Performed by: PHYSICIAN ASSISTANT

## 2023-03-10 PROCEDURE — 99239 PR HOSPITAL DISCHARGE DAY,>30 MIN: ICD-10-PCS | Mod: 24,,,

## 2023-03-10 PROCEDURE — 25000003 PHARM REV CODE 250

## 2023-03-10 PROCEDURE — 80197 ASSAY OF TACROLIMUS: CPT | Performed by: PHYSICIAN ASSISTANT

## 2023-03-10 PROCEDURE — 25000003 PHARM REV CODE 250: Performed by: PHYSICIAN ASSISTANT

## 2023-03-10 PROCEDURE — 85025 COMPLETE CBC W/AUTO DIFF WBC: CPT | Performed by: PHYSICIAN ASSISTANT

## 2023-03-10 PROCEDURE — 99239 HOSP IP/OBS DSCHRG MGMT >30: CPT | Mod: 24,,,

## 2023-03-10 PROCEDURE — 80053 COMPREHEN METABOLIC PANEL: CPT | Performed by: PHYSICIAN ASSISTANT

## 2023-03-10 PROCEDURE — 63600175 PHARM REV CODE 636 W HCPCS: Performed by: TRANSPLANT SURGERY

## 2023-03-10 PROCEDURE — 63600175 PHARM REV CODE 636 W HCPCS: Performed by: PHYSICIAN ASSISTANT

## 2023-03-10 RX ORDER — METRONIDAZOLE 500 MG/1
500 TABLET ORAL EVERY 8 HOURS
Qty: 24 TABLET | Refills: 0 | Status: SHIPPED | OUTPATIENT
Start: 2023-03-10 | End: 2023-03-18

## 2023-03-10 RX ORDER — CEFPODOXIME PROXETIL 200 MG/1
200 TABLET, FILM COATED ORAL EVERY 12 HOURS
Qty: 16 TABLET | Refills: 0 | Status: SHIPPED | OUTPATIENT
Start: 2023-03-10 | End: 2023-03-18

## 2023-03-10 RX ORDER — CEFPODOXIME PROXETIL 200 MG/1
200 TABLET, FILM COATED ORAL EVERY 12 HOURS
Status: DISCONTINUED | OUTPATIENT
Start: 2023-03-10 | End: 2023-03-10 | Stop reason: HOSPADM

## 2023-03-10 RX ADMIN — Medication 800 MG: at 09:03

## 2023-03-10 RX ADMIN — CEFEPIME 1 G: 1 INJECTION, POWDER, FOR SOLUTION INTRAMUSCULAR; INTRAVENOUS at 12:03

## 2023-03-10 RX ADMIN — SITAGLIPTIN 100 MG: 100 TABLET, FILM COATED ORAL at 09:03

## 2023-03-10 RX ADMIN — CEFPODOXIME PROXETIL 200 MG: 200 TABLET, FILM COATED ORAL at 10:03

## 2023-03-10 RX ADMIN — TACROLIMUS 2 MG: 1 CAPSULE ORAL at 09:03

## 2023-03-10 RX ADMIN — ASPIRIN 81 MG: 81 TABLET, COATED ORAL at 09:03

## 2023-03-10 RX ADMIN — VALGANCICLOVIR 450 MG: 450 TABLET, FILM COATED ORAL at 09:03

## 2023-03-10 RX ADMIN — ACETAMINOPHEN 650 MG: 325 TABLET ORAL at 12:03

## 2023-03-10 RX ADMIN — METRONIDAZOLE 500 MG: 500 TABLET ORAL at 05:03

## 2023-03-10 RX ADMIN — PREGABALIN 25 MG: 25 CAPSULE ORAL at 09:03

## 2023-03-10 RX ADMIN — MYCOPHENOLATE MOFETIL 1000 MG: 250 CAPSULE ORAL at 09:03

## 2023-03-10 RX ADMIN — URSODIOL 250 MG: 250 TABLET ORAL at 09:03

## 2023-03-10 NOTE — PROGRESS NOTES
Discharge Medication Note:    Hospital Course:  57 y/o F s/p Liver Transplant on 1/17/23 secondary to DORAN admitted with acute LLQ pain.  Started on IVF and cefepime/flagyl for suspected acute diverticulitis noted on prior CT scan.  Patient's pain improved - afebrile and transitioned to PO abx cefpodoxime/flagyl (allergy to fluoroquinolones) (EOT: 3/18/23).     Met with Mickey Harris at discharge to review discharge medications and to update the blue medication card.           Medication List        START taking these medications      cefpodoxime 200 MG tablet  Commonly known as: VANTIN  Take 1 tablet (200 mg total) by mouth every 12 (twelve) hours. for 8 days     metroNIDAZOLE 500 MG tablet  Commonly known as: FLAGYL  Take 1 tablet (500 mg total) by mouth every 8 (eight) hours. for 8 days            CONTINUE taking these medications      aspirin 81 MG EC tablet  Commonly known as: ECOTRIN  Take 1 tablet (81 mg total) by mouth once daily.     blood sugar diagnostic Strp  To check BG 4 times daily and as needed, to use with insurance preferred meter     blood-glucose meter kit  To check BG 4 times daily and as needed , to use with insurance preferred meter     calcium carbonate-vitamin D3 600 mg-20 mcg (800 unit) Tab  Take 1 tablet by mouth once daily.     dapsone 100 MG Tab  Take 1 tablet (100 mg total) by mouth once daily. STOP 7/17/23     [facility]  To check BG 4 times daily and as needed, to use with insurance preferred meter     magnesium oxide 400 mg (241.3 mg magnesium) tablet  Commonly known as: MAG-OX  Take 2 tablets (800 mg total) by mouth 2 (two) times daily.     oxyCODONE 5 MG immediate release tablet  Commonly known as: ROXICODONE  Take 1 tablet (5 mg total) by mouth every 8 (eight) hours as needed for Pain.     polyethylene glycol 17 gram Pwpk  Commonly known as: MIRALAX  Take 17 g (1 packet) and dissolve in full glass of water to take by mouth once daily.     pregabalin 25 MG capsule  Commonly  known as: LYRICA  Take 1 capsule (25 mg total) by mouth 2 (two) times daily.     SITagliptin phosphate 100 MG Tab  Commonly known as: JANUVIA  Take 1 tablet (100 mg total) by mouth once daily.     tacrolimus 1 MG Cap  Commonly known as: PROGRAF  Take 2 capsules (2 mg total) by mouth every 12 (twelve) hours.     ursodioL 250 mg Tab  Commonly known as: ACTIGALL  Take 1 tablet (250 mg total) by mouth 3 (three) times daily.     valGANciclovir 450 mg Tab  Commonly known as: VALCYTE  Take 1 tablet (450 mg total) by mouth once daily. Stop 4/18/2023            STOP taking these medications      diltiaZEM 180 MG 24 hr capsule  Commonly known as: CARDIZEM CD     mycophenolate 250 mg Cap  Commonly known as: CELLCEPT               Where to Get Your Medications        These medications were sent to [facility] [address], [place] [address]      Hours: Mon-Fri 7a-7p, Sat-Sun 10a-4p Phone: [phone]   cefpodoxime 200 MG tablet  metroNIDAZOLE 500 MG tablet            The following medications have been placed on HOLD and should be restarted in the outpatient setting (when appropriate): MMF on hold during infection treatment and due to low WBC    Mickey Harris verbalized understanding and had the opportunity to ask questions.

## 2023-03-10 NOTE — PLAN OF CARE
Plan of care reviewed with the patient at the beginning of the shift. Pt admitted with abdominal pain. Started on PO Flagyl yesterday for diverticulitis, states pain has improved. Fall precautions maintained. She is up independently without difficulty. Pt remained free from falls and injury this shift. Bed locked in lowest position, side rails up x2, call light within reach. Instructed pt to call for assistance as needed. Pt verbalized understanding. Vitals stable. Pt afebrile overnight. No acute issues overnight. Will continue to monitor.

## 2023-03-10 NOTE — PROGRESS NOTES
Discharge Note     presented to patient bedside to discuss discharge plans, as well as assess any concerns or needs. Patient was alert and oriented x4, pleasant and communicative. Patient is coping well with support from family. Patient presented with no caregiver. Patient notes pts  coming to bedside once she calls him. Patient will discharge today to  local lodging/rental close to the hospital: [address], [place] [address] with no needs. Patient's  will transport patient. Patient reports  agreeing with the discharge plan and has  no psychosocial concerns. Patient verbalized understanding and is involved in treatment planning and discharge process. Patient had no concerns or questions at this time.    SW remains available and will continue to follow, providing psychosocial support, education and assistance as needed.

## 2023-03-10 NOTE — PROGRESS NOTES
RAGINI negrete'yancy.  Reviewed patient's discharge instructions and patient verbalized understanding.  Patient reports will  new prescriptions from pharmacy on the way out.  Patient AAOx4, VSS, and in NAD.  Patient left floor via ambulation accompanied by .

## 2023-03-10 NOTE — PLAN OF CARE
Patient remaining free from injury.  Patient voiding and having BMs.  Reports pain well controlled.  Patient ate 50% of breakfast and BG = 119.  ABX changed to oral.  Patient reports she is ready to discharge.

## 2023-03-11 ENCOUNTER — EXTERNAL HOME HEALTH (OUTPATIENT)
Dept: HOME HEALTH SERVICES | Facility: HOSPITAL | Age: 58
End: 2023-03-11
Payer: COMMERCIAL

## 2023-03-11 ENCOUNTER — TELEPHONE (OUTPATIENT)
Dept: TRANSPLANT | Facility: CLINIC | Age: 58
End: 2023-03-11
Payer: COMMERCIAL

## 2023-03-11 NOTE — PLAN OF CARE
Jordana with [facility] contacted  for assistance with obtaining  orders. Patient has a transplant SW and they have weekend staff covering service. Jordana will contact hospital  to connect with transplant on-call SW.      Constanza Bailey RN  Weekend  - [facility]  Spectralink: [phone]

## 2023-03-12 NOTE — TELEPHONE ENCOUNTER
TXP SW On Call Note    SW received call from Jordana forrest/ [facility] (ph: [phone]) stating pt was discharged home last week without HH orders. Jordana states there are no HH orders in pt's chart. STEF confirmed no HH orders in chart and stated Liver Txp SW did not document HH in discharge summary from 3/10/23. Jordana states pt needs order for resumption of HH for skilled nursing and physical therapy. STEF states she will contact liver txp on call team and follow up.    STEF spoke to both Dr. Felix and Kathryn Cary, FNP re: call from [facility]. Decided that HH orders will be discussed on Monday. Pt's nurse coordinator has been made aware of situation.     STEF contacted Jordana and relayed message. Jordana verbalized understanding and thanked STEF. STEF remains available.

## 2023-03-13 ENCOUNTER — PATIENT MESSAGE (OUTPATIENT)
Dept: TRANSPLANT | Facility: CLINIC | Age: 58
End: 2023-03-13
Payer: COMMERCIAL

## 2023-03-13 ENCOUNTER — LAB VISIT (OUTPATIENT)
Dept: LAB | Facility: HOSPITAL | Age: 58
End: 2023-03-13
Attending: SURGERY
Payer: COMMERCIAL

## 2023-03-13 ENCOUNTER — TELEPHONE (OUTPATIENT)
Dept: TRANSPLANT | Facility: CLINIC | Age: 58
End: 2023-03-13
Payer: COMMERCIAL

## 2023-03-13 DIAGNOSIS — Z94.4 S/P LIVER TRANSPLANT: Primary | ICD-10-CM

## 2023-03-13 DIAGNOSIS — G62.9 NEUROPATHY: ICD-10-CM

## 2023-03-13 DIAGNOSIS — Z94.4 LIVER TRANSPLANTED: ICD-10-CM

## 2023-03-13 LAB
ACID FAST MOD KINY STN SPEC: NORMAL
ALBUMIN SERPL BCP-MCNC: 3.7 G/DL (ref 3.5–5.2)
ALP SERPL-CCNC: 58 U/L (ref 55–135)
ALT SERPL W/O P-5'-P-CCNC: 16 U/L (ref 10–44)
ANION GAP SERPL CALC-SCNC: 7 MMOL/L (ref 8–16)
AST SERPL-CCNC: 17 U/L (ref 10–40)
BASOPHILS # BLD AUTO: 0.03 K/UL (ref 0–0.2)
BASOPHILS NFR BLD: 0.9 % (ref 0–1.9)
BILIRUB DIRECT SERPL-MCNC: 0.5 MG/DL (ref 0.1–0.3)
BILIRUB SERPL-MCNC: 0.9 MG/DL (ref 0.1–1)
BUN SERPL-MCNC: 9 MG/DL (ref 6–20)
CALCIUM SERPL-MCNC: 9.8 MG/DL (ref 8.7–10.5)
CHLORIDE SERPL-SCNC: 108 MMOL/L (ref 95–110)
CO2 SERPL-SCNC: 25 MMOL/L (ref 23–29)
CREAT SERPL-MCNC: 0.8 MG/DL (ref 0.5–1.4)
DIFFERENTIAL METHOD: ABNORMAL
EOSINOPHIL # BLD AUTO: 0.1 K/UL (ref 0–0.5)
EOSINOPHIL NFR BLD: 3.9 % (ref 0–8)
ERYTHROCYTE [DISTWIDTH] IN BLOOD BY AUTOMATED COUNT: 13 % (ref 11.5–14.5)
EST. GFR  (NO RACE VARIABLE): >60 ML/MIN/1.73 M^2
GLUCOSE SERPL-MCNC: 101 MG/DL (ref 70–110)
HCT VFR BLD AUTO: 38.4 % (ref 37–48.5)
HGB BLD-MCNC: 12.1 G/DL (ref 12–16)
IMM GRANULOCYTES # BLD AUTO: 0.02 K/UL (ref 0–0.04)
IMM GRANULOCYTES NFR BLD AUTO: 0.6 % (ref 0–0.5)
LYMPHOCYTES # BLD AUTO: 0.6 K/UL (ref 1–4.8)
LYMPHOCYTES NFR BLD: 16.5 % (ref 18–48)
MAGNESIUM SERPL-MCNC: 1.8 MG/DL (ref 1.6–2.6)
MCH RBC QN AUTO: 27.9 PG (ref 27–31)
MCHC RBC AUTO-ENTMCNC: 31.5 G/DL (ref 32–36)
MCV RBC AUTO: 89 FL (ref 82–98)
MONOCYTES # BLD AUTO: 0.3 K/UL (ref 0.3–1)
MONOCYTES NFR BLD: 9.6 % (ref 4–15)
MYCOBACTERIUM SPEC QL CULT: NORMAL
NEUTROPHILS # BLD AUTO: 2.3 K/UL (ref 1.8–7.7)
NEUTROPHILS NFR BLD: 68.5 % (ref 38–73)
NRBC BLD-RTO: 0 /100 WBC
PLATELET # BLD AUTO: 156 K/UL (ref 150–450)
PMV BLD AUTO: 12.4 FL (ref 9.2–12.9)
POTASSIUM SERPL-SCNC: 4.6 MMOL/L (ref 3.5–5.1)
PROT SERPL-MCNC: 6.2 G/DL (ref 6–8.4)
RBC # BLD AUTO: 4.33 M/UL (ref 4–5.4)
SODIUM SERPL-SCNC: 140 MMOL/L (ref 136–145)
TACROLIMUS BLD-MCNC: 4.7 NG/ML (ref 5–15)
WBC # BLD AUTO: 3.33 K/UL (ref 3.9–12.7)

## 2023-03-13 PROCEDURE — 80053 COMPREHEN METABOLIC PANEL: CPT | Performed by: SURGERY

## 2023-03-13 PROCEDURE — 85025 COMPLETE CBC W/AUTO DIFF WBC: CPT | Performed by: SURGERY

## 2023-03-13 PROCEDURE — 82248 BILIRUBIN DIRECT: CPT | Performed by: SURGERY

## 2023-03-13 PROCEDURE — 83735 ASSAY OF MAGNESIUM: CPT | Performed by: SURGERY

## 2023-03-13 PROCEDURE — 80197 ASSAY OF TACROLIMUS: CPT | Performed by: SURGERY

## 2023-03-13 PROCEDURE — 36415 COLL VENOUS BLD VENIPUNCTURE: CPT | Performed by: SURGERY

## 2023-03-13 NOTE — TELEPHONE ENCOUNTER
Pt notified via portal of stable labs and that no medication changes are needed. Repeat labs due 3/20/23 prior to clinic apt with Dr. Regalado.   ----- Message from Juan Pablo Kimbrough MD sent at 3/13/2023  2:16 PM CDT -----  Results ok. No action needed

## 2023-03-14 ENCOUNTER — TELEPHONE (OUTPATIENT)
Dept: TRANSPLANT | Facility: CLINIC | Age: 58
End: 2023-03-14
Payer: COMMERCIAL

## 2023-03-14 ENCOUNTER — PATIENT MESSAGE (OUTPATIENT)
Dept: TRANSPLANT | Facility: CLINIC | Age: 58
End: 2023-03-14
Payer: COMMERCIAL

## 2023-03-14 RX ORDER — PREGABALIN 50 MG/1
50 CAPSULE ORAL 2 TIMES DAILY
Qty: 60 CAPSULE | Refills: 2 | Status: SHIPPED | OUTPATIENT
Start: 2023-03-14 | End: 2023-05-24 | Stop reason: SDUPTHER

## 2023-03-14 NOTE — TELEPHONE ENCOUNTER
Pt doing well overall. Will cancel clinic visit today. Pt aware that we can reschedule to Friday if things change. She is scheduled to see Dr. Regalado Monday 3/20/23.  ----- Message from Reema Powers MA sent at 3/14/2023  1:27 PM CDT -----  Regarding: Returning call  Contact: [phone]  Patient is returning a call. Patient states she is okay, but she is requesting to speak to coordinator.

## 2023-03-17 ENCOUNTER — TELEPHONE (OUTPATIENT)
Dept: PSYCHIATRY | Facility: CLINIC | Age: 58
End: 2023-03-17
Payer: COMMERCIAL

## 2023-03-20 ENCOUNTER — LAB VISIT (OUTPATIENT)
Dept: LAB | Facility: HOSPITAL | Age: 58
End: 2023-03-20
Attending: STUDENT IN AN ORGANIZED HEALTH CARE EDUCATION/TRAINING PROGRAM
Payer: COMMERCIAL

## 2023-03-20 ENCOUNTER — OFFICE VISIT (OUTPATIENT)
Dept: TRANSPLANT | Facility: CLINIC | Age: 58
End: 2023-03-20
Payer: COMMERCIAL

## 2023-03-20 VITALS
WEIGHT: 175.94 LBS | TEMPERATURE: 97 F | SYSTOLIC BLOOD PRESSURE: 124 MMHG | OXYGEN SATURATION: 97 % | RESPIRATION RATE: 16 BRPM | HEIGHT: 65 IN | DIASTOLIC BLOOD PRESSURE: 60 MMHG | BODY MASS INDEX: 29.31 KG/M2 | HEART RATE: 71 BPM

## 2023-03-20 DIAGNOSIS — Z94.4 S/P LIVER TRANSPLANT: ICD-10-CM

## 2023-03-20 DIAGNOSIS — Z94.4 LIVER TRANSPLANTED: Primary | ICD-10-CM

## 2023-03-20 DIAGNOSIS — Z79.60 LONG-TERM USE OF IMMUNOSUPPRESSANT MEDICATION: ICD-10-CM

## 2023-03-20 LAB
ALBUMIN SERPL BCP-MCNC: 4 G/DL (ref 3.5–5.2)
ALP SERPL-CCNC: 52 U/L (ref 55–135)
ALT SERPL W/O P-5'-P-CCNC: 14 U/L (ref 10–44)
ANION GAP SERPL CALC-SCNC: 7 MMOL/L (ref 8–16)
AST SERPL-CCNC: 15 U/L (ref 10–40)
BASOPHILS # BLD AUTO: 0.03 K/UL (ref 0–0.2)
BASOPHILS NFR BLD: 0.9 % (ref 0–1.9)
BILIRUB SERPL-MCNC: 1 MG/DL (ref 0.1–1)
BUN SERPL-MCNC: 13 MG/DL (ref 6–20)
CALCIUM SERPL-MCNC: 10.1 MG/DL (ref 8.7–10.5)
CHLORIDE SERPL-SCNC: 106 MMOL/L (ref 95–110)
CO2 SERPL-SCNC: 27 MMOL/L (ref 23–29)
CREAT SERPL-MCNC: 0.9 MG/DL (ref 0.5–1.4)
DIFFERENTIAL METHOD: ABNORMAL
EOSINOPHIL # BLD AUTO: 0.1 K/UL (ref 0–0.5)
EOSINOPHIL NFR BLD: 3 % (ref 0–8)
ERYTHROCYTE [DISTWIDTH] IN BLOOD BY AUTOMATED COUNT: 13.5 % (ref 11.5–14.5)
EST. GFR  (NO RACE VARIABLE): >60 ML/MIN/1.73 M^2
GLUCOSE SERPL-MCNC: 116 MG/DL (ref 70–110)
HCT VFR BLD AUTO: 40.1 % (ref 37–48.5)
HGB BLD-MCNC: 12.7 G/DL (ref 12–16)
IMM GRANULOCYTES # BLD AUTO: 0.02 K/UL (ref 0–0.04)
IMM GRANULOCYTES NFR BLD AUTO: 0.6 % (ref 0–0.5)
LYMPHOCYTES # BLD AUTO: 0.5 K/UL (ref 1–4.8)
LYMPHOCYTES NFR BLD: 14.7 % (ref 18–48)
MAGNESIUM SERPL-MCNC: 1.7 MG/DL (ref 1.6–2.6)
MCH RBC QN AUTO: 28 PG (ref 27–31)
MCHC RBC AUTO-ENTMCNC: 31.7 G/DL (ref 32–36)
MCV RBC AUTO: 89 FL (ref 82–98)
MONOCYTES # BLD AUTO: 0.3 K/UL (ref 0.3–1)
MONOCYTES NFR BLD: 8.1 % (ref 4–15)
NEUTROPHILS # BLD AUTO: 2.4 K/UL (ref 1.8–7.7)
NEUTROPHILS NFR BLD: 72.7 % (ref 38–73)
NRBC BLD-RTO: 0 /100 WBC
PLATELET # BLD AUTO: 101 K/UL (ref 150–450)
PMV BLD AUTO: 12.9 FL (ref 9.2–12.9)
POTASSIUM SERPL-SCNC: 4.9 MMOL/L (ref 3.5–5.1)
PROT SERPL-MCNC: 6.5 G/DL (ref 6–8.4)
RBC # BLD AUTO: 4.53 M/UL (ref 4–5.4)
SODIUM SERPL-SCNC: 140 MMOL/L (ref 136–145)
TACROLIMUS BLD-MCNC: 6.9 NG/ML (ref 5–15)
WBC # BLD AUTO: 3.33 K/UL (ref 3.9–12.7)

## 2023-03-20 PROCEDURE — 36415 COLL VENOUS BLD VENIPUNCTURE: CPT | Performed by: STUDENT IN AN ORGANIZED HEALTH CARE EDUCATION/TRAINING PROGRAM

## 2023-03-20 PROCEDURE — 99215 OFFICE O/P EST HI 40 MIN: CPT | Mod: 24,S$GLB,, | Performed by: STUDENT IN AN ORGANIZED HEALTH CARE EDUCATION/TRAINING PROGRAM

## 2023-03-20 PROCEDURE — 85025 COMPLETE CBC W/AUTO DIFF WBC: CPT | Performed by: STUDENT IN AN ORGANIZED HEALTH CARE EDUCATION/TRAINING PROGRAM

## 2023-03-20 PROCEDURE — 80053 COMPREHEN METABOLIC PANEL: CPT | Performed by: STUDENT IN AN ORGANIZED HEALTH CARE EDUCATION/TRAINING PROGRAM

## 2023-03-20 PROCEDURE — 99999 PR PBB SHADOW E&M-EST. PATIENT-LVL IV: ICD-10-PCS | Mod: PBBFAC,,, | Performed by: STUDENT IN AN ORGANIZED HEALTH CARE EDUCATION/TRAINING PROGRAM

## 2023-03-20 PROCEDURE — 83735 ASSAY OF MAGNESIUM: CPT | Performed by: STUDENT IN AN ORGANIZED HEALTH CARE EDUCATION/TRAINING PROGRAM

## 2023-03-20 PROCEDURE — 80197 ASSAY OF TACROLIMUS: CPT | Performed by: STUDENT IN AN ORGANIZED HEALTH CARE EDUCATION/TRAINING PROGRAM

## 2023-03-20 PROCEDURE — 99215 PR OFFICE/OUTPT VISIT, EST, LEVL V, 40-54 MIN: ICD-10-PCS | Mod: 24,S$GLB,, | Performed by: STUDENT IN AN ORGANIZED HEALTH CARE EDUCATION/TRAINING PROGRAM

## 2023-03-20 PROCEDURE — 99999 PR PBB SHADOW E&M-EST. PATIENT-LVL IV: CPT | Mod: PBBFAC,,, | Performed by: STUDENT IN AN ORGANIZED HEALTH CARE EDUCATION/TRAINING PROGRAM

## 2023-03-20 NOTE — Clinical Note
March 22, 2023                     [facility] 1st Fl  [address]  [facility] [address]  Phone: [phone]   Patient: Mickey Harris   MR Number: [id number]   YOB: 1965   Date of Visit: 3/20/2023       Dear      Thank you for referring Mickey Harris to me for evaluation. Attached you will find relevant portions of my assessment and plan of care.    If you have questions, please do not hesitate to call me. I look forward to following Mickey Harris along with you.    Sincerely,    Kg Regalado MD    Enclosure    If you would like to receive this communication electronically, please contact [email or web address] or [phone] to request [facility] access.    [facility] is a tool which provides read-only access to select patient information with whom you have a relationship. Its easy to use and provides real time access to review your patients record including encounter summaries, notes, results, and demographic information.    If you feel you have received this communication in error or would no longer like to receive these types of communications, please e-mail [email or web address]

## 2023-03-20 NOTE — PROGRESS NOTES
Transplant Hepatology  Liver Transplant Recipient Follow Up    PCP: Primary Doctor No    Transplant History  Transplant Date: 1/17/2023  UNOS Native Liver Dx: Cirrhosis: Fatty Liver (DORAN)    ORGAN: RIGHT LIVER LOBE (SEGS 5,6,7,8) WITHOUT MIDDLE HEPATIC VEIN  Whole or Partial: split liver  Donor Type: living  Biliary Anastomosis: angela-en-y  IVC reconstruction: hepatic vein confluence piggyback  Portal vein status: patent    She has had the following complications since transplant: bile leak    Chief complaint: follow up, history of OLT    HPI:  Mickey Harris is a 58 y.o. 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     This is her first hepatology visit since transplant. She was hospitalized earlier this month with diverticulitis.  Reports overall doing well since discharge though continues to have some abdominal pain.  No recent fever or chills.  She also reports ongoing neuropathy in her hands and feet.  Has had worsening dizziness since starting Lyrica.  Compliant with Prograf.  She does have intermittent headaches but denies recent nausea, vomiting, diarrhea, tremors.    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     Mild tricuspid regurgitation     Unspecified cirrhosis of liver        Past Surgical History:   Procedure Laterality Date    cholecystectomy  2003    CHOLEDOCHOJEJUNOSTOMY  1/24/2023    Procedure: CHOLEDOCHOJEJUNOSTOMY;  Surgeon: Mac Ayala MD;  Location: [address];  Service: Transplant;;    COLONOSCOPY      6 polyps removed    COLONOSCOPY  07/07/2021    DIAGNOSTIC ULTRASOUND N/A 1/17/2023    Procedure: ULTRASOUND, DIAGNOSTIC;  Surgeon: Juan Pablo Kimbrough MD;  Location: [facility] OR [address] [place] [address];  Service: Transplant;  Laterality: N/A;  INTRAOPERATIVE  ULTRASOUND    ESOPHAGOGASTRODUODENOSCOPY      2 coulums of grade 1 varices    ESOPHAGOGASTRODUODENOSCOPY  2019    trace varices    ESOPHAGOGASTRODUODENOSCOPY  2021    ESOPHAGOGASTRODUODENOSCOPY N/A 2022    Procedure: EGD (ESOPHAGOGASTRODUODENOSCOPY);  Surgeon: Bruce Dominguez MD;  Location: [facility] (4TH FLR);  Service: Endoscopy;  Laterality: N/A;  labs prior  liver transplant candidate  screen for varices   fully vaccinated; instructions to portal-LW   pt rescheduled; updated instructions to portal-st    gynecologic surgery       removal of scar tissues and adhesions    HYSTERECTOMY      knee surgery      LAPAROTOMY, EXPLORATORY N/A 2023    Procedure: LAPAROTOMY, EXPLORATORY;  Surgeon: Jordon Lamb MD;  Location: [facility] OR 2ND FLR;  Service: Transplant;  Laterality: N/A;    LAPAROTOMY, EXPLORATORY N/A 2023    Procedure: LAPAROTOMY, EXPLORATORY;  Surgeon: aMc Ayala MD;  Location: [facility] OR 2ND FLR;  Service: Transplant;  Laterality: N/A;    LIVER TRANSPLANT N/A 2023    Procedure: TRANSPLANT, LIVER;  Surgeon: Juan Pablo Kimbrough MD;  Location: [facility] OR [facility] FLR;  Service: Transplant;  Laterality: N/A;    OOPHORECTOMY Right     OPEN hysterectomy      removed uterus and bilateral fallopian tubes and LEFT ovary stayed in place    ROTATOR CUFF REPAIR Right     HOANG-EN-Y PROCEDURE  2023    Procedure: CREATION, ANASTOMOSIS, HOANG-EN-Y;  Surgeon: Juan Pablo Kimbrough MD;  [facility];  Service: Transplant;;    TONSILLECTOMY         Family History   Problem Relation Age of Onset    Hypertension Father     Hyperlipidemia Father     Heart disease Father     Depression Father     Arrhythmia Brother     Heart disease Brother        Social History     Tobacco Use    Smoking status: Former     Packs/day: 1.00     Types: Cigarettes     Quit date:      Years since quittin.2    Smokeless tobacco: Never   Substance Use Topics    Alcohol use:  Not Currently    Drug use: Not Currently       Current Outpatient Medications   Medication Sig Dispense Refill    aspirin (ECOTRIN) 81 MG EC tablet Take 1 tablet (81 mg total) by mouth once daily. 90 tablet 3    blood sugar diagnostic Strp To check BG 4 times daily and as needed, to use with insurance preferred meter 200 each 3    blood-glucose meter kit To check BG 4 times daily and as needed , to use with insurance preferred meter 1 each 0    calcium carbonate-vitamin D3 600 mg-20 mcg (800 unit) Tab Take 1 tablet by mouth once daily. 90 tablet 3    dapsone 100 MG Tab Take 1 tablet (100 mg total) by mouth once daily. STOP 7/17/23 30 tablet 5    lancets Misc To check BG 4 times daily and as needed, to use with insurance preferred meter 100 each 3    magnesium oxide (MAG-OX) 400 mg (241.3 mg magnesium) tablet Take 2 tablets (800 mg total) by mouth 2 (two) times daily. 360 tablet 3    oxyCODONE (ROXICODONE) 5 MG immediate release tablet Take 1 tablet (5 mg total) by mouth every 8 (eight) hours as needed for Pain. 28 tablet 0    polyethylene glycol (MIRALAX) 17 gram PwPk Take 17 g (1 packet) and dissolve in full glass of water to take by mouth once daily. 30 each 1    pregabalin (LYRICA) 50 MG capsule Take 1 capsule (50 mg total) by mouth 2 (two) times daily. 60 capsule 2    SITagliptin phosphate (JANUVIA) 100 MG Tab Take 1 tablet (100 mg total) by mouth once daily. 90 tablet 1    tacrolimus (PROGRAF) 1 MG Cap Take 2 capsules (2 mg total) by mouth every 12 (twelve) hours. 360 capsule 3    ursodioL (ACTIGALL) 250 mg Tab Take 1 tablet (250 mg total) by mouth 3 (three) times daily. 270 tablet 3    valGANciclovir (VALCYTE) 450 mg Tab Take 1 tablet (450 mg total) by mouth once daily. Stop 4/18/2023 62 tablet 0     No current facility-administered medications for this visit.       Review of patient's allergies indicates:   Allergen Reactions    Levofloxacin Hives and Shortness Of Breath    Sulfa (sulfonamide antibiotics)  "Rash       Review of Systems   Constitutional:  Negative for fever and weight loss.   Gastrointestinal:  Positive for abdominal pain. Negative for blood in stool, constipation, diarrhea, heartburn, melena, nausea and vomiting.   Neurological:  Positive for dizziness and headaches. Negative for tremors.     Vitals:    03/20/23 0832   BP: 124/60   Pulse: 71   Resp: 16   Temp: 97.3 °F (36.3 °C)   TempSrc: Temporal   SpO2: 97%   Weight: 79.8 kg (175 lb 14.8 oz)   Height: 5' 5" (1.651 m)       Physical Exam  Constitutional:       General: She is not in acute distress.  Eyes:      General: No scleral icterus.  Cardiovascular:      Rate and Rhythm: Normal rate and regular rhythm.   Pulmonary:      Effort: Pulmonary effort is normal. No respiratory distress.   Abdominal:      General: Bowel sounds are normal. There is no distension.      Palpations: Abdomen is soft.      Tenderness: There is no abdominal tenderness. There is no guarding or rebound.   Musculoskeletal:      Right lower leg: No edema.      Left lower leg: No edema.   Skin:     Coloration: Skin is not jaundiced.       LABS:  Lab Results   Component Value Date    WBC 3.33 (L) 03/13/2023    HGB 12.1 03/13/2023    HCT 38.4 03/13/2023    MCV 89 03/13/2023     03/13/2023       Lab Results   Component Value Date     03/13/2023    K 4.6 03/13/2023     03/13/2023    CO2 25 03/13/2023    BUN 9 03/13/2023    CREATININE 0.8 03/13/2023    CALCIUM 9.8 03/13/2023    ANIONGAP 7 (L) 03/13/2023    ESTGFRAFRICA >60.0 06/30/2022    EGFRNONAA >60.0 06/30/2022       Lab Results   Component Value Date    ALT 16 03/13/2023    AST 17 03/13/2023    GGT 38 06/30/2022    ALKPHOS 58 03/13/2023    BILITOT 0.9 03/13/2023       Lab Results   Component Value Date    TACROLIMUS 4.7 (L) 03/13/2023         Assessment:  58 y.o. 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    1. Liver transplanted    2. Long-term " use of immunosuppressant medication        Recommendations:  Allograft Function  - Excellent graft function with normal LFTs    Immunosuppression   - Continue Prograf 2mg twice daily. Consider switching to cyclosporine if neuropathy persists.  - Plan to restart MMF 1000mg twice daily once she completes antibiotics for diverticulitis    Kidney function   - Creatinine 0.8  - Avoid NSAIDs as able and maintain adequate hydration    Health Maintenance/Screening:  - Recommend age appropriate cancer screening  - Skin cancer: Recommend use of sunscreen SPF 30 or higher, hat and sunglasses while outside, dermatologist visit annually or sooner if any concerning lesions.  - Osteoporosis: Recommend bone density testing every 2-3 years if previously normal or annually if previously abnormal.    Return to clinic in 1 month.    UNOS Patient Status  Functional Status: 70% - Cares for self: unable to carry on normal activity or active work  Physical Capacity: No Limitations    I spent a total of 40 minutes on the day of the visit. This includes face to face time and non-face to face time preparing to see the patient (eg, review of tests), obtaining and/or reviewing separately obtained history, documenting clinical information in the electronic or other health record, independently interpreting results, and communicating results to the patient/family/caregiver, or care coordination.    Kg Regalado MD  Staff Physician  Hepatology and Liver Transplant  [facility]  [facility] [place]

## 2023-03-21 DIAGNOSIS — Z94.4 S/P LIVER TRANSPLANT: ICD-10-CM

## 2023-03-21 DIAGNOSIS — F41.9 ANXIETY: Primary | ICD-10-CM

## 2023-03-21 DIAGNOSIS — Z94.4 S/P LIVER TRANSPLANT: Primary | ICD-10-CM

## 2023-03-21 RX ORDER — MYCOPHENOLATE MOFETIL 250 MG/1
1000 CAPSULE ORAL 2 TIMES DAILY
Qty: 240 CAPSULE | Refills: 11 | Status: SHIPPED | OUTPATIENT
Start: 2023-03-21 | End: 2023-04-20 | Stop reason: DRUGHIGH

## 2023-03-22 ENCOUNTER — TELEPHONE (OUTPATIENT)
Dept: TRANSPLANT | Facility: CLINIC | Age: 58
End: 2023-03-22
Payer: COMMERCIAL

## 2023-03-22 DIAGNOSIS — Z94.4 S/P LIVER TRANSPLANT: Primary | ICD-10-CM

## 2023-03-23 ENCOUNTER — TELEPHONE (OUTPATIENT)
Dept: TRANSPLANT | Facility: CLINIC | Age: 58
End: 2023-03-23
Payer: COMMERCIAL

## 2023-03-23 NOTE — TELEPHONE ENCOUNTER
Pt aware and will repeat labs Monday 3/27/23.  ----- Message from Kg Regalado MD sent at 3/22/2023  8:23 PM CDT -----  Liver tests are stable. No changes in her immunosuppression. Please continue to monitor labs per transplant protocol.

## 2023-03-27 ENCOUNTER — LAB VISIT (OUTPATIENT)
Dept: LAB | Facility: HOSPITAL | Age: 58
End: 2023-03-27
Attending: STUDENT IN AN ORGANIZED HEALTH CARE EDUCATION/TRAINING PROGRAM
Payer: COMMERCIAL

## 2023-03-27 DIAGNOSIS — Z94.4 S/P LIVER TRANSPLANT: ICD-10-CM

## 2023-03-27 LAB
ALBUMIN SERPL BCP-MCNC: 4.3 G/DL (ref 3.5–5.2)
ALP SERPL-CCNC: 52 U/L (ref 55–135)
ALT SERPL W/O P-5'-P-CCNC: 17 U/L (ref 10–44)
ANION GAP SERPL CALC-SCNC: 9 MMOL/L (ref 8–16)
AST SERPL-CCNC: 17 U/L (ref 10–40)
BASOPHILS # BLD AUTO: 0.02 K/UL (ref 0–0.2)
BASOPHILS NFR BLD: 0.7 % (ref 0–1.9)
BILIRUB SERPL-MCNC: 1.1 MG/DL (ref 0.1–1)
BUN SERPL-MCNC: 18 MG/DL (ref 6–20)
CALCIUM SERPL-MCNC: 10.2 MG/DL (ref 8.7–10.5)
CHLORIDE SERPL-SCNC: 107 MMOL/L (ref 95–110)
CO2 SERPL-SCNC: 26 MMOL/L (ref 23–29)
CREAT SERPL-MCNC: 0.9 MG/DL (ref 0.5–1.4)
DIFFERENTIAL METHOD: ABNORMAL
EOSINOPHIL # BLD AUTO: 0.1 K/UL (ref 0–0.5)
EOSINOPHIL NFR BLD: 3.3 % (ref 0–8)
ERYTHROCYTE [DISTWIDTH] IN BLOOD BY AUTOMATED COUNT: 14 % (ref 11.5–14.5)
EST. GFR  (NO RACE VARIABLE): >60 ML/MIN/1.73 M^2
GLUCOSE SERPL-MCNC: 115 MG/DL (ref 70–110)
HCT VFR BLD AUTO: 41.7 % (ref 37–48.5)
HGB BLD-MCNC: 13.1 G/DL (ref 12–16)
IMM GRANULOCYTES # BLD AUTO: 0.01 K/UL (ref 0–0.04)
IMM GRANULOCYTES NFR BLD AUTO: 0.4 % (ref 0–0.5)
LYMPHOCYTES # BLD AUTO: 0.6 K/UL (ref 1–4.8)
LYMPHOCYTES NFR BLD: 22.9 % (ref 18–48)
MCH RBC QN AUTO: 27.6 PG (ref 27–31)
MCHC RBC AUTO-ENTMCNC: 31.4 G/DL (ref 32–36)
MCV RBC AUTO: 88 FL (ref 82–98)
MONOCYTES # BLD AUTO: 0.2 K/UL (ref 0.3–1)
MONOCYTES NFR BLD: 8.9 % (ref 4–15)
NEUTROPHILS # BLD AUTO: 1.7 K/UL (ref 1.8–7.7)
NEUTROPHILS NFR BLD: 63.8 % (ref 38–73)
NRBC BLD-RTO: 0 /100 WBC
PLATELET # BLD AUTO: 106 K/UL (ref 150–450)
PMV BLD AUTO: 12.5 FL (ref 9.2–12.9)
POTASSIUM SERPL-SCNC: 5 MMOL/L (ref 3.5–5.1)
PROT SERPL-MCNC: 7.2 G/DL (ref 6–8.4)
RBC # BLD AUTO: 4.75 M/UL (ref 4–5.4)
SODIUM SERPL-SCNC: 142 MMOL/L (ref 136–145)
TACROLIMUS BLD-MCNC: 6.2 NG/ML (ref 5–15)
WBC # BLD AUTO: 2.71 K/UL (ref 3.9–12.7)

## 2023-03-27 PROCEDURE — 80197 ASSAY OF TACROLIMUS: CPT | Performed by: STUDENT IN AN ORGANIZED HEALTH CARE EDUCATION/TRAINING PROGRAM

## 2023-03-27 PROCEDURE — 36415 COLL VENOUS BLD VENIPUNCTURE: CPT | Performed by: STUDENT IN AN ORGANIZED HEALTH CARE EDUCATION/TRAINING PROGRAM

## 2023-03-27 PROCEDURE — 85025 COMPLETE CBC W/AUTO DIFF WBC: CPT | Performed by: STUDENT IN AN ORGANIZED HEALTH CARE EDUCATION/TRAINING PROGRAM

## 2023-03-27 PROCEDURE — 80053 COMPREHEN METABOLIC PANEL: CPT | Performed by: STUDENT IN AN ORGANIZED HEALTH CARE EDUCATION/TRAINING PROGRAM

## 2023-03-28 ENCOUNTER — PATIENT MESSAGE (OUTPATIENT)
Dept: TRANSPLANT | Facility: CLINIC | Age: 58
End: 2023-03-28
Payer: COMMERCIAL

## 2023-03-28 ENCOUNTER — TELEPHONE (OUTPATIENT)
Dept: PSYCHIATRY | Facility: CLINIC | Age: 58
End: 2023-03-28
Payer: COMMERCIAL

## 2023-03-28 ENCOUNTER — TELEPHONE (OUTPATIENT)
Dept: TRANSPLANT | Facility: CLINIC | Age: 58
End: 2023-03-28
Payer: COMMERCIAL

## 2023-03-28 NOTE — TELEPHONE ENCOUNTER
Called pt per her request.  She states she is cancelling her psych apt. She will f/u with her psychologist in [place].  Recommended she send disability paperwork to Medical Records.    She is reporting -   Severe neuropathy that not getting better. The Lyrica is not helping.She is having dizzy spells. She cannot walk long distances without feeling weak. She is having headaches daily. She is having nausea. She relates all this to the tacrolimus. Discussed with pt that I will discuss with Dr. Regalado and call her back with his recommendations.

## 2023-03-28 NOTE — TELEPHONE ENCOUNTER
Returned patient's call. Answered questions about mental health care. Patient stated that she is not interested in Psychiatry at this time. Patient currently lives in [place] and plans to relocate to [place] in approx. 1 month. Patient decided to look for counseling/psychotherapy services in [place], near home.

## 2023-03-30 ENCOUNTER — TELEPHONE (OUTPATIENT)
Dept: PSYCHIATRY | Facility: CLINIC | Age: 58
End: 2023-03-30
Payer: COMMERCIAL

## 2023-04-05 ENCOUNTER — TELEPHONE (OUTPATIENT)
Dept: TRANSPLANT | Facility: CLINIC | Age: 58
End: 2023-04-05
Payer: COMMERCIAL

## 2023-04-05 NOTE — TELEPHONE ENCOUNTER
Pt aware. Repeat labs pending from 4/3/23.  ----- Message from Kg Regalado MD sent at 4/3/2023 12:01 PM CDT -----  Liver tests are stable.

## 2023-04-07 LAB
ALBUMIN SERPL-MCNC: 4.5 G/DL (ref 3.8–4.9)
ALBUMIN/GLOB SERPL: 2.4 {RATIO} (ref 1.2–2.2)
ALP SERPL-CCNC: 50 IU/L (ref 44–121)
ALT SERPL-CCNC: 21 IU/L (ref 0–32)
AST SERPL-CCNC: 18 IU/L (ref 0–40)
BASOPHILS # BLD AUTO: 0 X10E3/UL (ref 0–0.2)
BASOPHILS NFR BLD AUTO: 1 %
BILIRUB SERPL-MCNC: 1.4 MG/DL (ref 0–1.2)
BUN SERPL-MCNC: 16 MG/DL (ref 6–24)
BUN/CREAT SERPL: 14 (ref 9–23)
CALCIUM SERPL-MCNC: 9.7 MG/DL (ref 8.7–10.2)
CHLORIDE SERPL-SCNC: 104 MMOL/L (ref 96–106)
CO2 SERPL-SCNC: 25 MMOL/L (ref 20–29)
CREAT SERPL-MCNC: 1.15 MG/DL (ref 0.57–1)
EOSINOPHIL # BLD AUTO: 0.1 X10E3/UL (ref 0–0.4)
EOSINOPHIL NFR BLD AUTO: 3 %
ERYTHROCYTE [DISTWIDTH] IN BLOOD BY AUTOMATED COUNT: 13.9 % (ref 11.7–15.4)
EST. GFR  (NO RACE VARIABLE): 55 ML/MIN/1.73
GLOBULIN SER CALC-MCNC: 1.9 G/DL (ref 1.5–4.5)
GLUCOSE SERPL-MCNC: 88 MG/DL (ref 70–99)
HCT VFR BLD AUTO: 38.5 % (ref 34–46.6)
HGB BLD-MCNC: 12.2 G/DL (ref 11.1–15.9)
IMM GRANULOCYTES # BLD AUTO: 0 X10E3/UL (ref 0–0.1)
IMM GRANULOCYTES NFR BLD AUTO: 0 %
LYMPHOCYTES # BLD AUTO: 0.6 X10E3/UL (ref 0.7–3.1)
LYMPHOCYTES NFR BLD AUTO: 23 %
MAGNESIUM SERPL-MCNC: 2 MG/DL (ref 1.6–2.3)
MCH RBC QN AUTO: 27.8 PG (ref 26.6–33)
MCHC RBC AUTO-ENTMCNC: 31.7 G/DL (ref 31.5–35.7)
MCV RBC AUTO: 88 FL (ref 79–97)
MONOCYTES # BLD AUTO: 0.3 X10E3/UL (ref 0.1–0.9)
MONOCYTES NFR BLD AUTO: 10 %
MORPHOLOGY BLD-IMP: ABNORMAL
NEUTROPHILS # BLD AUTO: 1.6 X10E3/UL (ref 1.4–7)
NEUTROPHILS NFR BLD AUTO: 63 %
PLATELET # BLD AUTO: 97 X10E3/UL (ref 150–450)
POTASSIUM SERPL-SCNC: 4.7 MMOL/L (ref 3.5–5.2)
PROT SERPL-MCNC: 6.4 G/DL (ref 6–8.5)
RBC # BLD AUTO: 4.39 X10E6/UL (ref 3.77–5.28)
SODIUM SERPL-SCNC: 141 MMOL/L (ref 134–144)
TACROLIMUS BLD LC/MS/MS-MCNC: 6.3 NG/ML (ref 2–20)
WBC # BLD AUTO: 2.7 X10E3/UL (ref 3.4–10.8)

## 2023-04-10 ENCOUNTER — TELEPHONE (OUTPATIENT)
Dept: TRANSPLANT | Facility: CLINIC | Age: 58
End: 2023-04-10
Payer: COMMERCIAL

## 2023-04-10 ENCOUNTER — PATIENT MESSAGE (OUTPATIENT)
Dept: TRANSPLANT | Facility: CLINIC | Age: 58
End: 2023-04-10
Payer: COMMERCIAL

## 2023-04-10 LAB
EXT ALBUMIN: 4.5
EXT ALKALINE PHOSPHATASE: 50
EXT ALT: 21
EXT AST: 18
EXT BASOPHIL%: 1
EXT BILIRUBIN TOTAL: 1.4
EXT BUN: 16
EXT CALCIUM: 9.7
EXT CHLORIDE: 104
EXT CO2: 25
EXT CREATININE: 1.15 MG/DL
EXT EGFR NO RACE VARIABLE: 55
EXT EOSINOPHIL%: 3
EXT GLUCOSE: 88
EXT HEMATOCRIT: 38.5
EXT HEMOGLOBIN: 12.2
EXT LYMPH%: 23
EXT MAGNESIUM: 2
EXT MONOCYTES%: 10
EXT PLATELETS: 97
EXT POTASSIUM: 4.7
EXT PROTEIN TOTAL: 6.4
EXT SEGS%: 63
EXT SODIUM: 141 MMOL/L
EXT TACROLIMUS LVL: 6.3
EXT WBC: 2.7

## 2023-04-10 NOTE — TELEPHONE ENCOUNTER
----- Message from Kg Regalado MD sent at 4/10/2023  9:07 AM CDT -----  Ok thanks for the update  ----- Message -----  From: Danya Cason RN  Sent: 4/4/2023   2:14 PM CDT  To: Kg Regalado MD    Just FYI - she did not want to switch to cyclosporine this week. She wants to think about it.    Repeat labs from yesterday pending.    Danya    ----- Message -----  From: Digna Phillips, PharmROBER  Sent: 4/3/2023  12:49 PM CDT  To: Danya Cason RN    Yeah, I think once she starts cyclosporine, she can stop tacrolimus - and I would check a first level ~5 days or so after starting cyclosporine.    ----- Message -----  From: Danya Cason RN  Sent: 4/3/2023  12:38 PM CDT  To: Digna Phillips, Dayanna, Kg Regalado MD    When she starts cyclosporine, she stops tacrolimus right? No overlap?  ----- Message -----  From: Digna Phillips PharmD  Sent: 4/3/2023  12:37 PM CDT  To: Danya Cason RN, Kg Regalado MD    I think that's a reasonable starting dose!    ----- Message -----  From: Kg Regalado MD  Sent: 4/3/2023  12:05 PM CDT  To: Danya Cason RN, #    Let's try switching tac to csa and see if that helps her symptoms. She takes tac 2/2 so I would start with csa 100mg BID. Including pharmacy to see if they agree or recommend a different dose.  ----- Message -----  From: Danya Cason RN  Sent: 3/28/2023   3:42 PM CDT  To: Kg Regalado MD    She is having issues with severe neuropathy. She is also having dizzy spills. She is having abdominal pain. She said she can't walk long distances.     Should she switch to rapamune? She sounds miserable.

## 2023-04-14 LAB
EXT ALBUMIN: 4.8
EXT ALKALINE PHOSPHATASE: 50
EXT ALT: 19
EXT AST: 18
EXT BASOPHIL%: 1
EXT BILIRUBIN TOTAL: 1.5
EXT BUN: 16
EXT CALCIUM: 9.2
EXT CHLORIDE: 105
EXT CO2: 21
EXT CREATININE: 0.92 MG/DL
EXT EGFR NO RACE VARIABLE: 72
EXT EOSINOPHIL%: 3
EXT GLUCOSE: 100
EXT HEMATOCRIT: 36
EXT HEMOGLOBIN: 11.3
EXT LYMPH%: 26
EXT MAGNESIUM: 1.8
EXT MONOCYTES%: 10
EXT PLATELETS: 93
EXT POTASSIUM: 4.3
EXT PROTEIN TOTAL: 6.5
EXT SEGS%: 59
EXT SODIUM: 142 MMOL/L
EXT TACROLIMUS LVL: 3.5
EXT WBC: 2

## 2023-04-17 LAB
EXT ABO: NORMAL
EXT AFP: NORMAL
EXT ALBUMIN: 4.8
EXT ALCOHOL, MEDICAL, SERUM: NORMAL
EXT ALKALINE PHOSPHATASE: 50
EXT ALT: 19
EXT AMMONIA LEVEL: NORMAL
EXT AMYLASE: NORMAL
EXT AST: 18
EXT BACTERIA UA: NORMAL
EXT BASOPHIL%: 1
EXT BILIRUBIN DIRECT: NORMAL
EXT BILIRUBIN TOTAL: 1.5
EXT BK VIRUS DNA QN PCR: NORMAL
EXT BK VIRUS DNA QUANT, PCR, URINE: NORMAL
EXT BLOOD (STOOL): NORMAL
EXT BLOOD CULTURE, ROUTINE: NORMAL
EXT BLOOD CULTURE: NORMAL
EXT BLOOD FUNGUS: NORMAL
EXT BLOOD WITH ANAEROBE: NORMAL
EXT BUN: 16
EXT CALCIUM: 9.2
EXT CDIFF (STOOL): NORMAL
EXT CHLORIDE: 105
EXT CHOLESTEROL: NORMAL
EXT CMV ANTIGENEMIA: NORMAL
EXT CMV DNA QUANT. BY PCR: NORMAL
EXT CO2: 21
EXT CREATININE UA: NORMAL
EXT CREATININE: 0.92 MG/DL
EXT CULTURE (STOOL): NORMAL
EXT CULTURE, BODY FLUID - BACTEC: NORMAL
EXT CYCLOSPORONE LEVEL: NORMAL
EXT DRUGS OF ABUSE CONFIRMATION: NORMAL
EXT EBV DNA BY PCR: NORMAL
EXT EBV DNA-COPIES/ML: NORMAL
EXT EBV IGG: NORMAL
EXT EBV IGM: NORMAL
EXT EGFR NO RACE VARIABLE: 72
EXT EOSINOPHIL%: 3
EXT ERYTHROPOIETIN: NORMAL
EXT FERRITIN: NORMAL
EXT FOLATE: NORMAL
EXT FUNGAL (STOOL): NORMAL
EXT G6PD QUAL: NORMAL
EXT GFR MDRD AF AMER: NORMAL
EXT GFR MDRD NON AF AMER: NORMAL
EXT GGT: NORMAL
EXT GLUCOSE: 100
EXT HBV DNA QUANT PCR: NORMAL
EXT HBV DNA, QUALITATIVE PCR: NORMAL
EXT HCV QUANT: NORMAL
EXT HDL: NORMAL
EXT HEMATOCRIT: 36
EXT HEMOGLOBIN A1C: NORMAL
EXT HEMOGLOBIN: 11.3
EXT HEP B S AB: NORMAL
EXT HEP B S AG: NORMAL
EXT HIV RNA QUANT PCR: NORMAL
EXT HIV: NORMAL
EXT IMMUNKNOW (NON-STIMULATED): NORMAL
EXT IMMUNKNOW (STIMULATED): NORMAL
EXT INR: NORMAL
EXT IRON SATURATION: NORMAL
EXT LDL CHOLESTEROL: NORMAL
EXT LIPASE: NORMAL
EXT LYMPH%: 26
EXT MAGNESIUM: 1.8
EXT MONOCYTES%: 10
EXT O&P (STOOL): NORMAL
EXT PHOSPHORUS: NORMAL
EXT PLATELETS: 93
EXT POTASSIUM: 4.3
EXT PREALBUMIN: NORMAL
EXT PROT/CREAT RATIO UR: NORMAL
EXT PROTEIN TOTAL: 6.5
EXT PROTEIN UA: NORMAL
EXT PT: NORMAL
EXT PTH, INTACT: NORMAL
EXT PTT: NORMAL
EXT RBC UA: NORMAL
EXT RETICULOCYTE COUNT: NORMAL
EXT SARS COV-2 (COVID-19): NORMAL
EXT SEGS%: 59
EXT SIROLIMUS LVL: NORMAL
EXT SODIUM: 142 MMOL/L
EXT TACROLIMUS LVL: 3.5
EXT TIBC: NORMAL
EXT TRIGLYCERIDES: NORMAL
EXT UNSATURATED IRON BINDING CAP.: NORMAL
EXT URIC ACID: NORMAL
EXT VIT D 1 25 DIHYDROXY: NORMAL
EXT VIT D 25 HYDROXY: NORMAL
EXT WBC (STOOL): NORMAL
EXT WBC UA: NORMAL
EXT WBC: 2

## 2023-04-18 ENCOUNTER — PATIENT MESSAGE (OUTPATIENT)
Dept: TRANSPLANT | Facility: CLINIC | Age: 58
End: 2023-04-18
Payer: COMMERCIAL

## 2023-04-18 DIAGNOSIS — Z94.4 LIVER TRANSPLANTED: ICD-10-CM

## 2023-04-18 NOTE — TELEPHONE ENCOUNTER
Pt advised via portal of dose change and repeat labs needed.  ----- Message from Kg Regalado MD sent at 4/18/2023  3:40 PM CDT -----  Liver tests are stable. Please increase tac to 3/2.

## 2023-04-19 RX ORDER — TACROLIMUS 1 MG/1
CAPSULE ORAL
Qty: 150 CAPSULE | Refills: 11 | Status: SHIPPED | OUTPATIENT
Start: 2023-04-19 | End: 2023-04-20 | Stop reason: DRUGHIGH

## 2023-04-20 ENCOUNTER — TELEPHONE (OUTPATIENT)
Dept: TRANSPLANT | Facility: CLINIC | Age: 58
End: 2023-04-20
Payer: COMMERCIAL

## 2023-04-20 DIAGNOSIS — Z94.4 S/P LIVER TRANSPLANT: ICD-10-CM

## 2023-04-20 DIAGNOSIS — Z94.4 LIVER TRANSPLANTED: ICD-10-CM

## 2023-04-20 LAB
EXT ALBUMIN: 4.6
EXT ALKALINE PHOSPHATASE: 52
EXT ALT: 25
EXT AST: 19
EXT BASOPHIL%: 1
EXT BILIRUBIN TOTAL: 1.3
EXT BUN: 20
EXT CALCIUM: 9.3
EXT CHLORIDE: 107
EXT CO2: 23
EXT CREATININE: 0.83 MG/DL
EXT EOSINOPHIL%: 4
EXT GLUCOSE: 95
EXT HEMATOCRIT: 37.4
EXT HEMOGLOBIN: 11.6
EXT LYMPH%: 29
EXT MAGNESIUM: 2.1
EXT MONOCYTES%: 13
EXT PLATELETS: 110
EXT POTASSIUM: 5.2
EXT PROTEIN TOTAL: 6.4
EXT SEGS%: 52
EXT SODIUM: 145 MMOL/L
EXT TACROLIMUS LVL: 4.5
EXT WBC: 1.7

## 2023-04-20 RX ORDER — TACROLIMUS 1 MG/1
CAPSULE ORAL
Qty: 90 CAPSULE | Refills: 11 | Status: SHIPPED | OUTPATIENT
Start: 2023-04-20 | End: 2023-05-03 | Stop reason: DRUGHIGH

## 2023-04-20 RX ORDER — MYCOPHENOLATE MOFETIL 250 MG/1
500 CAPSULE ORAL 2 TIMES DAILY
Qty: 120 CAPSULE | Refills: 11 | Status: SHIPPED | OUTPATIENT
Start: 2023-04-20 | End: 2023-06-14 | Stop reason: SDUPTHER

## 2023-04-23 NOTE — PROGRESS NOTES
Transplant Hepatology  Liver Transplant Recipient Follow Up    PCP: Primary Doctor No    Transplant History  Transplant Date: 1/17/2023  UNOS Native Liver Dx: Cirrhosis: Fatty Liver (DORAN)    ORGAN: RIGHT LIVER LOBE (SEGS 5,6,7,8) WITHOUT MIDDLE HEPATIC VEIN  Whole or Partial: split liver  Donor Type: living  Biliary Anastomosis: angela-en-y  IVC reconstruction: hepatic vein confluence piggyback  Portal vein status: patent    She has had the following complications since transplant: bile leak    Chief complaint: follow up, history of OLT    HPI:  Mickey Harris is a 58 y.o. 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     4/24/23:  No recent hospitalizations or ED visits.  She does report ongoing neuropathy and dizziness as well as abdominal pain, nausea, headaches.  Compliant with Prograf and CellCept.  She denies recent fever, vomiting, diarrhea, tremors.    3/20/23: This is her first hepatology visit since transplant. She was hospitalized earlier this month with diverticulitis.  Reports overall doing well since discharge though continues to have some abdominal pain.  No recent fever or chills.  She also reports ongoing neuropathy in her hands and feet.  Has had worsening dizziness since starting Lyrica.  Compliant with Prograf.  She does have intermittent headaches but denies recent nausea, vomiting, diarrhea, tremors.    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     Mild tricuspid regurgitation     Unspecified cirrhosis of liver        Past Surgical History:   Procedure Laterality Date    cholecystectomy  2003    CHOLEDOCHOJEJUNOSTOMY  1/24/2023    Procedure: CHOLEDOCHOJEJUNOSTOMY;  Surgeon: Mac Ayala MD;  Location: [facility] OR [address] [place] [address];  Service: Transplant;;     COLONOSCOPY      6 polyps removed    COLONOSCOPY  07/07/2021    DIAGNOSTIC ULTRASOUND N/A 1/17/2023    Procedure: ULTRASOUND, DIAGNOSTIC;  Surgeon: Juan Pablo Kimbrough MD;  Location: [address];  Service: Transplant;  Laterality: N/A;  INTRAOPERATIVE ULTRASOUND    ESOPHAGOGASTRODUODENOSCOPY      2 coulums of grade 1 varices    ESOPHAGOGASTRODUODENOSCOPY  11/01/2019    trace varices    ESOPHAGOGASTRODUODENOSCOPY  07/13/2021    ESOPHAGOGASTRODUODENOSCOPY N/A 9/20/2022    Procedure: EGD (ESOPHAGOGASTRODUODENOSCOPY);  Surgeon: Bruce Dominguez MD;  Location: [facility] ENDO (4TH FLR);  Service: Endoscopy;  Laterality: N/A;  labs prior  liver transplant candidate  screen for varices  8/18 fully vaccinated; instructions to portal-LW  8/19 pt rescheduled; updated instructions to portal-st    gynecologic surgery       removal of scar tissues and adhesions    HYSTERECTOMY  1989    knee surgery  1992    LAPAROTOMY, EXPLORATORY N/A 1/23/2023    Procedure: LAPAROTOMY, EXPLORATORY;  Surgeon: Jordon Lamb MD;  [address];  Service: Transplant;  Laterality: N/A;    LAPAROTOMY, EXPLORATORY N/A 1/24/2023    Procedure: LAPAROTOMY, EXPLORATORY;  Surgeon: Mac Ayala MD;  [address];  Service: Transplant;  Laterality: N/A;    LIVER TRANSPLANT N/A 1/17/2023    Procedure: TRANSPLANT, LIVER;  Surgeon: Juan Pablo Kimbrough MD;  Location: [address];  Service: Transplant;  Laterality: N/A;    OOPHORECTOMY Right 1982    OPEN hysterectomy  1989    removed uterus and bilateral fallopian tubes and LEFT ovary stayed in place    ROTATOR CUFF REPAIR Right 2001    HOANG-EN-Y PROCEDURE  1/17/2023    Procedure: CREATION, ANASTOMOSIS, HOANG-EN-Y;  Surgeon: Juan Pablo Kimbrough MD;  Location: [address];  Service: Transplant;;    TONSILLECTOMY  1972       Family History   Problem Relation Age of Onset    Hypertension Father     Hyperlipidemia Father     Heart disease Father     Depression Father     Arrhythmia Brother      Heart disease Brother        Social History     Tobacco Use    Smoking status: Former     Packs/day: 1.00     Types: Cigarettes     Quit date:      Years since quittin.3    Smokeless tobacco: Never   Substance Use Topics    Alcohol use: Not Currently    Drug use: Not Currently       Current Outpatient Medications   Medication Sig Dispense Refill    aspirin (ECOTRIN) 81 MG EC tablet Take 1 tablet (81 mg total) by mouth once daily. 90 tablet 3    blood sugar diagnostic Strp To check BG 4 times daily and as needed, to use with insurance preferred meter 200 each 3    blood-glucose meter kit To check BG 4 times daily and as needed , to use with insurance preferred meter 1 each 0    calcium carbonate-vitamin D3 600 mg-20 mcg (800 unit) Tab Take 1 tablet by mouth once daily. 90 tablet 3    dapsone 100 MG Tab Take 1 tablet (100 mg total) by mouth once daily. STOP 23 30 tablet 5    lancets Misc To check BG 4 times daily and as needed, to use with insurance preferred meter 100 each 3    magnesium oxide (MAG-OX) 400 mg (241.3 mg magnesium) tablet Take 2 tablets (800 mg total) by mouth 2 (two) times daily. 360 tablet 3    mycophenolate (CELLCEPT) 250 mg Cap Take 2 capsules (500 mg total) by mouth 2 (two) times daily. 120 capsule 11    pregabalin (LYRICA) 50 MG capsule Take 1 capsule (50 mg total) by mouth 2 (two) times daily. 60 capsule 2    SITagliptin phosphate (JANUVIA) 100 MG Tab Take 1 tablet (100 mg total) by mouth once daily. 90 tablet 1    tacrolimus (PROGRAF) 1 MG Cap Take 2 capsules (2 mg total) by mouth every morning AND 1 capsule (1 mg total) every evening. 90 capsule 11    ursodioL (ACTIGALL) 250 mg Tab Take 1 tablet (250 mg total) by mouth 3 (three) times daily. 270 tablet 3    oxyCODONE (ROXICODONE) 5 MG immediate release tablet Take 1 tablet (5 mg total) by mouth every 8 (eight) hours as needed for Pain. 28 tablet 0    polyethylene glycol (MIRALAX) 17 gram PwPk Take 17 g (1 packet) and dissolve  "in full glass of water to take by mouth once daily. 30 each 1     No current facility-administered medications for this visit.       Review of patient's allergies indicates:   Allergen Reactions    Levofloxacin Hives and Shortness Of Breath    Sulfa (sulfonamide antibiotics) Rash       Review of Systems   Constitutional:  Negative for fever and weight loss.   Gastrointestinal:  Positive for abdominal pain. Negative for blood in stool, constipation, diarrhea, heartburn, melena, nausea and vomiting.   Neurological:  Positive for dizziness and headaches. Negative for tremors.     Vitals:    04/24/23 0941   BP: 118/62   Pulse: 73   Resp: 16   Temp: 97.3 °F (36.3 °C)   TempSrc: Skin   SpO2: 95%   Weight: 79.3 kg (174 lb 13.2 oz)   Height: 5' 5" (1.651 m)         Physical Exam  Constitutional:       General: She is not in acute distress.  Eyes:      General: No scleral icterus.  Cardiovascular:      Rate and Rhythm: Normal rate and regular rhythm.   Pulmonary:      Effort: Pulmonary effort is normal. No respiratory distress.   Abdominal:      General: Bowel sounds are normal. There is no distension.      Palpations: Abdomen is soft.      Tenderness: There is no abdominal tenderness. There is no guarding or rebound.   Musculoskeletal:      Right lower leg: No edema.      Left lower leg: No edema.   Skin:     Coloration: Skin is not jaundiced.       LABS:  Lab Results   Component Value Date    WBC 1.57 (LL) 04/24/2023    HGB 11.8 (L) 04/24/2023    HCT 37.9 04/24/2023    MCV 91 04/24/2023     (L) 04/24/2023       Lab Results   Component Value Date     04/24/2023    K 4.4 04/24/2023     04/24/2023    CO2 25 04/24/2023    BUN 17 04/24/2023    CREATININE 0.9 04/24/2023    CALCIUM 9.2 04/24/2023    ANIONGAP 7 (L) 04/24/2023    ESTGFRAFRICA >60.0 06/30/2022    EGFRNONAA >60.0 06/30/2022       Lab Results   Component Value Date    ALT 28 04/24/2023    AST 19 04/24/2023    GGT 38 06/30/2022    ALKPHOS 49 (L) " 04/24/2023    BILITOT 1.1 (H) 04/24/2023       Lab Results   Component Value Date    TACROLIMUS 8.4 04/24/2023         Assessment:  58 y.o. 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    1. Liver transplanted    2. Long-term use of immunosuppressant medication        Recommendations:  Allograft Function  - Excellent graft function with normal LFTs    Immunosuppression   - Continue Prograf 2mg in AM and 1mg in PM. Consider switching to cyclosporine if neuropathy persists or worsens. She does not wish to switch at this time.  - Hold CellCept given neutropenia    Kidney function   - Creatinine 0.9  - Avoid NSAIDs as able and maintain adequate hydration    Health Maintenance/Screening:  - Recommend age appropriate cancer screening  - Skin cancer: Recommend use of sunscreen SPF 30 or higher, hat and sunglasses while outside, dermatologist visit annually or sooner if any concerning lesions.  - Osteoporosis: Recommend bone density testing every 2-3 years if previously normal or annually if previously abnormal.    Return to clinic in 3 months.    UNOS Patient Status  Functional Status: 70% - Cares for self: unable to carry on normal activity or active work  Physical Capacity: No Limitations    I spent a total of 30 minutes on the day of the visit. This includes face to face time and non-face to face time preparing to see the patient (eg, review of tests), obtaining and/or reviewing separately obtained history, documenting clinical information in the electronic or other health record, independently interpreting results, and communicating results to the patient/family/caregiver, or care coordination.    Kg Regalado MD  Staff Physician  Hepatology and Liver Transplant  [facility]  [facility] [place]

## 2023-04-24 ENCOUNTER — TELEPHONE (OUTPATIENT)
Dept: TRANSPLANT | Facility: CLINIC | Age: 58
End: 2023-04-24
Payer: COMMERCIAL

## 2023-04-24 ENCOUNTER — OFFICE VISIT (OUTPATIENT)
Dept: TRANSPLANT | Facility: CLINIC | Age: 58
End: 2023-04-24
Payer: COMMERCIAL

## 2023-04-24 ENCOUNTER — TELEPHONE (OUTPATIENT)
Dept: TRANSPLANT | Facility: CLINIC | Age: 58
End: 2023-04-24

## 2023-04-24 ENCOUNTER — PATIENT MESSAGE (OUTPATIENT)
Dept: TRANSPLANT | Facility: CLINIC | Age: 58
End: 2023-04-24

## 2023-04-24 ENCOUNTER — LAB VISIT (OUTPATIENT)
Dept: LAB | Facility: HOSPITAL | Age: 58
End: 2023-04-24
Attending: STUDENT IN AN ORGANIZED HEALTH CARE EDUCATION/TRAINING PROGRAM
Payer: COMMERCIAL

## 2023-04-24 VITALS
OXYGEN SATURATION: 95 % | SYSTOLIC BLOOD PRESSURE: 118 MMHG | HEIGHT: 65 IN | HEART RATE: 73 BPM | RESPIRATION RATE: 16 BRPM | BODY MASS INDEX: 29.12 KG/M2 | DIASTOLIC BLOOD PRESSURE: 62 MMHG | WEIGHT: 174.81 LBS | TEMPERATURE: 97 F

## 2023-04-24 DIAGNOSIS — Z94.4 LIVER TRANSPLANTED: Primary | ICD-10-CM

## 2023-04-24 DIAGNOSIS — Z94.4 S/P LIVER TRANSPLANT: ICD-10-CM

## 2023-04-24 DIAGNOSIS — Z79.60 LONG-TERM USE OF IMMUNOSUPPRESSANT MEDICATION: ICD-10-CM

## 2023-04-24 LAB
ALBUMIN SERPL BCP-MCNC: 4.1 G/DL (ref 3.5–5.2)
ALP SERPL-CCNC: 49 U/L (ref 55–135)
ALT SERPL W/O P-5'-P-CCNC: 28 U/L (ref 10–44)
ANION GAP SERPL CALC-SCNC: 7 MMOL/L (ref 8–16)
AST SERPL-CCNC: 19 U/L (ref 10–40)
BASOPHILS NFR BLD: 1 % (ref 0–1.9)
BILIRUB SERPL-MCNC: 1.1 MG/DL (ref 0.1–1)
BUN SERPL-MCNC: 17 MG/DL (ref 6–20)
CALCIUM SERPL-MCNC: 9.2 MG/DL (ref 8.7–10.5)
CHLORIDE SERPL-SCNC: 109 MMOL/L (ref 95–110)
CO2 SERPL-SCNC: 25 MMOL/L (ref 23–29)
CREAT SERPL-MCNC: 0.9 MG/DL (ref 0.5–1.4)
DACRYOCYTES BLD QL SMEAR: ABNORMAL
DIFFERENTIAL METHOD: ABNORMAL
EOSINOPHIL NFR BLD: 0 % (ref 0–8)
ERYTHROCYTE [DISTWIDTH] IN BLOOD BY AUTOMATED COUNT: 15.3 % (ref 11.5–14.5)
EST. GFR  (NO RACE VARIABLE): >60 ML/MIN/1.73 M^2
GLUCOSE SERPL-MCNC: 94 MG/DL (ref 70–110)
HCT VFR BLD AUTO: 37.9 % (ref 37–48.5)
HGB BLD-MCNC: 11.8 G/DL (ref 12–16)
IMM GRANULOCYTES # BLD AUTO: ABNORMAL K/UL (ref 0–0.04)
IMM GRANULOCYTES NFR BLD AUTO: ABNORMAL % (ref 0–0.5)
LYMPHOCYTES NFR BLD: 33 % (ref 18–48)
MCH RBC QN AUTO: 28.2 PG (ref 27–31)
MCHC RBC AUTO-ENTMCNC: 31.1 G/DL (ref 32–36)
MCV RBC AUTO: 91 FL (ref 82–98)
MONOCYTES NFR BLD: 17 % (ref 4–15)
NEUTROPHILS NFR BLD: 49 % (ref 38–73)
NRBC BLD-RTO: 0 /100 WBC
PLATELET # BLD AUTO: 100 K/UL (ref 150–450)
PLATELET BLD QL SMEAR: ABNORMAL
PMV BLD AUTO: 12.3 FL (ref 9.2–12.9)
POIKILOCYTOSIS BLD QL SMEAR: SLIGHT
POTASSIUM SERPL-SCNC: 4.4 MMOL/L (ref 3.5–5.1)
PROT SERPL-MCNC: 6.2 G/DL (ref 6–8.4)
RBC # BLD AUTO: 4.18 M/UL (ref 4–5.4)
SODIUM SERPL-SCNC: 141 MMOL/L (ref 136–145)
TACROLIMUS BLD-MCNC: 8.4 NG/ML (ref 5–15)
WBC # BLD AUTO: 1.57 K/UL (ref 3.9–12.7)

## 2023-04-24 PROCEDURE — 99214 PR OFFICE/OUTPT VISIT, EST, LEVL IV, 30-39 MIN: ICD-10-PCS | Mod: 24,S$GLB,, | Performed by: STUDENT IN AN ORGANIZED HEALTH CARE EDUCATION/TRAINING PROGRAM

## 2023-04-24 PROCEDURE — 85027 COMPLETE CBC AUTOMATED: CPT | Performed by: STUDENT IN AN ORGANIZED HEALTH CARE EDUCATION/TRAINING PROGRAM

## 2023-04-24 PROCEDURE — 99999 PR PBB SHADOW E&M-EST. PATIENT-LVL IV: CPT | Mod: PBBFAC,,, | Performed by: STUDENT IN AN ORGANIZED HEALTH CARE EDUCATION/TRAINING PROGRAM

## 2023-04-24 PROCEDURE — 85007 BL SMEAR W/DIFF WBC COUNT: CPT | Performed by: STUDENT IN AN ORGANIZED HEALTH CARE EDUCATION/TRAINING PROGRAM

## 2023-04-24 PROCEDURE — 80197 ASSAY OF TACROLIMUS: CPT | Performed by: STUDENT IN AN ORGANIZED HEALTH CARE EDUCATION/TRAINING PROGRAM

## 2023-04-24 PROCEDURE — 99999 PR PBB SHADOW E&M-EST. PATIENT-LVL IV: ICD-10-PCS | Mod: PBBFAC,,, | Performed by: STUDENT IN AN ORGANIZED HEALTH CARE EDUCATION/TRAINING PROGRAM

## 2023-04-24 PROCEDURE — 99214 OFFICE O/P EST MOD 30 MIN: CPT | Mod: 24,S$GLB,, | Performed by: STUDENT IN AN ORGANIZED HEALTH CARE EDUCATION/TRAINING PROGRAM

## 2023-04-24 PROCEDURE — 36415 COLL VENOUS BLD VENIPUNCTURE: CPT | Performed by: STUDENT IN AN ORGANIZED HEALTH CARE EDUCATION/TRAINING PROGRAM

## 2023-04-24 PROCEDURE — 80053 COMPREHEN METABOLIC PANEL: CPT | Performed by: STUDENT IN AN ORGANIZED HEALTH CARE EDUCATION/TRAINING PROGRAM

## 2023-04-24 NOTE — TELEPHONE ENCOUNTER
Sent a message to patient to let her know wbc low and will need to repeat labs and may need a Neupogen shot.    ----- Message from Kg Regalado MD sent at 4/24/2023  1:01 PM CDT -----  Holding mmf due to neutropenia. Repeat labs later this week ideally at an [facility] facility so that we get results quickly. If ANC drops further, may need neupogen before she returns to [place] this weekend. Return 3 months.

## 2023-04-24 NOTE — TELEPHONE ENCOUNTER
----- Message from Rodger Chen sent at 4/24/2023 12:11 PM CDT -----  Regarding: Reschedule Ultrasound  Patient called in for assistance with rescheduling an ultrasound she has scheduled for today. Patient states it just popped up and she is not prepared for it. Requesting a call back to assist further.                       Contact: [phone]

## 2023-04-24 NOTE — Clinical Note
Holding mmf due to neutropenia. Repeat labs later this week ideally at an [facility] facility so that we get results quickly. If ANC drops further, may need neupogen before she returns to [place] this weekend. Return 3 months.

## 2023-04-24 NOTE — TELEPHONE ENCOUNTER
Patient states she could not make 2:00 ultrasound appointment because she's tied up from 3-4:00 requested for something after 4:00 today nothing after 4:00 today was available. Offered patient tomorrow for 1:15 patient denied and states she will be leaving tonight to go back home , states she will message coordinator about getting a ultrasound done close to her this week.

## 2023-04-24 NOTE — Clinical Note
April 24, 2023                     [facility] 1st [facility]  [address]  [facility] [address]  Phone: [phone]   Patient: Mickye Harris   MR Number: [id number]   YOB: 1965   Date of Visit: 4/24/2023       Dear      Thank you for referring Mickey Harris to me for evaluation. Attached you will find relevant portions of my assessment and plan of care.    If you have questions, please do not hesitate to call me. I look forward to following Mickey Harris along with you.    Sincerely,    Kg Regalado MD    Enclosure    If you would like to receive this communication electronically, please contact [email or web address] or [phone] to request [employer] access.    [employer] is a tool which provides read-only access to select patient information with whom you have a relationship. Its easy to use and provides real time access to review your patients record including encounter summaries, notes, results, and demographic information.    If you feel you have received this communication in error or would no longer like to receive these types of communications, please e-mail [email or web address]

## 2023-04-25 ENCOUNTER — TELEPHONE (OUTPATIENT)
Dept: TRANSPLANT | Facility: CLINIC | Age: 58
End: 2023-04-25
Payer: COMMERCIAL

## 2023-04-25 NOTE — TELEPHONE ENCOUNTER
Pt states she will have labs at [facility]. She understands that Dr. Regalado would prefer an [facility] facility. Advised pt that if she needs Neupogen, her only option would be to come here Friday as we cannot get Neupogen that fast for outpatient use.  ----- Message from Kg Regalado MD sent at 4/24/2023  1:01 PM CDT -----  Holding mmf due to neutropenia. Repeat labs later this week ideally at an [facility] facility so that we get results quickly. If ANC drops further, may need neupogen before she returns to [place] this weekend. Return 3 months.

## 2023-04-26 DIAGNOSIS — Z94.4 S/P LIVER TRANSPLANT: ICD-10-CM

## 2023-04-27 RX ORDER — ACETAMINOPHEN 500 MG
1 TABLET ORAL DAILY
Qty: 90 TABLET | Refills: 3 | Status: SHIPPED | OUTPATIENT
Start: 2023-04-27 | End: 2024-04-26

## 2023-04-28 ENCOUNTER — TELEPHONE (OUTPATIENT)
Dept: TRANSPLANT | Facility: CLINIC | Age: 58
End: 2023-04-28
Payer: COMMERCIAL

## 2023-04-28 ENCOUNTER — PATIENT MESSAGE (OUTPATIENT)
Dept: TRANSPLANT | Facility: CLINIC | Age: 58
End: 2023-04-28
Payer: COMMERCIAL

## 2023-04-28 LAB
EXT BANDS%: 0
EXT BASOPHIL%: 0
EXT EOSINOPHIL%: 2
EXT HEMATOCRIT: 38.3
EXT HEMOGLOBIN: 12.3
EXT LYMPH%: 39
EXT MONOCYTES%: 8
EXT PLATELETS: 98
EXT SEGS%: 51
EXT WBC: 2

## 2023-05-02 ENCOUNTER — PATIENT MESSAGE (OUTPATIENT)
Dept: TRANSPLANT | Facility: CLINIC | Age: 58
End: 2023-05-02
Payer: COMMERCIAL

## 2023-05-02 LAB
EXT ALBUMIN: 4.4
EXT ALKALINE PHOSPHATASE: 49
EXT ALT: 23
EXT AST: 21
EXT BASOPHIL%: 0.9
EXT BILIRUBIN TOTAL: 1.2
EXT BUN: 21
EXT CALCIUM: 9.1
EXT CHLORIDE: 109
EXT CO2: 23
EXT CREATININE: 0.88 MG/DL
EXT EGFR NO RACE VARIABLE: 76
EXT EOSINOPHIL%: 1.4
EXT GLUCOSE: 84
EXT HEMATOCRIT: 37.1
EXT HEMOGLOBIN: 11.6
EXT LYMPH%: 28.9
EXT MONOCYTES%: 14.2
EXT PLATELETS: 103
EXT POTASSIUM: 4
EXT PROTEIN TOTAL: 6
EXT SEGS%: 54.6
EXT SODIUM: 140 MMOL/L
EXT TACROLIMUS LVL: 4.7
EXT WBC: 2.2

## 2023-05-03 ENCOUNTER — PATIENT MESSAGE (OUTPATIENT)
Dept: TRANSPLANT | Facility: CLINIC | Age: 58
End: 2023-05-03
Payer: COMMERCIAL

## 2023-05-03 ENCOUNTER — TELEPHONE (OUTPATIENT)
Dept: TRANSPLANT | Facility: CLINIC | Age: 58
End: 2023-05-03
Payer: COMMERCIAL

## 2023-05-03 DIAGNOSIS — Z94.4 LIVER TRANSPLANTED: ICD-10-CM

## 2023-05-03 RX ORDER — TACROLIMUS 1 MG/1
CAPSULE ORAL
Qty: 120 CAPSULE | Refills: 11 | Status: SHIPPED | OUTPATIENT
Start: 2023-05-03 | End: 2023-06-14 | Stop reason: SDUPTHER

## 2023-05-03 NOTE — TELEPHONE ENCOUNTER
Pt advised of dose change  ----- Message from Kg Regalado MD sent at 5/3/2023  8:57 AM CDT -----  Liver tests are stable. Please increase tac to 2/2.

## 2023-05-03 NOTE — TELEPHONE ENCOUNTER
Pt notified via portal of stable labs and that no medication changes are needed. Repeat labs due 5/8/23 per protocol.   ----- Message from Kg Regalado MD sent at 5/3/2023  8:13 AM CDT -----  Liver tests are stable. No changes in her immunosuppression. Please continue to monitor labs per transplant protocol.

## 2023-05-11 LAB
EXT ALBUMIN: 4.5
EXT ALKALINE PHOSPHATASE: 54
EXT ALT: 24
EXT AST: 22
EXT BASOPHIL%: 0.8
EXT BILIRUBIN TOTAL: 1.3
EXT BUN: 30
EXT CALCIUM: 9.5
EXT CHLORIDE: 108
EXT CO2: 24
EXT CREATININE: 0.85 MG/DL
EXT EOSINOPHIL%: 1.5
EXT GLUCOSE: 98
EXT HEMATOCRIT: 37.5
EXT HEMOGLOBIN: 12
EXT LYMPH%: 26
EXT MAGNESIUM: 2.1
EXT MONOCYTES%: 12.8
EXT PLATELETS: 96
EXT POTASSIUM: 5
EXT PROTEIN TOTAL: 6.5
EXT SEGS%: 58.9
EXT SODIUM: 140 MMOL/L
EXT TACROLIMUS LVL: 6.9
EXT WBC: 2.7

## 2023-05-14 ENCOUNTER — PATIENT MESSAGE (OUTPATIENT)
Dept: TRANSPLANT | Facility: CLINIC | Age: 58
End: 2023-05-14
Payer: COMMERCIAL

## 2023-05-23 ENCOUNTER — TELEPHONE (OUTPATIENT)
Dept: TRANSPLANT | Facility: CLINIC | Age: 58
End: 2023-05-23
Payer: COMMERCIAL

## 2023-05-23 NOTE — TELEPHONE ENCOUNTER
Returned call to Melodie in Radiology at [facility] in [place]. She states she her facility doesn't have the capability to do a post transplant ultrasound with doppler. She states she attempted to image the liver but the patient had pain and could not tolerate the probe. She is asking if office wants to change order to abdomen limited or liver limited ultrasound. Will review pt's liver coordinator and transplant hepatologist to see what is advised.       ----- Message from Shanna Mueller sent at 5/23/2023  3:32 PM CDT -----  Regarding: Orders              Name of Caller: Melodie         Contact Preference: [phone]         Nature of Call: Requesting a call back states orders could not be completed would need orders revised or reversed would like to discuss

## 2023-05-24 ENCOUNTER — PATIENT MESSAGE (OUTPATIENT)
Dept: TRANSPLANT | Facility: CLINIC | Age: 58
End: 2023-05-24
Payer: COMMERCIAL

## 2023-05-24 DIAGNOSIS — Z94.4 S/P LIVER TRANSPLANT: ICD-10-CM

## 2023-05-24 DIAGNOSIS — G62.9 NEUROPATHY: ICD-10-CM

## 2023-05-24 RX ORDER — PREGABALIN 50 MG/1
50 CAPSULE ORAL 2 TIMES DAILY
Qty: 60 CAPSULE | Refills: 2 | Status: SHIPPED | OUTPATIENT
Start: 2023-05-24 | End: 2023-06-14 | Stop reason: SDUPTHER

## 2023-05-24 RX ORDER — DAPSONE 100 MG/1
100 TABLET ORAL DAILY
Qty: 30 TABLET | Refills: 5 | Status: SHIPPED | OUTPATIENT
Start: 2023-05-24 | End: 2023-06-14 | Stop reason: SDUPTHER

## 2023-05-25 ENCOUNTER — PATIENT MESSAGE (OUTPATIENT)
Dept: TRANSPLANT | Facility: CLINIC | Age: 58
End: 2023-05-25
Payer: COMMERCIAL

## 2023-05-25 LAB
EXT ALBUMIN: 4.4
EXT ALKALINE PHOSPHATASE: 53
EXT ALT: 21
EXT AST: 20
EXT BASOPHIL%: 0.7
EXT BILIRUBIN TOTAL: 1.2
EXT BUN: 34
EXT CALCIUM: 9.4
EXT CHLORIDE: 111
EXT CO2: 23
EXT CREATININE: 1.01 MG/DL
EXT EOSINOPHIL%: 2.2
EXT GLUCOSE: 90
EXT HEMATOCRIT: 38.8
EXT HEMOGLOBIN: 12
EXT LYMPH%: 23.9
EXT MAGNESIUM: 2.1
EXT MONOCYTES%: 7.6
EXT PLATELETS: NORMAL
EXT POTASSIUM: 4.6
EXT PROTEIN TOTAL: 6.2
EXT SEGS%: 65.6
EXT SODIUM: 141 MMOL/L
EXT TACROLIMUS LVL: 8.2
EXT WBC: 2.8

## 2023-05-31 LAB
EXT BASOPHIL%: 0.6
EXT EOSINOPHIL%: 1.8
EXT HEMATOCRIT: 41.9
EXT HEMOGLOBIN: 12.9
EXT LYMPH%: 19.9
EXT MONOCYTES%: 7.6
EXT PLATELETS: 85
EXT SEGS%: 70.1
EXT WBC: 3.4

## 2023-06-05 ENCOUNTER — PATIENT MESSAGE (OUTPATIENT)
Dept: TRANSPLANT | Facility: CLINIC | Age: 58
End: 2023-06-05
Payer: COMMERCIAL

## 2023-06-06 ENCOUNTER — TELEPHONE (OUTPATIENT)
Dept: TRANSPLANT | Facility: CLINIC | Age: 58
End: 2023-06-06
Payer: COMMERCIAL

## 2023-06-14 DIAGNOSIS — Z94.4 LIVER TRANSPLANTED: ICD-10-CM

## 2023-06-14 DIAGNOSIS — I48.91 ATRIAL FIBRILLATION: ICD-10-CM

## 2023-06-14 DIAGNOSIS — G62.9 NEUROPATHY: ICD-10-CM

## 2023-06-14 DIAGNOSIS — R73.9 HYPERGLYCEMIA: ICD-10-CM

## 2023-06-14 DIAGNOSIS — Z94.4 S/P LIVER TRANSPLANT: ICD-10-CM

## 2023-06-15 RX ORDER — ASPIRIN 81 MG/1
81 TABLET ORAL DAILY
Qty: 90 TABLET | Refills: 3 | Status: SHIPPED | OUTPATIENT
Start: 2023-06-15 | End: 2024-06-14

## 2023-06-15 RX ORDER — PREGABALIN 50 MG/1
50 CAPSULE ORAL 2 TIMES DAILY
Qty: 180 CAPSULE | Refills: 3 | Status: SHIPPED | OUTPATIENT
Start: 2023-06-15 | End: 2024-06-14

## 2023-06-15 RX ORDER — DAPSONE 100 MG/1
100 TABLET ORAL DAILY
Qty: 30 TABLET | Refills: 1 | Status: SHIPPED | OUTPATIENT
Start: 2023-06-15 | End: 2024-06-14

## 2023-06-15 RX ORDER — URSODIOL 250 MG/1
250 TABLET, FILM COATED ORAL 3 TIMES DAILY
Qty: 270 TABLET | Refills: 3 | Status: SHIPPED | OUTPATIENT
Start: 2023-06-15 | End: 2024-03-24

## 2023-06-15 RX ORDER — MYCOPHENOLATE MOFETIL 250 MG/1
500 CAPSULE ORAL 2 TIMES DAILY
Qty: 360 CAPSULE | Refills: 3 | Status: SHIPPED | OUTPATIENT
Start: 2023-06-15 | End: 2024-06-14

## 2023-06-15 RX ORDER — TACROLIMUS 1 MG/1
CAPSULE ORAL
Qty: 360 CAPSULE | Refills: 3 | Status: SHIPPED | OUTPATIENT
Start: 2023-06-15 | End: 2023-06-21 | Stop reason: DRUGHIGH

## 2023-06-16 LAB
EXT ALBUMIN: 4.2
EXT ALKALINE PHOSPHATASE: 55
EXT ALT: 18
EXT AST: 17
EXT BASOPHIL%: 0.8
EXT BILIRUBIN TOTAL: 1.3
EXT BUN: 35
EXT CALCIUM: 9.2
EXT CHLORIDE: 110
EXT CO2: 25
EXT CREATININE: 1.05 MG/DL
EXT EOSINOPHIL%: 1.2
EXT GLUCOSE: 112
EXT HEMATOCRIT: 34.4
EXT HEMOGLOBIN: 10.8
EXT LYMPH%: 22.5
EXT MAGNESIUM: 1.7
EXT MONOCYTES%: 8.6
EXT PLATELETS: 70
EXT POTASSIUM: 4.9
EXT PROTEIN TOTAL: 4.2
EXT SEGS%: 66.9
EXT SODIUM: 142 MMOL/L
EXT TACROLIMUS LVL: 7.9
EXT WBC: 2.4

## 2023-06-21 ENCOUNTER — PATIENT MESSAGE (OUTPATIENT)
Dept: TRANSPLANT | Facility: CLINIC | Age: 58
End: 2023-06-21
Payer: COMMERCIAL

## 2023-06-21 DIAGNOSIS — Z94.4 LIVER TRANSPLANTED: ICD-10-CM

## 2023-06-21 RX ORDER — TACROLIMUS 1 MG/1
CAPSULE ORAL
Qty: 270 CAPSULE | Refills: 3 | Status: SHIPPED | OUTPATIENT
Start: 2023-06-21 | End: 2023-08-17

## 2023-06-21 NOTE — TELEPHONE ENCOUNTER
Pt advised of dose change and need to repeat labs 7/3/23.  ----- Message from Kg Regalado MD sent at 6/21/2023 12:09 PM CDT -----  Liver tests are stable. Please decrease tac to 2/1.

## 2023-07-05 ENCOUNTER — TELEPHONE (OUTPATIENT)
Dept: TRANSPLANT | Facility: CLINIC | Age: 58
End: 2023-07-05
Payer: COMMERCIAL

## 2023-07-06 LAB
EXT ALBUMIN: 4.6
EXT ALKALINE PHOSPHATASE: 57
EXT ALT: 19
EXT AST: 20
EXT BASOPHIL%: 0.9
EXT BILIRUBIN TOTAL: 2
EXT BUN: 27
EXT CALCIUM: 9.8
EXT CHLORIDE: 109
EXT CO2: 25
EXT CREATININE: 1.13 MG/DL
EXT EGFR NO RACE VARIABLE: 56
EXT EOSINOPHIL%: 1.9
EXT GLUCOSE: 90
EXT HEMATOCRIT: 10.8
EXT HEMOGLOBIN: 33.7
EXT LYMPH%: 23.8
EXT MAGNESIUM: 2.3
EXT MONOCYTES%: 10.3
EXT PLATELETS: 80
EXT POTASSIUM: 4.9
EXT PROTEIN TOTAL: 6.4
EXT SEGS%: 63.1
EXT SODIUM: 142 MMOL/L
EXT TACROLIMUS LVL: 6.2
EXT WBC: 2.1

## 2023-07-12 ENCOUNTER — TELEPHONE (OUTPATIENT)
Dept: TRANSPLANT | Facility: CLINIC | Age: 58
End: 2023-07-12
Payer: COMMERCIAL

## 2023-07-12 ENCOUNTER — PATIENT MESSAGE (OUTPATIENT)
Dept: TRANSPLANT | Facility: CLINIC | Age: 58
End: 2023-07-12
Payer: COMMERCIAL

## 2023-07-12 NOTE — TELEPHONE ENCOUNTER
Pt notified via portal of stable labs and that no medication changes are needed. Repeat labs due 7/31/23 per protocol.   ----- Message from Kg Regalado MD sent at 7/12/2023  7:41 AM CDT -----  Liver tests are stable. No changes in her immunosuppression. Please continue to monitor labs per transplant protocol.

## 2023-07-19 LAB
EXT ALBUMIN: 4.4
EXT ALKALINE PHOSPHATASE: 55
EXT ALT: 19
EXT AST: 18
EXT BASOPHIL%: 0.7
EXT BILIRUBIN TOTAL: 1.4
EXT BUN: 24
EXT CALCIUM: 9.6
EXT CHLORIDE: 107
EXT CO2: 26
EXT CREATININE: 1 MG/DL
EXT EOSINOPHIL%: 1.1
EXT GLUCOSE: 95
EXT HEMATOCRIT: 34.8
EXT HEMOGLOBIN: 11.1
EXT LYMPH%: 22.9
EXT MONOCYTES%: 9.2
EXT PLATELETS: 78
EXT POTASSIUM: 4.9
EXT PROTEIN TOTAL: 6.4
EXT SEGS%: 66.1
EXT SODIUM: 140 MMOL/L
EXT TACROLIMUS LVL: 5.1
EXT WBC: 2.7

## 2023-07-21 ENCOUNTER — TELEPHONE (OUTPATIENT)
Dept: TRANSPLANT | Facility: CLINIC | Age: 58
End: 2023-07-21
Payer: COMMERCIAL

## 2023-07-21 ENCOUNTER — PATIENT MESSAGE (OUTPATIENT)
Dept: TRANSPLANT | Facility: CLINIC | Age: 58
End: 2023-07-21
Payer: COMMERCIAL

## 2023-07-21 NOTE — TELEPHONE ENCOUNTER
Sent message to let patient know labs stable to get labs on 7/31/23 as previously scheduled.    ----- Message from Kg Regalado MD sent at 7/20/2023  7:42 PM CDT -----  Liver tests are stable. No changes in her immunosuppression. Please continue to monitor labs per transplant protocol.

## 2023-07-24 ENCOUNTER — PATIENT MESSAGE (OUTPATIENT)
Dept: TRANSPLANT | Facility: CLINIC | Age: 58
End: 2023-07-24
Payer: COMMERCIAL

## 2023-07-25 ENCOUNTER — TELEPHONE (OUTPATIENT)
Dept: TRANSPLANT | Facility: CLINIC | Age: 58
End: 2023-07-25
Payer: COMMERCIAL

## 2023-07-25 ENCOUNTER — PATIENT MESSAGE (OUTPATIENT)
Dept: TRANSPLANT | Facility: CLINIC | Age: 58
End: 2023-07-25
Payer: COMMERCIAL

## 2023-07-25 NOTE — TELEPHONE ENCOUNTER
Pt sent [employer] message that she is not returning to see transplant team and will transfer her care to [place].     Notified Dr. Regalado and requested he contact pt directly to discuss with pt.

## 2023-07-26 ENCOUNTER — TELEPHONE (OUTPATIENT)
Dept: TRANSPLANT | Facility: CLINIC | Age: 58
End: 2023-07-26
Payer: COMMERCIAL

## 2023-07-26 NOTE — TELEPHONE ENCOUNTER
Discussed transfer of care with Dr. Kimbrough. Per MD, he requested I call patient to discuss us following her until she is one year post for reporting purposes only. Per MD, patient ok to seek transplant care at a transplant center that will accept her.    Called pt to discuss. No answer. LVM requesting return call.

## 2023-08-03 ENCOUNTER — PATIENT MESSAGE (OUTPATIENT)
Dept: TRANSPLANT | Facility: CLINIC | Age: 58
End: 2023-08-03
Payer: COMMERCIAL

## 2023-08-09 LAB
EXT ALBUMIN: 4.6
EXT ALKALINE PHOSPHATASE: 69
EXT ALT: 16
EXT AST: 18
EXT BILIRUBIN TOTAL: 1
EXT BUN: 29
EXT CALCIUM: 9.6
EXT CHLORIDE: 105
EXT CO2: 26
EXT CREATININE: 1.2 MG/DL
EXT EGFR NO RACE VARIABLE: 52
EXT GLUCOSE: 96
EXT MAGNESIUM: 2
EXT POTASSIUM: 4.2
EXT PROTEIN TOTAL: 6.6
EXT SODIUM: 138 MMOL/L
EXT TACROLIMUS LVL: 10.3

## 2023-08-17 ENCOUNTER — PATIENT MESSAGE (OUTPATIENT)
Dept: TRANSPLANT | Facility: CLINIC | Age: 58
End: 2023-08-17
Payer: COMMERCIAL

## 2023-08-17 DIAGNOSIS — Z94.4 LIVER TRANSPLANTED: ICD-10-CM

## 2023-08-17 NOTE — TELEPHONE ENCOUNTER
Pt advised of dose change since labs were a true trough. Requested repeat labs 8/28/23.  ----- Message from Kg Regalado MD sent at 8/16/2023  8:49 PM CDT -----  Liver tests are stable. Tac high. Did she take prior to labs? If not, please decrease tac to 1/1 and repeat labs in 2 weeks and then monthly x2 to monitor for rejection.

## 2023-08-18 RX ORDER — TACROLIMUS 1 MG/1
CAPSULE ORAL
Qty: 180 CAPSULE | Refills: 3 | Status: SHIPPED | OUTPATIENT
Start: 2023-08-18 | End: 2023-10-03 | Stop reason: DRUGHIGH

## 2023-09-06 LAB
EXT ALBUMIN: 4.2
EXT ALKALINE PHOSPHATASE: 46
EXT ALT: 14
EXT AST: 12
EXT BILIRUBIN TOTAL: 1
EXT BUN: 23
EXT CALCIUM: 9.6
EXT CHLORIDE: 104
EXT CO2: 28
EXT CREATININE: 0.95 MG/DL
EXT GLUCOSE: 84
EXT POTASSIUM: 4.2
EXT PROTEIN TOTAL: 6.4
EXT SODIUM: 139 MMOL/L
EXT TACROLIMUS LVL: 4.3

## 2023-09-11 ENCOUNTER — TELEPHONE (OUTPATIENT)
Dept: TRANSPLANT | Facility: CLINIC | Age: 58
End: 2023-09-11
Payer: COMMERCIAL

## 2023-09-11 NOTE — TELEPHONE ENCOUNTER
Liver ultrasound report reviewed with Dr. Regalado via email -   Reviewed. Liver and blood vessels appear normal.

## 2023-09-11 NOTE — TELEPHONE ENCOUNTER
Contacted Ervin. Acknowledged receipt of ultrasound copy.  ----- Message from Rahul Palencia MA sent at 9/11/2023  1:57 PM CDT -----  Regarding: FW: Ultrasound Report    ----- Message -----  From: Lziabeth Puentes  Sent: 9/11/2023  11:19 AM CDT  To: [facility] Post-Liver Transplant Non-Clinical  Subject: Ultrasound Report                                    Name Of Caller:    Ervin lon/ [facility]      Contact Preference:   [phone]      Nature of call:   Mr Mueller wants to confirm whether the office received the pt's ultrasound report. It was faxed over to Danya.

## 2023-09-14 ENCOUNTER — PATIENT MESSAGE (OUTPATIENT)
Dept: TRANSPLANT | Facility: CLINIC | Age: 58
End: 2023-09-14
Payer: COMMERCIAL

## 2023-09-14 ENCOUNTER — TELEPHONE (OUTPATIENT)
Dept: TRANSPLANT | Facility: CLINIC | Age: 58
End: 2023-09-14
Payer: COMMERCIAL

## 2023-09-14 NOTE — TELEPHONE ENCOUNTER
Pt notified via portal of stable labs and that no medication changes are needed. Repeat labs due 10/2/23 per protocol.   ----- Message from Kg Regalado MD sent at 9/13/2023  5:01 PM CDT -----  Liver tests are stable. No changes in her immunosuppression. Please continue to monitor labs per transplant protocol.

## 2023-09-26 LAB
EXT ALBUMIN: 4.4
EXT ALKALINE PHOSPHATASE: 71
EXT ALT: 26
EXT AST: 21
EXT BASOPHIL%: 0.7
EXT BILIRUBIN DIRECT: 0.3 MG/DL
EXT BILIRUBIN TOTAL: 1.2
EXT BUN: 16
EXT CALCIUM: 9.9
EXT CHLORIDE: 106
EXT CMV DNA QUANT. BY PCR: DETECTED
EXT CO2: 30
EXT CREATININE: 1.08 MG/DL
EXT EGFR NO RACE VARIABLE: 59
EXT EOSINOPHIL%: 1.3
EXT GGT: 24
EXT GLUCOSE: 99
EXT HEMATOCRIT: 38.9
EXT HEMOGLOBIN: 13
EXT LYMPH%: 14.2
EXT MONOCYTES%: 11.8
EXT PLATELETS: 103
EXT POTASSIUM: 5
EXT PROTEIN TOTAL: 7.3
EXT SEGS%: 71.6
EXT SODIUM: 143 MMOL/L
EXT TACROLIMUS LVL: 3.4
EXT WBC: 4.6

## 2023-10-03 ENCOUNTER — PATIENT MESSAGE (OUTPATIENT)
Dept: TRANSPLANT | Facility: CLINIC | Age: 58
End: 2023-10-03
Payer: COMMERCIAL

## 2023-10-03 DIAGNOSIS — Z94.4 LIVER TRANSPLANTED: ICD-10-CM

## 2023-10-03 RX ORDER — TACROLIMUS 1 MG/1
CAPSULE ORAL
Qty: 270 CAPSULE | Refills: 3 | Status: SHIPPED | OUTPATIENT
Start: 2023-10-03 | End: 2023-12-12 | Stop reason: DRUGHIGH

## 2023-10-03 NOTE — TELEPHONE ENCOUNTER
Pt advised of dose change and repeat labs needed.  She is following up with [facility] next week.  ----- Message from Kg Regalado MD sent at 10/3/2023  2:18 PM CDT -----  Liver tests are stable. Please increase tac to 2/1 and repeat labs in 2-3 weeks.

## 2023-10-06 NOTE — SUBJECTIVE & OBJECTIVE
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     Mild tricuspid regurgitation     Unspecified cirrhosis of liver        Past Surgical History:   Procedure Laterality Date    cholecystectomy  2003    CHOLEDOCHOJEJUNOSTOMY  1/24/2023    Procedure: CHOLEDOCHOJEJUNOSTOMY;  Surgeon: Mac yAala MD;  [facility];  Service: Transplant;;    COLONOSCOPY      6 polyps removed    COLONOSCOPY  07/07/2021    DIAGNOSTIC ULTRASOUND N/A 1/17/2023    Procedure: ULTRASOUND, DIAGNOSTIC;  Surgeon: Juan Pablo Kimbrough MD;  Location: [facility] OR [address] [place] [address];  Service: Transplant;  Laterality: N/A;  INTRAOPERATIVE ULTRASOUND    ESOPHAGOGASTRODUODENOSCOPY      2 coulums of grade 1 varices    ESOPHAGOGASTRODUODENOSCOPY  11/01/2019    trace varices    ESOPHAGOGASTRODUODENOSCOPY  07/13/2021    ESOPHAGOGASTRODUODENOSCOPY N/A 9/20/2022    Procedure: EGD (ESOPHAGOGASTRODUODENOSCOPY);  Surgeon: Bruce Dominguez MD;  Location: [facility] (4TH FLR);  Service: Endoscopy;  Laterality: N/A;  labs prior  liver transplant candidate  screen for varices  8/18 fully vaccinated; instructions to portal-LW  8/19 pt rescheduled; updated instructions to portal-st    gynecologic surgery       removal of scar tissues and adhesions    HYSTERECTOMY  1989    knee surgery  1992    LAPAROTOMY, EXPLORATORY N/A 1/23/2023    Procedure: LAPAROTOMY, EXPLORATORY;  Surgeon: Jordon Lamb MD;  [address];  Service: Transplant;  Laterality: N/A;    LAPAROTOMY, EXPLORATORY N/A 1/24/2023    Procedure: LAPAROTOMY, EXPLORATORY;  Surgeon: Mac Ayala MD;  Location: [facility] OR [address] [place] [address];  Service: Transplant;  Laterality: N/A;    LIVER TRANSPLANT N/A 1/17/2023    Procedure: TRANSPLANT, LIVER;  Surgeon: Juan Pablo Kimbrough MD;  Location: [facility] OR [address] [place] [address];  Service: Transplant;   Laterality: N/A;    OOPHORECTOMY Right     OPEN hysterectomy      removed uterus and bilateral fallopian tubes and LEFT ovary stayed in place    ROTATOR CUFF REPAIR Right     HOANG-EN-Y PROCEDURE  2023    Procedure: CREATION, ANASTOMOSIS, HOANG-EN-Y;  Surgeon: Juan Pablo Kimbrough MD;  Location: [facility] OR [address] [place] [address];  Service: Transplant;;    TONSILLECTOMY         Review of patient's allergies indicates:   Allergen Reactions    Levofloxacin Hives and Shortness Of Breath    Sulfa (sulfonamide antibiotics) Rash       No current facility-administered medications on file prior to encounter.     Current Outpatient Medications on File Prior to Encounter   Medication Sig    furosemide (LASIX) 20 MG tablet Take 40 mg by mouth once daily.    [DISCONTINUED] albuterol (PROVENTIL/VENTOLIN HFA) 90 mcg/actuation inhaler SMARTSI Puff(s) Via Inhaler Every 4 Hours PRN    [DISCONTINUED] biotin 1 mg Cap Take 1 mg by mouth once daily.    [DISCONTINUED] cyanocobalamin, vitamin B-12, 50 mcg tablet Take 1 tablet by mouth once daily.    [DISCONTINUED] hydrOXYzine HCL (ATARAX) 25 MG tablet Take 25 mg by mouth 3 (three) times daily.    [DISCONTINUED] lactulose (CHRONULAC) 10 gram/15 mL solution SMARTSIG:15 Milliliter(s) By Mouth 3 Times Daily    [DISCONTINUED] loratadine-pseudoephedrine  mg (CLARITIN-D 24-HOUR)  mg per 24 hr tablet Take 1 tablet by mouth once daily.    [DISCONTINUED] MG-PLUS-PROTEIN 133 mg tablet Take 1 tablet by mouth once daily.    [DISCONTINUED] spironolactone (ALDACTONE) 50 MG tablet Take 100 mg by mouth once daily.    [DISCONTINUED] vitamin E, dl,tocopheryl acet, (VITAMIN E, DL, ACETATE,) 45 mg (100 unit) Cap Take 1 tablet by mouth once daily.    [DISCONTINUED] ergocalciferol (ERGOCALCIFEROL) 50,000 unit Cap Take 50,000 Units by mouth every 7 days.    [DISCONTINUED] fluticasone propionate (FLOVENT HFA) 220 mcg/actuation inhaler Inhale 1 puff into the lungs daily as needed.    [DISCONTINUED]  ondansetron (ZOFRAN) 4 MG tablet Take 4 mg by mouth daily as needed.     Family History       Problem Relation (Age of Onset)    Arrhythmia Brother    Depression Father    Heart disease Father, Brother    Hyperlipidemia Father    Hypertension Father          Tobacco Use    Smoking status: Former     Packs/day: 1.00     Types: Cigarettes     Quit date:      Years since quittin.1    Smokeless tobacco: Never   Substance and Sexual Activity    Alcohol use: Not Currently    Drug use: Not Currently    Sexual activity: Yes     Partners: Male     Review of Systems   Constitutional: Negative for chills and fever.   HENT:  Negative for congestion and sore throat.    Eyes:  Negative for blurred vision and double vision.   Cardiovascular:  Positive for leg swelling. Negative for chest pain and palpitations.   Respiratory:  Negative for cough and sputum production.    Endocrine: Negative for polydipsia and polyphagia.   Musculoskeletal:  Negative for back pain and myalgias.   Gastrointestinal:  Negative for bloating, diarrhea, nausea and vomiting.   Genitourinary:  Negative for flank pain, frequency and hematuria.   Neurological:  Negative for numbness and weakness.   Psychiatric/Behavioral:  Negative for altered mental status. The patient is not nervous/anxious.    Objective:     Vital Signs (Most Recent):  Temp: 97.7 °F (36.5 °C) (23)  Pulse: 89 (23)  Resp: 14 (23)  BP: (!) 103/57 (23)  SpO2: 100 % (23)   Vital Signs (24h Range):  Temp:  [97.7 °F (36.5 °C)-98.5 °F (36.9 °C)] 97.7 °F (36.5 °C)  Pulse:  [] 89  Resp:  [14-18] 14  SpO2:  [94 %-100 %] 100 %  BP: ()/(50-59) 103/57     Weight: 90.5 kg (199 lb 10 oz)  Body mass index is 33.22 kg/m².    SpO2: 100 %         Intake/Output Summary (Last 24 hours) at 2023 1116  Last data filed at 2023 0532  Gross per 24 hour   Intake 1170 ml   Output 3240 ml   Net -2070 ml       Lines/Drains/Airways        Peripherally Inserted Central Catheter Line  Duration             PICC Double Lumen 01/24/23 0957 right basilic 7 days              Drain  Duration                  Closed/Suction Drain 01/23/23 1649 Medial RUQ Bulb 19 Fr. 7 days                    Physical Exam  HENT:      Head: Normocephalic.      Nose: Nose normal.      Mouth/Throat:      Mouth: Mucous membranes are moist.   Eyes:      General: Scleral icterus present.      Pupils: Pupils are equal, round, and reactive to light.   Cardiovascular:      Rate and Rhythm: Normal rate.   Pulmonary:      Effort: Pulmonary effort is normal.   Abdominal:      General: Abdomen is flat.   Musculoskeletal:         General: Normal range of motion.      Cervical back: Normal range of motion.      Right lower leg: Edema present.      Left lower leg: Edema present.   Skin:     General: Skin is warm.      Capillary Refill: Capillary refill takes less than 2 seconds.   Neurological:      General: No focal deficit present.      Mental Status: She is alert and oriented to person, place, and time.   Psychiatric:         Mood and Affect: Mood normal.       Significant Labs: All pertinent lab results from the last 24 hours have been reviewed.    Significant Imaging:  Imaging reviewed   99

## 2023-10-12 LAB
EXT ALBUMIN: 4.2
EXT ALKALINE PHOSPHATASE: 50
EXT ALT: 10
EXT AST: 11
EXT BASOPHIL%: 0.19
EXT BILIRUBIN TOTAL: 0.9
EXT BUN: 22
EXT CALCIUM: 10
EXT CHLORIDE: 105
EXT CMV DNA QUANT. BY PCR: NOT DETECTED
EXT CO2: 27
EXT CREATININE: 0.97 MG/DL
EXT EOSINOPHIL%: 1.49
EXT GLUCOSE: 85
EXT HEMATOCRIT: 38.9
EXT HEMOGLOBIN: 13.1
EXT HEP B QUANT: NOT DETECTED
EXT HEP B S AB: REACTIVE
EXT LYMPH%: 17.7
EXT MAGNESIUM: 1.9
EXT MONOCYTES%: 5.75
EXT PLATELETS: 107
EXT POTASSIUM: 4.3
EXT PROTEIN TOTAL: 6.6
EXT SEGS%: 74.87
EXT SODIUM: 140 MMOL/L
EXT TACROLIMUS LVL: 5.6
EXT WBC: 3.4

## 2023-10-13 ENCOUNTER — TELEPHONE (OUTPATIENT)
Dept: TRANSPLANT | Facility: CLINIC | Age: 58
End: 2023-10-13
Payer: COMMERCIAL

## 2023-10-13 ENCOUNTER — PATIENT MESSAGE (OUTPATIENT)
Dept: TRANSPLANT | Facility: CLINIC | Age: 58
End: 2023-10-13
Payer: COMMERCIAL

## 2023-10-13 NOTE — TELEPHONE ENCOUNTER
Pt notified via portal of stable labs and that no medication changes are needed. Repeat labs due 11/6/23 per protocol.   ----- Message from Kg Regalado MD sent at 10/12/2023  7:37 PM CDT -----  Liver tests are stable. No changes in her immunosuppression. Please continue to monitor labs per transplant protocol.

## 2023-10-28 ENCOUNTER — PATIENT MESSAGE (OUTPATIENT)
Dept: TRANSPLANT | Facility: CLINIC | Age: 58
End: 2023-10-28
Payer: COMMERCIAL

## 2023-11-20 ENCOUNTER — PATIENT MESSAGE (OUTPATIENT)
Dept: TRANSPLANT | Facility: CLINIC | Age: 58
End: 2023-11-20
Payer: COMMERCIAL

## 2023-11-20 LAB
EXT ALBUMIN: 4.5
EXT ALKALINE PHOSPHATASE: 66
EXT ALT: 16
EXT AST: 12
EXT BASOPHIL%: 0.27
EXT BILIRUBIN TOTAL: 0.7
EXT BUN: 25
EXT CALCIUM: 10.3
EXT CHLORIDE: 105
EXT CO2: 27
EXT CREATININE: 1.1 MG/DL
EXT EOSINOPHIL%: 1.3
EXT GLUCOSE: 82
EXT HEMATOCRIT: 39.9
EXT HEMOGLOBIN: 13.6
EXT LYMPH%: 24.53
EXT MAGNESIUM: 1.9
EXT MONOCYTES%: 5.16
EXT PLATELETS: 106
EXT POTASSIUM: 4.6
EXT PROTEIN TOTAL: 7.4
EXT SEGS%: 68.74
EXT SODIUM: 140 MMOL/L
EXT TACROLIMUS LVL: 21.1
EXT WBC: 3.3

## 2023-11-27 ENCOUNTER — TELEPHONE (OUTPATIENT)
Dept: TRANSPLANT | Facility: CLINIC | Age: 58
End: 2023-11-27
Payer: COMMERCIAL

## 2023-11-27 NOTE — TELEPHONE ENCOUNTER
Pt notified via portal of stable labs and that no medication changes are needed. Repeat labs due 12/4/23. Reminded pt that no medications should be taken on the morning of labs until after labs are drawn.   ----- Message from Kg Regalado MD sent at 11/26/2023  5:47 PM CST -----  Liver tests are stable. Tac high. Noted not a trough. Please repeat labs in 2-3 weeks.

## 2023-11-29 ENCOUNTER — PATIENT MESSAGE (OUTPATIENT)
Dept: TRANSPLANT | Facility: CLINIC | Age: 58
End: 2023-11-29
Payer: COMMERCIAL

## 2023-12-01 LAB
EXT ALBUMIN: 4.4
EXT ALKALINE PHOSPHATASE: 67
EXT ALT: 17
EXT AST: 11
EXT BASOPHIL%: 0.37
EXT BILIRUBIN TOTAL: 0.7
EXT BUN: 28
EXT CALCIUM: 10
EXT CHLORIDE: 107
EXT CO2: 26
EXT CREATININE: 0.94 MG/DL
EXT EGFR NO RACE VARIABLE: 61
EXT GLUCOSE: 107
EXT HEMATOCRIT: 37.7
EXT HEMOGLOBIN: 12.7
EXT LYMPH%: 20.85
EXT MONOCYTES%: 6.57
EXT PLATELETS: 101
EXT POTASSIUM: 4.5
EXT PROTEIN TOTAL: 6.9
EXT SEGS%: 70.14
EXT SODIUM: 141 MMOL/L
EXT TACROLIMUS LVL: 7.2
EXT WBC: 2.9

## 2023-12-12 ENCOUNTER — PATIENT MESSAGE (OUTPATIENT)
Dept: TRANSPLANT | Facility: CLINIC | Age: 58
End: 2023-12-12
Payer: COMMERCIAL

## 2023-12-12 DIAGNOSIS — Z94.4 LIVER TRANSPLANTED: ICD-10-CM

## 2023-12-12 RX ORDER — TACROLIMUS 1 MG/1
1 CAPSULE ORAL EVERY 12 HOURS
Qty: 180 CAPSULE | Refills: 3 | Status: SHIPPED | OUTPATIENT
Start: 2023-12-12 | End: 2024-12-11

## 2023-12-12 NOTE — TELEPHONE ENCOUNTER
Pt advised of dose change and need to repeat labs.  ----- Message from Kg Regalado MD sent at 12/11/2023  7:50 PM CST -----  Liver tests are stable. Please decrease tac to 1/1 and repeat labs in 2 weeks and then monthly x2 to monitor for rejection.

## 2024-01-03 ENCOUNTER — TELEPHONE (OUTPATIENT)
Dept: TRANSPLANT | Facility: CLINIC | Age: 59
End: 2024-01-03
Payer: COMMERCIAL

## 2024-01-03 ENCOUNTER — PATIENT MESSAGE (OUTPATIENT)
Dept: TRANSPLANT | Facility: CLINIC | Age: 59
End: 2024-01-03
Payer: COMMERCIAL

## 2024-01-03 NOTE — TELEPHONE ENCOUNTER
Pt advised via portal that labs are due for Dr. Regalado. Requested she go tomorrow or Monday.  ----- Message from Shanna Mueller sent at 1/3/2024 11:24 AM CST -----  Regarding: Patient advice  Contact: [phone]            Name of Caller:  Nurse at [facility]     Contact Preference:   [phone]     Nature of Call:  Called to give update that pt did not take labs

## 2024-01-09 ENCOUNTER — TELEPHONE (OUTPATIENT)
Dept: TRANSPLANT | Facility: CLINIC | Age: 59
End: 2024-01-09
Payer: COMMERCIAL

## 2024-01-09 ENCOUNTER — PATIENT MESSAGE (OUTPATIENT)
Dept: TRANSPLANT | Facility: CLINIC | Age: 59
End: 2024-01-09
Payer: COMMERCIAL

## 2024-01-09 LAB
EXT ALBUMIN: 4.4
EXT ALKALINE PHOSPHATASE: 83
EXT ALT: 27
EXT AST: 17
EXT BASOPHIL%: 0.39
EXT BILIRUBIN TOTAL: 0.8
EXT BUN: 29
EXT CALCIUM: 9.5
EXT CHLORIDE: 102
EXT CO2: 26
EXT CREATININE: 1.1 MG/DL
EXT EGFR NO RACE VARIABLE: 51
EXT EOSINOPHIL%: 1.17
EXT GLUCOSE: 126
EXT HEMATOCRIT: 40.2
EXT HEMOGLOBIN: 13.7
EXT LYMPH%: 20.14
EXT MONOCYTES%: 5.48
EXT PLATELETS: 107
EXT POTASSIUM: 4.3
EXT PROTEIN TOTAL: 7.1
EXT SEGS%: 72.82
EXT SODIUM: 136 MMOL/L
EXT TACROLIMUS LVL: 4.9
EXT WBC: 3.7

## 2024-01-09 NOTE — TELEPHONE ENCOUNTER
Notified that pt's local lab, [facility], used an old lab order to draw her transplant labs so they did not draw the ordered HBV DNA that we ordered.    Pt advised. HBV DNA lab order emailed to pt. Requested she go back this week.

## 2024-01-12 ENCOUNTER — TELEPHONE (OUTPATIENT)
Dept: TRANSPLANT | Facility: CLINIC | Age: 59
End: 2024-01-12
Payer: COMMERCIAL

## 2024-01-12 ENCOUNTER — PATIENT MESSAGE (OUTPATIENT)
Dept: TRANSPLANT | Facility: CLINIC | Age: 59
End: 2024-01-12
Payer: COMMERCIAL

## 2024-01-12 LAB — EXT HEP B QUANT: NOT DETECTED

## 2024-01-12 NOTE — TELEPHONE ENCOUNTER
Pt advised of labs results and the no medication changes needed. Requested repeat labs 2/19/24. Pt to let me know if she plans to transfer her care to [facility].   Also advised pt that she needs a liver transplant ultrasound. Emailed her an order.  ----- Message from Kg Regalado MD sent at 1/11/2024 12:57 PM CST -----  Liver tests are stable. No changes in her immunosuppression. Please continue to monitor labs per transplant protocol.

## 2024-01-12 NOTE — TELEPHONE ENCOUNTER
Pt advised   ----- Message from Kg Regalado MD sent at 1/12/2024  8:09 AM CST -----  Looks like there's a significant amount of steatosis (fat) in her new liver. We can see fatty liver disease come back after transplant. Recommend diet, weight loss, and control of medical problems like high blood pressure, diabetes, and high cholesterol. We typically recommend losing 5-10% body weight over 1 year.  ----- Message -----  From: Danya Cason RN  Sent: 1/12/2024   8:00 AM CST  To: Kg Regalado MD    In her [employer] messages, she sent me a MRI report. Can you review it? She is having an EGD next week.    Danya

## 2024-02-06 ENCOUNTER — TELEPHONE (OUTPATIENT)
Dept: TRANSPLANT | Facility: CLINIC | Age: 59
End: 2024-02-06
Payer: COMMERCIAL

## 2024-02-06 ENCOUNTER — PATIENT MESSAGE (OUTPATIENT)
Dept: TRANSPLANT | Facility: CLINIC | Age: 59
End: 2024-02-06
Payer: COMMERCIAL

## 2024-02-06 NOTE — TELEPHONE ENCOUNTER
Pt advised  ----- Message from Kg Regalado MD sent at 1/12/2024  8:09 AM CST -----  Looks like there's a significant amount of steatosis (fat) in her new liver. We can see fatty liver disease come back after transplant. Recommend diet, weight loss, and control of medical problems like high blood pressure, diabetes, and high cholesterol. We typically recommend losing 5-10% body weight over 1 year.  ----- Message -----  From: Danya Cason RN  Sent: 1/12/2024   8:00 AM CST  To: Kg Regalado MD    In her [employer] messages, she sent me a MRI report. Can you review it? She is having an EGD next week.    Danya

## 2024-02-07 ENCOUNTER — TELEPHONE (OUTPATIENT)
Dept: TRANSPLANT | Facility: CLINIC | Age: 59
End: 2024-02-07
Payer: COMMERCIAL

## 2024-02-07 NOTE — TELEPHONE ENCOUNTER
Patient will transfer fully to [facility]'s transplant program. This was patient's choice. Dr. Regalado aware and in agreement.

## 2024-03-21 DIAGNOSIS — Z94.4 S/P LIVER TRANSPLANT: ICD-10-CM

## 2024-03-24 RX ORDER — URSODIOL 250 MG/1
250 TABLET, FILM COATED ORAL 3 TIMES DAILY
Qty: 270 TABLET | Refills: 3 | Status: SHIPPED | OUTPATIENT
Start: 2024-03-24

## 2024-05-14 DIAGNOSIS — Z94.4 S/P LIVER TRANSPLANT: ICD-10-CM

## 2024-05-14 RX ORDER — ASPIRIN 81 MG/1
81 TABLET ORAL DAILY
Qty: 90 TABLET | Refills: 3 | Status: SHIPPED | OUTPATIENT
Start: 2024-05-14

## 2024-07-24 ENCOUNTER — PATIENT MESSAGE (OUTPATIENT)
Dept: HEPATOLOGY | Facility: CLINIC | Age: 59
End: 2024-07-24
Payer: COMMERCIAL

## (undated) DEVICE — ELECTRODE REM PLYHSV RETURN 9

## (undated) DEVICE — TIP YANKAUERS BULB NO VENT

## (undated) DEVICE — REMOVER STAPLE SKIN DISPOSABLE

## (undated) DEVICE — SPONGE LAP 18X18 PREWASHED

## (undated) DEVICE — DRESSING AQUACEL SACRAL 9 X 9

## (undated) DEVICE — NDL MONOPTY BIOPSY 14GX10CM

## (undated) DEVICE — SUT PROLENE 5-0 36IN C-1

## (undated) DEVICE — DRAIN CHANNEL ROUND 19FR

## (undated) DEVICE — DRAPE CORETEMP FLD WRM 56X62IN

## (undated) DEVICE — SUT SILK 3-0 SH DETACH 30IN

## (undated) DEVICE — SUTURE PROLENE 5-0

## (undated) DEVICE — SUT SILK 3-0 SH 18IN BLACK

## (undated) DEVICE — SET DECANTER MEDICHOICE

## (undated) DEVICE — CUTTER PROXIMATE BLUE 75MM

## (undated) DEVICE — KIT FIBRIN SEALANT EVICEL 5 ML

## (undated) DEVICE — DRESSING ADH ISLAND 3.6 X 14

## (undated) DEVICE — TUBE KANGAROO FEED 12FR 109CM

## (undated) DEVICE — CONTAINER SPECIMEN STRL 4OZ

## (undated) DEVICE — CONNECTOR TUBING STR 5 IN 1

## (undated) DEVICE — CATH EMBOLECTOMY 2F 60CM

## (undated) DEVICE — BLADE 4 INCH EDGE UN-INS

## (undated) DEVICE — SUT SILK 0 STRANDS 30IN BLK

## (undated) DEVICE — BOOT AIR FLUID HEEL ADLT STD

## (undated) DEVICE — SUT SILK 3-0 STRANDS 30IN

## (undated) DEVICE — TRAY CATH FOL SIL URIMTR 16FR

## (undated) DEVICE — SUT 3-0 12-18IN SILK

## (undated) DEVICE — HEMOSTAT SURGICEL NU-KNIT 6X9

## (undated) DEVICE — SUT PROLENE 3-0 SH DA 36 BL

## (undated) DEVICE — SET EXTENSION STERILE 30IN

## (undated) DEVICE — SUT PROLENE 4-0 SH BLU 36IN

## (undated) DEVICE — EVACUATOR WOUND BULB 100CC

## (undated) DEVICE — SET IV ADMIN 15DROP 3 CARESITE

## (undated) DEVICE — COVER CLAMP FABRIC RADIOPAQUE

## (undated) DEVICE — DRAPE STERI INSTRUMENT 1018

## (undated) DEVICE — SOL NS 1000CC

## (undated) DEVICE — PAD K-THERMIA 24IN X 60IN

## (undated) DEVICE — SUT 2-0 12-18IN SILK

## (undated) DEVICE — TOWEL OR XRAY WHITE 17X26IN

## (undated) DEVICE — DRESSING MEPORE 3.6 X 10

## (undated) DEVICE — PACK UNIVERSAL SPLIT II

## (undated) DEVICE — RELOAD PROXIMATE CUT BLUE 75MM

## (undated) DEVICE — BOOT SUTURE AID

## (undated) DEVICE — SUT CTD VICRYL VIL BR SH 27

## (undated) DEVICE — SUT 4-0 12-30IN SILK

## (undated) DEVICE — FIBRILLAR ABS HEMOSTAT 4X4

## (undated) DEVICE — REMOVER STAPLE SKIN STERILE

## (undated) DEVICE — HANDSET ARGON PLUS

## (undated) DEVICE — SUT ETHILON 3-0 PS2 18 BLK

## (undated) DEVICE — APPLICATOR CHLORAPREP ORN 26ML

## (undated) DEVICE — SUT 4-0 12-18IN SILK BLACK

## (undated) DEVICE — TUBE FEEDING PURPLE 8FRX40CM

## (undated) DEVICE — TOWEL OR DISP STRL BLUE 4/PK

## (undated) DEVICE — SUT 1 36IN PDS II VIO MONO

## (undated) DEVICE — SUT SILK 2-0 STRANDS 30IN

## (undated) DEVICE — SUT PROLENE 6-0 BV-1

## (undated) DEVICE — SEE MEDLINE ITEM 156901

## (undated) DEVICE — HANDPIECE ABC SNGL FUNC DISP

## (undated) DEVICE — CLIP SPRING 6MM

## (undated) DEVICE — GOWN SURGICAL X-LARGE

## (undated) DEVICE — SEE MEDLINE ITEM 156902

## (undated) DEVICE — ELECTRODE PAD DEFIB STERILE

## (undated) DEVICE — CATH URETHRAL RED RUBBER 18FR

## (undated) DEVICE — KIT COLLECTION E SWAB REGULAR

## (undated) DEVICE — SYR SLIP TIP 1CC

## (undated) DEVICE — SUT PDS BV 6-0

## (undated) DEVICE — TUBE FEED. 8FRX45IN FLEXI

## (undated) DEVICE — WIPE ESENTA BARR STNG FREE 3ML

## (undated) DEVICE — SUT PROLENE 6-0 BV-1 30IN

## (undated) DEVICE — KIT SAHARA DRAPE DRAW/LIFT

## (undated) DEVICE — STAPLER SKIN PROXIMATE WIDE